# Patient Record
Sex: FEMALE | Race: BLACK OR AFRICAN AMERICAN | Employment: UNEMPLOYED | URBAN - METROPOLITAN AREA
[De-identification: names, ages, dates, MRNs, and addresses within clinical notes are randomized per-mention and may not be internally consistent; named-entity substitution may affect disease eponyms.]

---

## 2017-01-06 ENCOUNTER — OFFICE VISIT (OUTPATIENT)
Dept: INTERNAL MEDICINE | Facility: CLINIC | Age: 47
End: 2017-01-06
Payer: MEDICARE

## 2017-01-06 VITALS
HEART RATE: 82 BPM | SYSTOLIC BLOOD PRESSURE: 128 MMHG | WEIGHT: 293 LBS | BODY MASS INDEX: 50.02 KG/M2 | HEIGHT: 64 IN | OXYGEN SATURATION: 97 % | TEMPERATURE: 98.6 F | DIASTOLIC BLOOD PRESSURE: 88 MMHG

## 2017-01-06 DIAGNOSIS — M17.0 PRIMARY OSTEOARTHRITIS OF BOTH KNEES: ICD-10-CM

## 2017-01-06 DIAGNOSIS — Z01.818 PREOP GENERAL PHYSICAL EXAM: Primary | ICD-10-CM

## 2017-01-06 DIAGNOSIS — E78.5 HYPERLIPIDEMIA LDL GOAL <130: ICD-10-CM

## 2017-01-06 DIAGNOSIS — Z98.84 GASTRIC BYPASS STATUS FOR OBESITY: ICD-10-CM

## 2017-01-06 DIAGNOSIS — G47.33 OSA (OBSTRUCTIVE SLEEP APNEA): ICD-10-CM

## 2017-01-06 DIAGNOSIS — K59.00 CONSTIPATION, UNSPECIFIED CONSTIPATION TYPE: ICD-10-CM

## 2017-01-06 DIAGNOSIS — I10 ESSENTIAL HYPERTENSION, BENIGN: ICD-10-CM

## 2017-01-06 DIAGNOSIS — E88.810 DYSMETABOLIC SYNDROME X: ICD-10-CM

## 2017-01-06 DIAGNOSIS — E61.1 IRON DEFICIENCY: ICD-10-CM

## 2017-01-06 DIAGNOSIS — K59.03 DRUG-INDUCED CONSTIPATION: ICD-10-CM

## 2017-01-06 DIAGNOSIS — E53.8 VITAMIN B12 DEFICIENCY WITHOUT ANEMIA: ICD-10-CM

## 2017-01-06 DIAGNOSIS — E55.9 VITAMIN D DEFICIENCY: ICD-10-CM

## 2017-01-06 DIAGNOSIS — E54 ASCORBIC ACID DEFICIENCY: ICD-10-CM

## 2017-01-06 LAB
ALBUMIN SERPL-MCNC: 3.3 G/DL (ref 3.4–5)
ALP SERPL-CCNC: 79 U/L (ref 40–150)
ALT SERPL W P-5'-P-CCNC: 23 U/L (ref 0–50)
ANION GAP SERPL CALCULATED.3IONS-SCNC: 9 MMOL/L (ref 3–14)
AST SERPL W P-5'-P-CCNC: 12 U/L (ref 0–45)
BILIRUB SERPL-MCNC: 0.3 MG/DL (ref 0.2–1.3)
BUN SERPL-MCNC: 27 MG/DL (ref 7–30)
CALCIUM SERPL-MCNC: 9.4 MG/DL (ref 8.5–10.1)
CHLORIDE SERPL-SCNC: 101 MMOL/L (ref 94–109)
CO2 SERPL-SCNC: 27 MMOL/L (ref 20–32)
CREAT SERPL-MCNC: 0.98 MG/DL (ref 0.52–1.04)
ERYTHROCYTE [DISTWIDTH] IN BLOOD BY AUTOMATED COUNT: 12.9 % (ref 10–15)
GFR SERPL CREATININE-BSD FRML MDRD: 61 ML/MIN/1.7M2
GLUCOSE SERPL-MCNC: 102 MG/DL (ref 70–99)
HCT VFR BLD AUTO: 39.4 % (ref 35–47)
HGB BLD-MCNC: 13.3 G/DL (ref 11.7–15.7)
LDLC SERPL DIRECT ASSAY-MCNC: 168 MG/DL
MCH RBC QN AUTO: 35.8 PG (ref 26.5–33)
MCHC RBC AUTO-ENTMCNC: 33.8 G/DL (ref 31.5–36.5)
MCV RBC AUTO: 106 FL (ref 78–100)
PLATELET # BLD AUTO: 295 10E9/L (ref 150–450)
POTASSIUM SERPL-SCNC: 4.6 MMOL/L (ref 3.4–5.3)
PROT SERPL-MCNC: 7.6 G/DL (ref 6.8–8.8)
RBC # BLD AUTO: 3.72 10E12/L (ref 3.8–5.2)
SODIUM SERPL-SCNC: 137 MMOL/L (ref 133–144)
T4 FREE SERPL-MCNC: 1.04 NG/DL (ref 0.76–1.46)
TSH SERPL DL<=0.05 MIU/L-ACNC: 3.34 MU/L (ref 0.4–4)
VIT B12 SERPL-MCNC: 395 PG/ML (ref 193–986)
WBC # BLD AUTO: 11.8 10E9/L (ref 4–11)

## 2017-01-06 PROCEDURE — 82306 VITAMIN D 25 HYDROXY: CPT | Performed by: INTERNAL MEDICINE

## 2017-01-06 PROCEDURE — 82728 ASSAY OF FERRITIN: CPT | Performed by: INTERNAL MEDICINE

## 2017-01-06 PROCEDURE — 83540 ASSAY OF IRON: CPT | Performed by: INTERNAL MEDICINE

## 2017-01-06 PROCEDURE — 99000 SPECIMEN HANDLING OFFICE-LAB: CPT | Performed by: INTERNAL MEDICINE

## 2017-01-06 PROCEDURE — 84443 ASSAY THYROID STIM HORMONE: CPT | Performed by: INTERNAL MEDICINE

## 2017-01-06 PROCEDURE — 82607 VITAMIN B-12: CPT | Performed by: INTERNAL MEDICINE

## 2017-01-06 PROCEDURE — 85027 COMPLETE CBC AUTOMATED: CPT | Performed by: INTERNAL MEDICINE

## 2017-01-06 PROCEDURE — 93000 ELECTROCARDIOGRAM COMPLETE: CPT | Performed by: INTERNAL MEDICINE

## 2017-01-06 PROCEDURE — 36415 COLL VENOUS BLD VENIPUNCTURE: CPT | Performed by: INTERNAL MEDICINE

## 2017-01-06 PROCEDURE — 83721 ASSAY OF BLOOD LIPOPROTEIN: CPT | Performed by: INTERNAL MEDICINE

## 2017-01-06 PROCEDURE — 99215 OFFICE O/P EST HI 40 MIN: CPT | Performed by: INTERNAL MEDICINE

## 2017-01-06 PROCEDURE — 83550 IRON BINDING TEST: CPT | Performed by: INTERNAL MEDICINE

## 2017-01-06 PROCEDURE — 82180 ASSAY OF ASCORBIC ACID: CPT | Mod: 90 | Performed by: INTERNAL MEDICINE

## 2017-01-06 PROCEDURE — 84439 ASSAY OF FREE THYROXINE: CPT | Performed by: INTERNAL MEDICINE

## 2017-01-06 PROCEDURE — 80053 COMPREHEN METABOLIC PANEL: CPT | Performed by: INTERNAL MEDICINE

## 2017-01-06 RX ORDER — AMOXICILLIN 250 MG
1-2 CAPSULE ORAL 2 TIMES DAILY
Qty: 120 TABLET | Status: ON HOLD | OUTPATIENT
Start: 2017-01-06 | End: 2019-01-29

## 2017-01-06 RX ORDER — POLYETHYLENE GLYCOL 3350 17 G/17G
1 POWDER, FOR SOLUTION ORAL DAILY
Qty: 1 BOTTLE | Status: SHIPPED
Start: 2017-01-06 | End: 2019-12-12

## 2017-01-06 RX ORDER — ROSUVASTATIN CALCIUM 40 MG/1
40 TABLET, COATED ORAL DAILY
Qty: 90 TABLET | Status: SHIPPED
Start: 2017-01-06 | End: 2018-09-27

## 2017-01-06 RX ORDER — CYANOCOBALAMIN 1000 UG/ML
1 INJECTION, SOLUTION INTRAMUSCULAR; SUBCUTANEOUS
Qty: 30 ML | Refills: 5 | Status: SHIPPED | OUTPATIENT
Start: 2017-01-06 | End: 2017-02-07

## 2017-01-06 RX ORDER — HYDROCODONE BITARTRATE AND ACETAMINOPHEN 10; 325 MG/1; MG/1
1 TABLET ORAL 3 TIMES DAILY
COMMUNITY
End: 2018-02-12

## 2017-01-06 NOTE — PATIENT INSTRUCTIONS
** FOLLOW UP PLAN**:    PLEASE SCHEDULE OFFICE VISIT 1 MONTH FROM TODAY TO FOLLOW UP POST OPERATIVELY        YOU MAY CONTACT THE CLINIC IF ANY QUESTIONS OR CONCERNS -[phone] OR VIA [facility]           Before Your Surgery      Call your surgeon if there is any change in your health. This includes signs of a cold or flu (such as a sore throat, runny nose, cough, rash or fever).    Do not smoke, drink alcohol or take over the counter medicine (unless your surgeon or primary care doctor tells you to) for the 24 hours before and after surgery.    If you take prescribed drugs: Follow your doctor s orders about which medicines to take and which to stop until after surgery.    Eating and drinking prior to surgery: follow the instructions from your surgeon    Take a shower or bath the night before surgery. Use the soap your surgeon gave you to gently clean your skin. If you do not have soap from your surgeon, use your regular soap. Do not shave or scrub the surgery site.  Wear clean pajamas and have clean sheets on your bed.

## 2017-01-06 NOTE — NURSING NOTE
"Chief Complaint   Patient presents with     Pre-Op Exam     01/18/17 weight loss surgery - Dr Amaro at [facility] - needs Rx for a shower chair        Initial /88 mmHg  Pulse 82  Temp(Src) 98.6  F (37  C) (Oral)  Ht   Wt   SpO2 97%  LMP   Breastfeeding? No Estimated body mass index is 51.54 kg/(m^2) as calculated from the following:    Height as of 7/16/15: 5' 4\" (1.626 m).    Weight as of 5/24/16: 300 lb 6.4 oz (136.261 kg)..  BP completed using cuff size: regular - took BP on right wrist  RISA Vicente LPN    "

## 2017-01-06 NOTE — PROGRESS NOTES
[address] [address]  [phone]  Dept: [phone]    PRE-OP EVALUATION:  Today's date: 2017    Christina Fletcher (: 1970) presents for pre-operative evaluation assessment as requested by Dr. Amaro.  She requires evaluation and anesthesia risk assessment prior to undergoing surgery/procedure for treatment of obesity .  Proposed procedure: bariatric surgery    Date of Surgery/ Procedure: 17  Time of Surgery/ Procedure: to be determined  Hospital/Surgical Facility: [facility]  Fax number for surgical facility: [phone]  Primary Physician: Abdullahi Saini  Type of Anesthesia Anticipated: General    Patient has a Health Care Directive or Living Will:  NO    1. NO - Do you have a history of heart attack, stroke, stent, bypass or surgery on an artery in the head, neck, heart or legs?  2. YES - DO YOU EVER HAVE ANY PAIN OR DISCOMFORT IN YOUR CHEST? sometimes  3. NO - Do you have a history of  Heart Failure?  4. YES - ARE YOUR TROUBLED BY SHORTNESS OF BREATH WHEN WALKING ON THE LEVEL, UP A SLIGHT HILL OR AT NIGHT? Slight hill  5. NO - Do you currently have a cold, bronchitis or other respiratory infection?  6. NO - Do you have a cough, shortness of breath or wheezing?  7. NO - Do you sometimes get pains in the calves of your legs when you walk?  8. YES - DO YOU OR ANYONE IN YOUR FAMILY HAVE PREVIOUS HISTORY OF BLOOD CLOTS? PE - takes Coumadin  9. NO - Do you or does anyone in your family have a serious bleeding problem such as prolonged bleeding following surgeries or cuts?  10. YES - HAVE YOU EVER HAD PROBLEMS WITH ANEMIA OR BEEN TOLD TO TAKE IRON PILLS? Dr Saini  11. YES - HAVE YOU HAD ANY ABNORMAL BLOOD LOSS SUCH AS BLACK, TARRY OR BLOODY STOOLS, OR ABNORMAL VAGINAL BLEEDING? Vaginal bleeding - irregular periods  12. NO - Have you ever had a blood transfusion?  13. YES - HAVE YOU OR ANY OF YOUR RELATIVES EVER HAD PROBLEMS WITH  ANESTHESIA? Patient - feels that she woke up during a surgery once  14. YES - DO YOU HAVE SLEEP APNEA, EXCESSIVE SNORING OR DAYTIME DROWSINESS? Sleep apnea  15. NO - Do you have any prosthetic heart valves?  16. NO - Do you have prosthetic joints?  17. NO - Is there any chance that you may be pregnant?      HPI:                                                      Brief HPI related to upcoming procedure: Christina has struggled with weight issues over many years since 2009 after injuring her knees and being unable to exercise. She is scheduled for surgery on 01/18/17.      HYPERTENSION - Patient has longstanding history of mod-severe HTN , currently denies any symptoms referable to elevated blood pressure. Specifically denies chest pain, palpitations, dyspnea, orthopnea, PND or peripheral edema. Blood pressure readings have been in normal range. Current medication regimen is as listed below. Patient denies any side effects of medication.                                                                                                                                                                                          .  HYPERLIPIDEMIA - Patient has a long history of significant Hyperlipidemia requiring medication for treatment with recent poor control. Patient reports no problems or side effects with the medication.                                                                                                                                                       .    MEDICAL HISTORY:                                                      Patient Active Problem List    Diagnosis Date Noted     Eczema 05/24/2016     Priority: Medium     Hyponatremia 05/24/2016     Priority: Medium     Chronic kidney disease, stage III (moderate) 05/24/2016     Priority: Medium     Neuropathy (H) 05/24/2016     Priority: Medium     Morbid obesity due to excess calories (H) 05/24/2016     Priority: Medium     Vitamin C deficiency  05/24/2016     Priority: Medium     Osteoarthritis of both knees, unspecified osteoarthritis type 05/24/2016     Priority: Medium     Hypomagnesemia 05/24/2016     Priority: Medium     Essential hypertension with goal blood pressure less than 130/80 05/24/2016     Priority: Medium     Eczema, unspecified type 05/24/2016     Priority: Medium     Major depressive disorder, recurrent episode, moderate (H) 05/24/2016     Priority: Medium     Moderate major depression (H) 01/13/2015     Priority: Medium     Hyperlipidemia with target LDL less than 130 01/13/2015     Priority: Medium     Major depressive disorder, single episode, moderate (H) 11/20/2014     Priority: Medium     BMI 50.0-59.9, adult (H) 08/08/2014     Priority: Medium     KAROLINA on CPAP 05/02/2014     Priority: Medium     KAROLINA (obstructive sleep apnea) 03/25/2014     Priority: Medium     Hyperlipidemia LDL goal <130 03/25/2014     Priority: Medium     Knee pain 10/30/2013     Priority: Medium     OA (osteoarthritis) of knee 09/05/2013     Priority: Medium     Ascorbic acid deficiency 09/05/2013     Priority: Medium     Diagnosis updated by automated process. Provider to review and confirm.       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 Provider to review and confirm.       Iron deficiency 11/20/2012     Priority: Medium     Vitamin D deficiency 11/20/2012     Priority: Medium     Right facial swelling 11/20/2012     Priority: Medium     FILLING FELL OUT, FACIALPAIN , FEVER, AND CHILLS, SUSPECT DENTAL  ABSCESS       Elevated MCV 11/01/2012     Priority: Medium     Fibroids 11/01/2012     Priority: Medium     Fatigue 11/01/2012     Priority: Medium     Menorrhagia 11/01/2012     Priority: Medium     HTN (hypertension) 11/01/2012     Priority: Medium     Gastric bypass status for obesity 11/01/2012     Priority: Medium     CARDIOVASCULAR SCREENING; LDL GOAL LESS THAN 160 11/01/2012     Priority: Medium     Lower extremity edema 08/15/2011     Priority: Medium     Obesity 05/06/2010     Priority: Medium     Chronic constipation 05/06/2010     Priority: Medium     GERD (gastroesophageal reflux disease) 05/06/2010     Priority: Medium     Adjustment disorder with depressed mood 04/04/2008     Priority: Medium     Other disorder of menstruation and other abnormal bleeding from female genital tract 01/17/2008     Priority: Medium     Cramping, hx of fibroids, US___       Female genital symptoms 04/30/2007     Priority: Medium     Problem list name updated by automated process. Provider to review        Past Medical History   Diagnosis Date     Intramural leiomyoma of uterus      ABDOMINAL PAIN OTHER SPEC SITE 4/4/2008     Adjustment disorder with depressed mood 4/4/2008     Obesity 5/6/2010     Other disorder of menstruation and other abnormal bleeding from female genital tract 1/17/2008     Chronic constipation 5/6/2010     GERD (gastroesophageal reflux disease) 5/6/2010     Lower extremity edema 8/15/2011     Osteoarthritis, knee      ACL (anterior cruciate ligament) tear 2007     Past Surgical History   Procedure Laterality Date     C laparoscopic gastric restrictive px, w/gastric bypass/ irene-en-y, < 150cm  2001     Lap Banding      Debride assoc fx/dislo skin/mus/bone  1990's     Infected - Right Femur     Hysteroscopy,ablation endometrium  2008     Current Outpatient Prescriptions   Medication Sig Dispense Refill     HYDROcodone-acetaminophen (NORCO)  MG per tablet Take 1 tablet by mouth 3 times daily        Emollient (AVEENO ACTIVE JOEL SKIN RELIEF) CREA Externally apply 1 Application topically 2 times daily INDICATION: ECZEMA 1 Bottle PRN     Nebivolol HCl 20 MG TABS Take 20 mg by mouth daily INDICATION: TO LOWER BLOOD PRESSURE 90 tablet 2     simvastatin (ZOCOR) 20 MG tablet Take 1 tablet (20 mg) by mouth At Bedtime INDICATION: TO LOWER CHOLESTEROL AND TO HELP REDUCE RISK OF HEART ATTACK AND STROKE 90 tablet prn     chlorthalidone (HYGROTON) 50 MG tablet Take 1 tablet (50 mg) by mouth daily INDICATION: TO LOWER BLOOD PRESSURE 30 tablet 1     magnesium oxide (MAG-OX) 400 MG tablet INDICATION: TO TREAT LOW MAGNESIUM, TAKE 1 PILL TWICE DAILY 180 tablet 3     cyanocobalamin (VITAMIN B12) 1000 MCG/ML injection Inject 1 mL (1,000 mcg) into the muscle every 14 days INDICATION: FOR VITAMIN B12 SUPPLEMENTATION 30 mL 5     spironolactone (ALDACTONE) 100 MG tablet Take 1 tablet (100 mg) by mouth 2 times daily INDICATION: TO LOWER BLOOD PRESSURE 180 tablet 3     Calcium Carb-Cholecalciferol (CALCIUM 1000 + D) 1000-800 MG-UNIT TABS Take 1 tablet by mouth daily TAKE WITH FOOD, FOR BONE HEALTH AND FOR VITAMIN D SUPPLEMENTATION 100 tablet PRN     buPROPion (WELLBUTRIN XL) 300 MG 24 hr tablet Take 1 tablet (300 mg) by mouth every morning INDICATION: TO CONTROL SYMPTOMS OF DEPRESSION 90 tablet 3     senna-docusate (SENOKOT-S;PERICOLACE) 8.6-50 MG per tablet Take 1-2 tablets by mouth 2 times daily INDICATION: CONSTIPATION, TO ACHIEVE 1-2 SOFT BMs PER  tablet prn     tiZANidine (ZANAFLEX) 2 MG tablet   2     warfarin (COUMADIN) 5 MG tablet Take 1.5 tablets (7.5 mg) by mouth daily Take 1 to 1 1/2 tablets by mouth daily as directed by the anticoagulation clinicINDICATION: ANTICOAGULATION 180 tablet 0     cholecalciferol (VITAMIN D3) 52265 UNITS capsule TAKE ONE CAP 3 X A MONTH ON THE 1ST, 10TH AND 20TH OF EACH MONTH, FOR VITAMIN D DEFICIENCY 60 capsule 0     ORDER FOR DME Equipment being ordered: thermometer 1 Device 0     Ascorbic  "Acid Buffered (VITAMIN C EFFERVESCENT) PDEF Take 1 packet by mouth 2 times daily (before meals) EMERGEN- C, INDICATION: VITAMIN C SUPPLEMENTATION AND TO AID ABSORPTION OF IRON SUPPLEMENT 90 g PRN     ORDER FOR DME Equipment being ordered: Automatic blood pressure/pulse monitor 1 Units NA     triamcinolone (KENALOG) 0.1 % cream Apply sparingly once or twice per day as needed to affected area until the skin is better, then stop 30 g 0     diclofenac (VOLTAREN) 1 % GEL Apply topically 4 times daily       Syringe/Needle, Disp, (BD ECLIPSE SYRINGE) 25G X 5/8\" 3 ML MISC 1,000 mcg See Admin Instructions AS DIRECTED FOR B12 SHOTS 20 each 11     ORDER FOR DME Equipment being ordered: shower chair, knee brace 1 Device 0     B Complex Vitamins (B COMPLEX 50) TABS Take 1 tablet by mouth daily. INDICATION: VITAMIN SUPPLEMENT 100 tablet PRN     Multiple Vitamin (MULTI-VITAMIN DAILY PO) Take 2 tablets by mouth daily        celecoxib (CELEBREX) 200 MG capsule Take 1 capsule (200 mg) by mouth 2 times daily as needed for pain 60 capsule prn     HYDROcodone-acetaminophen (NORCO) 7.5-325 MG per tablet Take 1 tablet by mouth every 6 hours as needed for pain 42 tablet 0     [DISCONTINUED] hydrALAZINE (APRESOLINE) 100 MG TABS Take 1 tablet (100 mg) by mouth 2 times daily INDICATION: TO LOWER BLOOD PRESSURE 60 tablet prn     [DISCONTINUED] metoprolol (TOPROL-XL) 100 MG 24 hr tablet Take 1 tablet (100 mg) by mouth 2 times daily INDICATION:TO LOWER BLOOD PRESSURE AND TO PREVENT HEART DISEASE 60 tablet PRN     [DISCONTINUED] Spironolactone-HCTZ 50-50 MG TABS Take 1 tablet by mouth every morning INDICATION: TO LOWER BLOOD PRESSURE 30 tablet PRN     ORDER FOR DME Equipment being ordered: wheeled walker with seat 1 Device 0     OTC products: None, except as noted above and Aspirin    Allergies   Allergen Reactions     Hydralazine Shortness Of Breath     WITH ASSOCIATED NAUSEA AND VOMITING     Penicillin [Esters]       Latex Allergy: NO    Social " History   Substance Use Topics     Smoking status: Never Smoker      Smokeless tobacco: Never Used     Alcohol Use: 0.0 oz/week     0 Standard drinks or equivalent per week      Comment: occ      History   Drug Use No       REVIEW OF SYSTEMS:                                                    14 point ROS negative except for OA issues, states that she needs a shower chair.      EXAM:                                                    /88 mmHg  Pulse 82  Temp(Src) 98.6  F (37  C) (Oral)  Ht   Wt   SpO2 97%  LMP   Breastfeeding? No    GENERAL APPEARANCE: healthy, alert and no distress     EYES: EOMI,- PERRL     HENT: ear canals and TM's normal and nose and mouth without ulcers or lesions     NECK: no adenopathy, no asymmetry, masses, or scars and thyroid normal to palpation     RESP: lungs clear to auscultation - no rales, rhonchi or wheezes     CV: regular rates and rhythm, normal S1 S2, no S3 or S4 and no murmur, click or rub -     ABDOMEN: tender L flank, no rebound     MS: extremities normal- no gross deformities noted, no evidence of inflammation in joints, FROM in all extremities.     SKIN: no suspicious lesions or rashes     NEURO: Normal strength and tone, sensory exam grossly normal, mentation intact and speech normal     PSYCH: mentation appears normal. and affect normal/bright     LYMPHATICS: No axillary, cervical, inguinal, or supraclavicular nodes    DIAGNOSTICS:                                                      EKG: appears normal, NSR, normal axis, normal intervals, no acute ST/T changes c/w ischemia, no LVH by voltage criteria, unchanged from previous tracings  Labs Drawn and in Process:   Unresulted Labs Ordered in the Past 30 Days of this Admission     No orders found from 11/8/2016 to 1/7/2017.          Recent Labs   Lab Test  05/24/16   1209  03/25/16   1229  07/16/15   1345   05/18/15   1517 02/23/15  12/22/14   03/25/14   1713   HGB   --    --    --    --   13.6   --    --    --     --   13.8   PLT   --    --    --    --   266   --    --    --    --   307   INR   --    --    --    --    --   4.0*   --   3.5*   < >   --    NA  135   --   135   < >  132*   --    < >   --    < >  139   POTASSIUM  4.6   --   4.2   < >  4.4   --    < >   --    < >  3.8   CR  1.14*   --   1.00   < >  0.96   --    < >   --    < >  0.74   A1C   --   5.9   --    --    --    --    --    --    --    --     < > = values in this interval not displayed.        IMPRESSION:                                                    Reason for surgery/procedure: RADHA-EN-Y SURGERY  Diagnosis/reason for consult: OBTAIN PREOPERATIVE MEDICAL CLEARANCE AND TO ASSESS CARDIAC RISK    The proposed surgical procedure is considered INTERMEDIATE risk.    REVISED CARDIAC RISK INDEX  The patient has the following serious cardiovascular risks for perioperative complications such as (MI, PE, VFib and 3  AV Block):  No serious cardiac risks  INTERPRETATION: 1 risks: Class II (low risk - 0.9% complication rate)    The patient has the following additional risks for perioperative complications:  No identified additional risks    No diagnosis found.    RECOMMENDATIONS:                                                      --Consult hospital rounder / IM to assist post-op medical management    Obstructive Sleep Apnea (or suspected sleep apnea)  Patient is to bring their home CPAP with them on the day of surgery      --Patient is to take all scheduled medications on the day of surgery EXCEPT for modifications listed below.    APPROVAL GIVEN to proceed with proposed procedure, without further diagnostic evaluation       Signed Electronically by: Abdullahi Saini MD    Copy of this evaluation report is provided to requesting physician.    [facility] Preop Guidelines

## 2017-01-06 NOTE — MR AVS SNAPSHOT
After Visit Summary   1/6/2017    Christina Fletcher    MRN: [id number]           Patient Information     Date Of Birth          1970        Visit Information        Provider Department      1/6/2017 2:00 PM Abdullahi Saini MD [facility]        Today's Diagnoses     Preop general physical exam    -  1     Vitamin D deficiency         Gastric bypass status for obesity         Hyperlipidemia LDL goal <130         Dysmetabolic syndrome X         Vitamin B12 deficiency without anemia         Iron deficiency         Ascorbic acid deficiency         Essential hypertension, benign         Drug-induced constipation         Constipation, unspecified constipation type         KAROLINA (obstructive sleep apnea)           Care Instructions      ** FOLLOW UP PLAN**:    PLEASE SCHEDULE OFFICE VISIT 1 MONTH FROM TODAY TO FOLLOW UP POST OPERATIVELY        YOU MAY CONTACT THE CLINIC IF ANY QUESTIONS OR CONCERNS -[phone] OR VIA MoviePass           Before Your Surgery      Call your surgeon if there is any change in your health. This includes signs of a cold or flu (such as a sore throat, runny nose, cough, rash or fever).    Do not smoke, drink alcohol or take over the counter medicine (unless your surgeon or primary care doctor tells you to) for the 24 hours before and after surgery.    If you take prescribed drugs: Follow your doctor s orders about which medicines to take and which to stop until after surgery.    Eating and drinking prior to surgery: follow the instructions from your surgeon    Take a shower or bath the night before surgery. Use the soap your surgeon gave you to gently clean your skin. If you do not have soap from your surgeon, use your regular soap. Do not shave or scrub the surgery site.  Wear clean pajamas and have clean sheets on your bed.         Follow-ups after your visit        Who to contact     If you have questions or need follow up information about today's  "clinic visit or your schedule please contact [facility] directly at [phone].  Normal or non-critical lab and imaging results will be communicated to you by MyChart, letter or phone within 4 business days after the clinic has received the results. If you do not hear from us within 7 days, please contact the clinic through [employer] or phone. If you have a critical or abnormal lab result, we will notify you by phone as soon as possible.  Submit refill requests through [employer] or call your pharmacy and they will forward the refill request to us. Please allow 3 business days for your refill to be completed.          Additional Information About Your Visit        MyChart Information     Be[employer]ance lets you send messages to your doctor, view your test results, renew your prescriptions, schedule appointments and more. To sign up, go to [email or web address] . Click on \"Log in\" on the left side of the screen, which will take you to the Welcome page. Then click on \"Sign up Now\" on the right side of the page.     You will be asked to enter the access code listed below, as well as some personal information. Please follow the directions to create your username and password.     Your access code is: [email or web address]  Expires: 2017  3:47 PM     Your access code will  in 90 days. If you need help or a new code, please call your [facility] clinic or [phone].        Care EveryWhere ID     This is your Care EveryWhere ID. This could be used by other organizations to access your [facility] medical records  [id number]        Your Vitals Were     Pulse Temperature Pulse Oximetry Breastfeeding?          82 98.6  F (37  C) (Oral) 97% No         Blood Pressure from Last 3 Encounters:   17 128/88   16 124/88   16 128/60    Weight from Last 3 Encounters:   16 300 lb 6.4 oz (136.261 kg)   01/13/15 291 lb (131.997 kg)   14 295 lb (133.811 kg)              We Performed the " Following     CBC with platelets     Comprehensive metabolic panel     EKG 12-lead complete w/read - Clinics     Ferritin     Iron and iron binding capacity     LDL cholesterol direct     T4 FREE     TSH     Vitamin B12     Vitamin C     Vitamin D Deficiency          Today's Medication Changes          These changes are accurate as of: 1/6/17  3:47 PM.  If you have any questions, ask your nurse or doctor.               Start taking these medicines.        Dose/Directions    polyethylene glycol powder   Commonly known as:  MIRALAX   Used for:  Drug-induced constipation   Started by:  Abdullahi Saini MD        Dose:  1 capful   Take 17 g (1 capful) by mouth daily INDICATION: CONSTIPATION, TO ACHIEVE 1-2 SOFT BMs PER DAY   Quantity:  1 Bottle   Refills:  PRN       rosuvastatin 40 MG tablet   Commonly known as:  CRESTOR   Used for:  Hyperlipidemia LDL goal <130   Replaces:  simvastatin 20 MG tablet   Started by:  Abdullahi Saini MD        Dose:  40 mg   Take 1 tablet (40 mg) by mouth daily INDICATION: TO LOWER CHOLESTEROL AND TO HELP REDUCE RISK OF HEART ATTACK AND STROKE   Quantity:  90 tablet   Refills:  PRN         Stop taking these medicines if you haven't already. Please contact your care team if you have questions.     celecoxib 200 MG capsule   Commonly known as:  celeBREX   Stopped by:  Abdullahi Saini MD           simvastatin 20 MG tablet   Commonly known as:  ZOCOR   Replaced by:  rosuvastatin 40 MG tablet   Stopped by:  Abdullahi Saini MD                Where to get your medicines      These medications were sent to [facility] [address] [address]     Phone:  [phone]    - cholecalciferol 66930 UNITS capsule  - cyanocobalamin 1000 MCG/ML injection  - polyethylene glycol powder  - rosuvastatin 40 MG tablet  - senna-docusate 8.6-50 MG per tablet             Primary Care Provider Office Phone # Fax #    Abdullahi Saini  [phone]       [facility] [address] [facility] [address]        Thank you!     Thank you for choosing [facility]  for your care. Our goal is always to provide you with excellent care. Hearing back from our patients is one way we can continue to improve our services. Please take a few minutes to complete the written survey that you may receive in the mail after your visit with us. Thank you!             Your Updated Medication List - Protect others around you: Learn how to safely use, store and throw away your medicines at www.disposemymeds.org.          This list is accurate as of: 1/6/17  3:47 PM.  Always use your most recent med list.                   Brand Name Dispense Instructions for use    AVEENO ACTIVE JOEL SKIN RELIEF Crea     1 Bottle    Externally apply 1 Application topically 2 times daily INDICATION: ECZEMA       B COMPLEX 50 Tabs     100 tablet    Take 1 tablet by mouth daily. INDICATION: VITAMIN SUPPLEMENT       buPROPion 300 MG 24 hr tablet    WELLBUTRIN XL    90 tablet    Take 1 tablet (300 mg) by mouth every morning INDICATION: TO CONTROL SYMPTOMS OF DEPRESSION       Calcium Carb-Cholecalciferol 1000-800 MG-UNIT Tabs    CALCIUM 1000 + D    100 tablet    Take 1 tablet by mouth daily TAKE WITH FOOD, FOR BONE HEALTH AND FOR VITAMIN D SUPPLEMENTATION       chlorthalidone 50 MG tablet    HYGROTON    30 tablet    Take 1 tablet (50 mg) by mouth daily INDICATION: TO LOWER BLOOD PRESSURE       cholecalciferol 92394 UNITS capsule    VITAMIN D3    60 capsule    TAKE ONE CAP 3 X A MONTH ON THE 1ST, 10TH AND 20TH OF EACH MONTH, FOR VITAMIN D DEFICIENCY       cyanocobalamin 1000 MCG/ML injection    VITAMIN B12    30 mL    Inject 1 mL (1,000 mcg) into the muscle every 14 days INDICATION: FOR VITAMIN B12 SUPPLEMENTATION       * NORCO  MG per tablet   Generic drug:  HYDROcodone-acetaminophen      Take 1 tablet by mouth 3 times daily       *  "HYDROcodone-acetaminophen 7.5-325 MG per tablet    NORCO    42 tablet    Take 1 tablet by mouth every 6 hours as needed for pain       magnesium oxide 400 MG tablet    MAG-OX    180 tablet    INDICATION: TO TREAT LOW MAGNESIUM, TAKE 1 PILL TWICE DAILY       MULTI-VITAMIN DAILY PO      Take 2 tablets by mouth daily       Nebivolol HCl 20 MG Tabs     90 tablet    Take 20 mg by mouth daily INDICATION: TO LOWER BLOOD PRESSURE       * order for DME     1 Device    Equipment being ordered: shower chair, knee brace       * order for DME     1 Device    Equipment being ordered: wheeled walker with seat       * order for DME     1 Units    Equipment being ordered: Automatic blood pressure/pulse monitor       order for DME     1 Device    Equipment being ordered: thermometer       polyethylene glycol powder    MIRALAX    1 Bottle    Take 17 g (1 capful) by mouth daily INDICATION: CONSTIPATION, TO ACHIEVE 1-2 SOFT BMs PER DAY       rosuvastatin 40 MG tablet    CRESTOR    90 tablet    Take 1 tablet (40 mg) by mouth daily INDICATION: TO LOWER CHOLESTEROL AND TO HELP REDUCE RISK OF HEART ATTACK AND STROKE       senna-docusate 8.6-50 MG per tablet    SENOKOT-S;PERICOLACE    120 tablet    Take 1-2 tablets by mouth 2 times daily INDICATION: CONSTIPATION, TO ACHIEVE 1-2 SOFT BMs PER DAY       spironolactone 100 MG tablet    ALDACTONE    180 tablet    Take 1 tablet (100 mg) by mouth 2 times daily INDICATION: TO LOWER BLOOD PRESSURE       Syringe/Needle (Disp) 25G X 5/8\" 3 ML Misc    BD ECLIPSE SYRINGE    20 each    1,000 mcg See Admin Instructions AS DIRECTED FOR B12 SHOTS       tiZANidine 2 MG tablet    ZANAFLEX         triamcinolone 0.1 % cream    KENALOG    30 g    Apply sparingly once or twice per day as needed to affected area until the skin is better, then stop       VITAMIN C EFFERVESCENT Pdef     90 g    Take 1 packet by mouth 2 times daily (before meals) EMERGEN- C, INDICATION: VITAMIN C SUPPLEMENTATION AND TO AID ABSORPTION " OF IRON SUPPLEMENT       VOLTAREN 1 % Gel topical gel   Generic drug:  diclofenac      Apply topically 4 times daily       warfarin 5 MG tablet    COUMADIN    180 tablet    Take 1.5 tablets (7.5 mg) by mouth daily Take 1 to 1 1/2 tablets by mouth daily as directed by the anticoagulation clinicINDICATION: ANTICOAGULATION       * Notice:  This list has 5 medication(s) that are the same as other medications prescribed for you. Read the directions carefully, and ask your doctor or other care provider to review them with you.

## 2017-01-07 LAB
FERRITIN SERPL-MCNC: 170 NG/ML (ref 8–252)
IRON SATN MFR SERPL: 14 % (ref 15–46)
IRON SERPL-MCNC: 53 UG/DL (ref 35–180)
TIBC SERPL-MCNC: 381 UG/DL (ref 240–430)

## 2017-01-09 LAB — DEPRECATED CALCIDIOL+CALCIFEROL SERPL-MC: 42 UG/L (ref 20–75)

## 2017-01-10 ENCOUNTER — TELEPHONE (OUTPATIENT)
Dept: INTERNAL MEDICINE | Facility: CLINIC | Age: 47
End: 2017-01-10

## 2017-01-10 LAB — VIT C SERPL-MCNC: 29 MG/DL

## 2017-01-10 NOTE — PROGRESS NOTES
Quick Note:    Results reviewed follow up visit scheduled. Will discuss results at next office visit  ______

## 2017-01-10 NOTE — TELEPHONE ENCOUNTER
Reason for Call:  Other has questions about her lab  Pt going into hospital 1/18/17    Detailed comments has questions about her labs before going into hosptal    Phone Number Patient can be reached at [phone]    Best Time asap     Can we leave a detailed message on this number? YES    Call taken on 1/10/2017 at 11:36 AM by Vidhya Burton

## 2017-01-13 ENCOUNTER — TELEPHONE (OUTPATIENT)
Dept: INTERNAL MEDICINE | Facility: CLINIC | Age: 47
End: 2017-01-13

## 2017-01-13 DIAGNOSIS — Z79.01 LONG TERM CURRENT USE OF ANTICOAGULANT THERAPY: Primary | ICD-10-CM

## 2017-01-13 NOTE — TELEPHONE ENCOUNTER
Received a call form pt, asking to have INR done today.  Pt having surgery next Wed, was informed by PCP at preop, that [facility] will determine what pt should do about warfarin.    Pt stated that PCP told her INR would be done with preop labs- no INR was ordered.    Pt INR checked in [facility] nearly 2 yrs ago- Feb 2015- reading 4.0, advised return for INR 2 weeks, has never come back.    Asked pt who has been managing INR's, she said  She has not had INR checks done.  Asked who has been giving her rx's for warfarin. Noted last Rx ordered to pharm March 10, 2016 #180- 0 refills.    Pt stated, has not missed her meds, has been taking them all along????. When asked her who has been giving her the prescription, she asked if I do not believe her, she said I was accusing her of lying...  Informed pt, that due to [facility] has not managed her for nearly 2 yrs, that the information as to how many days to hold warfarin will need to come from PCP, and asked that she contact the staff in clinic, asked to have her call main number or attempted to inform her could transfer, but pt kept interrupting when [facility] nurse tried to give direction.   After 3 attempts asking to have pt listen, informed her that this conversation needed to end and she need to call back to main number of clinic to get advisement of anticoagulation..    This message is a FYI message, no response needed.

## 2017-01-14 ENCOUNTER — TELEPHONE (OUTPATIENT)
Dept: NURSING | Facility: CLINIC | Age: 47
End: 2017-01-14

## 2017-01-14 ENCOUNTER — TELEPHONE (OUTPATIENT)
Dept: INTERNAL MEDICINE | Facility: CLINIC | Age: 47
End: 2017-01-14

## 2017-01-14 NOTE — TELEPHONE ENCOUNTER
Patient that has a lot of issues re compliance. Was of the opinion that she was being seen at [facility] so that bridging anticoagulation could be coordinated through [facility]. INR not being ordered was an oversight but patient was supposed to have followed up a week ago. Patient is off coumadin as of today. Given the prescription fill history not even sure that patient is taking coumadin. So will have her come in on Monday to have INR checked and proceed from there.

## 2017-01-14 NOTE — TELEPHONE ENCOUNTER
Clinic Action Needed:  Yes, callback  FNA Triage Call  Presenting Problem:    Christina is having Surgery on Wednesday, Jan 18th.  Christina was prescribed Miralax for pre-surgery and Christina states that Miralax will not have her system cleared out by Wednesday.    Christina also has concerns about coumadin.  Please phone Christina on Monday, Jan 16th.      Routed to:  SHREYAS Eaton RN/FNA

## 2017-01-14 NOTE — TELEPHONE ENCOUNTER
Call Type: Triage Call    Presenting Problem: Christina is having Surgery on Wednesday, Jan 18th.  Christina was prescribed Miralax for pre-surgery and Christina states that  Miralax will not have her system cleared out by Wednesday.    Christina  also has concerns about coumadin.  Please phone Christina on Monday, Jan 16th.  Triage Note:  Guideline Title: No Guideline - Advice Per Reference (Adult)  Recommended Disposition: Call Provider Immediately  Original Inclination: Wanted to speak with a nurse  Override Disposition:  Intended Action: Call PCP/HCP  Physician Contacted: No  CALL PROVIDER IMMEDIATELY ?  YES  SEE ED IMMEDIATELY ? NO  ACTIVATE  ? NO  Physician Instructions:  Care Advice:

## 2017-01-16 DIAGNOSIS — Z79.01 LONG TERM CURRENT USE OF ANTICOAGULANT THERAPY: ICD-10-CM

## 2017-01-16 LAB — INR PPP: 1.04 (ref 0.86–1.14)

## 2017-01-16 PROCEDURE — 85610 PROTHROMBIN TIME: CPT | Performed by: INTERNAL MEDICINE

## 2017-01-16 PROCEDURE — 36415 COLL VENOUS BLD VENIPUNCTURE: CPT | Performed by: INTERNAL MEDICINE

## 2017-01-24 ENCOUNTER — ANTICOAGULATION THERAPY VISIT (OUTPATIENT)
Dept: ANTICOAGULATION | Facility: CLINIC | Age: 47
End: 2017-01-24
Payer: MEDICARE

## 2017-01-24 DIAGNOSIS — I26.99 PULMONARY EMBOLI (H): Primary | ICD-10-CM

## 2017-01-24 PROBLEM — Z79.01 LONG-TERM (CURRENT) USE OF ANTICOAGULANTS: Status: ACTIVE | Noted: 2017-01-24

## 2017-01-24 LAB — INR POINT OF CARE: 1.2 (ref 0.86–1.14)

## 2017-01-24 PROCEDURE — 85610 PROTHROMBIN TIME: CPT | Mod: QW

## 2017-01-24 PROCEDURE — 99207 ZZC NO CHARGE NURSE ONLY: CPT

## 2017-01-24 PROCEDURE — 36416 COLLJ CAPILLARY BLOOD SPEC: CPT

## 2017-01-24 RX ORDER — WARFARIN SODIUM 5 MG/1
5 TABLET ORAL DAILY
Qty: 35 TABLET | Refills: 0 | Status: SHIPPED | OUTPATIENT
Start: 2017-01-24 | End: 2017-03-30

## 2017-01-24 NOTE — PROGRESS NOTES
"  ANTICOAGULATION FOLLOW-UP CLINIC VISIT    Patient Name:  Christina Fletcher  Date:  1/24/2017  Contact Type:  Face to Face    SUBJECTIVE:     Patient Findings     Positives Hospitalization (pt had gastric bypass surgery on 1/18/17. When asked pt how surgery went ab [place], how she was feeling, appetite, pain level, pt stated, I am here to have [facility] get me started on coumadin.. Initiating pt's dose today, will start with \"new start protocal\".  )           OBJECTIVE    INR PROTIME   Date Value Ref Range Status   01/24/2017 1.2* 0.86 - 1.14 Final       ASSESSMENT / PLAN  INR assessment THER pt has not been on warfarin, should be at 1.2   Recheck INR In: 2 DAYS    INR Location Clinic      Anticoagulation Summary as of 1/24/2017     INR goal 2.0-3.0   Selected INR 1.2! (1/24/2017)   Maintenance plan No maintenance plan   Full instructions 1/24: 7.5 mg; 1/25: 7.5 mg; Otherwise No maintenance plan   Next INR check 1/26/2017   Target end date     Indications   Long-term (current) use of anticoagulants [Z79.01] [Z79.01]  PE (pulmonary embolism) [I26.99]         Anticoagulation Episode Summary     INR check [facility]    Preferred lab     Send INR reminders to  [facility]    Comments             See the Encounter Report to view Anticoagulation Flowsheet and Dosing Calendar (Go to Encounters tab in chart review, and find the Anticoagulation Therapy Visit)        Pinky Avendano RN                 "

## 2017-01-26 ENCOUNTER — ANTICOAGULATION THERAPY VISIT (OUTPATIENT)
Dept: ANTICOAGULATION | Facility: CLINIC | Age: 47
End: 2017-01-26
Payer: MEDICARE

## 2017-01-26 DIAGNOSIS — Z79.01 LONG-TERM (CURRENT) USE OF ANTICOAGULANTS: Primary | ICD-10-CM

## 2017-01-26 DIAGNOSIS — I26.99 PULMONARY EMBOLISM (H): ICD-10-CM

## 2017-01-26 LAB — INR POINT OF CARE: 2.2 (ref 0.86–1.14)

## 2017-01-26 PROCEDURE — 36416 COLLJ CAPILLARY BLOOD SPEC: CPT

## 2017-01-26 PROCEDURE — 85610 PROTHROMBIN TIME: CPT | Mod: QW

## 2017-01-26 PROCEDURE — 99207 ZZC NO CHARGE NURSE ONLY: CPT

## 2017-01-26 NOTE — PROGRESS NOTES
ANTICOAGULATION FOLLOW-UP CLINIC VISIT    Patient Name:  Christina Fletcher  Date:  1/26/2017  Contact Type:  Face to Face    SUBJECTIVE:     Patient Findings     Positives Initiation of therapy           OBJECTIVE    INR PROTIME   Date Value Ref Range Status   01/26/2017 2.2* 0.86 - 1.14 Final       ASSESSMENT / PLAN  INR assessment THER    Recheck INR In: 4 DAYS    INR Location Clinic      Anticoagulation Summary as of 1/26/2017     INR goal 2.0-3.0   Selected INR 2.2 (1/26/2017)   Maintenance plan No maintenance plan   Full instructions 1/26: 5 mg; 1/27: 5 mg; 1/28: 5 mg; 1/29: 5 mg; Otherwise No maintenance plan   Next INR check 1/30/2017   Target end date     Indications   Long-term (current) use of anticoagulants [Z79.01] [Z79.01]  PE (pulmonary embolism) [I26.99]         Anticoagulation Episode Summary     INR check [facility]    Preferred lab     Send INR reminders to  [facility]    Comments             See the Encounter Report to view Anticoagulation Flowsheet and Dosing Calendar (Go to Encounters tab in chart review, and find the Anticoagulation Therapy Visit)    Dosage adjustment made based on physician directed care plan.    Emily Mar RN

## 2017-02-02 ENCOUNTER — TELEPHONE (OUTPATIENT)
Dept: ANTICOAGULATION | Facility: CLINIC | Age: 47
End: 2017-02-02

## 2017-02-02 NOTE — TELEPHONE ENCOUNTER
Pt has missed last [facility] appointment.  Called placed to pt and left detailed message to follow up in the [facility].

## 2017-02-07 ENCOUNTER — OFFICE VISIT (OUTPATIENT)
Dept: INTERNAL MEDICINE | Facility: CLINIC | Age: 47
End: 2017-02-07
Payer: MEDICARE

## 2017-02-07 VITALS
DIASTOLIC BLOOD PRESSURE: 82 MMHG | BODY MASS INDEX: 49.55 KG/M2 | OXYGEN SATURATION: 96 % | WEIGHT: 288.8 LBS | TEMPERATURE: 98.9 F | SYSTOLIC BLOOD PRESSURE: 116 MMHG | HEART RATE: 85 BPM

## 2017-02-07 DIAGNOSIS — I10 ESSENTIAL HYPERTENSION WITH GOAL BLOOD PRESSURE LESS THAN 130/80: ICD-10-CM

## 2017-02-07 DIAGNOSIS — E54 ASCORBIC ACID DEFICIENCY: ICD-10-CM

## 2017-02-07 DIAGNOSIS — E53.8 VITAMIN B12 DEFICIENCY WITHOUT ANEMIA: ICD-10-CM

## 2017-02-07 DIAGNOSIS — E61.1 IRON DEFICIENCY: ICD-10-CM

## 2017-02-07 DIAGNOSIS — Z98.84 GASTRIC BYPASS STATUS FOR OBESITY: Primary | ICD-10-CM

## 2017-02-07 DIAGNOSIS — Z79.01 LONG TERM CURRENT USE OF ANTICOAGULANT THERAPY: ICD-10-CM

## 2017-02-07 DIAGNOSIS — E55.9 VITAMIN D DEFICIENCY: ICD-10-CM

## 2017-02-07 DIAGNOSIS — E83.42 HYPOMAGNESEMIA: ICD-10-CM

## 2017-02-07 DIAGNOSIS — E53.1 PYRIDOXINE DEFICIENCY: ICD-10-CM

## 2017-02-07 PROCEDURE — 99215 OFFICE O/P EST HI 40 MIN: CPT | Performed by: INTERNAL MEDICINE

## 2017-02-07 RX ORDER — MAGNESIUM OXIDE 400 MG/1
TABLET ORAL
Qty: 180 TABLET | Refills: 3 | Status: SHIPPED
Start: 2017-02-07 | End: 2018-09-27

## 2017-02-07 RX ORDER — SPIRONOLACTONE 50 MG/1
50 TABLET, FILM COATED ORAL 2 TIMES DAILY
Qty: 180 TABLET | Refills: 3 | Status: SHIPPED | OUTPATIENT
Start: 2017-02-07 | End: 2018-09-27

## 2017-02-07 RX ORDER — SPIRONOLACTONE 100 MG/1
50 TABLET, FILM COATED ORAL 2 TIMES DAILY
Qty: 180 TABLET | Refills: 3 | Status: SHIPPED | OUTPATIENT
Start: 2017-02-07 | End: 2017-02-07

## 2017-02-07 NOTE — NURSING NOTE
"Chief Complaint   Patient presents with     Hospital F/U       Initial /82 mmHg  Pulse 85  Temp(Src) 98.9  F (37.2  C) (Oral)  Wt 288 lb 12.8 oz (130.999 kg)  SpO2 96% Estimated body mass index is 49.55 kg/(m^2) as calculated from the following:    Height as of 1/6/17: 5' 4\" (1.626 m).    Weight as of this encounter: 288 lb 12.8 oz (130.999 kg).  Medication Reconciliation: complete    "

## 2017-02-07 NOTE — PROGRESS NOTES
SUBJECTIVE:                                                    Christina Fletcher is a 47 year old female who presents to clinic today for the following health issues:          Hospital Follow-up Visit:    Hospital/Nursing Home/IP Rehab Facility: [facility]  Date of Admission: 1-18-17  Date of Discharge: 1-20-17  Reason(s) for Admission: Bariatric Surgery             Problems taking medications regularly:  None       Medication changes since discharge: None       Problems adhering to non-medication therapy:  None    Summary of hospitalization:  Discharge summary unavailable  Diagnostic Tests/Treatments reviewed.  Follow up needed: none  Other Healthcare Providers Involved in Patient s Care:         None  Update since discharge: improved. She has lost 20 lbs since her last visit.    Post Discharge Medication Reconciliation: discharge medications reconciled and changed, per note/orders (see AVS).  Plan of care communicated with patient     Coding guidelines for this visit:  Type of Medical   Decision Making Face-to-Face Visit       within 7 Days of discharge Face-to-Face Visit        within 14 days of discharge   Moderate Complexity [id number] [id number]   High Complexity 94070 49110            Other significant issues as outlined and addressed in the plan section of this note.      Problem list and histories reviewed & adjusted, as indicated.  Additional history: as documented    Labs reviewed in EPIC    ROS:  14 point ROS negative except as above      OBJECTIVE:                                                    /82  Pulse 85  Temp 98.9  F (37.2  C) (Oral)  Wt 288 lb 12.8 oz (131 kg)  SpO2 96%  BMI 49.57 kg/m2  Body mass index is 49.57 kg/(m^2).  GENERAL: healthy, alert and no distress  NECK: no adenopathy, no asymmetry, masses, or scars and thyroid normal to palpation  RESP: lungs clear to auscultation - no rales, rhonchi or wheezes  CV: regular rate and rhythm, normal S1 S2, no S3 or S4, no murmur, click or  rub, no peripheral edema and peripheral pulses strong  ABDOMEN: tenderness - mild generalized, and bowel sounds normal  MS: no gross musculoskeletal defects noted, no edema    Diagnostic Test Results:  Results for orders placed or performed in visit on 01/26/17   INR point of care   Result Value Ref Range    INR Protime 2.2 (A) 0.86 - 1.14        ASSESSMENT/PLAN:                                                    Hypertension; controlled   Associated with the following complications:    None   Plan:  Medications: see orders, diuretic dose decreased      DIAGNOSIS/PLAN:     ICD-10-CM    1. Gastric bypass status for obesity Z98.84 Cyanocobalamin (B-12 SUPER STRENGTH) 5000 MCG/ML LIQD     calcium-vitamin D-vitamin K (VIACTIV) 500-500-40 MG-UNT-MCG CHEW     Vitamin B6     Vitamin B1 whole blood     Vitamin D Deficiency     CBC with platelets     Ferritin     Vitamin C     Comprehensive metabolic panel     Vitamin B12     Folate     Vitamin B1 whole blood     Magnesium     Phosphorus    LAP BAND AND GASTRIC SLEEVE   2. Vitamin D deficiency E55.9 calcium-vitamin D-vitamin K (VIACTIV) 500-500-40 MG-UNT-MCG CHEW     cholecalciferol (VITAMIN D3) 22642 UNITS capsule     Vitamin D Deficiency   3. Hypomagnesemia E83.42 magnesium oxide (MAG-OX) 400 MG tablet     Magnesium   4. Essential hypertension with goal blood pressure less than 130/80 I10 spironolactone (ALDACTONE) 50 MG tablet     DISCONTINUED: spironolactone (ALDACTONE) 100 MG tablet    INCIDENTALLY MENTIONS THAT SHE HAS HAD NON-ACTIVITY RELATED CP   5. Vitamin B12 deficiency without anemia E53.8 Vitamin B12   6. Iron deficiency E61.1 CBC with platelets     Ferritin   7. Ascorbic acid deficiency E54 Vitamin C   8. 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 Long term current use of anticoagulant therapy Z79.01 INR     CANCELED: INR       SIGNIFICANT ISSUES RE The primary encounter diagnosis was Gastric bypass status for obesity. Diagnoses of Vitamin D deficiency,  "Hypomagnesemia, Essential hypertension with goal blood pressure less than 130/80, Vitamin B12 deficiency without anemia, Iron deficiency, Ascorbic acid deficiency, Pyridoxine deficiency, and Long term current use of anticoagulant therapy were also pertinent to this visit. AS NOTED AND ADDRESSED ABOVE   MEDS AND AND LABS AS ORDERED TO ADDRESS PREVIOUS AND CURRENT ABNORMAL INDICES    SEE PATIENT INSTRUCTION SECTION FOR FOLLOW UP PLAN    Liane IS TO CONTINUE OTHER TREATMENT REGIMEN/PLANS EXCEPT AS INDICATED    COMPLIANCE WITH MEDICATIONS DIET AND EXERCISE PLANS ENCOURAGED    DISCONTINUED MEDS:  Medications Discontinued During This Encounter   Medication Reason     Syringe/Needle, Disp, (BD ECLIPSE SYRINGE) 25G X 5/8\" 3 ML MISC      chlorthalidone (HYGROTON) 50 MG tablet      cyanocobalamin (VITAMIN B12) 1000 MCG/ML injection Reorder     Calcium Carb-Cholecalciferol (CALCIUM 1000 + D) 1000-800 MG-UNIT TABS Reorder     cholecalciferol (VITAMIN D3) 46391 UNITS capsule Reorder     magnesium oxide (MAG-OX) 400 MG tablet Reorder     spironolactone (ALDACTONE) 100 MG tablet Reorder     spironolactone (ALDACTONE) 100 MG tablet Reorder       CURRENT MED LIST WITH CHANGES AS NOTED BELOW:  Current Outpatient Prescriptions   Medication Sig Dispense Refill     Cyanocobalamin (B-12 SUPER STRENGTH) 5000 MCG/ML LIQD Place 1 mL under the tongue every morning FOR VITAMIN B12 SUPPLEMENTATION, PLEASE PLACE UNDER THE TONGUE 2 Bottle PRN     calcium-vitamin D-vitamin K (VIACTIV) 500-500-40 MG-UNT-MCG CHEW Take 1 tablet by mouth 3 times daily (with meals) INDICATION: FOR BONE AND BREAST HEALTH 100 tablet PRN     cholecalciferol (VITAMIN D3) 73214 UNITS capsule Take 1 capsule (50,000 Units) by mouth once a week TAKE FOR VITAMIN D DEFICIENCY 60 capsule 0     magnesium oxide (MAG-OX) 400 MG tablet INDICATION: TO TREAT LOW MAGNESIUM, TAKE 1 PILL TWICE DAILY 180 tablet 3     spironolactone (ALDACTONE) 50 MG tablet Take 1 tablet (50 mg) by mouth 2 " times daily INDICATION: TO LOWER BLOOD PRESSURE 180 tablet 3     warfarin (COUMADIN) 5 MG tablet Take 1 tablet (5 mg) by mouth daily 35 tablet 0     HYDROcodone-acetaminophen (NORCO)  MG per tablet Take 1 tablet by mouth 3 times daily       rosuvastatin (CRESTOR) 40 MG tablet Take 1 tablet (40 mg) by mouth daily INDICATION: TO LOWER CHOLESTEROL AND TO HELP REDUCE RISK OF HEART ATTACK AND STROKE 90 tablet PRN     polyethylene glycol (MIRALAX) powder Take 17 g (1 capful) by mouth daily INDICATION: CONSTIPATION, TO ACHIEVE 1-2 SOFT BMs PER DAY 1 Bottle PRN     senna-docusate (SENOKOT-S;PERICOLACE) 8.6-50 MG per tablet Take 1-2 tablets by mouth 2 times daily INDICATION: CONSTIPATION, TO ACHIEVE 1-2 SOFT BMs PER  tablet prn     order for DME Equipment being ordered: SHOWER CHAIR 1 Device 0     Emollient (AVEENO ACTIVE JOEL SKIN RELIEF) CREA Externally apply 1 Application topically 2 times daily INDICATION: ECZEMA 1 Bottle PRN     Nebivolol HCl 20 MG TABS Take 20 mg by mouth daily INDICATION: TO LOWER BLOOD PRESSURE 90 tablet 2     buPROPion (WELLBUTRIN XL) 300 MG 24 hr tablet Take 1 tablet (300 mg) by mouth every morning INDICATION: TO CONTROL SYMPTOMS OF DEPRESSION 90 tablet 3     warfarin (COUMADIN) 5 MG tablet Take 1.5 tablets (7.5 mg) by mouth daily Take 1 to 1 1/2 tablets by mouth daily as directed by the anticoagulation clinicINDICATION: ANTICOAGULATION 180 tablet 0     HYDROcodone-acetaminophen (NORCO) 7.5-325 MG per tablet Take 1 tablet by mouth every 6 hours as needed for pain 42 tablet 0     ORDER FOR DME Equipment being ordered: thermometer 1 Device 0     Ascorbic Acid Buffered (VITAMIN C EFFERVESCENT) PDEF Take 1 packet by mouth 2 times daily (before meals) EMERGEN- C, INDICATION: VITAMIN C SUPPLEMENTATION AND TO AID ABSORPTION OF IRON SUPPLEMENT 90 g PRN     ORDER FOR DME Equipment being ordered: Automatic blood pressure/pulse monitor 1 Units NA     triamcinolone (KENALOG) 0.1 % cream Apply  sparingly once or twice per day as needed to affected area until the skin is better, then stop 30 g 0     diclofenac (VOLTAREN) 1 % GEL Apply topically 4 times daily       ORDER FOR DME Equipment being ordered: wheeled walker with seat 1 Device 0     ORDER FOR DME Equipment being ordered: shower chair, knee brace 1 Device 0     B Complex Vitamins (B COMPLEX 50) TABS Take 1 tablet by mouth daily. INDICATION: VITAMIN SUPPLEMENT 100 tablet PRN     Multiple Vitamin (MULTI-VITAMIN DAILY PO) Take 2 tablets by mouth daily        tiZANidine (ZANAFLEX) 2 MG tablet   2     [DISCONTINUED] hydrALAZINE (APRESOLINE) 100 MG TABS Take 1 tablet (100 mg) by mouth 2 times daily INDICATION: TO LOWER BLOOD PRESSURE 60 tablet prn     [DISCONTINUED] metoprolol (TOPROL-XL) 100 MG 24 hr tablet Take 1 tablet (100 mg) by mouth 2 times daily INDICATION:TO LOWER BLOOD PRESSURE AND TO PREVENT HEART DISEASE 60 tablet PRN     [DISCONTINUED] Spironolactone-HCTZ 50-50 MG TABS Take 1 tablet by mouth every morning INDICATION: TO LOWER BLOOD PRESSURE 30 tablet PRN         Office visit time > 40 mins, greater than 50% of which was spent obtaining history, reviewing medications, counseling re compliance with treatment plan, discussion of treatment, follow up plans, and coordination of care.       Patient Instructions   ** FOLLOW UP PLAN**:    PLEASE SCHEDULE LABS WITH OFFICE VISIT 2 MONTHS FROM TODAY TO FOLLOW UP ON  Gastric bypass status for obesity  Vitamin D deficiency  Hypomagnesemia  Essential hypertension with goal blood pressure less than 130/80  Vitamin Deficiencies  OTHER MEDICAL ISSUES, AND TO REVIEW TEST RESULTS      BE SURE TO SCHEDULE YOUR LAB DRAW APPOINTMENT FOR AT LEAST 1 WEEK BEFORE YOUR NEXT VISIT        YOU MAY CONTACT THE CLINIC IF ANY QUESTIONS OR CONCERNS -[phone] OR VIA invino                 Abdullahi Saini MD  [facility]

## 2017-02-07 NOTE — MR AVS SNAPSHOT
After Visit Summary   2/7/2017    Christina Fletcher    MRN: [id number]           Patient Information     Date Of Birth          1970        Visit Information        Provider Department      2/7/2017 2:00 PM Abdullahi Saini MD [facility]        Today's Diagnoses     Gastric bypass status for obesity    -  1     Vitamin D deficiency         Hypomagnesemia         Essential hypertension with goal blood pressure less than 130/80         Vitamin B12 deficiency without anemia         Iron deficiency         Ascorbic acid deficiency         Pyridoxine deficiency         Long term current use of anticoagulant therapy           Care Instructions    ** FOLLOW UP PLAN**:    PLEASE SCHEDULE LABS WITH OFFICE VISIT 2 MONTHS FROM TODAY TO FOLLOW UP ON  Gastric bypass status for obesity  Vitamin D deficiency  Hypomagnesemia  Essential hypertension with goal blood pressure less than 130/80  Vitamin Deficiencies  OTHER MEDICAL ISSUES, AND TO REVIEW TEST RESULTS      BE SURE TO SCHEDULE YOUR LAB DRAW APPOINTMENT FOR AT LEAST 1 WEEK BEFORE YOUR NEXT VISIT        YOU MAY CONTACT THE CLINIC IF ANY QUESTIONS OR CONCERNS -[phone] OR VIA [facility]                   Follow-ups after your visit        Your next 10 appointments already scheduled     Feb 07, 2017  5:15 PM   [facility]tico[facility] with [facility]   [facility]    [address] [address] [phone]              Future tests that were ordered for you today     Open Future Orders        Priority Expected Expires Ordered    Vitamin B6 Routine 2/7/2017 8/7/2017 2/7/2017    Vitamin B1 whole blood Routine 2/7/2017 8/7/2017 2/7/2017    Vitamin D Deficiency Routine 2/7/2017 8/7/2017 2/7/2017    CBC with platelets Routine 2/7/2017 8/7/2017 2/7/2017    Ferritin Routine 2/7/2017 8/7/2017 2/7/2017    Vitamin C Routine 2/7/2017 8/7/2017  "2017    Comprehensive metabolic panel Routine 2017    Vitamin B12 Routine 2017    Folate Routine 2017    Vitamin B1 whole blood Routine 2017    Magnesium Routine 2017    Phosphorus Routine 2017            Who to contact     If you have questions or need follow up information about today's clinic visit or your schedule please contact [facility] directly at [phone].  Normal or non-critical lab and imaging results will be communicated to you by Cell[employer], letter or phone within 4 business days after the clinic has received the results. If you do not hear from us within 7 days, please contact the clinic through [employer] or phone. If you have a critical or abnormal lab result, we will notify you by phone as soon as possible.  Submit refill requests through [employer] or call your pharmacy and they will forward the refill request to us. Please allow 3 business days for your refill to be completed.          Additional Information About Your Visit        [employer] Information     Z[employer]ikka lets you send messages to your doctor, view your test results, renew your prescriptions, schedule appointments and more. To sign up, go to [email or web address] . Click on \"Log in\" on the left side of the screen, which will take you to the Welcome page. Then click on \"Sign up Now\" on the right side of the page.     You will be asked to enter the access code listed below, as well as some personal information. Please follow the directions to create your username and password.     Your access code is: [email or web address]  Expires: 2017  3:47 PM     Your access code will  in 90 days. If you need help or a new code, please call your [facility] or [phone].        Care EveryWhere ID     This is your Care EveryWhere ID. This could be used by other organizations to access your " [place] medical records  [id number]        Your Vitals Were     Pulse Temperature Pulse Oximetry             85 98.9  F (37.2  C) (Oral) 96%          Blood Pressure from Last 3 Encounters:   02/07/17 116/82   01/06/17 128/88   05/24/16 124/88    Weight from Last 3 Encounters:   02/07/17 288 lb 12.8 oz (130.999 kg)   01/06/17 310 lb (140.615 kg)   05/24/16 300 lb 6.4 oz (136.261 kg)              We Performed the Following     INR          Today's Medication Changes          These changes are accurate as of: 2/7/17  3:33 PM.  If you have any questions, ask your nurse or doctor.               Start taking these medicines.        Dose/Directions    calcium-vitamin D-vitamin K 500-500-40 MG-UNT-MCG Chew   Commonly known as:  VIACTIV   Used for:  Vitamin D deficiency, Gastric bypass status for obesity   Replaces:  Calcium Carb-Cholecalciferol 1000-800 MG-UNIT Tabs   Started by:  Abdullahi Saini MD        Dose:  1 tablet   Take 1 tablet by mouth 3 times daily (with meals) INDICATION: FOR BONE AND BREAST HEALTH   Quantity:  100 tablet   Refills:  PRN       Cyanocobalamin 5000 MCG/ML Liqd   Commonly known as:  B-12 SUPER STRENGTH   Used for:  Gastric bypass status for obesity   Replaces:  cyanocobalamin 1000 MCG/ML injection   Started by:  Abdullahi Saini MD        Dose:  1 mL   Place 1 mL under the tongue every morning FOR VITAMIN B12 SUPPLEMENTATION, PLEASE PLACE UNDER THE TONGUE   Quantity:  2 Bottle   Refills:  PRN       spironolactone 50 MG tablet   Commonly known as:  ALDACTONE   Used for:  Essential hypertension with goal blood pressure less than 130/80   Started by:  Abdullahi Saini MD        Dose:  50 mg   Take 1 tablet (50 mg) by mouth 2 times daily INDICATION: TO LOWER BLOOD PRESSURE   Quantity:  180 tablet   Refills:  3         These medicines have changed or have updated prescriptions.        Dose/Directions    cholecalciferol 56856 UNITS capsule   Commonly known as:  VITAMIN D3   This may have  "changed:    - how much to take  - how to take this  - when to take this  - additional instructions   Used for:  Vitamin D deficiency   Changed by:  Abdullahi Saini MD        Dose:  1 capsule   Take 1 capsule (50,000 Units) by mouth once a week TAKE FOR VITAMIN D DEFICIENCY   Quantity:  60 capsule   Refills:  0         Stop taking these medicines if you haven't already. Please contact your care team if you have questions.     Calcium Carb-Cholecalciferol 1000-800 MG-UNIT Tabs   Commonly known as:  CALCIUM 1000 + D   Replaced by:  calcium-vitamin D-vitamin K 500-500-40 MG-UNT-MCG Chew   Stopped by:  Abdullahi Saini MD           chlorthalidone 50 MG tablet   Commonly known as:  HYGROTON   Stopped by:  Abdullahi Saini MD           cyanocobalamin 1000 MCG/ML injection   Commonly known as:  VITAMIN B12   Replaced by:  Cyanocobalamin 5000 MCG/ML Liqd   Stopped by:  Abdullahi Saini MD           Syringe/Needle (Disp) 25G X 5/8\" 3 ML Misc   Commonly known as:  BD ECLIPSE SYRINGE   Stopped by:  Abdullahi Saini MD                Where to get your medicines      These medications were sent to [id number] - [address] AT [facility] & [address] [place] [address]     Phone:  [phone]    - calcium-vitamin D-vitamin K 500-500-40 MG-UNT-MCG Chew  - Cyanocobalamin 5000 MCG/ML Liqd  - magnesium oxide 400 MG tablet  - spironolactone 50 MG tablet      Some of these will need a paper prescription and others can be bought over the counter.  Ask your nurse if you have questions.     You don't need a prescription for these medications    - cholecalciferol 03317 UNITS capsule             Primary Care Provider Office Phone # Fax #    Abdullahi Saini [phone]       [facility] [address]  [facility] [address]        Thank you!     Thank you for choosing [facility]  for your care. Our goal " is always to provide you with excellent care. Hearing back from our patients is one way we can continue to improve our services. Please take a few minutes to complete the written survey that you may receive in the mail after your visit with us. Thank you!             Your Updated Medication List - Protect others around you: Learn how to safely use, store and throw away your medicines at www.disposemymeds.org.          This list is accurate as of: 2/7/17  3:33 PM.  Always use your most recent med list.                   Brand Name Dispense Instructions for use    AVEENO ACTIVE JOEL SKIN RELIEF Crea     1 Bottle    Externally apply 1 Application topically 2 times daily INDICATION: ECZEMA       B COMPLEX 50 Tabs     100 tablet    Take 1 tablet by mouth daily. INDICATION: VITAMIN SUPPLEMENT       buPROPion 300 MG 24 hr tablet    WELLBUTRIN XL    90 tablet    Take 1 tablet (300 mg) by mouth every morning INDICATION: TO CONTROL SYMPTOMS OF DEPRESSION       calcium-vitamin D-vitamin K 500-500-40 MG-UNT-MCG Chew    VIACTIV    100 tablet    Take 1 tablet by mouth 3 times daily (with meals) INDICATION: FOR BONE AND BREAST HEALTH       cholecalciferol 27029 UNITS capsule    VITAMIN D3    60 capsule    Take 1 capsule (50,000 Units) by mouth once a week TAKE FOR VITAMIN D DEFICIENCY       Cyanocobalamin 5000 MCG/ML Liqd    B-12 SUPER STRENGTH    2 Bottle    Place 1 mL under the tongue every morning FOR VITAMIN B12 SUPPLEMENTATION, PLEASE PLACE UNDER THE TONGUE       * NORCO  MG per tablet   Generic drug:  HYDROcodone-acetaminophen      Take 1 tablet by mouth 3 times daily       * HYDROcodone-acetaminophen 7.5-325 MG per tablet    NORCO    42 tablet    Take 1 tablet by mouth every 6 hours as needed for pain       magnesium oxide 400 MG tablet    MAG-OX    180 tablet    INDICATION: TO TREAT LOW MAGNESIUM, TAKE 1 PILL TWICE DAILY       MULTI-VITAMIN DAILY PO      Take 2 tablets by mouth daily       Nebivolol HCl 20 MG Tabs      90 tablet    Take 20 mg by mouth daily INDICATION: TO LOWER BLOOD PRESSURE       * order for DME     1 Device    Equipment being ordered: shower chair, knee brace       * order for DME     1 Device    Equipment being ordered: wheeled walker with seat       * order for DME     1 Units    Equipment being ordered: Automatic blood pressure/pulse monitor       * order for DME     1 Device    Equipment being ordered: thermometer       * order for DME     1 Device    Equipment being ordered: SHOWER CHAIR       polyethylene glycol powder    MIRALAX    1 Bottle    Take 17 g (1 capful) by mouth daily INDICATION: CONSTIPATION, TO ACHIEVE 1-2 SOFT BMs PER DAY       rosuvastatin 40 MG tablet    CRESTOR    90 tablet    Take 1 tablet (40 mg) by mouth daily INDICATION: TO LOWER CHOLESTEROL AND TO HELP REDUCE RISK OF HEART ATTACK AND STROKE       senna-docusate 8.6-50 MG per tablet    SENOKOT-S;PERICOLACE    120 tablet    Take 1-2 tablets by mouth 2 times daily INDICATION: CONSTIPATION, TO ACHIEVE 1-2 SOFT BMs PER DAY       spironolactone 50 MG tablet    ALDACTONE    180 tablet    Take 1 tablet (50 mg) by mouth 2 times daily INDICATION: TO LOWER BLOOD PRESSURE       tiZANidine 2 MG tablet    ZANAFLEX         triamcinolone 0.1 % cream    KENALOG    30 g    Apply sparingly once or twice per day as needed to affected area until the skin is better, then stop       VITAMIN C EFFERVESCENT Pdef     90 g    Take 1 packet by mouth 2 times daily (before meals) EMERGEN- C, INDICATION: VITAMIN C SUPPLEMENTATION AND TO AID ABSORPTION OF IRON SUPPLEMENT       VOLTAREN 1 % Gel topical gel   Generic drug:  diclofenac      Apply topically 4 times daily       * warfarin 5 MG tablet    COUMADIN    180 tablet    Take 1.5 tablets (7.5 mg) by mouth daily Take 1 to 1 1/2 tablets by mouth daily as directed by the anticoagulation clinicINDICATION: ANTICOAGULATION       * warfarin 5 MG tablet    COUMADIN    35 tablet    Take 1 tablet (5 mg) by mouth daily        * Notice:  This list has 9 medication(s) that are the same as other medications prescribed for you. Read the directions carefully, and ask your doctor or other care provider to review them with you.

## 2017-02-07 NOTE — PATIENT INSTRUCTIONS
** FOLLOW UP PLAN**:    PLEASE SCHEDULE LABS WITH OFFICE VISIT 2 MONTHS FROM TODAY TO FOLLOW UP ON  Gastric bypass status for obesity  Vitamin D deficiency  Hypomagnesemia  Essential hypertension with goal blood pressure less than 130/80  Vitamin Deficiencies  OTHER MEDICAL ISSUES, AND TO REVIEW TEST RESULTS      BE SURE TO SCHEDULE YOUR LAB DRAW APPOINTMENT FOR AT LEAST 1 WEEK BEFORE YOUR NEXT VISIT        YOU MAY CONTACT THE CLINIC IF ANY QUESTIONS OR CONCERNS -[phone] OR VIA [employer]

## 2017-02-08 ENCOUNTER — TRANSFERRED RECORDS (OUTPATIENT)
Dept: HEALTH INFORMATION MANAGEMENT | Facility: CLINIC | Age: 47
End: 2017-02-08

## 2017-02-09 NOTE — TELEPHONE ENCOUNTER
Pt was to have [facility] appointment on 2/7/17 following appointment with PCP.  Pt told staff that could not wait to be seen and left before [facility] nurse could see her.  Pt rescheduled her appointment for 2/8 at 4pm and then no showed for the appointment.  Writer did attempt to all pt and left VM that needs to follow up in [facility] . Pt has not had a follow up INR since recent start up.   Pt has been noncompliant with follow up.     FYI to MD, advise if needed.

## 2017-02-10 NOTE — TELEPHONE ENCOUNTER
Please call patient and reiterate the importance of following up with the [facility] especially in view of her recent surgery, and please help facilitate her making an appointment. Appreciation to [facility] staff for update.

## 2017-03-13 ENCOUNTER — COMMUNICATION - HEALTHEAST (OUTPATIENT)
Dept: SURGERY | Facility: CLINIC | Age: 47
End: 2017-03-13

## 2017-03-13 DIAGNOSIS — K91.2 POSTOPERATIVE MALABSORPTION: ICD-10-CM

## 2017-03-30 DIAGNOSIS — I82.409 DEEP VEIN THROMBOSIS (DVT) OF LOWER EXTREMITY, UNSPECIFIED CHRONICITY, UNSPECIFIED LATERALITY, UNSPECIFIED VEIN (H): Primary | ICD-10-CM

## 2017-03-30 NOTE — TELEPHONE ENCOUNTER
warfarin 5mg peach    Last Written Prescription Date: 01/24/17  Last Fill Qty: 35, # refills: 0  Last Office Visit with G, P or [facility] prescribing provider: 02/07/17       Date and Result of Last PT/INR:   Lab Results   Component Value Date    INR 2.2 01/26/2017    INR 1.2 01/24/2017    INR 1.04 01/16/2017    INR 2.74 07/30/2014

## 2017-03-30 NOTE — TELEPHONE ENCOUNTER
Routing refill request to provider for review/approval because:  Patient has been non compliant with INR follow ups, have placed several reminder calls and pt has no showed for appointments on 2/1, 2/8, 2/15 and 2/17.      Warfarin last filled on 1/24/17 for #30 tabs only-----break in medication     Routing refill request to provider for review/approval because:  A break in medication---Warfarin last filled on 1/24/17 for #30 tabs only-----break in medication

## 2017-03-31 RX ORDER — WARFARIN SODIUM 5 MG/1
5 TABLET ORAL DAILY
Qty: 35 TABLET | Refills: 0 | Status: SHIPPED | OUTPATIENT
Start: 2017-03-31 | End: 2017-05-24

## 2017-05-24 DIAGNOSIS — I10 ESSENTIAL HYPERTENSION WITH GOAL BLOOD PRESSURE LESS THAN 130/80: ICD-10-CM

## 2017-05-24 DIAGNOSIS — I82.409 DEEP VEIN THROMBOSIS (DVT) OF LOWER EXTREMITY, UNSPECIFIED CHRONICITY, UNSPECIFIED LATERALITY, UNSPECIFIED VEIN (H): ICD-10-CM

## 2017-05-25 RX ORDER — NEBIVOLOL HYDROCHLORIDE 20 MG/1
TABLET ORAL
Qty: 90 TABLET | Refills: 2 | Status: SHIPPED | OUTPATIENT
Start: 2017-05-25 | End: 2018-03-22

## 2017-05-25 NOTE — TELEPHONE ENCOUNTER
Routing RX for Warfarin to INR Nurse.    RX for Nebivolol-refilled. Prescription approved per [facility] Refill Protocol.

## 2017-05-30 RX ORDER — WARFARIN SODIUM 5 MG/1
TABLET ORAL
Qty: 35 TABLET | Refills: 0 | Status: SHIPPED | OUTPATIENT
Start: 2017-05-30 | End: 2017-07-27

## 2017-07-06 ENCOUNTER — TELEPHONE (OUTPATIENT)
Dept: INTERNAL MEDICINE | Facility: CLINIC | Age: 47
End: 2017-07-06

## 2017-07-06 NOTE — TELEPHONE ENCOUNTER
7/6/2017      Patient is out of town and cannot schedule at the moment. She will call back when she return home.          Outreach ,  Sher Rose

## 2017-07-27 DIAGNOSIS — I82.409 DEEP VEIN THROMBOSIS (DVT) OF LOWER EXTREMITY, UNSPECIFIED CHRONICITY, UNSPECIFIED LATERALITY, UNSPECIFIED VEIN (H): ICD-10-CM

## 2017-07-28 RX ORDER — WARFARIN SODIUM 5 MG/1
TABLET ORAL
Qty: 35 TABLET | Refills: 0 | Status: SHIPPED | OUTPATIENT
Start: 2017-07-28 | End: 2018-02-12

## 2017-07-28 NOTE — TELEPHONE ENCOUNTER
Warfarin     Last Written Prescription Date: 5/30/17  Last Fill Qty: 30, # refills: 0  Last Office Visit with [facility], [facility] or [facility] prescribing provider: 2/7/17       Date and Result of Last PT/INR:   Lab Results   Component Value Date    INR 2.2 01/26/2017    INR 1.2 01/24/2017    INR 1.04 01/16/2017    INR 2.74 07/30/2014        Routing refill request to provider for review/approval because:  Evelyne given x1 and patient did not follow up, please advise  Pt has not had an INR done since February.  Pt has no showed for several appointments.

## 2017-08-15 ENCOUNTER — TELEPHONE (OUTPATIENT)
Dept: INTERNAL MEDICINE | Facility: CLINIC | Age: 47
End: 2017-08-15

## 2017-08-15 DIAGNOSIS — F33.1 MAJOR DEPRESSIVE DISORDER, RECURRENT EPISODE, MODERATE (H): ICD-10-CM

## 2017-08-15 RX ORDER — BUPROPION HYDROCHLORIDE 300 MG/1
300 TABLET ORAL EVERY MORNING
Qty: 90 TABLET | Refills: 0 | Status: CANCELLED | OUTPATIENT
Start: 2017-08-15

## 2017-08-15 RX ORDER — BUPROPION HYDROCHLORIDE 300 MG/1
TABLET ORAL
Qty: 90 TABLET | Refills: 3 | Status: SHIPPED | OUTPATIENT
Start: 2017-08-15 | End: 2018-09-27

## 2017-08-15 NOTE — TELEPHONE ENCOUNTER
Pt has a fill that was rx'd in [place] and with appt filled at [facility] .Larissa Luis RN

## 2017-08-15 NOTE — TELEPHONE ENCOUNTER
Wellbutrin 300mg        Last Written Prescription Date: 5/24/17  Last Fill Quantity: 90; # refills: 3  Last Office Visit with [facility], [facility] or  Health prescribing provider:  2/7/17   Next 5 appointments (look out 90 days)     Aug 31, 2017  2:00 PM CDT   [facility]i [facility]   [facility]    [address] [place] [address] [phone]                   Last PHQ-9 score on record=   PHQ-9 SCORE 5/24/2016   Total Score -   Total Score 16       Lab Results   Component Value Date    AST 12 01/06/2017     Lab Results   Component Value Date    ALT 23 01/06/2017       Labs showing if normal/abnormal  Lab Results   Component Value Date    AST 12 01/06/2017    ALT 23 01/06/2017     Routing refill request to provider for review/approval because:  Routed to PCP because elevated PHQ-9

## 2017-08-15 NOTE — TELEPHONE ENCOUNTER
Routing refill request to provider for review/approval because:  Pt is due for PHQ9, and 6 month f/u--appt has been scheduled for 8/31.      buPROPion (WELLBUTRIN XL) 300 MG 24 hr tablet  Last Written Prescription Date: 5/24/16  Last Fill Quantity: 90; # refills: 3  Last Office Visit with [facility], [facility] or [facility] prescribing provider:   2/7/17  Next 5 appointments (look out 90 days)     Aug 31, 2017  2:00 PM CDT   [facility]   [facility]    [address] [address] [phone]                   Last PHQ-9 score on record=   PHQ-9 SCORE 5/24/2016   Total Score -   Total Score 16       Lab Results   Component Value Date    AST 12 01/06/2017     Lab Results   Component Value Date    ALT 23 01/06/2017

## 2017-09-03 ENCOUNTER — HEALTH MAINTENANCE LETTER (OUTPATIENT)
Age: 47
End: 2017-09-03

## 2017-10-27 DIAGNOSIS — I82.409 DEEP VEIN THROMBOSIS (DVT) OF LOWER EXTREMITY, UNSPECIFIED CHRONICITY, UNSPECIFIED LATERALITY, UNSPECIFIED VEIN (H): ICD-10-CM

## 2017-10-30 ENCOUNTER — OFFICE VISIT (OUTPATIENT)
Dept: INTERNAL MEDICINE | Facility: CLINIC | Age: 47
End: 2017-10-30
Payer: MEDICARE

## 2017-10-30 ENCOUNTER — TELEPHONE (OUTPATIENT)
Dept: INTERNAL MEDICINE | Facility: CLINIC | Age: 47
End: 2017-10-30

## 2017-10-30 VITALS
SYSTOLIC BLOOD PRESSURE: 120 MMHG | HEART RATE: 77 BPM | DIASTOLIC BLOOD PRESSURE: 80 MMHG | OXYGEN SATURATION: 99 % | TEMPERATURE: 98 F

## 2017-10-30 DIAGNOSIS — G47.33 OSA ON CPAP: Primary | ICD-10-CM

## 2017-10-30 DIAGNOSIS — M17.0 OSTEOARTHRITIS OF BOTH KNEES, UNSPECIFIED OSTEOARTHRITIS TYPE: ICD-10-CM

## 2017-10-30 DIAGNOSIS — Z79.01 LONG-TERM (CURRENT) USE OF ANTICOAGULANTS: ICD-10-CM

## 2017-10-30 PROBLEM — Z86.711 HISTORY OF PULMONARY EMBOLISM: Status: ACTIVE | Noted: 2017-01-18

## 2017-10-30 PROCEDURE — 99214 OFFICE O/P EST MOD 30 MIN: CPT | Performed by: PHYSICIAN ASSISTANT

## 2017-10-30 NOTE — MR AVS SNAPSHOT
"              After Visit Summary   10/30/2017    Christina Fletcher    MRN: [id number]           Patient Information     Date Of Birth          1970        Visit Information        Provider Department      10/30/2017 1:40 PM Lisbet Pate PA-C [facility]        Today's Diagnoses     KAROLINA on CPAP    -  1    Osteoarthritis of both knees, unspecified osteoarthritis type        Long-term (current) use of anticoagulants [Z79.01]           Follow-ups after your visit        Who to contact     If you have questions or need follow up information about today's clinic visit or your schedule please contact [facility] directly at [phone].  Normal or non-critical lab and imaging results will be communicated to you by ipadiohart, letter or phone within 4 business days after the clinic has received the results. If you do not hear from us within 7 days, please contact the clinic through ipadiohart or phone. If you have a critical or abnormal lab result, we will notify you by phone as soon as possible.  Submit refill requests through [employer] or call your pharmacy and they will forward the refill request to us. Please allow 3 business days for your refill to be completed.          Additional Information About Your Visit        MyChart Information     [employer] lets you send messages to your doctor, view your test results, renew your prescriptions, schedule appointments and more. To sign up, go to [email or web address] . Click on \"Log in\" on the left side of the screen, which will take you to the Welcome page. Then click on \"Sign up Now\" on the right side of the page.     You will be asked to enter the access code listed below, as well as some personal information. Please follow the directions to create your username and password.     Your access code is: [email or web address]  Expires: 2018  5:41 PM     Your access code will  in 90 days. If you need help or a new code, " please call your [place] clinic or [phone].        Care EveryWhere ID     This is your Care EveryWhere ID. This could be used by other organizations to access your [place] medical records  [id number]        Your Vitals Were     Pulse Temperature Pulse Oximetry             77 98  F (36.7  C) (Oral) 99%          Blood Pressure from Last 3 Encounters:   10/30/17 120/80   02/07/17 116/82   01/06/17 128/88    Weight from Last 3 Encounters:   02/07/17 288 lb 12.8 oz (131 kg)   01/06/17 (!) 310 lb (140.6 kg)   05/24/16 (!) 300 lb 6.4 oz (136.3 kg)              Today, you had the following     No orders found for display       Primary Care Provider Office Phone # Fax #    Abdullahi Saini [phone]       [address] [facility] [address]        Equal Access to Services     ARNOLD CHAN : Hadii aad ku hadasho Soomaali, waaxda luqadaha, qaybta kaalmada adeegyada, johnny madsen . So [facility] [phone].    ATENCIÓN: Si jamarila español, tiene a hassan disposición servicios gratuitos de asistencia lingüística. Llame al [phone].    We comply with applicable federal civil rights laws and [place] laws. We do not discriminate on the basis of race, color, national origin, age, disability, sex, sexual orientation, or gender identity.            Thank you!     Thank you for choosing [facility]  for your care. Our goal is always to provide you with excellent care. Hearing back from our patients is one way we can continue to improve our services. Please take a few minutes to complete the written survey that you may receive in the mail after your visit with us. Thank you!             Your Updated Medication List - Protect others around you: Learn how to safely use, store and throw away your medicines at www.disposemymeds.org.          This list is accurate as of: 10/30/17  3:12 PM.  Always use your most recent med list.                   Brand Name Dispense  Instructions for use Diagnosis    AVEENO ACTIVE JOEL SKIN RELIEF Crea     1 Bottle    Externally apply 1 Application topically 2 times daily INDICATION: ECZEMA    Eczema, unspecified type       B COMPLEX 50 Tabs     100 tablet    Take 1 tablet by mouth daily. INDICATION: VITAMIN SUPPLEMENT    Gastric bypass status for obesity       buPROPion 300 MG 24 hr tablet    WELLBUTRIN XL    90 tablet    TAKE 1 TABLET(300 MG) BY MOUTH EVERY MORNING    Major depressive disorder, recurrent episode, moderate (H)       BYSTOLIC 20 MG Tabs   Generic drug:  Nebivolol HCl     90 tablet    TAKE 1 TABLET BY MOUTH DAILY    Essential hypertension with goal blood pressure less than 130/80       calcium-vitamin D-vitamin K 500-500-40 MG-UNT-MCG Chew    VIACTIV    100 tablet    Take 1 tablet by mouth 3 times daily (with meals) INDICATION: FOR BONE AND BREAST HEALTH    Vitamin D deficiency, Gastric bypass status for obesity       cholecalciferol 31056 UNITS capsule    VITAMIN D3    60 capsule    Take 1 capsule (50,000 Units) by mouth once a week TAKE FOR VITAMIN D DEFICIENCY    Vitamin D deficiency       Cyanocobalamin 5000 MCG/ML Liqd    B-12 SUPER STRENGTH    2 Bottle    Place 1 mL under the tongue every morning FOR VITAMIN B12 SUPPLEMENTATION, PLEASE PLACE UNDER THE TONGUE    Gastric bypass status for obesity       * NORCO  MG per tablet   Generic drug:  HYDROcodone-acetaminophen      Take 1 tablet by mouth 3 times daily        * HYDROcodone-acetaminophen 7.5-325 MG per tablet    NORCO    42 tablet    Take 1 tablet by mouth every 6 hours as needed for pain    Chronic pain       magnesium oxide 400 MG tablet    MAG-OX    180 tablet    INDICATION: TO TREAT LOW MAGNESIUM, TAKE 1 PILL TWICE DAILY    Hypomagnesemia       MULTI-VITAMIN DAILY PO      Take 2 tablets by mouth daily        * order for DME     1 Device    Equipment being ordered: shower chair, knee brace    Fatigue, PE (pulmonary embolism), Knee pain       * order for DME     1  Device    Equipment being ordered: wheeled walker with seat    Lower extremity edema, Fatigue, Obesity, OA (osteoarthritis) of knee       * order for DME     1 Units    Equipment being ordered: Automatic blood pressure/pulse monitor    HTN (hypertension)       * order for DME     1 Device    Equipment being ordered: thermometer    Avulsion of tooth       * order for DME     1 Device    Equipment being ordered: SHOWER CHAIR    BMI 50.0-59.9, adult (H), Primary osteoarthritis of both knees       polyethylene glycol powder    MIRALAX    1 Bottle    Take 17 g (1 capful) by mouth daily INDICATION: CONSTIPATION, TO ACHIEVE 1-2 SOFT BMs PER DAY    Drug-induced constipation       rosuvastatin 40 MG tablet    CRESTOR    90 tablet    Take 1 tablet (40 mg) by mouth daily INDICATION: TO LOWER CHOLESTEROL AND TO HELP REDUCE RISK OF HEART ATTACK AND STROKE    Hyperlipidemia LDL goal <130       senna-docusate 8.6-50 MG per tablet    SENOKOT-S;PERICOLACE    120 tablet    Take 1-2 tablets by mouth 2 times daily INDICATION: CONSTIPATION, TO ACHIEVE 1-2 SOFT BMs PER DAY    Constipation, unspecified constipation type       spironolactone 50 MG tablet    ALDACTONE    180 tablet    Take 1 tablet (50 mg) by mouth 2 times daily INDICATION: TO LOWER BLOOD PRESSURE    Essential hypertension with goal blood pressure less than 130/80       tiZANidine 2 MG tablet    ZANAFLEX          triamcinolone 0.1 % cream    KENALOG    30 g    Apply sparingly once or twice per day as needed to affected area until the skin is better, then stop    Eczema       VITAMIN C EFFERVESCENT Pdef     90 g    Take 1 packet by mouth 2 times daily (before meals) EMERGEN- C, INDICATION: VITAMIN C SUPPLEMENTATION AND TO AID ABSORPTION OF IRON SUPPLEMENT    Gastric bypass status for obesity, Deficient, vitamin, C       VOLTAREN 1 % Gel topical gel   Generic drug:  diclofenac      Apply topically 4 times daily        * warfarin 5 MG tablet    COUMADIN    180 tablet    Take  1.5 tablets (7.5 mg) by mouth daily Take 1 to 1 1/2 tablets by mouth daily as directed by the anticoagulation clinicINDICATION: ANTICOAGULATION    Other pulmonary embolism without acute cor pulmonale, unspecified chronicity (H)       * warfarin 5 MG tablet    COUMADIN    35 tablet    TAKE 1 TABLET(5 MG) BY MOUTH DAILY    Deep vein thrombosis (DVT) of lower extremity, unspecified chronicity, unspecified laterality, unspecified vein (H)       * Notice:  This list has 9 medication(s) that are the same as other medications prescribed for you. Read the directions carefully, and ask your doctor or other care provider to review them with you.

## 2017-10-30 NOTE — NURSING NOTE
"Chief Complaint   Patient presents with     Forms     [employer]        Initial /80 (BP Location: Left arm, Patient Position: Chair, Cuff Size: Adult Regular)  Pulse 77  Temp 98  F (36.7  C) (Oral)  SpO2 99% Estimated body mass index is 49.57 kg/(m^2) as calculated from the following:    Height as of 1/6/17: 5' 4\" (1.626 m).    Weight as of 2/7/17: 288 lb 12.8 oz (131 kg).  Medication Reconciliation: complete  \  "

## 2017-10-30 NOTE — TELEPHONE ENCOUNTER
Routing refill request to provider for review/approval because:  Evelyne given x1 and patient did not follow up, please advise  Patient last INR done 2/7/17 and has no showed for several appointments  Pt last refill done in July for #35 tablets only.

## 2017-10-30 NOTE — PROGRESS NOTES
SUBJECTIVE:   Christina Fletcher is a 47 year old female who presents to clinic today for the following health issues:      Pt needs forms filled out for [employer].     Sleep apnea       Duration: years     Description (location/character/radiation): patient on CPAP machine and needs form filled out so that her power does not get turned off and she is able to pay a reduced rate     Intensity: moderate    Accompanying signs and symptoms: does have hypoventilation if not using her CPAP     History (similar episodes/previous evaluation):     Precipitating or alleviating factors:     Therapies tried and outcome: CPAP      Vascular Disease Follow-up:  Hx of DVT      Chest pain or pressure, left side neck or arm pain: No    Shortness of breath/increased sweats/nausea with exertion: No    Pain in calves walking 1-2 blocks: No- chronic knee pain     Worsened or new symptoms since last visit: No    Daily warfarin.     Patient seeing [facility] and [facility] pain clinics for management of chronic pain.   She has been getting INR checked with labs with [facility] before having injections done in her knees.   We have not had any records of these labs.             Problem list and histories reviewed & adjusted, as indicated.  Additional history: as documented    Labs reviewed in EPIC    Reviewed and updated as needed this visit by clinical staffTobacco  Allergies       Reviewed and updated as needed this visit by Provider  Allergies  Meds         ROS:  Constitutional, HEENT, cardiovascular, pulmonary, gi and gu systems are negative, except as otherwise noted.      OBJECTIVE:   /80 (BP Location: Left arm, Patient Position: Chair, Cuff Size: Adult Regular)  Pulse 77  Temp 98  F (36.7  C) (Oral)  SpO2 99%  There is no height or weight on file to calculate BMI.  GENERAL: healthy, alert and no distress  RESP: lungs clear to auscultation - no rales, rhonchi or wheezes  CV: regular rate and rhythm, normal S1 S2, no S3 or S4, no  murmur, click or rub, no peripheral edema and peripheral pulses strong  MS: walks with cane, antalgic gait   SKIN: no suspicious lesions or rashes  PSYCH: mentation appears normal, affect normal/bright    Diagnostic Test Results:  none     ASSESSMENT/PLAN:             1. KAROLINA on CPAP  Form filled out for [place] [employer]- Copy in chart       2. Osteoarthritis of both knees, unspecified osteoarthritis type  Seeing pain clinics-   Release of records given to patient as Ellie has not gotten office notes in a long time     3. Long-term (current) use of anticoagulants [Z79.01]  Get copy of INR reports.     Reviewed need to establish care with new primary as Dr Saini is leaving this practice.     25 minutes spent with patient > 50% of time on counseling and plan of care.          Lisbet Pate PA-C  [facility]

## 2017-10-31 RX ORDER — WARFARIN SODIUM 5 MG/1
TABLET ORAL
Qty: 35 TABLET | Refills: 0 | OUTPATIENT
Start: 2017-10-31

## 2017-11-01 NOTE — TELEPHONE ENCOUNTER
It appears that patient has been noncompliant with anticoagulation given the part that her last refill was in July for 35 pills. Please contact and have her follow-up with her local INR team immediately.

## 2017-12-05 DIAGNOSIS — I82.409 DEEP VEIN THROMBOSIS (DVT) OF LOWER EXTREMITY, UNSPECIFIED CHRONICITY, UNSPECIFIED LATERALITY, UNSPECIFIED VEIN (H): ICD-10-CM

## 2017-12-05 RX ORDER — WARFARIN SODIUM 5 MG/1
TABLET ORAL
Qty: 35 TABLET | Refills: 0 | OUTPATIENT
Start: 2017-12-05

## 2017-12-06 NOTE — TELEPHONE ENCOUNTER
Routing refill request to provider for review/approval because:    Last INR Feb 1 2017.  Last Warfarin fill was 7/28/17 with #35  0 refills.    Pt has given sugar dose.  Needs to set up appt with new provider.

## 2017-12-15 NOTE — TELEPHONE ENCOUNTER
Left detailed message to call back to schedule appt per CTC. Also left detailed message for pharmacy to inform pt. Was going to keep chart open to make sure she sets up this appt.  RISA Vicente LPN

## 2018-02-12 ENCOUNTER — OFFICE VISIT (OUTPATIENT)
Dept: INTERNAL MEDICINE | Facility: CLINIC | Age: 48
End: 2018-02-12
Payer: MEDICARE

## 2018-02-12 ENCOUNTER — TELEPHONE (OUTPATIENT)
Dept: NURSING | Facility: CLINIC | Age: 48
End: 2018-02-12

## 2018-02-12 VITALS
DIASTOLIC BLOOD PRESSURE: 90 MMHG | OXYGEN SATURATION: 96 % | TEMPERATURE: 98 F | SYSTOLIC BLOOD PRESSURE: 120 MMHG | HEART RATE: 90 BPM | HEIGHT: 64 IN

## 2018-02-12 DIAGNOSIS — S80.11XA HEMATOMA OF LEG, RIGHT, INITIAL ENCOUNTER: ICD-10-CM

## 2018-02-12 DIAGNOSIS — S80.10XA CONTUSION OF LOWER LEG, UNSPECIFIED LATERALITY, INITIAL ENCOUNTER: Primary | ICD-10-CM

## 2018-02-12 PROCEDURE — 99213 OFFICE O/P EST LOW 20 MIN: CPT | Performed by: PHYSICIAN ASSISTANT

## 2018-02-12 RX ORDER — OXYCODONE AND ACETAMINOPHEN 10; 325 MG/1; MG/1
TABLET ORAL
Refills: 0 | COMMUNITY
Start: 2018-02-01 | End: 2019-03-12

## 2018-02-12 RX ORDER — DULOXETIN HYDROCHLORIDE 30 MG/1
CAPSULE, DELAYED RELEASE ORAL
Refills: 0 | COMMUNITY
Start: 2018-01-08 | End: 2018-09-27

## 2018-02-12 NOTE — PROGRESS NOTES
"  SUBJECTIVE:   Christina Fletcher is a 48 year old female who presents to clinic today for the following health issues:      Pt is here today because she has noticed bruising on her R upper thigh that has a hard ball in the center of the bruise. Also she has some bruising down by her ankles as well.     Patient was triaged today by RN, concern about lump in leg, - possible blood clot.  Patient wanted to see INR nurse only and was told no needed to be seen by a provider.  Initially patient defensive but when realized I had seen her in the past then was ok.  Denies any redness or swelling of the legs. Just worried about some bruising. No increase in leg pain.  Was playing with granddaughter yesterday.         -------------------------------------    Problem list and histories reviewed & adjusted, as indicated.  Additional history: as documented    Labs reviewed in EPIC    Reviewed and updated as needed this visit by clinical staff  Tobacco  Allergies  Meds       Reviewed and updated as needed this visit by Provider  Allergies  Meds         ROS:  Constitutional, HEENT, cardiovascular, pulmonary, gi and gu systems are negative, except as otherwise noted.    OBJECTIVE:     /90 (BP Location: Left arm, Patient Position: Chair, Cuff Size: Adult Large)  Pulse 90  Temp 98  F (36.7  C) (Oral)  Ht 5' 4\" (1.626 m)  SpO2 96%  There is no height or weight on file to calculate BMI.  GENERAL: healthy, alert and no distress  RESP: lungs clear to auscultation - no rales, rhonchi or wheezes  CV: regular rate and rhythm, normal S1 S2, no S3 or S4, no murmur, click or rub, no peripheral edema and peripheral pulses strong  MS: obese,  No swelling/induration noted.   No redness.  Few scatter bruising lower inner ankles.   Hematoma upper right thigh about 2 cm in diameter  No calf pain  SKIN: no suspicious lesions or rashes and xerosis - lower legs    Diagnostic Test Results:  none     ASSESSMENT/PLAN:             1. Contusion " of lower leg, unspecified laterality, initial encounter      2. Hematoma of leg, right, initial encounter        Appears simple bruising of lower legs- likely mild trauma.  Larger hematoma upper right thigh.  No sign of clot or superficial thrombophlebitis     Patient to see new PCP- review medications and management.  Questions about need for coumadin- patient has been off for several months on her own.         Lisbet Pate PA-C  [facility]

## 2018-02-12 NOTE — TELEPHONE ENCOUNTER
Christina Fletcher is a 48 year old female who calls with bilateral leg pain, bruising/redness and knot in upper right thigh.    NURSING ASSESSMENT:  Description:  Bruising/redness on bilateral legs from thighs to ankles, swelling, knot in upper right thigh  Onset/duration:  States bruising/swelling has been present for some time, discovered knot in upper right thigh today.  Precip. factors: (Not on coumadin as prescribed and has not had INR checked recently)  Associated symptoms:  NA  Improves/worsens symptoms:  Improves with rest, Worsens with movement      NURSING PLAN: Nursing advice to patient Advised patient be seen in ED.  Patient declined.  Informed patient of possible risks in delaying care.  Patient understood and requested appointment in clinic. Scheduled appointment.  Notified provider of situation.    RECOMMENDED DISPOSITION:  To ED, another person to drive - Declined  Will comply with recommendation: NO, declined.  If further questions/concerns or if symptoms do not improve, worsen or new symptoms develop, call your PCP or [facility] as soon as possible.      Guideline used:  Telephone Triage Protocols for Nurses, Fifth Edition, Estephania Correa RN

## 2018-02-12 NOTE — MR AVS SNAPSHOT
"              After Visit Summary   2/12/2018    Christina Fletcher    MRN: [id number]           Patient Information     Date Of Birth          1970        Visit Information        Provider Department      2/12/2018 2:20 PM Lisbet Pate PA-C [facility]        Today's Diagnoses     Contusion of lower leg, unspecified laterality, initial encounter    -  1    Hematoma of leg, right, initial encounter           Follow-ups after your visit        Your next 10 appointments already scheduled     Feb 13, 2018  2:45 PM CST   Anticoagulation Visit with [facility]   [facility] ([facility]    [address] [address] [phone]              Who to contact     If you have questions or need follow up information about today's clinic visit or your schedule please contact [facility] directly at [phone].  Normal or non-critical lab and imaging results will be communicated to you by MyChart, letter or phone within 4 business days after the clinic has received the results. If you do not hear from us within 7 days, please contact the clinic through [employer] or phone. If you have a critical or abnormal lab result, we will notify you by phone as soon as possible.  Submit refill requests through [employer] or call your pharmacy and they will forward the refill request to us. Please allow 3 business days for your refill to be completed.          Additional Information About Your Visit        [employer] Information     [employer] lets you send messages to your doctor, view your test results, renew your prescriptions, schedule appointments and more. To sign up, go to [email or web address] . Click on \"Log in\" on the left side of the screen, which will take you to the Welcome page. Then click on \"Sign up Now\" on the right side of the page.     You will be asked to enter the access code listed " "below, as well as some personal information. Please follow the directions to create your username and password.     Your access code is: [email or web address]  Expires: 2018  3:02 PM     Your access code will  in 90 days. If you need help or a new code, please call your [place] [facility] clinic or [phone].        Care EveryWhere ID     This is your Care EveryWhere ID. This could be used by other organizations to access your [facility] medical records  [id number]        Your Vitals Were     Pulse Temperature Height Pulse Oximetry          90 98  F (36.7  C) (Oral) 5' 4\" (1.626 m) 96%         Blood Pressure from Last 3 Encounters:   18 120/90   10/30/17 120/80   17 116/82    Weight from Last 3 Encounters:   17 288 lb 12.8 oz (131 kg)   17 (!) 310 lb (140.6 kg)   16 (!) 300 lb 6.4 oz (136.3 kg)              Today, you had the following     No orders found for display         Today's Medication Changes          These changes are accurate as of 18  3:02 PM.  If you have any questions, ask your nurse or doctor.               Stop taking these medicines if you haven't already. Please contact your care team if you have questions.     HYDROcodone-acetaminophen 7.5-325 MG per tablet   Commonly known as:  NORCO   Stopped by:  Lisbet Pate PA-C           NORCO  MG per tablet   Generic drug:  HYDROcodone-acetaminophen   Stopped by:  Lisbet Pate PA-C                    Primary Care Provider Office Phone # Fax #    Abdullahi Saini [phone]       XXX RESIGNED XXX  [facility] [address]        Equal Access to Services     ARNOLD CHAN : Lisa Maradiaga, olga ace, qaybta kaalmada johnny ybarra. So [facility] [phone].    ATENCIÓN: Si habla español, tiene a hassan disposición servicios gratuitos de asistencia lingüística. Llgeorgie al [phone].    We comply with applicable federal civil rights laws " and [place] laws. We do not discriminate on the basis of race, color, national origin, age, disability, sex, sexual orientation, or gender identity.            Thank you!     Thank you for choosing [facility]  for your care. Our goal is always to provide you with excellent care. Hearing back from our patients is one way we can continue to improve our services. Please take a few minutes to complete the written survey that you may receive in the mail after your visit with us. Thank you!             Your Updated Medication List - Protect others around you: Learn how to safely use, store and throw away your medicines at www.disposemymeds.org.          This list is accurate as of 2/12/18  3:02 PM.  Always use your most recent med list.                   Brand Name Dispense Instructions for use Diagnosis    AVEENO ACTIVE JOEL SKIN RELIEF Crea     1 Bottle    Externally apply 1 Application topically 2 times daily INDICATION: ECZEMA    Eczema, unspecified type       B COMPLEX 50 Tabs     100 tablet    Take 1 tablet by mouth daily. INDICATION: VITAMIN SUPPLEMENT    Gastric bypass status for obesity       buPROPion 300 MG 24 hr tablet    WELLBUTRIN XL    90 tablet    TAKE 1 TABLET(300 MG) BY MOUTH EVERY MORNING    Major depressive disorder, recurrent episode, moderate (H)       BYSTOLIC 20 MG Tabs   Generic drug:  Nebivolol HCl     90 tablet    TAKE 1 TABLET BY MOUTH DAILY    Essential hypertension with goal blood pressure less than 130/80       calcium-vitamin D-vitamin K 500-500-40 MG-UNT-MCG Chew    VIACTIV    100 tablet    Take 1 tablet by mouth 3 times daily (with meals) INDICATION: FOR BONE AND BREAST HEALTH    Vitamin D deficiency, Gastric bypass status for obesity       cholecalciferol 51094 UNITS capsule    VITAMIN D3    60 capsule    Take 1 capsule (50,000 Units) by mouth once a week TAKE FOR VITAMIN D DEFICIENCY    Vitamin D deficiency       Cyanocobalamin 5000 MCG/ML Liqd    B-12 SUPER  STRENGTH    2 Bottle    Place 1 mL under the tongue every morning FOR VITAMIN B12 SUPPLEMENTATION, PLEASE PLACE UNDER THE TONGUE    Gastric bypass status for obesity       DULoxetine 30 MG EC capsule    CYMBALTA     TK 1 C PO D        magnesium oxide 400 MG tablet    MAG-OX    180 tablet    INDICATION: TO TREAT LOW MAGNESIUM, TAKE 1 PILL TWICE DAILY    Hypomagnesemia       MULTI-VITAMIN DAILY PO      Take 2 tablets by mouth daily        * order for DME     1 Device    Equipment being ordered: shower chair, knee brace    Fatigue, PE (pulmonary embolism), Knee pain       * order for DME     1 Device    Equipment being ordered: wheeled walker with seat    Lower extremity edema, Fatigue, Obesity, OA (osteoarthritis) of knee       * order for DME     1 Units    Equipment being ordered: Automatic blood pressure/pulse monitor    HTN (hypertension)       * order for DME     1 Device    Equipment being ordered: thermometer    Avulsion of tooth       * order for DME     1 Device    Equipment being ordered: SHOWER CHAIR    BMI 50.0-59.9, adult (H), Primary osteoarthritis of both knees       oxyCODONE-acetaminophen  MG per tablet    PERCOCET     TK 1 AND 1/2 TS PO Q 4 TO 6 H PRF PAIN. MAX 4.5 TS PER DAY        polyethylene glycol powder    MIRALAX    1 Bottle    Take 17 g (1 capful) by mouth daily INDICATION: CONSTIPATION, TO ACHIEVE 1-2 SOFT BMs PER DAY    Drug-induced constipation       rosuvastatin 40 MG tablet    CRESTOR    90 tablet    Take 1 tablet (40 mg) by mouth daily INDICATION: TO LOWER CHOLESTEROL AND TO HELP REDUCE RISK OF HEART ATTACK AND STROKE    Hyperlipidemia LDL goal <130       senna-docusate 8.6-50 MG per tablet    SENOKOT-S;PERICOLACE    120 tablet    Take 1-2 tablets by mouth 2 times daily INDICATION: CONSTIPATION, TO ACHIEVE 1-2 SOFT BMs PER DAY    Constipation, unspecified constipation type       spironolactone 50 MG tablet    ALDACTONE    180 tablet    Take 1 tablet (50 mg) by mouth 2 times daily  INDICATION: TO LOWER BLOOD PRESSURE    Essential hypertension with goal blood pressure less than 130/80       tiZANidine 2 MG tablet    ZANAFLEX          triamcinolone 0.1 % cream    KENALOG    30 g    Apply sparingly once or twice per day as needed to affected area until the skin is better, then stop    Eczema       VITAMIN C EFFERVESCENT Pdef     90 g    Take 1 packet by mouth 2 times daily (before meals) EMERGEN- C, INDICATION: VITAMIN C SUPPLEMENTATION AND TO AID ABSORPTION OF IRON SUPPLEMENT    Gastric bypass status for obesity, Deficient, vitamin, C       VOLTAREN 1 % Gel topical gel   Generic drug:  diclofenac      Apply topically 4 times daily        * Notice:  This list has 5 medication(s) that are the same as other medications prescribed for you. Read the directions carefully, and ask your doctor or other care provider to review them with you.

## 2018-02-15 ENCOUNTER — ANTICOAGULATION THERAPY VISIT (OUTPATIENT)
Dept: ANTICOAGULATION | Facility: CLINIC | Age: 48
End: 2018-02-15

## 2018-02-15 NOTE — MR AVS SNAPSHOT
Christina Fletcher   2/15/2018   Anticoagulation Therapy Visit    Description:  48 year old female   Provider:  Abdullahi Saini MD   Department:  [facility] Anti Coagulation           INR as of 2/15/2018     Today's INR       Anticoagulation Summary as of 2/15/2018     INR goal 2.0-3.0   Today's INR    Full instructions No maintenance plan   Next INR check     Indications   Long-term (current) use of anticoagulants [Z79.01] [Z79.01]         Anticoagulation Episode Summary     Resolved date 2/15/2018    Resolved reason Noncompliance      Contact Numbers     [facility]  Please call  [phone] to cancel and/or reschedule your appointment   Please call  [phone] with any problems or questions regarding your therapy.

## 2018-02-15 NOTE — PROGRESS NOTES
Patient currently off warfarin and was advised to follow up with a new MD to discuss if still needs to continue on warfarin.

## 2018-03-01 ENCOUNTER — TRANSFERRED RECORDS (OUTPATIENT)
Dept: HEALTH INFORMATION MANAGEMENT | Facility: CLINIC | Age: 48
End: 2018-03-01

## 2018-03-22 DIAGNOSIS — I10 ESSENTIAL HYPERTENSION WITH GOAL BLOOD PRESSURE LESS THAN 130/80: ICD-10-CM

## 2018-03-22 RX ORDER — NEBIVOLOL 20 MG/1
1 TABLET ORAL DAILY
Qty: 90 TABLET | Refills: 2 | Status: SHIPPED | OUTPATIENT
Start: 2018-03-22 | End: 2018-09-27

## 2018-03-22 NOTE — TELEPHONE ENCOUNTER
Requested Prescriptions   Pending Prescriptions Disp Refills     Nebivolol HCl (BYSTOLIC) 20 MG TABS 90 tablet 2     Sig: Take 1 tablet by mouth daily    There is no refill protocol information for this order      Last Written Prescription Date:  05/25/17  Last Fill Quantity: 90,  # refills: 2   Last office visit: 2/12/2018 with prescribing provider:  02/12/18   Future Office Visit: 03/28/18  Next 5 appointments (look out 90 days)     Mar 28, 2018  1:00 PM CDT   Office Visit with Kathryn Arce PA-C   [facility]    [address] [address] [phone]

## 2018-03-22 NOTE — TELEPHONE ENCOUNTER
"Requested Prescriptions   Pending Prescriptions Disp Refills     Nebivolol HCl (BYSTOLIC) 20 MG TABS 90 tablet 2     Sig: Take 1 tablet by mouth daily    Beta-Blockers Protocol Failed    3/22/2018  3:01 PM       Failed - Blood pressure under 140/90 in past 12 months    BP Readings from Last 3 Encounters:   02/12/18 120/90   10/30/17 120/80   02/07/17 116/82                Passed - Patient is age 6 or older       Passed - Recent (12 mo) or future (30 days) visit within the authorizing provider's specialty    Patient had office visit in the last 12 months or has a visit in the next 30 days with authorizing provider or within the authorizing provider's specialty.  See \"Patient Info\" tab in inbasket, or \"Choose Columns\" in Meds & Orders section of the refill encounter.            Routing refill request to provider for review/approval because:  out of range:  Blood pressure        "

## 2018-03-28 ENCOUNTER — APPOINTMENT (OUTPATIENT)
Dept: GENERAL RADIOLOGY | Facility: CLINIC | Age: 48
End: 2018-03-28
Attending: EMERGENCY MEDICINE
Payer: MEDICARE

## 2018-03-28 ENCOUNTER — APPOINTMENT (OUTPATIENT)
Dept: CT IMAGING | Facility: CLINIC | Age: 48
End: 2018-03-28
Attending: EMERGENCY MEDICINE
Payer: MEDICARE

## 2018-03-28 ENCOUNTER — HOSPITAL ENCOUNTER (EMERGENCY)
Facility: CLINIC | Age: 48
Discharge: HOME | End: 2018-03-29
Attending: EMERGENCY MEDICINE | Admitting: EMERGENCY MEDICINE
Payer: MEDICARE

## 2018-03-28 VITALS
RESPIRATION RATE: 31 BRPM | TEMPERATURE: 97.4 F | SYSTOLIC BLOOD PRESSURE: 119 MMHG | WEIGHT: 270 LBS | DIASTOLIC BLOOD PRESSURE: 85 MMHG | BODY MASS INDEX: 46.1 KG/M2 | HEIGHT: 64 IN | HEART RATE: 101 BPM | OXYGEN SATURATION: 95 %

## 2018-03-28 DIAGNOSIS — E86.0 DEHYDRATION: ICD-10-CM

## 2018-03-28 DIAGNOSIS — R79.89 ELEVATED SERUM CREATININE: ICD-10-CM

## 2018-03-28 DIAGNOSIS — J20.9 ACUTE BRONCHITIS, UNSPECIFIED ORGANISM: ICD-10-CM

## 2018-03-28 LAB
ALBUMIN SERPL-MCNC: 2.5 G/DL (ref 3.4–5)
ALBUMIN UR-MCNC: 30 MG/DL
ALP SERPL-CCNC: 88 U/L (ref 40–150)
ALT SERPL W P-5'-P-CCNC: 15 U/L (ref 0–50)
ANION GAP SERPL CALCULATED.3IONS-SCNC: 10 MMOL/L (ref 3–14)
APPEARANCE UR: CLEAR
AST SERPL W P-5'-P-CCNC: 20 U/L (ref 0–45)
BASOPHILS # BLD AUTO: 0 10E9/L (ref 0–0.2)
BASOPHILS NFR BLD AUTO: 0.3 %
BILIRUB SERPL-MCNC: 0.2 MG/DL (ref 0.2–1.3)
BILIRUB UR QL STRIP: NEGATIVE
BUN SERPL-MCNC: 18 MG/DL (ref 7–30)
CALCIUM SERPL-MCNC: 9.4 MG/DL (ref 8.5–10.1)
CHLORIDE SERPL-SCNC: 100 MMOL/L (ref 94–109)
CO2 SERPL-SCNC: 24 MMOL/L (ref 20–32)
COLOR UR AUTO: YELLOW
CREAT SERPL-MCNC: 1.45 MG/DL (ref 0.52–1.04)
D DIMER PPP FEU-MCNC: 3.2 UG/ML FEU (ref 0–0.5)
D DIMER PPP FEU-MCNC: NORMAL UG/ML FEU (ref 0–0.5)
DIFFERENTIAL METHOD BLD: ABNORMAL
EOSINOPHIL # BLD AUTO: 0.1 10E9/L (ref 0–0.7)
EOSINOPHIL NFR BLD AUTO: 0.8 %
ERYTHROCYTE [DISTWIDTH] IN BLOOD BY AUTOMATED COUNT: 11.5 % (ref 10–15)
FLUAV+FLUBV AG SPEC QL: NEGATIVE
FLUAV+FLUBV AG SPEC QL: NEGATIVE
GFR SERPL CREATININE-BSD FRML MDRD: 39 ML/MIN/1.7M2
GLUCOSE SERPL-MCNC: 111 MG/DL (ref 70–99)
GLUCOSE UR STRIP-MCNC: NEGATIVE MG/DL
HCT VFR BLD AUTO: 38.4 % (ref 35–47)
HGB BLD-MCNC: 12.7 G/DL (ref 11.7–15.7)
HGB UR QL STRIP: NEGATIVE
HYALINE CASTS #/AREA URNS LPF: 5 /LPF (ref 0–2)
IMM GRANULOCYTES # BLD: 0.1 10E9/L (ref 0–0.4)
IMM GRANULOCYTES NFR BLD: 0.8 %
INTERPRETATION ECG - MUSE: NORMAL
KETONES UR STRIP-MCNC: NEGATIVE MG/DL
LEUKOCYTE ESTERASE UR QL STRIP: ABNORMAL
LIPASE SERPL-CCNC: 82 U/L (ref 73–393)
LYMPHOCYTES # BLD AUTO: 1.9 10E9/L (ref 0.8–5.3)
LYMPHOCYTES NFR BLD AUTO: 12 %
MCH RBC QN AUTO: 35.4 PG (ref 26.5–33)
MCHC RBC AUTO-ENTMCNC: 33.1 G/DL (ref 31.5–36.5)
MCV RBC AUTO: 107 FL (ref 78–100)
MONOCYTES # BLD AUTO: 1.3 10E9/L (ref 0–1.3)
MONOCYTES NFR BLD AUTO: 8.4 %
NEUTROPHILS # BLD AUTO: 12.3 10E9/L (ref 1.6–8.3)
NEUTROPHILS NFR BLD AUTO: 77.7 %
NITRATE UR QL: NEGATIVE
PH UR STRIP: 6 PH (ref 5–7)
PLATELET # BLD AUTO: 378 10E9/L (ref 150–450)
PLATELET # BLD EST: ABNORMAL 10*3/UL
POTASSIUM SERPL-SCNC: 4.1 MMOL/L (ref 3.4–5.3)
PROT SERPL-MCNC: 8.5 G/DL (ref 6.8–8.8)
RBC # BLD AUTO: 3.59 10E12/L (ref 3.8–5.2)
RBC #/AREA URNS AUTO: 1 /HPF (ref 0–2)
RBC MORPH BLD: NORMAL
SODIUM SERPL-SCNC: 134 MMOL/L (ref 133–144)
SOURCE: ABNORMAL
SP GR UR STRIP: 1.04 (ref 1–1.03)
SPECIMEN SOURCE: NORMAL
SQUAMOUS #/AREA URNS AUTO: 3 /HPF (ref 0–1)
TROPONIN I SERPL-MCNC: <0.015 UG/L (ref 0–0.04)
UROBILINOGEN UR STRIP-MCNC: NORMAL MG/DL (ref 0–2)
WBC # BLD AUTO: 15.8 10E9/L (ref 4–11)
WBC #/AREA URNS AUTO: 1 /HPF (ref 0–5)

## 2018-03-28 PROCEDURE — 25000125 ZZHC RX 250: Performed by: EMERGENCY MEDICINE

## 2018-03-28 PROCEDURE — 96360 HYDRATION IV INFUSION INIT: CPT | Mod: 59

## 2018-03-28 PROCEDURE — 36415 COLL VENOUS BLD VENIPUNCTURE: CPT | Performed by: EMERGENCY MEDICINE

## 2018-03-28 PROCEDURE — 83690 ASSAY OF LIPASE: CPT | Performed by: EMERGENCY MEDICINE

## 2018-03-28 PROCEDURE — 71260 CT THORAX DX C+: CPT

## 2018-03-28 PROCEDURE — 71046 X-RAY EXAM CHEST 2 VIEWS: CPT

## 2018-03-28 PROCEDURE — A9270 NON-COVERED ITEM OR SERVICE: HCPCS | Mod: GY | Performed by: EMERGENCY MEDICINE

## 2018-03-28 PROCEDURE — 85025 COMPLETE CBC W/AUTO DIFF WBC: CPT | Performed by: EMERGENCY MEDICINE

## 2018-03-28 PROCEDURE — 25000128 H RX IP 250 OP 636: Performed by: EMERGENCY MEDICINE

## 2018-03-28 PROCEDURE — 80053 COMPREHEN METABOLIC PANEL: CPT | Performed by: EMERGENCY MEDICINE

## 2018-03-28 PROCEDURE — 93005 ELECTROCARDIOGRAM TRACING: CPT

## 2018-03-28 PROCEDURE — 85379 FIBRIN DEGRADATION QUANT: CPT | Performed by: EMERGENCY MEDICINE

## 2018-03-28 PROCEDURE — 99285 EMERGENCY DEPT VISIT HI MDM: CPT | Mod: 25

## 2018-03-28 PROCEDURE — 81001 URINALYSIS AUTO W/SCOPE: CPT | Performed by: EMERGENCY MEDICINE

## 2018-03-28 PROCEDURE — 87804 INFLUENZA ASSAY W/OPTIC: CPT | Performed by: EMERGENCY MEDICINE

## 2018-03-28 PROCEDURE — 25000132 ZZH RX MED GY IP 250 OP 250 PS 637: Mod: GY | Performed by: EMERGENCY MEDICINE

## 2018-03-28 PROCEDURE — 96361 HYDRATE IV INFUSION ADD-ON: CPT

## 2018-03-28 PROCEDURE — 84484 ASSAY OF TROPONIN QUANT: CPT | Performed by: EMERGENCY MEDICINE

## 2018-03-28 RX ORDER — ACETAMINOPHEN 500 MG
1000 TABLET ORAL ONCE
Status: COMPLETED | OUTPATIENT
Start: 2018-03-28 | End: 2018-03-28

## 2018-03-28 RX ORDER — ALBUTEROL SULFATE 0.83 MG/ML
2.5 SOLUTION RESPIRATORY (INHALATION) ONCE
Status: COMPLETED | OUTPATIENT
Start: 2018-03-28 | End: 2018-03-28

## 2018-03-28 RX ORDER — IOPAMIDOL 755 MG/ML
82 INJECTION, SOLUTION INTRAVASCULAR ONCE
Status: COMPLETED | OUTPATIENT
Start: 2018-03-28 | End: 2018-03-28

## 2018-03-28 RX ORDER — AZITHROMYCIN 250 MG/1
TABLET, FILM COATED ORAL
Qty: 6 TABLET | Refills: 0 | Status: SHIPPED | OUTPATIENT
Start: 2018-03-28 | End: 2018-09-27

## 2018-03-28 RX ORDER — ALBUTEROL SULFATE 90 UG/1
2-4 AEROSOL, METERED RESPIRATORY (INHALATION)
Qty: 1 INHALER | Refills: 1 | Status: SHIPPED | OUTPATIENT
Start: 2018-03-28 | End: 2018-09-27

## 2018-03-28 RX ADMIN — IOPAMIDOL 82 ML: 755 INJECTION, SOLUTION INTRAVENOUS at 21:21

## 2018-03-28 RX ADMIN — SODIUM CHLORIDE 103 ML: 9 INJECTION, SOLUTION INTRAVENOUS at 21:21

## 2018-03-28 RX ADMIN — SODIUM CHLORIDE, POTASSIUM CHLORIDE, SODIUM LACTATE AND CALCIUM CHLORIDE 1000 ML: 600; 310; 30; 20 INJECTION, SOLUTION INTRAVENOUS at 18:46

## 2018-03-28 RX ADMIN — SODIUM CHLORIDE, POTASSIUM CHLORIDE, SODIUM LACTATE AND CALCIUM CHLORIDE 1000 ML: 600; 310; 30; 20 INJECTION, SOLUTION INTRAVENOUS at 20:25

## 2018-03-28 RX ADMIN — ALBUTEROL SULFATE 2.5 MG: 2.5 SOLUTION RESPIRATORY (INHALATION) at 22:53

## 2018-03-28 RX ADMIN — ACETAMINOPHEN 1000 MG: 500 TABLET, FILM COATED ORAL at 23:07

## 2018-03-28 ASSESSMENT — ENCOUNTER SYMPTOMS
DIARRHEA: 1
VOMITING: 1
FREQUENCY: 1
NAUSEA: 1
RHINORRHEA: 0
DIAPHORESIS: 1
SHORTNESS OF BREATH: 1
SORE THROAT: 0
FEVER: 1
MYALGIAS: 1
APPETITE CHANGE: 1

## 2018-03-28 NOTE — ED AVS SNAPSHOT
Emergency Department    [address] [place] [address]    Phone:  [phone]    Fax:  [phone]                                       Christina Fletcher   MRN: [id number]    Department:   Emergency Department   Date of Visit:  3/28/2018           After Visit Summary Signature Page     I have received my discharge instructions, and my questions have been answered. I have discussed any challenges I see with this plan with the nurse or doctor.    ..........................................................................................................................................  Patient/Patient Representative Signature      ..........................................................................................................................................  Patient Representative Print Name and Relationship to Patient    ..................................................               ................................................  Date                                            Time    ..........................................................................................................................................  Reviewed by Signature/Title    ...................................................              ..............................................  Date                                                            Time

## 2018-03-28 NOTE — ED PROVIDER NOTES
"  History     Chief Complaint:  Chest Pain    HPI   Christina Fletcher is a 48 year old female with arthritis a history of pulmonary embolism in 2013 who presents to the emergency department with her son for evaluation of intermittent chest pain and SOB. The patient was put on warfarin after her pulmonary embolism, but states that she stopped taking it about two months ago because \"so much is going on\" in her life. She reports that she has been feeling ill since last week with subjective fevers, lack of appetite, nausea and dry heaves, some diarrhea, body aches, diaphoresis, shortness of breath, and chest pain.     The patient's chest pain is intermittent and comes on every few hours at home and is located in the left chest and shoulder. The pain and shortness of breath is worse with exertion, with moving her left arm, and with taking a deep breath. Overall her episodes of chest pain occur about every four hours. The patient has been taking tylenol for her pain with her last dose just before presenting here. She also notes some increased urinary frequency and urgency, to the extent that she occasionally does not make it to the bathroom in time. She denies any associated leg swelling, congestion, ear pain, sore throat, or rhinorrhea. Of note, the patient's son has been ill with flu-like symptoms recently.    Allergies:  Hydralazine  Nsaids  Penicillin [Esters]    Medications:    Bystolic  Cymbalta  Percocet  Wellbutrin  Magnesium  Aldactone  Crestor  Miralax  Senna-docusate  Zanaflex  Apresoline  Toprol-XL  Kenalog  Voltaren    Past Medical History:    Anterior cruciate ligament tear  Adjustment disorder with depressed mood  Chronic constipation  GERD  Intramural leiomyoma of uterus  Lower extremity edema  Major depressive disorder  Obesity  Knee osteoarthritis  Abnormal menstruation  Pulmonary embolism     Past Surgical History:    Debride right femur  Laparoscopic adjustable gastric band  Laparoscopic gastric " "sleeve  Gastric bypass (Conversion of gastric sleeve to gastric bypass with lysis of adhesions)  Hysteroscopy, ablation of endometrium    Family History:    Hypertension  Mother  Thyroid Disease Son  Kidney failure  Daughter  Breast Cancer  Maternal Grandmother  Bone Cancer  Maternal Grandmother    Social History:  The patient was accompanied to the emergency department by her son.  Smoking Status: Never Smoker  Smokeless Tobacco: Never Used  Alcohol Use: Yes (Occasional)  Marital Status: Single     Review of Systems   Constitutional: Positive for appetite change (anorexia), diaphoresis and fever.   HENT: Negative for congestion, ear pain, rhinorrhea and sore throat.    Respiratory: Positive for shortness of breath.    Cardiovascular: Positive for chest pain. Negative for leg swelling.   Gastrointestinal: Positive for diarrhea, nausea and vomiting (dry heaves).   Genitourinary: Positive for frequency and urgency.   Musculoskeletal: Positive for myalgias.   All other systems reviewed and are negative.    Physical Exam     Patient Vitals for the past 24 hrs:   BP Temp Temp src Pulse Heart Rate Resp SpO2 Height Weight   03/28/18 2336 - - - - 115 (!) 31 95 % - -   03/28/18 2257 119/85 - - 101 104 25 95 % - -   03/28/18 2255 119/85 - - - 104 16 96 % - -   03/28/18 2155 - - - - 98 29 100 % - -   03/28/18 2138 117/74 - - - 99 28 97 % - -   03/28/18 2005 - - - - 94 27 96 % - -   03/28/18 2004 - - - - 95 29 94 % - -   03/28/18 2003 116/57 - - - - - - - -   03/28/18 1958 - - - - 96 26 97 % - -   03/28/18 1924 - - - - 90 27 96 % - -   03/28/18 1815 - - - - 97 14 95 % 1.626 m (5' 4\") 122.5 kg (270 lb)   03/28/18 1814 - - - - - - 96 % - -   03/28/18 1813 (!) 107/36 97.4  F (36.3  C) Temporal - - - - - -       Physical Exam  General: Well appearing, nontoxic. Resting comfortably  Head:  Scalp, face, and head appear normal  Eyes:  Pupils are equal, round, and reactive to light, EOMI    Conjunctivae non-injected and sclerae " white  ENT:    The external nose is normal    Pinnae are normal. Bilateral TMs clear without erythema, bulging, or effusion. Auditory canals normal.     The oropharynx is normal, mucous membranes moist    Posterior pharynx clear without swelling, exudates or erythema     Uvula is in the midline  Neck:  Normal range of motion.    There is no rigidity noted    Trachea is in the midline  CV:  Regular rate and rhythm     Normal S1/S2, no S3/S4    No murmur or rub  Resp:  Lungs are clear and equal bilaterally    There is no tachypnea    No increased work of breathing    No rales, wheezing, or rhonchi  GI:  Abdomen is soft, obese no rigidity or guarding    No distension, or mass    No tenderness or rebound tenderness   MS:  Normal muscular tone.    Symmetric motor strength    No lower extremity edema. DP pulses 2+ and symmetric. No calf swelling or tenderness.  Skin:  No rash or acute lesions.  Neuro: A&Ox3, GCS 15    CN II - XII intact    Speech clear, fluent, and normal    Strength 5/5 and symmetric in bilateral upper and lower extremities..    No meningismus   Psych:  Normal affect.  Appropriate interactions.    Emergency Department Course     ECG:  ECG taken at 1808, ECG read at 1814  Normal sinus rhythm  Moderate voltage criteria for LVH, may be normal variant  Borderline ECG   No significant change compared to ECG dated 01/06/17.  Rate 100 bpm. ND interval 130 ms. QRS duration 90 ms. QT/QTc 340/438 ms. P-R-T axes 49 -13 20.    Imaging:  Radiology findings were communicated with the patient who voiced understanding of the findings.    CT Chest Pulmonary Embolism w Contrast  1. No CT evidence of pulmonary embolism.  2. Poor lung volumes and bilateral atelectasis.  Reading per radiology.     XR Chest 2 Views  Atelectasis right base with elevated right hemidiaphragm.  Reading per radiology.     Laboratory:  Laboratory findings were communicated with the patient who voiced understanding of the findings.    UA: Specific  Gravity Urine 1.043 (H), Protein Albumin 30 (A), Leukocyte Esterase Trace (A), Squamous Epithelial/HPF 3 (H), Hyaline Casts 5 (H), o/w Negative   D dimer quantitative: 3.2 (H)  Influenza A/B antigen: Influenza A Negative, Influenza B Negative   CBC: WBC 15.8 (H), HGB 12.7,   CMP: Glucose 111 (H), Creatinine 1.45 (H), GFR Estimate 47 (L), Albumin 2.5 (L) o/w WNL   Lipase: 82  Troponin (Collected 1835): <0.015     Interventions:  1846 NS Bolus IV   2025 Lactated ringers Bolus IV   2253 Albuterol nebulizer 2.5 mg   2307 Tylenol 1,000 mg PO     Emergency Department Course:    Nursing notes and vitals reviewed.    I performed an exam of the patient as documented above.     IV was inserted and blood was drawn for laboratory testing, results above.     A nasopharyngeal sample was obtained from the patient and sent for influenza testing.    The patient was sent for a CT scan and x-ray while in the emergency department, results above.    2330 I checked on and updated the patient.     0002 I called the patient's prescriptions into the pharmacy as she requested.    I personally reviewed the laboratory, imaging, and EKG results with the patient and answered all related questions prior to discharge.    Impression & Plan      Medical Decision Making:  Christina Fletcher is a 48 year old female with a history of pulmonary embolism who presents with shortness of breath, cough, intermittent pleuritic chest pain as noted above. Patient is well appearing, afebrile; however she is intermittently tachycardic. She is not hypoxic. Given her reported history of fever, cough, and shortness of breath, I feel the most likely etiology is infectious in nature including influenza, pneumonia, bronchitis, UTI among other possible etiologies. Given her history of pulmonary embolism there is also concern for pulmonary embolism and other rare causes of chest pain and shortness of breath are also considered, such as pneumothorax or pleural  effusion. Patient's history and physical exam are not consistent with an acute coronary syndrome. I also considered musculoskeletal pain as the cause of her chest pain, given its intermittent nature. Workup in the emergency department was notable for elevated white blood cell count at 15.8, elevated creatinine at 1.45, and elevated D dimer. CTA of the chest was negative for any acute findings, including pneumonia or other air space disease. Urinalysis was negative for infection. Influenza testing was negative. Patient was treated with IV fluids, nebulizer treatment, and pain medications and felt significantly improved. I discussed with her that her creatinine is mildly elevated and that this should be followed up closely by her primary care physician. I feel that this is 2/2 dehydration. We will treat her with antibiotics given her elevated white blood cell count and inhaler was prescribed for home as well as tylenol for pain. Patient was provided with close return precautions and instructed to follow up closely with her PCP in two days. She was discharged in improved condition. Return precautions were discussed with patient. The patient's questions were answered and the patient was agreeable with discharge.    Diagnosis:    ICD-10-CM    1. Acute bronchitis, unspecified organism J20.9 UA reflex to Microscopic and Culture   2. Elevated serum creatinine R79.89    3. Dehydration E86.0      Disposition:   The patient was discharged home.     Discharge Medications:  Discharge Medication List as of 3/28/2018 11:43 PM      START taking these medications    Details   albuterol (PROAIR HFA/PROVENTIL HFA/VENTOLIN HFA) 108 (90 BASE) MCG/ACT Inhaler Inhale 2-4 puffs into the lungs every 2 hours as needed for shortness of breath / dyspnea, Disp-1 Inhaler, R-1, Local PrintWITH SPACER      acetaminophen 500 MG CAPS Take 2 capsules by mouth every 8 hours as needed For aches, pain, fever, Disp-60 capsule, R-0, Local Print       azithromycin (ZITHROMAX Z-LAINE) 250 MG tablet Take 2 tablets now, then 1 tablet daily for 4 days., Disp-6 tablet, R-0, Local Print             Scribe Disclosure:  I, Theresa Ramirez, am serving as a scribe at 6:20 PM on 3/28/2018 to document services personally performed by Ross Troy MD based on my observations and the provider's statements to me.     EMERGENCY DEPARTMENT     Ross Troy MD  03/29/18 1902

## 2018-03-28 NOTE — ED AVS SNAPSHOT
Emergency Department    [address] [facility] [address]    Phone:  [phone]    Fax:  [phone]                                       Christina Fletcher   MRN: [id number]    Department:   Emergency Department   Date of Visit:  3/28/2018           Patient Information     Date Of Birth          1970        Your diagnoses for this visit were:     Acute bronchitis, unspecified organism     Elevated serum creatinine     Dehydration        You were seen by Ross Troy MD.      Follow-up Information     Follow up with [facility]. Schedule an appointment as soon as possible for a visit in 2 days.    Specialties:  Internal Medicine, Pediatrics, Obstetrics & Gynecology    Why:  For close follow up    Contact information:    [address] [address] [phone]        Discharge Instructions         Your kidney function which is measured by the chemical creatinine in the blood was mildly low today.  This is most likely caused by dehydration.  Your primary care doctor should recheck this in 1-2 weeks to make sure it has returned to normal.  Taking too much ibuprofen can also cause kidney problems and this should be avoided. Follow up closely with your primary care physician.    Bronchitis, Antibiotic Treatment (Adult)    Bronchitis is an infection of the air passages (bronchial tubes) in your lungs. It often occurs when you have a cold. This illness is contagious during the first few days and is spread through the air by coughing and sneezing, or by direct contact (touching the sick person and then touching your own eyes, nose, or mouth).  Symptoms of bronchitis include cough with mucus (phlegm) and low-grade fever. Bronchitis usually lasts 7 to 14 days. Mild cases can be treated with simple home remedies. More severe infection is treated with an antibiotic.  Home care  Follow these guidelines when caring for yourself at home:    If your  symptoms are severe, rest at home for the first 2 to 3 days. When you go back to your usual activities, don't let yourself get too tired.    Do not smoke. Also avoid being exposed to secondhand smoke.    You may use over-the-counter medicines to control fever or pain, unless another medicine was prescribed. (Note: If you have chronic liver or kidney disease or have ever had a stomach ulcer or gastrointestinal bleeding, talk with your healthcare provider before using these medicines. Also talk to your provider if you are taking medicine to prevent blood clots.) Aspirin should never be given to anyone younger than 18 years of age who is ill with a viral infection or fever. It may cause severe liver or brain damage.    Your appetite may be poor, so a light diet is fine. Avoid dehydration by drinking 6 to 8 glasses of fluids per day (such as water, soft drinks, sports drinks, juices, tea, or soup). Extra fluids will help loosen secretions in the nose and lungs.    Over-the-counter cough, cold, and sore-throat medicines will not shorten the length of the illness, but they may be helpful to reduce symptoms. (Note: Do not use decongestants if you have high blood pressure.)    Finish all antibiotic medicine. Do this even if you are feeling better after only a few days.  Follow-up care  Follow up with your healthcare provider, or as advised. If you had an X-ray or ECG (electrocardiogram), a specialist will review it. You will be notified of any new findings that may affect your care.  Note: If you are age 65 or older, or if you have a chronic lung disease or condition that affects your immune system, or you smoke, talk to your healthcare provider about having pneumococcal vaccinations and a yearly influenza vaccination (flu shot).  When to seek medical advice  Call your healthcare provider right away if any of these occur:    Fever of 100.4 F (38 C) or higher    Coughing up increased amounts of colored sputum    Weakness,  drowsiness, headache, facial pain, ear pain, or a stiff neck  Call 911, or get immediate medical care  Contact emergency services right away if any of these occur.    Coughing up blood    Worsening weakness, drowsiness, headache, or stiff neck    Trouble breathing, wheezing, or pain with breathing  Date Last Reviewed: 9/13/2015 2000-2017 [employer]. [address] [place] [address]. All rights reserved. This information is not intended as a substitute for professional medical care. Always follow your healthcare professional's instructions.          Dehydration (Adult)  Dehydration occurs when your body loses too much fluid. This may be the result of prolonged vomiting or diarrhea, excessive sweating, or a high fever. It may also happen if you don t drink enough fluid when you re sick or out in the heat. Misuse of diuretics (water pills) can also be a cause.  Symptoms include thirst and decreased urine output. You may also feel dizzy, weak, fatigued, or very drowsy. The diet described below is usually enough to treat dehydration. In some cases, you may need medicine.  Home care    Drink at least 12 8-ounce glasses of fluid every day to resolve the dehydration. Fluid may include water; orange juice; lemonade; apple, grape, or cranberry juice; clear fruit drinks; electrolyte replacement and sports drinks; and teas and coffee without caffeine. Don't drink alcohol. If you have been diagnosed with a kidney disease, ask your doctor how much and what types of fluids you should drink to prevent dehydration. If you have kidney disease, fluid can build up in the body. This can be dangerous to your health.    If you have a fever, muscle aches, or a headache as a result of a cold or flu, you may take acetaminophen or ibuprofen, unless another medicine was prescribed. If you have chronic liver or kidney disease, or have ever had a stomach ulcer or gastrointestinal bleeding, talk with your healthcare  provider before using these medicines. Don't take aspirin if you are younger than 18 and have a fever. Aspirin raises the chance for severe liver injury.  Follow-up care  Follow up with your healthcare provider, or as advised.  When to seek medical advice  Call your healthcare provider right away if any of these occur:    Continued vomiting    Frequent diarrhea (more than 5 times a day); blood (red or black color) or mucus in diarrhea    Blood in vomit or stool    Swollen abdomen or increasing abdominal pain    Weakness, dizziness, or fainting    Unusual drowsiness or confusion    Reduced urine output or extreme thirst    Fever of 100.4 F (38 C) or higher  Date Last Reviewed: 5/1/2017 2000-2017 [employer]. [address] [place] [address], [place] [id number]. All rights reserved. This information is not intended as a substitute for professional medical care. Always follow your healthcare professional's instructions.          24 Hour Appointment Hotline       To make an appointment at any [facility], call [phone] ([phone]). If you don't have a family doctor or clinic, we will help you find one. [facility] are conveniently located to serve the needs of you and your family.             Review of your medicines      START taking        Dose / Directions Last dose taken    acetaminophen 500 MG Caps   Dose:  2 capsule   Quantity:  60 capsule        Take 2 capsules by mouth every 8 hours as needed For aches, pain, fever   Refills:  0        albuterol 108 (90 BASE) MCG/ACT Inhaler   Commonly known as:  PROAIR HFA/PROVENTIL HFA/VENTOLIN HFA   Dose:  2-4 puff   Quantity:  1 Inhaler        Inhale 2-4 puffs into the lungs every 2 hours as needed for shortness of breath / dyspnea   Refills:  1        azithromycin 250 MG tablet   Commonly known as:  ZITHROMAX Z-LAINE   Quantity:  6 tablet        Take 2 tablets now, then 1 tablet daily for 4 days.   Refills:  0          Our records show that you are  taking the medicines listed below. If these are incorrect, please call your family doctor or clinic.        Dose / Directions Last dose taken    AVEENO ACTIVE JOEL SKIN RELIEF Crea   Dose:  1 Application   Quantity:  1 Bottle        Externally apply 1 Application topically 2 times daily INDICATION: ECZEMA   Refills:  PRN        B COMPLEX 50 Tabs   Dose:  1 tablet   Quantity:  100 tablet        Take 1 tablet by mouth daily. INDICATION: VITAMIN SUPPLEMENT   Refills:  PRN        buPROPion 300 MG 24 hr tablet   Commonly known as:  WELLBUTRIN XL   Quantity:  90 tablet        TAKE 1 TABLET(300 MG) BY MOUTH EVERY MORNING   Refills:  3        calcium-vitamin D-vitamin K 500-500-40 MG-UNT-MCG Chew   Commonly known as:  VIACTIV   Dose:  1 tablet   Quantity:  100 tablet        Take 1 tablet by mouth 3 times daily (with meals) INDICATION: FOR BONE AND BREAST HEALTH   Refills:  PRN        cholecalciferol 95426 UNITS capsule   Commonly known as:  VITAMIN D3   Dose:  1 capsule   Quantity:  60 capsule        Take 1 capsule (50,000 Units) by mouth once a week TAKE FOR VITAMIN D DEFICIENCY   Refills:  0        Cyanocobalamin 5000 MCG/ML Liqd   Commonly known as:  B-12 SUPER STRENGTH   Dose:  1 mL   Quantity:  2 Bottle        Place 1 mL under the tongue every morning FOR VITAMIN B12 SUPPLEMENTATION, PLEASE PLACE UNDER THE TONGUE   Refills:  PRN        DULoxetine 30 MG EC capsule   Commonly known as:  CYMBALTA        TK 1 C PO D   Refills:  0        magnesium oxide 400 MG tablet   Commonly known as:  MAG-OX   Quantity:  180 tablet        INDICATION: TO TREAT LOW MAGNESIUM, TAKE 1 PILL TWICE DAILY   Refills:  3        MULTI-VITAMIN DAILY PO   Dose:  2 tablet        Take 2 tablets by mouth daily   Refills:  0        Nebivolol HCl 20 MG Tabs   Commonly known as:  BYSTOLIC   Dose:  1 tablet   Quantity:  90 tablet        Take 1 tablet by mouth daily   Refills:  2        * order for DME   Quantity:  1 Device        Equipment being ordered:  shower chair, knee brace   Refills:  0        * order for DME   Quantity:  1 Device        Equipment being ordered: wheeled walker with seat   Refills:  0        * order for DME   Quantity:  1 Units        Equipment being ordered: Automatic blood pressure/pulse monitor   Refills:  NA        order for DME   Quantity:  1 Device        Equipment being ordered: thermometer   Refills:  0        order for DME   Quantity:  1 Device        Equipment being ordered: SHOWER CHAIR   Refills:  0        oxyCODONE-acetaminophen  MG per tablet   Commonly known as:  PERCOCET        TK 1 AND 1/2 TS PO Q 4 TO 6 H PRF PAIN. MAX 4.5 TS PER DAY   Refills:  0        polyethylene glycol powder   Commonly known as:  MIRALAX   Dose:  1 capful   Quantity:  1 Bottle        Take 17 g (1 capful) by mouth daily INDICATION: CONSTIPATION, TO ACHIEVE 1-2 SOFT BMs PER DAY   Refills:  PRN        rosuvastatin 40 MG tablet   Commonly known as:  CRESTOR   Dose:  40 mg   Quantity:  90 tablet        Take 1 tablet (40 mg) by mouth daily INDICATION: TO LOWER CHOLESTEROL AND TO HELP REDUCE RISK OF HEART ATTACK AND STROKE   Refills:  PRN        senna-docusate 8.6-50 MG per tablet   Commonly known as:  SENOKOT-S;PERICOLACE   Dose:  1-2 tablet   Quantity:  120 tablet        Take 1-2 tablets by mouth 2 times daily INDICATION: CONSTIPATION, TO ACHIEVE 1-2 SOFT BMs PER DAY   Refills:  prn        spironolactone 50 MG tablet   Commonly known as:  ALDACTONE   Dose:  50 mg   Quantity:  180 tablet        Take 1 tablet (50 mg) by mouth 2 times daily INDICATION: TO LOWER BLOOD PRESSURE   Refills:  3        tiZANidine 2 MG tablet   Commonly known as:  ZANAFLEX        Refills:  2        triamcinolone 0.1 % cream   Commonly known as:  KENALOG   Quantity:  30 g        Apply sparingly once or twice per day as needed to affected area until the skin is better, then stop   Refills:  0        VITAMIN C EFFERVESCENT Pdef   Dose:  1 packet   Quantity:  90 g        Take 1  packet by mouth 2 times daily (before meals) EMERGEN- C, INDICATION: VITAMIN C SUPPLEMENTATION AND TO AID ABSORPTION OF IRON SUPPLEMENT   Refills:  PRN        VOLTAREN 1 % Gel topical gel   Generic drug:  diclofenac        Apply topically 4 times daily   Refills:  0        * Notice:  This list has 3 medication(s) that are the same as other medications prescribed for you. Read the directions carefully, and ask your doctor or other care provider to review them with you.            Prescriptions were sent or printed at these locations (3 Prescriptions)                   Other Prescriptions                Printed at Department/Unit printer (3 of 3)         albuterol (PROAIR HFA/PROVENTIL HFA/VENTOLIN HFA) 108 (90 BASE) MCG/ACT Inhaler               acetaminophen 500 MG CAPS               azithromycin (ZITHROMAX Z-LAINE) 250 MG tablet                Procedures and tests performed during your visit     Procedure/Test Number of Times Performed    CBC with platelets differential 1    CT Chest Pulmonary Embolism w Contrast 1    Comprehensive metabolic panel 1    D dimer quantitative 2    EKG 12 lead 1    Influenza A/B antigen 1    Lipase 1    Peripheral IV catheter 1    Troponin I 1    UA reflex to Microscopic and Culture 1    XR Chest 2 Views 1      Orders Needing Specimen Collection     None      Pending Results     No orders found from 3/26/2018 to 3/29/2018.            Pending Culture Results     No orders found from 3/26/2018 to 3/29/2018.            Pending Results Instructions     If you had any lab results that were not finalized at the time of your Discharge, you can call the ED Lab Result RN at [phone]. You will be contacted by this team for any positive Lab results or changes in treatment. The nurses are available 7 days a week from 10A to 6:30P.  You can leave a message 24 hours per day and they will return your call.        Test Results From Your Hospital Stay        3/28/2018  7:50 PM      Component Results      Component Value Ref Range & Units Status    WBC 15.8 (H) 4.0 - 11.0 10e9/L Final    RBC Count 3.59 (L) 3.8 - 5.2 10e12/L Final    Hemoglobin 12.7 11.7 - 15.7 g/dL Final    Hematocrit 38.4 35.0 - 47.0 % Final     (H) 78 - 100 fl Final    MCH 35.4 (H) 26.5 - 33.0 pg Final    MCHC 33.1 31.5 - 36.5 g/dL Final    RDW 11.5 10.0 - 15.0 % Final    Platelet Count 378 150 - 450 10e9/L Final    Diff Method Automated Method  Final    % Neutrophils 77.7 % Final    % Lymphocytes 12.0 % Final    % Monocytes 8.4 % Final    % Eosinophils 0.8 % Final    % Basophils 0.3 % Final    % Immature Granulocytes 0.8 % Final    Absolute Neutrophil 12.3 (H) 1.6 - 8.3 10e9/L Final    Absolute Lymphocytes 1.9 0.8 - 5.3 10e9/L Final    Absolute Monocytes 1.3 0.0 - 1.3 10e9/L Final    Absolute Eosinophils 0.1 0.0 - 0.7 10e9/L Final    Absolute Basophils 0.0 0.0 - 0.2 10e9/L Final    Abs Immature Granulocytes 0.1 0 - 0.4 10e9/L Final    RBC Morphology Normal  Final    Platelet Estimate   Final    Automated count confirmed.  Platelet morphology is normal.         3/28/2018  7:21 PM      Component Results     Component Value Ref Range & Units Status    Sodium 134 133 - 144 mmol/L Final    Potassium 4.1 3.4 - 5.3 mmol/L Final    Specimen slightly hemolyzed, potassium may be falsely elevated    Chloride 100 94 - 109 mmol/L Final    Carbon Dioxide 24 20 - 32 mmol/L Final    Anion Gap 10 3 - 14 mmol/L Final    Glucose 111 (H) 70 - 99 mg/dL Final    Urea Nitrogen 18 7 - 30 mg/dL Final    Creatinine 1.45 (H) 0.52 - 1.04 mg/dL Final    GFR Estimate 39 (L) >60 mL/min/1.7m2 Final    Non  GFR Calc    GFR Estimate If Black 47 (L) >60 mL/min/1.7m2 Final    African American GFR Calc    Calcium 9.4 8.5 - 10.1 mg/dL Final    Bilirubin Total 0.2 0.2 - 1.3 mg/dL Final    Albumin 2.5 (L) 3.4 - 5.0 g/dL Final    Protein Total 8.5 6.8 - 8.8 g/dL Final    Alkaline Phosphatase 88 40 - 150 U/L Final    ALT 15 0 - 50 U/L Final    AST 20 0 - 45 U/L  Final    Specimen is hemolyzed which can falsely elevate AST. Analysis of a   non-hemolyzed specimen may result in a lower value.           3/28/2018  7:21 PM      Component Results     Component Value Ref Range & Units Status    Lipase 82 73 - 393 U/L Final         3/28/2018  7:21 PM      Component Results     Component Value Ref Range & Units Status    Troponin I ES <0.015 0.000 - 0.045 ug/L Final    The 99th percentile for upper reference range is 0.045 ug/L.  Troponin values   in the range of 0.045 - 0.120 ug/L may be associated with risks of adverse   clinical events.           3/28/2018  7:05 PM      Component Results     Component Value Ref Range & Units Status    D Dimer Canceled, Test credited 0.0 - 0.50 ug/ml FEU Final    Unsatisfactory specimen - hemolyzed  NOTED ON ED TRACKBOARD           3/28/2018 11:02 PM      Component Results     Component Value Ref Range & Units Status    Color Urine Yellow  Final    Appearance Urine Clear  Final    Glucose Urine Negative NEG^Negative mg/dL Final    Bilirubin Urine Negative NEG^Negative Final    Ketones Urine Negative NEG^Negative mg/dL Final    Specific Gravity Urine 1.043 (H) 1.003 - 1.035 Final    Blood Urine Negative NEG^Negative Final    pH Urine 6.0 5.0 - 7.0 pH Final    Protein Albumin Urine 30 (A) NEG^Negative mg/dL Final    Urobilinogen mg/dL Normal 0.0 - 2.0 mg/dL Final    Nitrite Urine Negative NEG^Negative Final    Leukocyte Esterase Urine Trace (A) NEG^Negative Final    Source Midstream Urine  Final    RBC Urine 1 0 - 2 /HPF Final    WBC Urine 1 0 - 5 /HPF Final    Squamous Epithelial /HPF Urine 3 (H) 0 - 1 /HPF Final    Hyaline Casts 5 (H) 0 - 2 /LPF Final         3/28/2018  7:08 PM      Component Results     Component Value Ref Range & Units Status    Influenza A/B Agn Specimen Nasopharyngeal  Final    Influenza A Negative NEG^Negative Final    Influenza B Negative NEG^Negative Final    Test results must be correlated with clinical data. If necessary,  results   should be confirmed by a molecular assay or viral culture.           3/28/2018  8:30 PM      Component Results     Component Value Ref Range & Units Status    D Dimer 3.2 (H) 0.0 - 0.50 ug/ml FEU Final    This D-dimer assay is intended for use in conjunction with a clinical pretest   probability assessment model to exclude pulmonary embolism (PE) and deep   venous thrombosis (DVT) in outpatients suspected of PE or DVT. The cut-off   value is 0.5 ug/mL FEU.           3/28/2018 10:48 PM      Narrative     CHEST TWO VIEWS 3/28/2018 7:41 PM     HISTORY: Fever, cough.     COMPARISON: 4/23/2009.    FINDINGS: Elevated right hemidiaphragm. Linear atelectasis right base.  These changes are new since 4/23/2009.        Impression     IMPRESSION: Atelectasis right base with elevated right hemidiaphragm.    RAFAEL FARRIS MD         3/28/2018 10:59 PM      Narrative     CT CHEST PULMONARY EMBOLISM WITH CONTRAST 3/28/2018 9:29 PM     HISTORY: Dyspnea.     CONTRAST DOSE:  82 mL [id number]    Radiation dose for this scan was reduced using automated exposure  control, adjustment of the mA and/or kV according to patient size, or  iterative reconstruction technique.    COMPARISON: 5/15/2013    FINDINGS:  There is a good contrast bolus within the pulmonary  arteries. No pulmonary arterial filling defects are demonstrated to  indicate pulmonary embolism. There is no evidence of aortic  dissection. The mediastinum and earnest appear within normal limits.  There are diminished lung volumes with marked elevation of the right  hemidiaphragm. This is associated with atelectasis at the right lung  base along the right hemidiaphragm. Atelectasis is also suspected  along the left hemidiaphragm. The lungs are otherwise clear. No  pleural effusion or pneumothorax. Within the upper abdomen, the left  kidney is not identified. Gastric sutures are noted.        Impression     IMPRESSION:  1. No CT evidence of pulmonary embolism.  2. Poor  lung volumes and bilateral atelectasis.    OSKAR NOVOA MD                Clinical Quality Measure: Blood Pressure Screening     Your blood pressure was checked while you were in the emergency department today. The last reading we obtained was  BP: 119/85 . Please read the guidelines below about what these numbers mean and what you should do about them.  If your systolic blood pressure (the top number) is less than 120 and your diastolic blood pressure (the bottom number) is less than 80, then your blood pressure is normal. There is nothing more that you need to do about it.  If your systolic blood pressure (the top number) is 120-139 or your diastolic blood pressure (the bottom number) is 80-89, your blood pressure may be higher than it should be. You should have your blood pressure rechecked within a year by a primary care provider.  If your systolic blood pressure (the top number) is 140 or greater or your diastolic blood pressure (the bottom number) is 90 or greater, you may have high blood pressure. High blood pressure is treatable, but if left untreated over time it can put you at risk for heart attack, stroke, or kidney failure. You should have your blood pressure rechecked by a primary care provider within the next 4 weeks.  If your provider in the emergency department today gave you specific instructions to follow-up with your doctor or provider even sooner than that, you should follow that instruction and not wait for up to 4 weeks for your follow-up visit.        Thank you for choosing [facility]       Thank you for choosing [facility] for your care. Our goal is always to provide you with excellent care. Hearing back from our patients is one way we can continue to improve our services. Please take a few minutes to complete the written survey that you may receive in the mail after you visit with us. Thank you!        [employer] Information     [employer] lets you send messages to your doctor, view your test  "results, renew your prescriptions, schedule appointments and more. To sign up, go to [email or web address] . Click on \"Log in\" on the left side of the screen, which will take you to the Welcome page. Then click on \"Sign up Now\" on the right side of the page.     You will be asked to enter the access code listed below, as well as some personal information. Please follow the directions to create your username and password.     Your access code is: [email or web address]  Expires: 2018  4:02 PM     Your access code will  in 90 days. If you need help or a new code, please call your [place] clinic or [phone].        Care EveryWhere ID     This is your Care EveryWhere ID. This could be used by other organizations to access your [place] medical records  [id number]        Equal Access to Services     ARNOLD CHAN : Lias Maradiaga, olga ace, melissa ybarra, johnny madsen . So [facility] [phone].    ATENCIÓN: Si habla español, tiene a hassan disposición servicios gratuitos de asistencia lingüística. Llame al [phone].    We comply with applicable federal civil rights laws and [place] laws. We do not discriminate on the basis of race, color, national origin, age, disability, sex, sexual orientation, or gender identity.            After Visit Summary       This is your record. Keep this with you and show to your community pharmacist(s) and doctor(s) at your next visit.                  "

## 2018-03-29 NOTE — ED NOTES
Patient encouraged to provide urine sample, pt still reporting she is unable to provide urine at this time.

## 2018-03-29 NOTE — ED NOTES
Patient reports she is still unable to provide urine sample and refusing catheterization to collect specimen. Dr. Troy notified.

## 2018-03-29 NOTE — DISCHARGE INSTRUCTIONS
Your kidney function which is measured by the chemical creatinine in the blood was mildly low today.  This is most likely caused by dehydration.  Your primary care doctor should recheck this in 1-2 weeks to make sure it has returned to normal.  Taking too much ibuprofen can also cause kidney problems and this should be avoided. Follow up closely with your primary care physician.    Bronchitis, Antibiotic Treatment (Adult)    Bronchitis is an infection of the air passages (bronchial tubes) in your lungs. It often occurs when you have a cold. This illness is contagious during the first few days and is spread through the air by coughing and sneezing, or by direct contact (touching the sick person and then touching your own eyes, nose, or mouth).  Symptoms of bronchitis include cough with mucus (phlegm) and low-grade fever. Bronchitis usually lasts 7 to 14 days. Mild cases can be treated with simple home remedies. More severe infection is treated with an antibiotic.  Home care  Follow these guidelines when caring for yourself at home:    If your symptoms are severe, rest at home for the first 2 to 3 days. When you go back to your usual activities, don't let yourself get too tired.    Do not smoke. Also avoid being exposed to secondhand smoke.    You may use over-the-counter medicines to control fever or pain, unless another medicine was prescribed. (Note: If you have chronic liver or kidney disease or have ever had a stomach ulcer or gastrointestinal bleeding, talk with your healthcare provider before using these medicines. Also talk to your provider if you are taking medicine to prevent blood clots.) Aspirin should never be given to anyone younger than 18 years of age who is ill with a viral infection or fever. It may cause severe liver or brain damage.    Your appetite may be poor, so a light diet is fine. Avoid dehydration by drinking 6 to 8 glasses of fluids per day (such as water, soft drinks, sports drinks,  juices, tea, or soup). Extra fluids will help loosen secretions in the nose and lungs.    Over-the-counter cough, cold, and sore-throat medicines will not shorten the length of the illness, but they may be helpful to reduce symptoms. (Note: Do not use decongestants if you have high blood pressure.)    Finish all antibiotic medicine. Do this even if you are feeling better after only a few days.  Follow-up care  Follow up with your healthcare provider, or as advised. If you had an X-ray or ECG (electrocardiogram), a specialist will review it. You will be notified of any new findings that may affect your care.  Note: If you are age 65 or older, or if you have a chronic lung disease or condition that affects your immune system, or you smoke, talk to your healthcare provider about having pneumococcal vaccinations and a yearly influenza vaccination (flu shot).  When to seek medical advice  Call your healthcare provider right away if any of these occur:    Fever of 100.4 F (38 C) or higher    Coughing up increased amounts of colored sputum    Weakness, drowsiness, headache, facial pain, ear pain, or a stiff neck  Call 911, or get immediate medical care  Contact emergency services right away if any of these occur.    Coughing up blood    Worsening weakness, drowsiness, headache, or stiff neck    Trouble breathing, wheezing, or pain with breathing  Date Last Reviewed: 9/13/2015 2000-2017 [employer]. [address] [place] [address]. All rights reserved. This information is not intended as a substitute for professional medical care. Always follow your healthcare professional's instructions.          Dehydration (Adult)  Dehydration occurs when your body loses too much fluid. This may be the result of prolonged vomiting or diarrhea, excessive sweating, or a high fever. It may also happen if you don t drink enough fluid when you re sick or out in the heat. Misuse of diuretics (water pills) can also be  a cause.  Symptoms include thirst and decreased urine output. You may also feel dizzy, weak, fatigued, or very drowsy. The diet described below is usually enough to treat dehydration. In some cases, you may need medicine.  Home care    Drink at least 12 8-ounce glasses of fluid every day to resolve the dehydration. Fluid may include water; orange juice; lemonade; apple, grape, or cranberry juice; clear fruit drinks; electrolyte replacement and sports drinks; and teas and coffee without caffeine. Don't drink alcohol. If you have been diagnosed with a kidney disease, ask your doctor how much and what types of fluids you should drink to prevent dehydration. If you have kidney disease, fluid can build up in the body. This can be dangerous to your health.    If you have a fever, muscle aches, or a headache as a result of a cold or flu, you may take acetaminophen or ibuprofen, unless another medicine was prescribed. If you have chronic liver or kidney disease, or have ever had a stomach ulcer or gastrointestinal bleeding, talk with your healthcare provider before using these medicines. Don't take aspirin if you are younger than 18 and have a fever. Aspirin raises the chance for severe liver injury.  Follow-up care  Follow up with your healthcare provider, or as advised.  When to seek medical advice  Call your healthcare provider right away if any of these occur:    Continued vomiting    Frequent diarrhea (more than 5 times a day); blood (red or black color) or mucus in diarrhea    Blood in vomit or stool    Swollen abdomen or increasing abdominal pain    Weakness, dizziness, or fainting    Unusual drowsiness or confusion    Reduced urine output or extreme thirst    Fever of 100.4 F (38 C) or higher  Date Last Reviewed: 5/1/2017 2000-2017 [employer]. [address] [place] [address]. All rights reserved. This information is not intended as a substitute for professional medical care. Always follow  your healthcare professional's instructions.

## 2018-03-29 NOTE — ED NOTES
Transport tech informed this writer that patient reported she is in a pain management program. Dr. Troy notified.

## 2018-04-20 ENCOUNTER — TRANSFERRED RECORDS (OUTPATIENT)
Dept: HEALTH INFORMATION MANAGEMENT | Facility: CLINIC | Age: 48
End: 2018-04-20

## 2018-06-07 ENCOUNTER — TELEPHONE (OUTPATIENT)
Dept: INTERNAL MEDICINE | Facility: CLINIC | Age: 48
End: 2018-06-07

## 2018-06-07 NOTE — TELEPHONE ENCOUNTER
Per outreach, patient is aware of overdue mammogram screening and will call on their own time for scheduling.     Thanks

## 2018-09-27 ENCOUNTER — OFFICE VISIT (OUTPATIENT)
Dept: FAMILY MEDICINE | Facility: CLINIC | Age: 48
End: 2018-09-27
Payer: MEDICARE

## 2018-09-27 VITALS
BODY MASS INDEX: 46.35 KG/M2 | HEART RATE: 113 BPM | SYSTOLIC BLOOD PRESSURE: 136 MMHG | DIASTOLIC BLOOD PRESSURE: 100 MMHG | TEMPERATURE: 98.3 F | RESPIRATION RATE: 16 BRPM | HEIGHT: 64 IN

## 2018-09-27 DIAGNOSIS — Z86.711 HISTORY OF PULMONARY EMBOLISM: ICD-10-CM

## 2018-09-27 DIAGNOSIS — I10 ESSENTIAL HYPERTENSION WITH GOAL BLOOD PRESSURE LESS THAN 130/80: ICD-10-CM

## 2018-09-27 DIAGNOSIS — J40 BRONCHITIS: ICD-10-CM

## 2018-09-27 DIAGNOSIS — E83.42 HYPOMAGNESEMIA: ICD-10-CM

## 2018-09-27 DIAGNOSIS — F33.1 MAJOR DEPRESSIVE DISORDER, RECURRENT EPISODE, MODERATE (H): Primary | ICD-10-CM

## 2018-09-27 DIAGNOSIS — E78.5 HYPERLIPIDEMIA LDL GOAL <130: ICD-10-CM

## 2018-09-27 DIAGNOSIS — Z98.84 GASTRIC BYPASS STATUS FOR OBESITY: ICD-10-CM

## 2018-09-27 DIAGNOSIS — E55.9 VITAMIN D DEFICIENCY: ICD-10-CM

## 2018-09-27 DIAGNOSIS — M17.0 PRIMARY OSTEOARTHRITIS OF BOTH KNEES: ICD-10-CM

## 2018-09-27 DIAGNOSIS — E66.01 MORBID OBESITY DUE TO EXCESS CALORIES (H): ICD-10-CM

## 2018-09-27 PROCEDURE — 85610 PROTHROMBIN TIME: CPT | Mod: QW | Performed by: PHYSICIAN ASSISTANT

## 2018-09-27 PROCEDURE — 99215 OFFICE O/P EST HI 40 MIN: CPT | Performed by: PHYSICIAN ASSISTANT

## 2018-09-27 PROCEDURE — 36416 COLLJ CAPILLARY BLOOD SPEC: CPT | Performed by: PHYSICIAN ASSISTANT

## 2018-09-27 RX ORDER — WARFARIN SODIUM 7.5 MG/1
7.5 TABLET ORAL DAILY
Qty: 30 TABLET | Refills: 0 | Status: SHIPPED | OUTPATIENT
Start: 2018-09-27 | End: 2019-01-25

## 2018-09-27 RX ORDER — ALBUTEROL SULFATE 90 UG/1
2-4 AEROSOL, METERED RESPIRATORY (INHALATION)
Qty: 1 INHALER | Refills: 1 | Status: SHIPPED | OUTPATIENT
Start: 2018-09-27 | End: 2019-01-10

## 2018-09-27 RX ORDER — MAGNESIUM OXIDE 400 MG/1
TABLET ORAL
Qty: 180 TABLET | Refills: 3 | Status: SHIPPED | OUTPATIENT
Start: 2018-09-27 | End: 2019-01-25

## 2018-09-27 RX ORDER — SPIRONOLACTONE 50 MG/1
50 TABLET, FILM COATED ORAL 2 TIMES DAILY
Qty: 180 TABLET | Refills: 3 | Status: ON HOLD | OUTPATIENT
Start: 2018-09-27 | End: 2019-01-29

## 2018-09-27 RX ORDER — DULOXETIN HYDROCHLORIDE 30 MG/1
CAPSULE, DELAYED RELEASE ORAL
Qty: 90 CAPSULE | Refills: 3 | Status: SHIPPED | OUTPATIENT
Start: 2018-09-27 | End: 2019-01-25

## 2018-09-27 RX ORDER — ROSUVASTATIN CALCIUM 40 MG/1
40 TABLET, COATED ORAL DAILY
Qty: 90 TABLET | Status: ON HOLD | OUTPATIENT
Start: 2018-09-27 | End: 2019-01-29

## 2018-09-27 RX ORDER — NEBIVOLOL 20 MG/1
1 TABLET ORAL DAILY
Qty: 90 TABLET | Refills: 2 | Status: ON HOLD | OUTPATIENT
Start: 2018-09-27 | End: 2019-01-29

## 2018-09-27 RX ORDER — BUPROPION HYDROCHLORIDE 300 MG/1
TABLET ORAL
Qty: 90 TABLET | Refills: 3 | Status: ON HOLD | OUTPATIENT
Start: 2018-09-27 | End: 2019-01-29

## 2018-09-27 ASSESSMENT — ANXIETY QUESTIONNAIRES
3. WORRYING TOO MUCH ABOUT DIFFERENT THINGS: NEARLY EVERY DAY
2. NOT BEING ABLE TO STOP OR CONTROL WORRYING: NEARLY EVERY DAY
GAD7 TOTAL SCORE: 18
5. BEING SO RESTLESS THAT IT IS HARD TO SIT STILL: MORE THAN HALF THE DAYS
IF YOU CHECKED OFF ANY PROBLEMS ON THIS QUESTIONNAIRE, HOW DIFFICULT HAVE THESE PROBLEMS MADE IT FOR YOU TO DO YOUR WORK, TAKE CARE OF THINGS AT HOME, OR GET ALONG WITH OTHER PEOPLE: SOMEWHAT DIFFICULT
7. FEELING AFRAID AS IF SOMETHING AWFUL MIGHT HAPPEN: MORE THAN HALF THE DAYS
1. FEELING NERVOUS, ANXIOUS, OR ON EDGE: NEARLY EVERY DAY
6. BECOMING EASILY ANNOYED OR IRRITABLE: NEARLY EVERY DAY

## 2018-09-27 ASSESSMENT — ENCOUNTER SYMPTOMS
RESPIRATORY NEGATIVE: 1
CARDIOVASCULAR NEGATIVE: 1
ARTHRALGIAS: 1
GASTROINTESTINAL NEGATIVE: 1
EYES NEGATIVE: 1
NEUROLOGICAL NEGATIVE: 1
CONSTITUTIONAL NEGATIVE: 1
ENDOCRINE NEGATIVE: 1

## 2018-09-27 ASSESSMENT — PATIENT HEALTH QUESTIONNAIRE - PHQ9: 5. POOR APPETITE OR OVEREATING: MORE THAN HALF THE DAYS

## 2018-09-27 NOTE — MR AVS SNAPSHOT
After Visit Summary   9/27/2018    Christina Fletcher    MRN: [id number]           Patient Information     Date Of Birth          1970        Visit Information        Provider Department      9/27/2018 11:30 AM Mary Carmen Chaudhry PA-C [facility]        Today's Diagnoses     Major depressive disorder, recurrent episode, moderate (H)    -  1    Primary osteoarthritis of both knees        Vitamin D deficiency        Gastric bypass status for obesity        Hypomagnesemia        Essential hypertension with goal blood pressure less than 130/80        Hyperlipidemia LDL goal <130        History of pulmonary embolism        Bronchitis        Morbid obesity due to excess calories (H)          Care Instructions    Shobha Varghese -[phone] ([place])  Orthopedic Surgeon - Joint Replacement   [place]   I enjoy caring for patients who have a wide variety of bone, joint and muscle problems and have a special interest in patients who have hip and knee arthritis. My goal is to improve my patients' quality of life by relieving pain and returning them to the highest level of function possible. I focus on total and complex hip and knee replacements, partial knee replacements and minimally invasive surgery. I have completed additional subspeciality training in Joint Replacement Surgery and had the opportunity to train at the #1 ranked hospital for orthopedics in the country. Not only do I strive to be an excellent technical surgeon, but I also want to provide my patients with a comfortable environment where they feel they are truly listened to and treated with care, compassion and respect.              Follow-ups after your visit        Additional Services     BARIATRIC ADULT REFERRAL       Your provider has referred you to: [facility]: [facility] [place] [phone]   [email or web address]    Please be aware that coverage of these services is subject to the terms and limitations of your health insurance plan.  Call member services at your health plan with any benefit or coverage questions.      Please bring the following with you to your appointment:      (1) List of current medications   (2) This referral request   (3) Any documents/labs given to you for this referral            MENTAL HEALTH REFERRAL  - Adult; Outpatient Treatment; Individual/Couples/Family/Group Therapy/Health Psychology; Other: Not Listed - Enter Referral Details in Scheduling Comments Below       [facility]    Phone: [phone]            ORTHOPEDICS ADULT REFERRAL       Your provider has referred you to: [facility] [phone]    Please be aware that coverage of these services is subject to the terms and limitations of your health insurance plan.  Call member services at your health plan with any benefit or coverage questions.      Please bring the following to your appointment:    >>   Any x-rays, CTs or MRIs which have been performed.  Contact the facility where they were done to arrange for  prior to your scheduled appointment.    >>   List of current medications   >>   This referral request   >>   Any documents/labs given to you for this referral            PHYSICAL THERAPY REFERRAL       Phone: [phone]    Please be aware that coverage of these services is subject to the terms and limitations of your health insurance plan.  Call member services at your health plan with any benefit or coverage questions.                  Follow-up notes from your care team     Return in about 2 weeks (around 10/11/2018) for Depression Recheck.      Future tests that were ordered for you today     Open Future Orders        Priority Expected Expires Ordered    PHYSICAL THERAPY REFERRAL Routine  9/27/2019 9/27/2018           "  Who to contact     If you have questions or need follow up information about today's clinic visit or your schedule please contact [facility] directly at [phone].  Normal or non-critical lab and imaging results will be communicated to you by MyChart, letter or phone within 4 business days after the clinic has received the results. If you do not hear from us within 7 days, please contact the clinic through MyChart or phone. If you have a critical or abnormal lab result, we will notify you by phone as soon as possible.  Submit refill requests through [employer] or call your pharmacy and they will forward the refill request to us. Please allow 3 business days for your refill to be completed.          Additional Information About Your Visit        [facility] Information     [employer] lets you send messages to your doctor, view your test results, renew your prescriptions, schedule appointments and more. To sign up, go to [email or web address] . Click on \"Log in\" on the left side of the screen, which will take you to the Welcome page. Then click on \"Sign up Now\" on the right side of the page.     You will be asked to enter the access code listed below, as well as some personal information. Please follow the directions to create your username and password.     Your access code is: [email or web address]  Expires: 2018 11:36 AM     Your access code will  in 90 days. If you need help or a new code, please call your [facility] clinic or [phone].        Care EveryWhere ID     This is your Care EveryWhere ID. This could be used by other organizations to access your [facility] medical records  [id number]        Your Vitals Were     Pulse Temperature Respirations Height BMI (Body Mass Index)       113 98.3  F (36.8  C) (Tympanic) 16 5' 4\" (1.626 m) 46.35 kg/m2        Blood Pressure from Last 3 Encounters:   18 (!) 136/100   18 119/85   18 120/90    Weight from Last 3 " Encounters:   03/28/18 270 lb (122.5 kg)   02/07/17 288 lb 12.8 oz (131 kg)   01/06/17 (!) 310 lb (140.6 kg)              We Performed the Following     BARIATRIC ADULT REFERRAL     DEPRESSION ACTION PLAN (DAP)     MENTAL HEALTH REFERRAL  - Adult; Outpatient Treatment; Individual/Couples/Family/Group Therapy/Health Psychology; Other: Not Listed - Enter Referral Details in Scheduling Comments Below     ORTHOPEDICS ADULT REFERRAL          Today's Medication Changes          These changes are accurate as of 9/27/18 12:39 PM.  If you have any questions, ask your nurse or doctor.               Start taking these medicines.        Dose/Directions    warfarin 7.5 MG tablet   Commonly known as:  COUMADIN   Used for:  History of pulmonary embolism   Started by:  Mary Carmen Chaudhry PA-C        Dose:  7.5 mg   Take 1 tablet (7.5 mg) by mouth daily   Quantity:  30 tablet   Refills:  0            Where to get your medicines      These medications were sent to [facility] [address] [place] [address] AT [facility] & [address] [place] [id number]     Phone:  [phone]     acetaminophen 500 MG Caps    albuterol 108 (90 Base) MCG/ACT inhaler    buPROPion 300 MG 24 hr tablet    calcium-vitamin D-vitamin K 500-500-40 MG-UNT-MCG Chew    Cyanocobalamin 5000 MCG/ML Liqd    DULoxetine 30 MG EC capsule    magnesium oxide 400 MG tablet    Nebivolol HCl 20 MG Tabs    rosuvastatin 40 MG tablet    spironolactone 50 MG tablet    warfarin 7.5 MG tablet                Primary Care Provider Fax #    Physician No Ref-Primary [phone]       No address on file        Equal Access to Services     [facility] : Hadii rocio ku hadasho Sorohitali, waaxda luqadaha, qaybta kaalmada adeegkareem, johnny meier. So [facility] [phone].    ATENCIÓN: Si habla español, tiene a hassan disposición servicios gratuitos de asistencia lingüística. Llame al [phone].    We comply  with applicable federal civil rights laws and [place] laws. We do not discriminate on the basis of race, color, national origin, age, disability, sex, sexual orientation, or gender identity.            Thank you!     Thank you for choosing [facility]  for your care. Our goal is always to provide you with excellent care. Hearing back from our patients is one way we can continue to improve our services. Please take a few minutes to complete the written survey that you may receive in the mail after your visit with us. Thank you!             Your Updated Medication List - Protect others around you: Learn how to safely use, store and throw away your medicines at www.disposemymeds.org.          This list is accurate as of 9/27/18 12:39 PM.  Always use your most recent med list.                   Brand Name Dispense Instructions for use Diagnosis    acetaminophen 500 MG Caps     60 capsule    Take 2 capsules by mouth every 8 hours as needed For aches, pain, fever    Primary osteoarthritis of both knees       albuterol 108 (90 Base) MCG/ACT inhaler    PROAIR HFA/PROVENTIL HFA/VENTOLIN HFA    1 Inhaler    Inhale 2-4 puffs into the lungs every 2 hours as needed for shortness of breath / dyspnea    Bronchitis       AVEENO ACTIVE JOEL SKIN RELIEF Crea     1 Bottle    Externally apply 1 Application topically 2 times daily INDICATION: ECZEMA    Eczema, unspecified type       B COMPLEX 50 Tabs     100 tablet    Take 1 tablet by mouth daily. INDICATION: VITAMIN SUPPLEMENT    Gastric bypass status for obesity       buPROPion 300 MG 24 hr tablet    WELLBUTRIN XL    90 tablet    TAKE 1 TABLET(300 MG) BY MOUTH EVERY MORNING    Major depressive disorder, recurrent episode, moderate (H)       calcium-vitamin D-vitamin K 500-500-40 MG-UNT-MCG Chew    VIACTIV    100 tablet    Take 1 tablet by mouth 3 times daily (with meals) INDICATION: FOR BONE AND BREAST HEALTH    Vitamin D deficiency, Gastric bypass status  for obesity       cholecalciferol 55001 units capsule    VITAMIN D3    60 capsule    Take 1 capsule (50,000 Units) by mouth once a week TAKE FOR VITAMIN D DEFICIENCY    Vitamin D deficiency       Cyanocobalamin 5000 MCG/ML Liqd    B-12 SUPER STRENGTH    2 Bottle    Place 1 mL under the tongue every morning FOR VITAMIN B12 SUPPLEMENTATION, PLEASE PLACE UNDER THE TONGUE    Gastric bypass status for obesity       DULoxetine 30 MG EC capsule    CYMBALTA    90 capsule    TK 1 C PO D    Major depressive disorder, recurrent episode, moderate (H)       magnesium oxide 400 MG tablet    MAG-OX    180 tablet    INDICATION: TO TREAT LOW MAGNESIUM, TAKE 1 PILL TWICE DAILY    Hypomagnesemia       MULTI-VITAMIN DAILY PO      Take 2 tablets by mouth daily        Nebivolol HCl 20 MG Tabs    BYSTOLIC    90 tablet    Take 1 tablet by mouth daily    Essential hypertension with goal blood pressure less than 130/80       * order for DME     1 Device    Equipment being ordered: shower chair, knee brace    Fatigue, PE (pulmonary embolism), Knee pain       * order for DME     1 Device    Equipment being ordered: wheeled walker with seat    Lower extremity edema, Fatigue, Obesity, OA (osteoarthritis) of knee       * order for DME     1 Units    Equipment being ordered: Automatic blood pressure/pulse monitor    HTN (hypertension)       order for DME     1 Device    Equipment being ordered: thermometer    Avulsion of tooth       order for DME     1 Device    Equipment being ordered: SHOWER CHAIR    BMI 50.0-59.9, adult (H), Primary osteoarthritis of both knees       oxyCODONE-acetaminophen  MG per tablet    PERCOCET     TK 1 AND 1/2 TS PO Q 4 TO 6 H PRF PAIN. MAX 4.5 TS PER DAY        polyethylene glycol powder    MIRALAX    1 Bottle    Take 17 g (1 capful) by mouth daily INDICATION: CONSTIPATION, TO ACHIEVE 1-2 SOFT BMs PER DAY    Drug-induced constipation       rosuvastatin 40 MG tablet    CRESTOR    90 tablet    Take 1 tablet (40 mg)  by mouth daily INDICATION: TO LOWER CHOLESTEROL AND TO HELP REDUCE RISK OF HEART ATTACK AND STROKE    Hyperlipidemia LDL goal <130       senna-docusate 8.6-50 MG per tablet    SENOKOT-S;PERICOLACE    120 tablet    Take 1-2 tablets by mouth 2 times daily INDICATION: CONSTIPATION, TO ACHIEVE 1-2 SOFT BMs PER DAY    Constipation, unspecified constipation type       spironolactone 50 MG tablet    ALDACTONE    180 tablet    Take 1 tablet (50 mg) by mouth 2 times daily INDICATION: TO LOWER BLOOD PRESSURE    Essential hypertension with goal blood pressure less than 130/80       tiZANidine 2 MG tablet    ZANAFLEX          triamcinolone 0.1 % cream    KENALOG    30 g    Apply sparingly once or twice per day as needed to affected area until the skin is better, then stop    Eczema       VITAMIN C EFFERVESCENT Pdef     90 g    Take 1 packet by mouth 2 times daily (before meals) EMERGEN- C, INDICATION: VITAMIN C SUPPLEMENTATION AND TO AID ABSORPTION OF IRON SUPPLEMENT    Gastric bypass status for obesity, Deficient, vitamin, C       VOLTAREN 1 % Gel topical gel   Generic drug:  diclofenac      Apply topically 4 times daily        warfarin 7.5 MG tablet    COUMADIN    30 tablet    Take 1 tablet (7.5 mg) by mouth daily    History of pulmonary embolism       * Notice:  This list has 3 medication(s) that are the same as other medications prescribed for you. Read the directions carefully, and ask your doctor or other care provider to review them with you.

## 2018-09-27 NOTE — PROGRESS NOTES
SUBJECTIVE:   Christina Fletcher is a 48 year old female who presents to clinic today for the following health issues:    Depression and Anxiety Follow-Up    Status since last visit: Worsened     Other associated symptoms: anger outburst    Complicating factors:     Significant life event: yes     Current substance abuse: None    PHQ-9 5/24/2016 2/13/2017   Total Score 16 -   Q9: Suicide Ideation Not at all Not at all       Amount of exercise or physical activity: None    Problems taking medications regularly: No    Medication side effects: none    Diet: regular (no restrictions)    Hypertension Follow-up      Outpatient blood pressures are not being checked.    Low Salt Diet: not monitoring salt    She has been out of her medications since June - including her warfarin.  She has a history of DVT and PE.  She has osteoarthritis of both knees complicated by morbid obesity.  She has a history of gastric band revised to a gastric sleeve - but still does not lose weight - she refused obtaining a weight today  She also has hypertension - which is not being treated or checked.   She has a hard time getting out of bed due to her depression - which is complicated by her knee pain.   She found benefit for her depression and knee pain by going to [facility] for physical and behavioral therapy in the past, and would like to restart this if possible.   She also would like a second opinion on her knee pain because her surgeon recommend both weight loss and waiting until age 51 before he would replace her knees - which she feels is not a feasible plan.     Problem list and histories reviewed & adjusted, as indicated.  Additional history: as documented    Patient Active Problem List   Diagnosis     Female genital symptoms     Other disorder of menstruation and other abnormal bleeding from female genital tract     Adjustment disorder with depressed mood     Obesity     Chronic constipation     GERD (gastroesophageal reflux disease)      Lower extremity edema     Elevated MCV     Fibroids     Fatigue     Menorrhagia     HTN (hypertension)     Gastric bypass status for obesity     CARDIOVASCULAR SCREENING; LDL GOAL LESS THAN 160     Bariatric surgery status     Vitamin B12 deficiency without anemia     Iron deficiency     Vitamin D deficiency     Weight gain     Dysmetabolic syndrome     Galactorrhea     OA (osteoarthritis) of knee     Ascorbic acid deficiency     Pyridoxine deficiency     Knee pain     KAROLINA (obstructive sleep apnea)     Hyperlipidemia LDL goal <130     KAROLINA on CPAP     BMI 50.0-59.9, adult (H)     Hyperlipidemia with target LDL less than 130     Eczema     Hyponatremia     Chronic kidney disease, stage III (moderate)     Neuropathy     Morbid obesity due to excess calories (H)     Vitamin C deficiency     Osteoarthritis of both knees, unspecified osteoarthritis type     Hypomagnesemia     Essential hypertension with goal blood pressure less than 130/80     Eczema, unspecified type     Major depressive disorder, recurrent episode, moderate (H)     Long-term (current) use of anticoagulants [Z79.01]     History of pulmonary embolism     Past Surgical History:   Procedure Laterality Date     DEBRIDE ASSOC FX/DISLO SKIN/MUS/BONE  1990's    Infected - Right Femur     GASTRIC BYPASS  01/18/2017    conversion of gastric sleeve to gastric bypass with lysis of adhesions-      HYSTEROSCOPY,ABLATION ENDOMETRIUM  2008     LAP ADJUSTABLE GASTRIC BAND  2001    Lap Banding      LAPAROSCOPIC GASTRIC SLEEVE  2010    Dr Harris       Social History   Substance Use Topics     Smoking status: Never Smoker     Smokeless tobacco: Never Used     Alcohol use 0.0 oz/week     0 Standard drinks or equivalent per week      Comment: occ      Family History   Problem Relation Age of Onset     Hypertension Mother      Thyroid Disease Son      Genitourinary Problems Daughter      Kidney failure     Breast Cancer Maternal Grandmother      Cancer Maternal Grandmother       Bone Cancer         Current Outpatient Prescriptions   Medication Sig Dispense Refill     acetaminophen 500 MG CAPS Take 2 capsules by mouth every 8 hours as needed For aches, pain, fever 60 capsule 0     albuterol (PROAIR HFA/PROVENTIL HFA/VENTOLIN HFA) 108 (90 Base) MCG/ACT inhaler Inhale 2-4 puffs into the lungs every 2 hours as needed for shortness of breath / dyspnea 1 Inhaler 1     Ascorbic Acid Buffered (VITAMIN C EFFERVESCENT) PDEF Take 1 packet by mouth 2 times daily (before meals) EMERGEN- C, INDICATION: VITAMIN C SUPPLEMENTATION AND TO AID ABSORPTION OF IRON SUPPLEMENT 90 g PRN     B Complex Vitamins (B COMPLEX 50) TABS Take 1 tablet by mouth daily. INDICATION: VITAMIN SUPPLEMENT 100 tablet PRN     buPROPion (WELLBUTRIN XL) 300 MG 24 hr tablet TAKE 1 TABLET(300 MG) BY MOUTH EVERY MORNING 90 tablet 3     calcium-vitamin D-vitamin K (VIACTIV) 500-500-40 MG-UNT-MCG CHEW Take 1 tablet by mouth 3 times daily (with meals) INDICATION: FOR BONE AND BREAST HEALTH 100 tablet PRN     cholecalciferol (VITAMIN D3) 50576 UNITS capsule Take 1 capsule (50,000 Units) by mouth once a week TAKE FOR VITAMIN D DEFICIENCY 60 capsule 0     Cyanocobalamin (B-12 SUPER STRENGTH) 5000 MCG/ML LIQD Place 1 mL under the tongue every morning FOR VITAMIN B12 SUPPLEMENTATION, PLEASE PLACE UNDER THE TONGUE 2 Bottle PRN     diclofenac (VOLTAREN) 1 % GEL Apply topically 4 times daily       DULoxetine (CYMBALTA) 30 MG EC capsule TK 1 C PO D 90 capsule 3     Emollient (AVEENO ACTIVE JOEL SKIN RELIEF) CREA Externally apply 1 Application topically 2 times daily INDICATION: ECZEMA 1 Bottle PRN     magnesium oxide (MAG-OX) 400 MG tablet INDICATION: TO TREAT LOW MAGNESIUM, TAKE 1 PILL TWICE DAILY 180 tablet 3     Multiple Vitamin (MULTI-VITAMIN DAILY PO) Take 2 tablets by mouth daily        Nebivolol HCl (BYSTOLIC) 20 MG TABS Take 1 tablet by mouth daily 90 tablet 2     order for DME Equipment being ordered: SHOWER CHAIR 1 Device 0     ORDER  FOR DME Equipment being ordered: thermometer 1 Device 0     ORDER FOR DME Equipment being ordered: Automatic blood pressure/pulse monitor 1 Units NA     ORDER FOR DME Equipment being ordered: wheeled walker with seat 1 Device 0     ORDER FOR DME Equipment being ordered: shower chair, knee brace 1 Device 0     oxyCODONE-acetaminophen (PERCOCET)  MG per tablet TK 1 AND 1/2 TS PO Q 4 TO 6 H PRF PAIN. MAX 4.5 TS PER DAY  0     polyethylene glycol (MIRALAX) powder Take 17 g (1 capful) by mouth daily INDICATION: CONSTIPATION, TO ACHIEVE 1-2 SOFT BMs PER DAY 1 Bottle PRN     rosuvastatin (CRESTOR) 40 MG tablet Take 1 tablet (40 mg) by mouth daily INDICATION: TO LOWER CHOLESTEROL AND TO HELP REDUCE RISK OF HEART ATTACK AND STROKE 90 tablet PRN     senna-docusate (SENOKOT-S;PERICOLACE) 8.6-50 MG per tablet Take 1-2 tablets by mouth 2 times daily INDICATION: CONSTIPATION, TO ACHIEVE 1-2 SOFT BMs PER  tablet prn     spironolactone (ALDACTONE) 50 MG tablet Take 1 tablet (50 mg) by mouth 2 times daily INDICATION: TO LOWER BLOOD PRESSURE 180 tablet 3     tiZANidine (ZANAFLEX) 2 MG tablet   2     triamcinolone (KENALOG) 0.1 % cream Apply sparingly once or twice per day as needed to affected area until the skin is better, then stop 30 g 0     warfarin (COUMADIN) 7.5 MG tablet Take 1 tablet (7.5 mg) by mouth daily 30 tablet 0     [DISCONTINUED] albuterol (PROAIR HFA/PROVENTIL HFA/VENTOLIN HFA) 108 (90 BASE) MCG/ACT Inhaler Inhale 2-4 puffs into the lungs every 2 hours as needed for shortness of breath / dyspnea 1 Inhaler 1     [DISCONTINUED] buPROPion (WELLBUTRIN XL) 300 MG 24 hr tablet TAKE 1 TABLET(300 MG) BY MOUTH EVERY MORNING 90 tablet 3     [DISCONTINUED] DULoxetine (CYMBALTA) 30 MG EC capsule TK 1 C PO D  0     [DISCONTINUED] hydrALAZINE (APRESOLINE) 100 MG TABS Take 1 tablet (100 mg) by mouth 2 times daily INDICATION: TO LOWER BLOOD PRESSURE 60 tablet prn     [DISCONTINUED] metoprolol (TOPROL-XL) 100 MG 24 hr  "tablet Take 1 tablet (100 mg) by mouth 2 times daily INDICATION:TO LOWER BLOOD PRESSURE AND TO PREVENT HEART DISEASE 60 tablet PRN     [DISCONTINUED] Nebivolol HCl (BYSTOLIC) 20 MG TABS Take 1 tablet by mouth daily 90 tablet 2     [DISCONTINUED] rosuvastatin (CRESTOR) 40 MG tablet Take 1 tablet (40 mg) by mouth daily INDICATION: TO LOWER CHOLESTEROL AND TO HELP REDUCE RISK OF HEART ATTACK AND STROKE 90 tablet PRN     [DISCONTINUED] spironolactone (ALDACTONE) 50 MG tablet Take 1 tablet (50 mg) by mouth 2 times daily INDICATION: TO LOWER BLOOD PRESSURE 180 tablet 3     [DISCONTINUED] Spironolactone-HCTZ 50-50 MG TABS Take 1 tablet by mouth every morning INDICATION: TO LOWER BLOOD PRESSURE 30 tablet PRN     Allergies   Allergen Reactions     Hydralazine Shortness Of Breath     WITH ASSOCIATED NAUSEA AND VOMITING     Nsaids      Other reaction(s): Other - Describe In Comment Field  Patient had gastric bypass surgery.  Should not take oral NSAID's ever.     Penicillin [Penicillins]        Reviewed and updated as needed this visit by clinical staff  Tobacco  Allergies  Meds  Med Hx  Surg Hx  Fam Hx  Soc Hx      Reviewed and updated as needed this visit by Provider         Review of Systems   Constitutional: Negative.    HENT: Negative.    Eyes: Negative.    Respiratory: Negative.    Cardiovascular: Negative.    Gastrointestinal: Negative.    Endocrine: Negative.    Genitourinary: Negative.    Musculoskeletal: Positive for arthralgias.   Skin: Negative.    Neurological: Negative.    Psychiatric/Behavioral:        As in HPI       OBJECTIVE:     BP (!) 136/100 (Cuff Size: Adult Regular)  Pulse 113  Temp 98.3  F (36.8  C) (Tympanic)  Resp 16  Ht 5' 4\" (1.626 m)  BMI 46.35 kg/m2  Body mass index is 46.35 kg/(m^2).    Physical Exam   Constitutional: She is oriented to person, place, and time. She appears well-developed and well-nourished.   HENT:   Head: Normocephalic.   Right Ear: External ear normal.   Left Ear: " External ear normal.   Nose: Nose normal.   Eyes: Conjunctivae and EOM are normal.   Neck: Normal range of motion.   Cardiovascular: Tachycardia present.    Pulmonary/Chest: Effort normal.   Neurological: She is alert and oriented to person, place, and time.   Skin: Skin is warm and dry. She is not diaphoretic.   Psychiatric: Her speech is normal and behavior is normal. Judgment and thought content normal. Cognition and memory are normal. She exhibits a depressed mood.       No results found for this or any previous visit (from the past 24 hour(s)).    ASSESSMENT/PLAN:       ICD-10-CM    1. Major depressive disorder, recurrent episode, moderate (H) F33.1 DULoxetine (CYMBALTA) 30 MG EC capsule     buPROPion (WELLBUTRIN XL) 300 MG 24 hr tablet     MENTAL HEALTH REFERRAL  - Adult; Outpatient Treatment; Individual/Couples/Family/Group Therapy/Health Psychology; Other: Not Listed - Enter Referral Details in Scheduling Comments Below   2. Primary osteoarthritis of both knees M17.0 ORTHOPEDICS ADULT REFERRAL     acetaminophen 500 MG CAPS     PHYSICAL THERAPY REFERRAL   3. Vitamin D deficiency E55.9 calcium-vitamin D-vitamin K (VIACTIV) 500-500-40 MG-UNT-MCG CHEW   4. Gastric bypass status for obesity Z98.84 calcium-vitamin D-vitamin K (VIACTIV) 500-500-40 MG-UNT-MCG CHEW     Cyanocobalamin (B-12 SUPER STRENGTH) 5000 MCG/ML LIQD     BARIATRIC ADULT REFERRAL    LAP BAND AND GASTRIC SLEEVE   5. Hypomagnesemia E83.42 magnesium oxide (MAG-OX) 400 MG tablet   6. Essential hypertension with goal blood pressure less than 130/80 I10 Nebivolol HCl (BYSTOLIC) 20 MG TABS     spironolactone (ALDACTONE) 50 MG tablet    INCIDENTALLY MENTIONS THAT SHE HAS HAD NON-ACTIVITY RELATED CP   7. Hyperlipidemia LDL goal <130 E78.5 rosuvastatin (CRESTOR) 40 MG tablet   8. History of pulmonary embolism Z86.711 warfarin (COUMADIN) 7.5 MG tablet     INR point of care     INR CLINIC REFERRAL   9. Bronchitis J40 albuterol (PROAIR HFA/PROVENTIL  HFA/VENTOLIN HFA) 108 (90 Base) MCG/ACT inhaler   10. Morbid obesity due to excess calories (H) E66.01 BARIATRIC ADULT REFERRAL     PHYSICAL THERAPY REFERRAL      Christina has many concerns that we were not able to completely address during today's visit.   I would like her to return in one week for an INR recheck  She should also return in 2 weeks for a depression recheck with me.   All her medications were refilled today, we will check in on these again in 2 weeks.     50 minutes were spent with the patient, greater than 50% of our time was spent in counseling.      Patient Instructions   Shobha Varghese -[phone] (Tria)  Orthopedic Surgeon - Joint Replacement   [place]   I enjoy caring for patients who have a wide variety of bone, joint and muscle problems and have a special interest in patients who have hip and knee arthritis. My goal is to improve my patients' quality of life by relieving pain and returning them to the highest level of function possible. I focus on total and complex hip and knee replacements, partial knee replacements and minimally invasive surgery. I have completed additional subspeciality training in Joint Replacement Surgery and had the opportunity to train at the #1 ranked hospital for orthopedics in the country. Not only do I strive to be an excellent technical surgeon, but I also want to provide my patients with a comfortable environment where they feel they are truly listened to and treated with care, compassion and respect.          Davis Chaudhry PA-C  [facility]

## 2018-09-27 NOTE — PATIENT INSTRUCTIONS
Shobha Varghese -[phone] (Tria)  Orthopedic Surgeon - Joint Replacement   [place]   I enjoy caring for patients who have a wide variety of bone, joint and muscle problems and have a special interest in patients who have hip and knee arthritis. My goal is to improve my patients' quality of life by relieving pain and returning them to the highest level of function possible. I focus on total and complex hip and knee replacements, partial knee replacements and minimally invasive surgery. I have completed additional subspeciality training in Joint Replacement Surgery and had the opportunity to train at the #1 ranked hospital for orthopedics in the country. Not only do I strive to be an excellent technical surgeon, but I also want to provide my patients with a comfortable environment where they feel they are truly listened to and treated with care, compassion and respect.

## 2018-09-28 ENCOUNTER — TELEPHONE (OUTPATIENT)
Dept: FAMILY MEDICINE | Facility: CLINIC | Age: 48
End: 2018-09-28

## 2018-09-28 DIAGNOSIS — Z98.84 GASTRIC BYPASS STATUS FOR OBESITY: ICD-10-CM

## 2018-09-28 RX ORDER — CYANOCOBALAMIN (VITAMIN B-12) 2500 MCG
2500 TABLET, SUBLINGUAL SUBLINGUAL DAILY
Qty: 30 TABLET | Refills: 1 | Status: ON HOLD | OUTPATIENT
Start: 2018-09-28 | End: 2019-01-29

## 2018-09-28 ASSESSMENT — PATIENT HEALTH QUESTIONNAIRE - PHQ9: SUM OF ALL RESPONSES TO PHQ QUESTIONS 1-9: 18

## 2018-09-28 ASSESSMENT — ANXIETY QUESTIONNAIRES: GAD7 TOTAL SCORE: 18

## 2018-09-28 NOTE — TELEPHONE ENCOUNTER
Reason for Call:  Medication or medication refill:    Do you use a [facility]?  Name of the pharmacy and phone number for the current request:     [facility] [address] [place] [address] [address] AT [facility] & [facility]    Name of the medication requested: Cyanocobalamin (B-12 SUPER STRENGTH) 5000 MCG/ML LIQD     Other request: Patients insurance does not cover the liquid but will cover the sublingual tablets. Please make the switch for the patient.     Can we leave a detailed message on this number? YES    Phone number patient can be reached at: Other phone number:  [phone]    Best Time: anytime.     Call taken on 9/28/2018 at 1:0 PM by Naomi Arce

## 2018-09-28 NOTE — TELEPHONE ENCOUNTER
Pt should schedule appt for labs and discussion about cholesterol. He has not had cholesterol panel or LDL checked since Jan 2017.  This is the highest dose of Crestor, Crestor is more potent than Atorvastatin.

## 2018-09-28 NOTE — TELEPHONE ENCOUNTER
rosuvastatin (CRESTOR) 40 MG tablet  Is not covered the preferred alternative is atorvastatin tab mg

## 2018-09-28 NOTE — TELEPHONE ENCOUNTER
Spoke with pharmacy saying that a PA is needed for the Crestor.Is sending over form to be filled out.    Left message with patient that she needs labs and an office visit related to her cholesterol.

## 2018-09-29 ENCOUNTER — TELEPHONE (OUTPATIENT)
Dept: FAMILY MEDICINE | Facility: CLINIC | Age: 48
End: 2018-09-29

## 2018-09-29 NOTE — TELEPHONE ENCOUNTER
Prior Authorization Retail Medication Request    Medication/Dose: rosuvastatin (CRESTOR) 40 MG tablet  ICD code (if different than what is on RX):    Previously Tried and Failed:    Rationale:      Insurance Name:   Insurance ID:  [id number]      Pharmacy Information (if different than what is on RX)  Name:    Phone:      MESSAGE: PLAN DOES NOT COVER THIS MEDICATION. PLEASE CALL PLAN [phone] TO INITIATE PRIOR AUTHORIZATION OF CALL/ FAX PHARMACY TO CHANGE MEDICATION.

## 2018-10-01 ENCOUNTER — TELEPHONE (OUTPATIENT)
Dept: FAMILY MEDICINE | Facility: CLINIC | Age: 48
End: 2018-10-01

## 2018-10-01 NOTE — TELEPHONE ENCOUNTER
Patient Contact    Attempt # 1    Was call answered?  No.  Left message on voicemail with information to call me back.    Want to assess for signs of bleeding, if no signs, okay to wait until Friday for INR. If signs of bleeding, try to get in on Wednesday's schedule.

## 2018-10-01 NOTE — TELEPHONE ENCOUNTER
Central Prior Authorization Team   Phone: [phone]    PA Initiation    Medication: rosuvastatin (CRESTOR) 40 MG tablet  Insurance Company: [employer] - Phone [phone] Fax [phone]  Pharmacy Filling the Rx: [facility] [address] [place] [address] [address] AT [facility]  Filling Pharmacy Phone: [phone]  Filling Pharmacy Fax:    Start Date: 10/1/2018

## 2018-10-01 NOTE — TELEPHONE ENCOUNTER
Prior Authorization Approval    Authorization Effective Date: 10/1/2018  Authorization Expiration Date: 12/31/2019  Medication: rosuvastatin (CRESTOR) 40 MG tablet  Approved Dose/Quantity:    Reference #:     Insurance Company: [employer] - Phone [phone] Fax [phone]  Expected CoPay:       CoPay Card Available:      Foundation Assistance Needed:    Which Pharmacy is filling the prescription (Not needed for infusion/clinic administered): [facility] [address] [place] [address] [address]  Pharmacy Notified: Yes  Patient Notified: Yes

## 2018-10-05 NOTE — TELEPHONE ENCOUNTER
Call made to the patient, as she did not show up to her appointment.     She is in [place], her uncle passed away. She states she is going to call the clinic and come in when she is back in [place], which should be Wednesday at the latest.     She promises to go to the ER if she has any signs of bleeding or clotting.

## 2018-10-05 NOTE — TELEPHONE ENCOUNTER
patient aware if signs of clotting (pain, tenderness, swelling, color change in leg or arm, SOB) and bleeding occur (blood in stool, urine, large bruising, bleeding gums, nosebleeds) to have INR check sooner. If sx severe report to ER or concerned for stroke call 911. If general questions or concerns arise, call clinic.

## 2019-01-25 ENCOUNTER — HOSPITAL ENCOUNTER (INPATIENT)
Facility: CLINIC | Age: 49
LOS: 4 days | Discharge: HOME | DRG: 287 | End: 2019-01-29
Attending: EMERGENCY MEDICINE | Admitting: HOSPITALIST
Payer: MEDICARE

## 2019-01-25 ENCOUNTER — APPOINTMENT (OUTPATIENT)
Dept: GENERAL RADIOLOGY | Facility: CLINIC | Age: 49
DRG: 287 | End: 2019-01-25
Payer: MEDICARE

## 2019-01-25 DIAGNOSIS — R07.89 CHEST PRESSURE: ICD-10-CM

## 2019-01-25 DIAGNOSIS — I42.9 CARDIOMYOPATHY, UNSPECIFIED TYPE (H): ICD-10-CM

## 2019-01-25 DIAGNOSIS — E87.70 HYPERVOLEMIA, UNSPECIFIED HYPERVOLEMIA TYPE: ICD-10-CM

## 2019-01-25 DIAGNOSIS — F41.1 GENERALIZED ANXIETY DISORDER: ICD-10-CM

## 2019-01-25 DIAGNOSIS — F33.1 MAJOR DEPRESSIVE DISORDER, RECURRENT EPISODE, MODERATE (H): ICD-10-CM

## 2019-01-25 DIAGNOSIS — K59.00 CONSTIPATION, UNSPECIFIED CONSTIPATION TYPE: ICD-10-CM

## 2019-01-25 DIAGNOSIS — R06.09 DYSPNEA ON EXERTION: Primary | ICD-10-CM

## 2019-01-25 DIAGNOSIS — I50.9 ACUTE CONGESTIVE HEART FAILURE, UNSPECIFIED HEART FAILURE TYPE (H): ICD-10-CM

## 2019-01-25 DIAGNOSIS — E78.5 HYPERLIPIDEMIA LDL GOAL <130: ICD-10-CM

## 2019-01-25 DIAGNOSIS — R06.02 SHORTNESS OF BREATH: ICD-10-CM

## 2019-01-25 DIAGNOSIS — Z86.711 HISTORY OF PULMONARY EMBOLISM: ICD-10-CM

## 2019-01-25 LAB
ALBUMIN SERPL-MCNC: 3.2 G/DL (ref 3.4–5)
ALP SERPL-CCNC: 85 U/L (ref 40–150)
ALT SERPL W P-5'-P-CCNC: 85 U/L (ref 0–50)
ANION GAP SERPL CALCULATED.3IONS-SCNC: 7 MMOL/L (ref 3–14)
AST SERPL W P-5'-P-CCNC: 32 U/L (ref 0–45)
BASOPHILS # BLD AUTO: 0 10E9/L (ref 0–0.2)
BASOPHILS NFR BLD AUTO: 0 %
BILIRUB SERPL-MCNC: 0.4 MG/DL (ref 0.2–1.3)
BUN SERPL-MCNC: 29 MG/DL (ref 7–30)
CALCIUM SERPL-MCNC: 8.8 MG/DL (ref 8.5–10.1)
CHLORIDE SERPL-SCNC: 108 MMOL/L (ref 94–109)
CO2 SERPL-SCNC: 28 MMOL/L (ref 20–32)
CREAT SERPL-MCNC: 0.9 MG/DL (ref 0.52–1.04)
CREAT SERPL-MCNC: 0.91 MG/DL (ref 0.52–1.04)
D DIMER PPP FEU-MCNC: <0.3 UG/ML FEU (ref 0–0.5)
DIFFERENTIAL METHOD BLD: ABNORMAL
EOSINOPHIL # BLD AUTO: 0 10E9/L (ref 0–0.7)
EOSINOPHIL NFR BLD AUTO: 0.1 %
ERYTHROCYTE [DISTWIDTH] IN BLOOD BY AUTOMATED COUNT: 12.1 % (ref 10–15)
GFR SERPL CREATININE-BSD FRML MDRD: 74 ML/MIN/{1.73_M2}
GFR SERPL CREATININE-BSD FRML MDRD: 75 ML/MIN/{1.73_M2}
GLUCOSE SERPL-MCNC: 81 MG/DL (ref 70–99)
HBA1C MFR BLD: 5 % (ref 0–5.6)
HCT VFR BLD AUTO: 38.9 % (ref 35–47)
HGB BLD-MCNC: 12.9 G/DL (ref 11.7–15.7)
IMM GRANULOCYTES # BLD: 0.1 10E9/L (ref 0–0.4)
IMM GRANULOCYTES NFR BLD: 0.6 %
INR PPP: 1 (ref 0.86–1.14)
INTERPRETATION ECG - MUSE: NORMAL
LYMPHOCYTES # BLD AUTO: 1.5 10E9/L (ref 0.8–5.3)
LYMPHOCYTES NFR BLD AUTO: 13.1 %
MCH RBC QN AUTO: 39.6 PG (ref 26.5–33)
MCHC RBC AUTO-ENTMCNC: 33.2 G/DL (ref 31.5–36.5)
MCV RBC AUTO: 119 FL (ref 78–100)
MONOCYTES # BLD AUTO: 1 10E9/L (ref 0–1.3)
MONOCYTES NFR BLD AUTO: 9.2 %
NEUTROPHILS # BLD AUTO: 8.7 10E9/L (ref 1.6–8.3)
NEUTROPHILS NFR BLD AUTO: 77 %
NRBC # BLD AUTO: 0 10*3/UL
NRBC BLD AUTO-RTO: 0 /100
NT-PROBNP SERPL-MCNC: 4575 PG/ML (ref 0–450)
PLATELET # BLD AUTO: 220 10E9/L (ref 150–450)
PLATELET # BLD AUTO: 238 10E9/L (ref 150–450)
POTASSIUM SERPL-SCNC: 3.9 MMOL/L (ref 3.4–5.3)
PROT SERPL-MCNC: 7.2 G/DL (ref 6.8–8.8)
RBC # BLD AUTO: 3.26 10E12/L (ref 3.8–5.2)
SODIUM SERPL-SCNC: 143 MMOL/L (ref 133–144)
TROPONIN I SERPL-MCNC: 0.04 UG/L (ref 0–0.04)
TSH SERPL DL<=0.005 MIU/L-ACNC: 1.6 MU/L (ref 0.4–4)
WBC # BLD AUTO: 11.3 10E9/L (ref 4–11)

## 2019-01-25 PROCEDURE — 85610 PROTHROMBIN TIME: CPT | Performed by: EMERGENCY MEDICINE

## 2019-01-25 PROCEDURE — 83036 HEMOGLOBIN GLYCOSYLATED A1C: CPT | Performed by: HOSPITALIST

## 2019-01-25 PROCEDURE — 25000128 H RX IP 250 OP 636: Performed by: EMERGENCY MEDICINE

## 2019-01-25 PROCEDURE — 83880 ASSAY OF NATRIURETIC PEPTIDE: CPT | Performed by: EMERGENCY MEDICINE

## 2019-01-25 PROCEDURE — 93005 ELECTROCARDIOGRAM TRACING: CPT

## 2019-01-25 PROCEDURE — 99285 EMERGENCY DEPT VISIT HI MDM: CPT | Mod: 25

## 2019-01-25 PROCEDURE — A9270 NON-COVERED ITEM OR SERVICE: HCPCS | Mod: GY | Performed by: HOSPITALIST

## 2019-01-25 PROCEDURE — 85049 AUTOMATED PLATELET COUNT: CPT | Performed by: HOSPITALIST

## 2019-01-25 PROCEDURE — 96374 THER/PROPH/DIAG INJ IV PUSH: CPT

## 2019-01-25 PROCEDURE — 84484 ASSAY OF TROPONIN QUANT: CPT | Performed by: HOSPITALIST

## 2019-01-25 PROCEDURE — 99207 ZZC APP CREDIT; MD BILLING SHARED VISIT: CPT | Performed by: NURSE PRACTITIONER

## 2019-01-25 PROCEDURE — 93010 ELECTROCARDIOGRAM REPORT: CPT | Performed by: INTERNAL MEDICINE

## 2019-01-25 PROCEDURE — 71046 X-RAY EXAM CHEST 2 VIEWS: CPT

## 2019-01-25 PROCEDURE — 85025 COMPLETE CBC W/AUTO DIFF WBC: CPT | Performed by: EMERGENCY MEDICINE

## 2019-01-25 PROCEDURE — 99223 1ST HOSP IP/OBS HIGH 75: CPT | Mod: AI | Performed by: HOSPITALIST

## 2019-01-25 PROCEDURE — 80053 COMPREHEN METABOLIC PANEL: CPT | Performed by: EMERGENCY MEDICINE

## 2019-01-25 PROCEDURE — 84484 ASSAY OF TROPONIN QUANT: CPT | Performed by: EMERGENCY MEDICINE

## 2019-01-25 PROCEDURE — 25000132 ZZH RX MED GY IP 250 OP 250 PS 637: Mod: GY | Performed by: EMERGENCY MEDICINE

## 2019-01-25 PROCEDURE — 12000000 ZZH R&B MED SURG/OB

## 2019-01-25 PROCEDURE — 25000128 H RX IP 250 OP 636: Performed by: HOSPITALIST

## 2019-01-25 PROCEDURE — 84443 ASSAY THYROID STIM HORMONE: CPT | Performed by: EMERGENCY MEDICINE

## 2019-01-25 PROCEDURE — 82565 ASSAY OF CREATININE: CPT | Performed by: HOSPITALIST

## 2019-01-25 PROCEDURE — 85379 FIBRIN DEGRADATION QUANT: CPT | Performed by: EMERGENCY MEDICINE

## 2019-01-25 PROCEDURE — 25000132 ZZH RX MED GY IP 250 OP 250 PS 637: Mod: GY | Performed by: HOSPITALIST

## 2019-01-25 PROCEDURE — A9270 NON-COVERED ITEM OR SERVICE: HCPCS | Mod: GY | Performed by: EMERGENCY MEDICINE

## 2019-01-25 PROCEDURE — 36415 COLL VENOUS BLD VENIPUNCTURE: CPT | Performed by: HOSPITALIST

## 2019-01-25 RX ORDER — LIDOCAINE 40 MG/G
CREAM TOPICAL
Status: DISCONTINUED | OUTPATIENT
Start: 2019-01-25 | End: 2019-01-29 | Stop reason: HOSPADM

## 2019-01-25 RX ORDER — CARVEDILOL 6.25 MG/1
6.25 TABLET ORAL 2 TIMES DAILY
Status: DISCONTINUED | OUTPATIENT
Start: 2019-01-25 | End: 2019-01-29

## 2019-01-25 RX ORDER — SPIRONOLACTONE 25 MG/1
50 TABLET ORAL 2 TIMES DAILY
Status: DISCONTINUED | OUTPATIENT
Start: 2019-01-25 | End: 2019-01-26

## 2019-01-25 RX ORDER — PROCHLORPERAZINE MALEATE 5 MG
10 TABLET ORAL EVERY 6 HOURS PRN
Status: DISCONTINUED | OUTPATIENT
Start: 2019-01-25 | End: 2019-01-29 | Stop reason: HOSPADM

## 2019-01-25 RX ORDER — AMOXICILLIN 250 MG
1 CAPSULE ORAL 2 TIMES DAILY
Status: DISCONTINUED | OUTPATIENT
Start: 2019-01-25 | End: 2019-01-29 | Stop reason: HOSPADM

## 2019-01-25 RX ORDER — ACETAMINOPHEN 325 MG/1
650 TABLET ORAL EVERY 4 HOURS PRN
Status: DISCONTINUED | OUTPATIENT
Start: 2019-01-25 | End: 2019-01-28

## 2019-01-25 RX ORDER — WARFARIN SODIUM 7.5 MG/1
7.5 TABLET ORAL
Status: COMPLETED | OUTPATIENT
Start: 2019-01-25 | End: 2019-01-25

## 2019-01-25 RX ORDER — PROCHLORPERAZINE 25 MG
25 SUPPOSITORY, RECTAL RECTAL EVERY 12 HOURS PRN
Status: DISCONTINUED | OUTPATIENT
Start: 2019-01-25 | End: 2019-01-29 | Stop reason: HOSPADM

## 2019-01-25 RX ORDER — FUROSEMIDE 10 MG/ML
20 INJECTION INTRAMUSCULAR; INTRAVENOUS
Status: DISCONTINUED | OUTPATIENT
Start: 2019-01-26 | End: 2019-01-26

## 2019-01-25 RX ORDER — LABETALOL HYDROCHLORIDE 5 MG/ML
10 INJECTION, SOLUTION INTRAVENOUS
Status: DISCONTINUED | OUTPATIENT
Start: 2019-01-25 | End: 2019-01-29 | Stop reason: HOSPADM

## 2019-01-25 RX ORDER — TIZANIDINE 2 MG/1
2 TABLET ORAL EVERY 6 HOURS PRN
Status: DISCONTINUED | OUTPATIENT
Start: 2019-01-25 | End: 2019-01-29 | Stop reason: HOSPADM

## 2019-01-25 RX ORDER — POLYETHYLENE GLYCOL 3350 17 G/17G
17 POWDER, FOR SOLUTION ORAL DAILY
Status: DISCONTINUED | OUTPATIENT
Start: 2019-01-26 | End: 2019-01-25

## 2019-01-25 RX ORDER — BUPROPION HYDROCHLORIDE 300 MG/1
300 TABLET ORAL DAILY
Status: DISCONTINUED | OUTPATIENT
Start: 2019-01-26 | End: 2019-01-27

## 2019-01-25 RX ORDER — OXYCODONE AND ACETAMINOPHEN 10; 325 MG/1; MG/1
1-2 TABLET ORAL EVERY 6 HOURS PRN
Status: DISCONTINUED | OUTPATIENT
Start: 2019-01-25 | End: 2019-01-29 | Stop reason: HOSPADM

## 2019-01-25 RX ORDER — ROSUVASTATIN CALCIUM 20 MG/1
40 TABLET, COATED ORAL DAILY
Status: DISCONTINUED | OUTPATIENT
Start: 2019-01-26 | End: 2019-01-29 | Stop reason: HOSPADM

## 2019-01-25 RX ORDER — NALOXONE HYDROCHLORIDE 0.4 MG/ML
.1-.4 INJECTION, SOLUTION INTRAMUSCULAR; INTRAVENOUS; SUBCUTANEOUS
Status: DISCONTINUED | OUTPATIENT
Start: 2019-01-25 | End: 2019-01-29 | Stop reason: HOSPADM

## 2019-01-25 RX ORDER — ACETAMINOPHEN 650 MG/1
650 SUPPOSITORY RECTAL EVERY 4 HOURS PRN
Status: DISCONTINUED | OUTPATIENT
Start: 2019-01-25 | End: 2019-01-29 | Stop reason: HOSPADM

## 2019-01-25 RX ORDER — FUROSEMIDE 10 MG/ML
40 INJECTION INTRAMUSCULAR; INTRAVENOUS ONCE
Status: COMPLETED | OUTPATIENT
Start: 2019-01-25 | End: 2019-01-25

## 2019-01-25 RX ORDER — NITROGLYCERIN 0.4 MG/1
0.4 TABLET SUBLINGUAL EVERY 5 MIN PRN
Status: DISCONTINUED | OUTPATIENT
Start: 2019-01-25 | End: 2019-01-25

## 2019-01-25 RX ORDER — ONDANSETRON 4 MG/1
4 TABLET, ORALLY DISINTEGRATING ORAL EVERY 6 HOURS PRN
Status: DISCONTINUED | OUTPATIENT
Start: 2019-01-25 | End: 2019-01-29 | Stop reason: HOSPADM

## 2019-01-25 RX ORDER — AMOXICILLIN 250 MG
2 CAPSULE ORAL 2 TIMES DAILY PRN
Status: DISCONTINUED | OUTPATIENT
Start: 2019-01-25 | End: 2019-01-29 | Stop reason: HOSPADM

## 2019-01-25 RX ORDER — POLYETHYLENE GLYCOL 3350 17 G/17G
17 POWDER, FOR SOLUTION ORAL DAILY PRN
Status: DISCONTINUED | OUTPATIENT
Start: 2019-01-25 | End: 2019-01-29 | Stop reason: HOSPADM

## 2019-01-25 RX ORDER — NITROGLYCERIN 0.4 MG/1
0.4 TABLET SUBLINGUAL EVERY 5 MIN PRN
Status: DISCONTINUED | OUTPATIENT
Start: 2019-01-25 | End: 2019-01-29 | Stop reason: HOSPADM

## 2019-01-25 RX ORDER — ONDANSETRON 2 MG/ML
4 INJECTION INTRAMUSCULAR; INTRAVENOUS EVERY 6 HOURS PRN
Status: DISCONTINUED | OUTPATIENT
Start: 2019-01-25 | End: 2019-01-29 | Stop reason: HOSPADM

## 2019-01-25 RX ORDER — BISACODYL 10 MG
10 SUPPOSITORY, RECTAL RECTAL DAILY PRN
Status: DISCONTINUED | OUTPATIENT
Start: 2019-01-25 | End: 2019-01-29 | Stop reason: HOSPADM

## 2019-01-25 RX ORDER — ASPIRIN 325 MG
325 TABLET ORAL ONCE
Status: DISCONTINUED | OUTPATIENT
Start: 2019-01-25 | End: 2019-01-25

## 2019-01-25 RX ORDER — AMOXICILLIN 250 MG
1 CAPSULE ORAL 2 TIMES DAILY PRN
Status: DISCONTINUED | OUTPATIENT
Start: 2019-01-25 | End: 2019-01-29 | Stop reason: HOSPADM

## 2019-01-25 RX ADMIN — CARVEDILOL 6.25 MG: 6.25 TABLET, FILM COATED ORAL at 23:40

## 2019-01-25 RX ADMIN — DICLOFENAC 2 G: 10 GEL TOPICAL at 21:04

## 2019-01-25 RX ADMIN — SENNOSIDES AND DOCUSATE SODIUM 1 TABLET: 8.6; 5 TABLET ORAL at 21:01

## 2019-01-25 RX ADMIN — WARFARIN SODIUM 7.5 MG: 7.5 TABLET ORAL at 21:01

## 2019-01-25 RX ADMIN — FUROSEMIDE 40 MG: 10 INJECTION, SOLUTION INTRAVENOUS at 16:28

## 2019-01-25 RX ADMIN — NITROGLYCERIN 0.4 MG: 0.4 TABLET SUBLINGUAL at 13:51

## 2019-01-25 RX ADMIN — SPIRONOLACTONE 50 MG: 25 TABLET ORAL at 21:01

## 2019-01-25 RX ADMIN — TIZANIDINE 2 MG: 2 TABLET ORAL at 23:40

## 2019-01-25 RX ADMIN — ENOXAPARIN SODIUM 135 MG: 150 INJECTION SUBCUTANEOUS at 21:04

## 2019-01-25 ASSESSMENT — ACTIVITIES OF DAILY LIVING (ADL)
BATHING: 1-->ASSISTIVE EQUIPMENT
NUMBER_OF_TIMES_PATIENT_HAS_FALLEN_WITHIN_LAST_SIX_MONTHS: 1
COGNITION: 0 - NO COGNITION ISSUES REPORTED
TRANSFERRING: 1-->ASSISTIVE EQUIPMENT
AMBULATION: 1-->ASSISTIVE EQUIPMENT
SWALLOWING: 0-->SWALLOWS FOODS/LIQUIDS WITHOUT DIFFICULTY
DRESS: 0-->INDEPENDENT
TOILETING: 1-->ASSISTIVE EQUIPMENT
RETIRED_EATING: 0-->INDEPENDENT
RETIRED_COMMUNICATION: 0-->UNDERSTANDS/COMMUNICATES WITHOUT DIFFICULTY
FALL_HISTORY_WITHIN_LAST_SIX_MONTHS: YES
ADLS_ACUITY_SCORE: 14

## 2019-01-25 ASSESSMENT — ENCOUNTER SYMPTOMS
ABDOMINAL PAIN: 0
COUGH: 1
FEVER: 0
SHORTNESS OF BREATH: 1

## 2019-01-25 ASSESSMENT — MIFFLIN-ST. JEOR: SCORE: 1903.22

## 2019-01-25 NOTE — PROGRESS NOTES
RECEIVING UNIT ED HANDOFF REVIEW    ED Nurse Handoff Report was reviewed by: Keo Hood on January 25, 2019 at 5:43 PM

## 2019-01-25 NOTE — H&P
[facility]    History and Physical - Hospitalist Service       Date of Admission:  1/25/2019    Assessment & Plan   Christina Fletcher is a 49 year old female admitted on 1/25/2019.  Past history of HTN, KAROLINA, CKD, hyperlipidemia, GERD, depression and PE who presents with progressive edema and dyspnea on exertion.    Probable acute on chronic CHF  Presents with progressive edema and WILKINSON, CHF possibly due to CPAP non-compliance.  Admission BNP elevated at 3545, CXR without edema, but did note right upper lobe patchy opacity.  EKG showing sinus tachycardia without ischemic changes, troponin negative.  TSH, D-dimer wnl.  Will continue lasix 20 mg IV bid, follow intake/output, daily weights and BMP.  Obtain TTE, trend troponin given report of chest discomfort upon arrival, check A1C and lipid panel with AM labs.  Will likely require ischemic work-up once adequately diuresed.  Would follow up CXR outpatient once euvolemic to ensure opacity resolved.    HTN  Hold PTA nebivolol in favor of starting Coreg.  Prn labetalol.    KAROLINA  CPAP per home settings.    CKD stage III  Baseline Cr 0.9-1.0, currently stable.  Monitor with diuresis.    Hyperlipidemia  Continue PTA statin.    Depression  Tearful during interview and reports recent loss of daughter with minimal support and difficulty coping.  Denies suicidal ideation.  Would like to speak with Psychiatry.  Will continue PTA Wellbutrin in the meantime.    Hx of PE  Stopped coumadin on her own but would like to resume this.  Will start lovenox bridge and consult PharmD for coumadin dosing.     Diet: 2g na  DVT Prophylaxis: Enoxaparin (Lovenox) SQ  Hernández Catheter: not present  Code Status: Full code    Disposition Plan   Expected discharge: 2 - 3 days, recommended to prior living arrangement once adequately diuresed, work-up complete.  Entered: Kulwinder Romo MD 01/25/2019, 4:36 PM     The patient's care was discussed with the Patient.    Kulwinder Romo MD  [place]  [facility]    ______________________________________________________________________    Chief Complaint   Dyspnea on exertion and lower extremity edema    History is obtained from the patient, chart review, discussion with emergency room provider    History of Present Illness   Christina Fletcher is a 49 year old female who presents with 1 week of progressive dyspnea on exertion and lower extremity edema.  She is a somewhat vague historian, stating symptoms started some unspecified period of time ago, but became significantly worse in the past week.  She reports lower extremity edema, but later states that she also has noted swelling of the upper extremities and some mild facial swelling.  She endorses dyspnea on exertion.  She has not noted any orthopnea.  She reports a nonproductive cough and denies any fevers or chills.  She reports occasional sharp chest pains over the past 6 months.  She also reports substernal chest pressure over the past several days, including today.  She reports very limited activity secondary to her bilateral knee osteoarthritis and therefore cannot really comment on whether chest pains are exertionally related.  Discomfort was nonradiating today.  Her remainder of review of systems is otherwise negative.  She stopped some of her medications an unspecified period of time ago, she did not want to go into details with me as she reported that she gave this information to the pharmacist.      She was more open later in interview.  She reports she is largely noncompliant with her CPAP.  Also stopped several medications of her own accord, including warfarin.  Reports recent loss of her daughter and has been under severe stress recently, reports minimal social support.       Review of Systems    The 10 point Review of Systems is negative other than noted in the HPI or here.     Past Medical History    History of pulmonary embolus  Depression  GERD  Hypertension  Obstructive sleep  apnea  History of hyperlipidemia  Chronic kidney disease stage III  Bilateral knee osteoarthritis    Past Surgical History   She has had prior gastric procedure, it is unclear which of the 3 listed in her chart she actually had  Hysterectomy    Social History   She denies any tobacco or illicit drug use.  She reports having approximately 1 alcoholic beverage weekly.    Family History   She reports her mother has heart disease, though does not know any details because she is not in contact with her.  Has home health services.    Prior to Admission Medications   Prior to Admission Medications   Prescriptions Last Dose Informant Patient Reported? Taking?   Ascorbic Acid Buffered (VITAMIN C EFFERVESCENT) PDEF   No No   Sig: Take 1 packet by mouth 2 times daily (before meals) EMERGEN- C, INDICATION: VITAMIN C SUPPLEMENTATION AND TO AID ABSORPTION OF IRON SUPPLEMENT   B Complex Vitamins (B COMPLEX 50) TABS   No No   Sig: Take 1 tablet by mouth daily. INDICATION: VITAMIN SUPPLEMENT   DULoxetine (CYMBALTA) 30 MG EC capsule   No No   Sig: TK 1 C PO D   Emollient (AVEENO ACTIVE JOEL SKIN RELIEF) CREA   No No   Sig: Externally apply 1 Application topically 2 times daily INDICATION: ECZEMA   Multiple Vitamin (MULTI-VITAMIN DAILY PO)   Yes No   Sig: Take 2 tablets by mouth daily    Nebivolol HCl (BYSTOLIC) 20 MG TABS   No No   Sig: Take 1 tablet by mouth daily   ORDER FOR DME   No No   Sig: Equipment being ordered: shower chair, knee brace   ORDER FOR DME   No No   Sig: Equipment being ordered: wheeled walker with seat   ORDER FOR DME   No No   Sig: Equipment being ordered: Automatic blood pressure/pulse monitor   ORDER FOR DME   No No   Sig: Equipment being ordered: thermometer   acetaminophen 500 MG CAPS   No No   Sig: Take 2 capsules by mouth every 8 hours as needed For aches, pain, fever   albuterol (PROAIR HFA/PROVENTIL HFA/VENTOLIN HFA) 108 (90 Base) MCG/ACT inhaler   No No   Sig: Inhale 2-4 puffs into the lungs every 2  hours as needed for shortness of breath / dyspnea   buPROPion (WELLBUTRIN XL) 300 MG 24 hr tablet   No No   Sig: TAKE 1 TABLET(300 MG) BY MOUTH EVERY MORNING   calcium-vitamin D-vitamin K (VIACTIV) 500-500-40 MG-UNT-MCG CHEW   No No   Sig: Take 1 tablet by mouth 3 times daily (with meals) INDICATION: FOR BONE AND BREAST HEALTH   cholecalciferol (VITAMIN D3) 55036 UNITS capsule   No No   Sig: Take 1 capsule (50,000 Units) by mouth once a week TAKE FOR VITAMIN D DEFICIENCY   cyanocobalamin (VITAMIN B-12) 2500 MCG sublingual tablet   No No   Sig: Place 2,500 mcg under the tongue daily   diclofenac (VOLTAREN) 1 % GEL   Yes No   Sig: Apply topically 4 times daily   magnesium oxide (MAG-OX) 400 MG tablet   No No   Sig: INDICATION: TO TREAT LOW MAGNESIUM, TAKE 1 PILL TWICE DAILY   order for DME   No No   Sig: Equipment being ordered: SHOWER CHAIR   oxyCODONE-acetaminophen (PERCOCET)  MG per tablet   Yes No   Sig: TK 1 AND 1/2 TS PO Q 4 TO 6 H PRF PAIN. MAX 4.5 TS PER DAY   polyethylene glycol (MIRALAX) powder   No No   Sig: Take 17 g (1 capful) by mouth daily INDICATION: CONSTIPATION, TO ACHIEVE 1-2 SOFT BMs PER DAY   rosuvastatin (CRESTOR) 40 MG tablet   No No   Sig: Take 1 tablet (40 mg) by mouth daily INDICATION: TO LOWER CHOLESTEROL AND TO HELP REDUCE RISK OF HEART ATTACK AND STROKE   senna-docusate (SENOKOT-S;PERICOLACE) 8.6-50 MG per tablet   No No   Sig: Take 1-2 tablets by mouth 2 times daily INDICATION: CONSTIPATION, TO ACHIEVE 1-2 SOFT BMs PER DAY   spironolactone (ALDACTONE) 50 MG tablet   No No   Sig: Take 1 tablet (50 mg) by mouth 2 times daily INDICATION: TO LOWER BLOOD PRESSURE   tiZANidine (ZANAFLEX) 2 MG tablet   Yes No   triamcinolone (KENALOG) 0.1 % cream   No No   Sig: Apply sparingly once or twice per day as needed to affected area until the skin is better, then stop   warfarin (COUMADIN) 7.5 MG tablet   No No   Sig: Take 1 tablet (7.5 mg) by mouth daily      Facility-Administered Medications:  None     Allergies   Allergies   Allergen Reactions     Hydralazine Shortness Of Breath     WITH ASSOCIATED NAUSEA AND VOMITING     Nsaids      Other reaction(s): Other - Describe In Comment Field  Patient had gastric bypass surgery.  Should not take oral NSAID's ever.     Penicillin [Penicillins]        Physical Exam   Vital Signs: Temp: 98.6  F (37  C)   BP: (!) 145/100 Pulse: 116 Heart Rate: 115 Resp: 15 SpO2: 98 % O2 Device: None (Room air)    Weight: 0 lbs 0 oz    General Appearance: Well-developed, obese female in no acute distress  Eyes: Pupils equal, round and reactive to light, sclera anicteric  HEENT: Refused oral exam, no neck lymphadenopathy  Respiratory: Lungs clear bilaterally without wheezes or crackles, no tachypnea  Cardiovascular: Regular rate and rhythm, normal S1/S2, no murmurs, rubs or gallops  GI: Abdomen soft, nontender, nondistended, normal bowel sounds  Lymph/Hematologic: 2+ pitting edema bilateral lower extremities  Musculoskeletal: Extreme is warm and well-perfused  Neurologic: Alert and appropriate, cranial nerves grossly intact   Psychiatric: normal affect    Data   Data reviewed today: I reviewed all medications, new labs and imaging results over the last 24 hours. I personally reviewed the EKG tracing showing sinus tachycardia without ischemic changes and the chest x-ray image(s) showing patchy right upper lobe opacities.    Recent Labs   Lab 01/25/19  1310   WBC 11.3*   HGB 12.9   *      INR 1.00      POTASSIUM 3.9   CHLORIDE 108   CO2 28   BUN 29   CR 0.91   ANIONGAP 7   ELIZABETH 8.8   GLC 81   ALBUMIN 3.2*   PROTTOTAL 7.2   BILITOTAL 0.4   ALKPHOS 85   ALT 85*   AST 32   TROPI 0.039     Recent Results (from the past 24 hour(s))   XR Chest 2 Views    Narrative    CHEST TWO VIEWS    1/25/2019 4:02 PM     HISTORY: Likely congestive heart failure.    COMPARISON: 3/28/2018.      Impression    IMPRESSION: New patchy opacity medial right upper lung. This could  represent  developing airspace disease. Stable cardiomegaly.    PETE LEBRON MD

## 2019-01-25 NOTE — ED PROVIDER NOTES
"  History     Chief Complaint:  Shortness of breath, edema, chest pressure       HPI   Christina Fletcher is a 49 year old female with a history of HTN, hyperlipidemia and PE who presents with shortness of breath, leg swelling, and chest pressure. The patient states that for the last week she has noticed swelling in her legs, face, and abdomen as well as shortness of breath and chest pressure. She first noticed the swelling in her ankles. The patient's shortness of breath is present at rest as well as exertion, but it gets worse on exertion. She also has had a dry cough recently. The patient denies any fevers or abdominal pain. She has not been taking any medications for \"a while\" due to personal issues that have arisen.       CARDIAC RISK FACTORS:  Sex:    Female  Tobacco:   negative   Hypertension:   positive  Hyperlipidemia:  positive  Diabetes:   negative  Family History:  Positive. Mother has CHF.    PE/DVT RISK FACTORS:  Sex:    Female  Hormones:   negative   Tobacco:   negative   Cancer:   negative   Travel:   negative   Surgery:   negative   Other immobilization: negative   Personal history:  Positive  Family history:  negative           Allergies:  Hydralazine  Nsaids  Penicillin [Esters]     Medications:  Currently not taking any of the following prescribed medications.   Bystolic  Cymbalta  Wellbutrin  Magnesium  Aldactone  Crestor  Miralax  Senna-docusate  Zanaflex  Apresoline  Toprol-XL  Kenalog  Voltaren  Coumadin     Past Medical History:    HTN  Anterior cruciate ligament tear  Adjustment disorder with depressed mood  Chronic constipation  GERD  Intramural leiomyoma of uterus  Lower extremity edema  Major depressive disorder  Obesity  Knee osteoarthritis  Abnormal menstruation  Pulmonary embolism      Past Surgical History:    Debride right femur  Laparoscopic adjustable gastric band  Laparoscopic gastric sleeve  Gastric bypass (Conversion of gastric sleeve to gastric bypass with lysis of " "adhesions)  Hysteroscopy, ablation of endometrium     Family History:    Hypertension  Thyroid Disease   Kidney failure   Breast Cancer    Bone Cancer      Social History:  Patient presents with son  Negative for tobacco use.  Occasional alcohol use  Marital Status:  Single      Review of Systems   Constitutional: Negative for fever.   Respiratory: Positive for cough and shortness of breath.    Cardiovascular: Positive for chest pain and leg swelling.   Gastrointestinal: Negative for abdominal pain.   All other systems reviewed and are negative.      Physical Exam     Patient Vitals for the past 24 hrs:   BP Temp Pulse Heart Rate Resp SpO2 Height   01/25/19 1615 (!) 145/100 -- 116 115 15 98 % --   01/25/19 1545 (!) 147/107 -- 105 115 22 -- --   01/25/19 1530 (!) 143/95 -- 115 114 30 -- --   01/25/19 1515 (!) 137/98 -- 109 112 25 98 % --   01/25/19 1500 (!) 138/106 -- 103 105 20 99 % --   01/25/19 1445 (!) 133/100 -- 105 112 (!) 41 96 % --   01/25/19 1430 (!) 133/101 -- 112 108 23 96 % --   01/25/19 1415 (!) 137/105 -- 110 110 (!) 31 97 % --   01/25/19 1400 (!) 140/100 -- 114 -- -- 94 % --   01/25/19 1345 (!) 135/120 -- -- -- -- -- --   01/25/19 1233 136/77 98.6  F (37  C) 118 -- 18 97 % 1.626 m (5' 4\")         Physical Exam   General: Resting on the bed. sitting rather upright.  Appears somewhat winded.    Head: No obvious trauma to head.  Ears, Nose, Throat:  External ears normal.  Nose normal.    Eyes:  Conjunctivae clear.  Pupils are equal, round, and reactive.   Neck: Normal range of motion.  Neck supple.   CV: Tachycardic rate and regular rhythm.  No murmurs.      Respiratory: Effort normal and breath sounds normal.  No wheezing or crackles.   Gastrointestinal: Soft.  No distension. There is no tenderness.    Musculoskeletal: Bilateral pitting edema to the lower extremities.  Nontender.  Skin: Skin is warm and dry.  No rash noted.       Emergency Department Course   ECG:  Time: 1303  Vent. Rate 113 bpm. AL " interval 136. QRS duration 88. QT/QTc 342/469. P-R-T axis 77 3 78. Sinus tachycardia. Otherwise normal ECG. No significant changes compared to EKG dated 3/28/18 Read time: 1303      Imaging:  Radiographic findings were communicated with the patient who voiced understanding of the findings.  X-ray Chest, 2 views:  New patchy opacity medial right upper lung. This could  represent developing airspace disease. Stable cardiomegaly.  Result per radiology.      Laboratory:  CBC: WBC: 11.3 (H), HGB: 12.9 , PLT: 220  CMP: Albumin: 3.2 (L), ALT: 85 (H), o/w WNL (Creatinine: 0.91)  INR: 1.00  D dimer: <0.3  BNP: 4575 (H)  Troponin: 0.039  TSH: 1.6     Interventions:  1351 - Nitrostat 0.4 mg SL  1628 - Lasix 40 mg IV    Emergency Department Course:  Nursing notes and vitals reviewed.  1251: I performed an exam of the patient as documented above. IV inserted and blood drawn. Labs ordered.     1615: Findings and plan explained to the Patient who consents to admission.     1622: Discussed the patient with Dr. Romo, who will admit the patient to a medical bed for further monitoring, evaluation, and treatment.     Impression & Plan    Medical Decision Makin-year-old female with history of prior PE off Coumadin, hypertension presents with shortness of breath, fluid retention, chest pressure.  Vital signs mildly tachycardic and mildly hypertensive.  No hypoxia.  Broad differential was pursued including but not limited to acute coronary syndrome, PE, CHF, pneumonia, pneumothorax, effusion, fluid overload, etc.  Considered PE but overall recent history seems less consistent with PE and  D-dimer is negative.  Overall very low suspicion for PE at this time.  Clinically her diagnosis seems to be more related to her fluid retention.  CBC shows mild leukocytosis and no anemia.  BMP shows no acute electrolyte metabolic or renal dysfunction.  INR is normal as patient was no longer on coumadin.  EKG was reviewed showing sinus  tachycardia no acute ST-T wave changes.  No signs of ischemia or arrhythmia.  Troponin is detectable but within normal range at 0.039.  Patient is unable to tolerate aspirin given her gastric sleeve operation.  Thyroid function is within normal range.  BNP is markedly elevated 4575.  This in the setting of her fluid retention, shortness of breath, chest pressure seems most consistent with likely CHF exacerbation and diagnosis.  Chest x-ray shows a possible new medial right upper lobe lung opacity but clinically there is no signs of infection per history or on exam.  No appreciable effusions or pneumothorax of note.  Patient does have known cardiomegaly.  At this point plan to admit patient for CHF exacerbation workup.  Patient was given 40 mg of Lasix.  She was also given nitro with some improvement in her symptoms.  Patient was discussed with hospitalist who admitted to tele.      Diagnosis:    ICD-10-CM    1. Acute congestive heart failure, unspecified heart failure type (H) I50.9    2. Shortness of breath R06.02    3. Chest pressure R07.89    4. Hypervolemia, unspecified hypervolemia type E87.70        IRoss, am serving as a scribe on 1/25/2019 at 1:20 PM to personally document services performed by Mary Fang MD based on my observations and the provider's statements to me.     Ross Sheets  1/25/2019    EMERGENCY DEPARTMENT       Mary Fang MD  01/25/19 4048

## 2019-01-25 NOTE — ED NOTES
"[facility]  ED Nurse Handoff Report    ED Chief complaint: generalized swelling x 2 days      ED Diagnosis:   Final diagnoses:   Acute congestive heart failure, unspecified heart failure type (H)   Shortness of breath   Chest pressure   Hypervolemia, unspecified hypervolemia type       Code Status: Full Code    Allergies:   Allergies   Allergen Reactions     Hydralazine Shortness Of Breath     WITH ASSOCIATED NAUSEA AND VOMITING     Nsaids      Other reaction(s): Other - Describe In Comment Field  Patient had gastric bypass surgery.  Should not take oral NSAID's ever.     Penicillin [Penicillins]        Activity level - Baseline/Home:  Independent    Activity Level - Current:   Stand with Assist     Needed?: No    Isolation: No  Infection: Not Applicable  Bariatric?: No    Vital Signs:   Vitals:    01/25/19 1515 01/25/19 1530 01/25/19 1545 01/25/19 1615   BP: (!) 137/98 (!) 143/95 (!) 147/107 (!) 145/100   Pulse: 109 115 105 116   Resp: 25 30 22 15   Temp:       SpO2: 98%   98%   Height:           Cardiac Rhythm: ,        Pain level: 0-10 Pain Scale: 7    Is this patient confused?: No   Does this patient have a guardian?  No         If yes, is there guardianship documents in the Epic \"Code/ACP\" activity?  N/A         Guardian Notified?  N/A  [place] - Suicide Severity Rating Scale Completed?  Yes  If yes, what color did the patient score?  White    Patient Report: Initial Complaint: swelling to legs, sob, fatigue  Focused Assessment: pt for the past week has noticed a large increase to swelling of bilat le's. Increased sob on exertion and fatigue. Pt with hx of HTN and blood clots and PE, states she hasnt been taking her meds for a few weeks. Pt alert and oriented. Independent getting to BR, some help needed getting into bed. Pt lives with son who is here.   Tests Performed: labs, Xray  Abnormal Results:   Results for orders placed or performed during the hospital encounter of 01/25/19 "   XR Chest 2 Views    Narrative    CHEST TWO VIEWS    1/25/2019 4:02 PM     HISTORY: Likely congestive heart failure.    COMPARISON: 3/28/2018.      Impression    IMPRESSION: New patchy opacity medial right upper lung. This could  represent developing airspace disease. Stable cardiomegaly.    PETE LEBRON MD   CBC with platelets differential   Result Value Ref Range    WBC 11.3 (H) 4.0 - 11.0 10e9/L    RBC Count 3.26 (L) 3.8 - 5.2 10e12/L    Hemoglobin 12.9 11.7 - 15.7 g/dL    Hematocrit 38.9 35.0 - 47.0 %     (H) 78 - 100 fl    MCH 39.6 (H) 26.5 - 33.0 pg    MCHC 33.2 31.5 - 36.5 g/dL    RDW 12.1 10.0 - 15.0 %    Platelet Count 220 150 - 450 10e9/L    Diff Method Automated Method     % Neutrophils 77.0 %    % Lymphocytes 13.1 %    % Monocytes 9.2 %    % Eosinophils 0.1 %    % Basophils 0.0 %    % Immature Granulocytes 0.6 %    Nucleated RBCs 0 0 /100    Absolute Neutrophil 8.7 (H) 1.6 - 8.3 10e9/L    Absolute Lymphocytes 1.5 0.8 - 5.3 10e9/L    Absolute Monocytes 1.0 0.0 - 1.3 10e9/L    Absolute Eosinophils 0.0 0.0 - 0.7 10e9/L    Absolute Basophils 0.0 0.0 - 0.2 10e9/L    Abs Immature Granulocytes 0.1 0 - 0.4 10e9/L    Absolute Nucleated RBC 0.0    Comprehensive metabolic panel   Result Value Ref Range    Sodium 143 133 - 144 mmol/L    Potassium 3.9 3.4 - 5.3 mmol/L    Chloride 108 94 - 109 mmol/L    Carbon Dioxide 28 20 - 32 mmol/L    Anion Gap 7 3 - 14 mmol/L    Glucose 81 70 - 99 mg/dL    Urea Nitrogen 29 7 - 30 mg/dL    Creatinine 0.91 0.52 - 1.04 mg/dL    GFR Estimate 74 >60 mL/min/[1.73_m2]    GFR Estimate If Black 86 >60 mL/min/[1.73_m2]    Calcium 8.8 8.5 - 10.1 mg/dL    Bilirubin Total 0.4 0.2 - 1.3 mg/dL    Albumin 3.2 (L) 3.4 - 5.0 g/dL    Protein Total 7.2 6.8 - 8.8 g/dL    Alkaline Phosphatase 85 40 - 150 U/L    ALT 85 (H) 0 - 50 U/L    AST 32 0 - 45 U/L   INR   Result Value Ref Range    INR 1.00 0.86 - 1.14   D dimer quantitative   Result Value Ref Range    D Dimer <0.3 0.0 - 0.50 ug/ml FEU    Nt probnp inpatient   Result Value Ref Range    N-Terminal Pro BNP Inpatient 4,575 (H) 0 - 450 pg/mL   Troponin I   Result Value Ref Range    Troponin I ES 0.039 0.000 - 0.045 ug/L   TSH   Result Value Ref Range    TSH 1.60 0.40 - 4.00 mU/L   EKG 12 lead   Result Value Ref Range    Interpretation ECG Click View Image link to view waveform and result      Treatments provided: meds    Family Comments: son here    OBS brochure/video discussed/provided to patient/family: N/A              Name of person given brochure if not patient: na              Relationship to patient: na    ED Medications:   Medications   nitroGLYcerin (NITROSTAT) sublingual tablet 0.4 mg (0.4 mg Sublingual Given 1/25/19 1351)   furosemide (LASIX) injection 40 mg (40 mg Intravenous Given 1/25/19 1629)       Drips infusing?:  No    For the majority of the shift this patient was Green.   Interventions performed were none.    Severe Sepsis OR Septic Shock Diagnosis Present: No    To be done/followed up on inpatient unit:  none    ED NURSE PHONE NUMBER: [phone]

## 2019-01-26 ENCOUNTER — APPOINTMENT (OUTPATIENT)
Dept: CARDIOLOGY | Facility: CLINIC | Age: 49
DRG: 287 | End: 2019-01-26
Payer: MEDICARE

## 2019-01-26 LAB
ANION GAP SERPL CALCULATED.3IONS-SCNC: 7 MMOL/L (ref 3–14)
BUN SERPL-MCNC: 26 MG/DL (ref 7–30)
CALCIUM SERPL-MCNC: 8.7 MG/DL (ref 8.5–10.1)
CHLORIDE SERPL-SCNC: 105 MMOL/L (ref 94–109)
CHOLEST SERPL-MCNC: 182 MG/DL
CO2 SERPL-SCNC: 28 MMOL/L (ref 20–32)
CREAT SERPL-MCNC: 0.79 MG/DL (ref 0.52–1.04)
ERYTHROCYTE [DISTWIDTH] IN BLOOD BY AUTOMATED COUNT: 12.2 % (ref 10–15)
FERRITIN SERPL-MCNC: 68 NG/ML (ref 8–252)
GFR SERPL CREATININE-BSD FRML MDRD: 88 ML/MIN/{1.73_M2}
GLUCOSE SERPL-MCNC: 140 MG/DL (ref 70–99)
HCT VFR BLD AUTO: 36.5 % (ref 35–47)
HDLC SERPL-MCNC: 61 MG/DL
HGB BLD-MCNC: 12.3 G/DL (ref 11.7–15.7)
INR PPP: 1.03 (ref 0.86–1.14)
LDLC SERPL CALC-MCNC: 100 MG/DL
MCH RBC QN AUTO: 39.8 PG (ref 26.5–33)
MCHC RBC AUTO-ENTMCNC: 33.7 G/DL (ref 31.5–36.5)
MCV RBC AUTO: 118 FL (ref 78–100)
NONHDLC SERPL-MCNC: 121 MG/DL
PLATELET # BLD AUTO: 208 10E9/L (ref 150–450)
POTASSIUM SERPL-SCNC: 3.8 MMOL/L (ref 3.4–5.3)
RBC # BLD AUTO: 3.09 10E12/L (ref 3.8–5.2)
SODIUM SERPL-SCNC: 140 MMOL/L (ref 133–144)
TRIGL SERPL-MCNC: 104 MG/DL
TROPONIN I SERPL-MCNC: 0.02 UG/L (ref 0–0.04)
TROPONIN I SERPL-MCNC: 0.02 UG/L (ref 0–0.04)
TROPONIN I SERPL-MCNC: 0.05 UG/L (ref 0–0.04)
WBC # BLD AUTO: 8.7 10E9/L (ref 4–11)

## 2019-01-26 PROCEDURE — 21000001 ZZH R&B HEART CARE

## 2019-01-26 PROCEDURE — 36415 COLL VENOUS BLD VENIPUNCTURE: CPT | Performed by: HOSPITALIST

## 2019-01-26 PROCEDURE — 93306 TTE W/DOPPLER COMPLETE: CPT | Mod: 26 | Performed by: INTERNAL MEDICINE

## 2019-01-26 PROCEDURE — 36415 COLL VENOUS BLD VENIPUNCTURE: CPT | Performed by: INTERNAL MEDICINE

## 2019-01-26 PROCEDURE — 25000128 H RX IP 250 OP 636: Performed by: INTERNAL MEDICINE

## 2019-01-26 PROCEDURE — 99222 1ST HOSP IP/OBS MODERATE 55: CPT | Mod: 25 | Performed by: INTERNAL MEDICINE

## 2019-01-26 PROCEDURE — 80048 BASIC METABOLIC PNL TOTAL CA: CPT | Performed by: HOSPITALIST

## 2019-01-26 PROCEDURE — 84484 ASSAY OF TROPONIN QUANT: CPT | Performed by: INTERNAL MEDICINE

## 2019-01-26 PROCEDURE — 93010 ELECTROCARDIOGRAM REPORT: CPT | Performed by: INTERNAL MEDICINE

## 2019-01-26 PROCEDURE — 82728 ASSAY OF FERRITIN: CPT | Performed by: INTERNAL MEDICINE

## 2019-01-26 PROCEDURE — 80061 LIPID PANEL: CPT | Performed by: HOSPITALIST

## 2019-01-26 PROCEDURE — 25500064 ZZH RX 255 OP 636: Performed by: HOSPITALIST

## 2019-01-26 PROCEDURE — 84484 ASSAY OF TROPONIN QUANT: CPT | Performed by: HOSPITALIST

## 2019-01-26 PROCEDURE — A9270 NON-COVERED ITEM OR SERVICE: HCPCS | Mod: GY | Performed by: INTERNAL MEDICINE

## 2019-01-26 PROCEDURE — 25000132 ZZH RX MED GY IP 250 OP 250 PS 637: Mod: GY | Performed by: INTERNAL MEDICINE

## 2019-01-26 PROCEDURE — 25000128 H RX IP 250 OP 636: Performed by: HOSPITALIST

## 2019-01-26 PROCEDURE — 93005 ELECTROCARDIOGRAM TRACING: CPT

## 2019-01-26 PROCEDURE — A9270 NON-COVERED ITEM OR SERVICE: HCPCS | Mod: GY | Performed by: HOSPITALIST

## 2019-01-26 PROCEDURE — 99233 SBSQ HOSP IP/OBS HIGH 50: CPT | Performed by: INTERNAL MEDICINE

## 2019-01-26 PROCEDURE — 40000264 ECHOCARDIOGRAM COMPLETE

## 2019-01-26 PROCEDURE — 25000132 ZZH RX MED GY IP 250 OP 250 PS 637: Mod: GY | Performed by: HOSPITALIST

## 2019-01-26 PROCEDURE — 85027 COMPLETE CBC AUTOMATED: CPT | Performed by: HOSPITALIST

## 2019-01-26 PROCEDURE — 85610 PROTHROMBIN TIME: CPT | Performed by: HOSPITALIST

## 2019-01-26 RX ORDER — LORAZEPAM 2 MG/ML
0.5 INJECTION INTRAMUSCULAR ONCE
Status: COMPLETED | OUTPATIENT
Start: 2019-01-26 | End: 2019-01-26

## 2019-01-26 RX ORDER — LISINOPRIL 5 MG/1
5 TABLET ORAL DAILY
Status: DISCONTINUED | OUTPATIENT
Start: 2019-01-26 | End: 2019-01-27

## 2019-01-26 RX ORDER — SPIRONOLACTONE 25 MG/1
50 TABLET ORAL DAILY
Status: DISCONTINUED | OUTPATIENT
Start: 2019-01-27 | End: 2019-01-27

## 2019-01-26 RX ORDER — FUROSEMIDE 10 MG/ML
40 INJECTION INTRAMUSCULAR; INTRAVENOUS
Status: DISCONTINUED | OUTPATIENT
Start: 2019-01-26 | End: 2019-01-27

## 2019-01-26 RX ADMIN — ENOXAPARIN SODIUM 135 MG: 150 INJECTION SUBCUTANEOUS at 07:36

## 2019-01-26 RX ADMIN — ENOXAPARIN SODIUM 135 MG: 150 INJECTION SUBCUTANEOUS at 20:25

## 2019-01-26 RX ADMIN — FUROSEMIDE 20 MG: 10 INJECTION, SOLUTION INTRAVENOUS at 07:57

## 2019-01-26 RX ADMIN — FUROSEMIDE 40 MG: 10 INJECTION, SOLUTION INTRAVENOUS at 16:47

## 2019-01-26 RX ADMIN — CARVEDILOL 6.25 MG: 6.25 TABLET, FILM COATED ORAL at 20:26

## 2019-01-26 RX ADMIN — DICLOFENAC 2 G: 10 GEL TOPICAL at 13:19

## 2019-01-26 RX ADMIN — SPIRONOLACTONE 50 MG: 25 TABLET ORAL at 08:01

## 2019-01-26 RX ADMIN — LORAZEPAM 0.5 MG: 2 INJECTION, SOLUTION INTRAMUSCULAR; INTRAVENOUS at 16:43

## 2019-01-26 RX ADMIN — DICLOFENAC 2 G: 10 GEL TOPICAL at 08:11

## 2019-01-26 RX ADMIN — OXYCODONE HYDROCHLORIDE AND ACETAMINOPHEN 1 TABLET: 10; 325 TABLET ORAL at 17:55

## 2019-01-26 RX ADMIN — OXYCODONE HYDROCHLORIDE AND ACETAMINOPHEN 1 TABLET: 10; 325 TABLET ORAL at 08:09

## 2019-01-26 RX ADMIN — SENNOSIDES AND DOCUSATE SODIUM 1 TABLET: 8.6; 5 TABLET ORAL at 20:26

## 2019-01-26 RX ADMIN — SENNOSIDES AND DOCUSATE SODIUM 1 TABLET: 8.6; 5 TABLET ORAL at 08:11

## 2019-01-26 RX ADMIN — DICLOFENAC 2 G: 10 GEL TOPICAL at 21:42

## 2019-01-26 RX ADMIN — ROSUVASTATIN CALCIUM 40 MG: 20 TABLET, FILM COATED ORAL at 08:01

## 2019-01-26 RX ADMIN — SENNOSIDES AND DOCUSATE SODIUM 1 TABLET: 8.6; 5 TABLET ORAL at 08:01

## 2019-01-26 RX ADMIN — BUPROPION HYDROCHLORIDE 300 MG: 300 TABLET, FILM COATED, EXTENDED RELEASE ORAL at 08:01

## 2019-01-26 RX ADMIN — DICLOFENAC 2 G: 10 GEL TOPICAL at 17:56

## 2019-01-26 RX ADMIN — CARVEDILOL 6.25 MG: 6.25 TABLET, FILM COATED ORAL at 08:01

## 2019-01-26 RX ADMIN — HUMAN ALBUMIN MICROSPHERES AND PERFLUTREN 9 ML: 10; .22 INJECTION, SOLUTION INTRAVENOUS at 11:45

## 2019-01-26 RX ADMIN — LISINOPRIL 5 MG: 5 TABLET ORAL at 17:55

## 2019-01-26 RX ADMIN — POLYETHYLENE GLYCOL 3350 17 G: 17 POWDER, FOR SOLUTION ORAL at 21:41

## 2019-01-26 RX ADMIN — TIZANIDINE 2 MG: 2 TABLET ORAL at 20:26

## 2019-01-26 ASSESSMENT — MIFFLIN-ST. JEOR: SCORE: 1901

## 2019-01-26 ASSESSMENT — ACTIVITIES OF DAILY LIVING (ADL)
ADLS_ACUITY_SCORE: 16
ADLS_ACUITY_SCORE: 17
ADLS_ACUITY_SCORE: 17

## 2019-01-26 NOTE — CONSULTS
[facility]    Cardiology Consultation     Date of Admission:  1/25/2019    Assessment & Plan   Christina Fletcher is a 49 year old female who was admitted on 1/25/2019.    1.  Acute systolic congestive heart failure  Patient presents with increasing shortness of breath, leg edema and abdominal distention, with elevated N-terminal proBNP, pulmonary congestion on chest x-ray and clinical evidence of congestive heart failure.  Echo reveals ejection fraction of less than 20% which is decreased significantly since 2013 when it was 45-50%.  At this time, I agree with intravenous Lasix.  Coreg can be continued.  I will add lisinopril 5 mg daily and decrease prolactin from 50 mg twice daily to once daily.  We will continue to monitor BMP.  When she is adequately diuresed, I would recommend coronary angiography to rule out CAD as well as assess the suspicion of anomalous circumflex coronary artery on echo.  This cardiomyopathy could be familial given the family history in her mother.  I would also check ferritin to rule out uncommon causes of cardiomyopathy.  Her TSH is normal.  Eventually, she would benefit from outpatient cardiac MRI although she is quite anxious and will need Valium and is likely to have some claustrophobia.    Given her need for angiogram, I would hold off on warfarin and continue Lovenox for bridging.    2.  Obstructive sleep apnea, continue CPAP.    3.  Morbid obesity, history of gastric bypass, has regained weight    Given the classic symptoms of congestive heart failure at a young age, possible familiar etiology, she will benefit from follow-up in the heart failure clinic with core MD and Inocente after discharge.    Luis Melendez MD    Primary Care Physician   [facility]    Reason for Consult   Reason for consult: I was asked by hospitalist to evaluate this patient for congestive heart failure.    History of Present Illness   Christina Fletcher is a 49 year old female who  presents with 1 week of progressive dyspnea on exertion and lower extremity edema.     She is quite anxious and tearful.  When I was taking history, she started getting some palpitations and shortness of breath suggestive of anxiety.  He tells me that over the last few days, she has had increased swelling on the legs as well as face and neck.  She has had abdominal distention and shortness of breath.  Typically comes, she cannot walk much because of degenerative joint disease.  But lately she has not been able to walk more than a few feet because of shortness of breath.  She always uses 1-2 pillows to sleep because of her sleep apnea and uses CPAP.  Lately she has to be more upright.    She denies any significant chest pain suggestive of angina.  There are occasional sharp pains over the last few     She denies any recent fever or chills.  No recent travel.  She does have a family history of congestive heart failure in her mother who also had cardiomyopathy.  I talked to the mother and the patient called her in my presence and she confirmed that her ejection fraction was low and now has improved some.  She also confirmed that her some of her uncles also have had some congestive heart failure.    She has history of pulmonary embolus in 2013 after having an abdominal procedure for uterine bleeding.  She is on warfarin since.  Her INR on admission was subtherapeutic.    She has minimal social support here and apparently there was some loss of her child recently.      Evaluation here has revealed an ejection fraction of less than 20% echocardiogram with LV dilatation.  Chest x-ray showed some pulmonary congestion on my review.  N-terminal proBNP was 4500.  She was started on IV Lasix and she has diuresed well.  She is still on IV Lasix.  Coreg has been added.  She is on Aldactone at home.    The echocardiogram also revealed possible coronary vessels along the mitral annulus which could suggest an anomalous vessel.  This  will need to be further evaluated.    Her first 2 troponins are negative and second troponin is slightly borderline elevated.    EKG reveals sinus rhythm without ischemic changes.        Patient Active Problem List   Diagnosis     Female genital symptoms     Other disorder of menstruation and other abnormal bleeding from female genital tract     Adjustment disorder with depressed mood     Obesity     Chronic constipation     GERD (gastroesophageal reflux disease)     Lower extremity edema     Elevated MCV     Fibroids     Fatigue     Menorrhagia     HTN (hypertension)     Gastric bypass status for obesity     CARDIOVASCULAR SCREENING; LDL GOAL LESS THAN 160     Bariatric surgery status     Vitamin B12 deficiency without anemia     Iron deficiency     Vitamin D deficiency     Weight gain     Dysmetabolic syndrome     Galactorrhea     OA (osteoarthritis) of knee     Ascorbic acid deficiency     Pyridoxine deficiency     Knee pain     KAROLINA (obstructive sleep apnea)     Hyperlipidemia LDL goal <130     KAROLINA on CPAP     BMI 50.0-59.9, adult (H)     Hyperlipidemia with target LDL less than 130     Eczema     Hyponatremia     Chronic kidney disease, stage III (moderate) (H)     Neuropathy     Morbid obesity due to excess calories (H)     Vitamin C deficiency     Osteoarthritis of both knees, unspecified osteoarthritis type     Hypomagnesemia     Essential hypertension with goal blood pressure less than 130/80     Eczema, unspecified type     Major depressive disorder, recurrent episode, moderate (H)     Long-term (current) use of anticoagulants [Z79.01]     History of pulmonary embolism     Dyspnea on exertion       Past Medical History   I have reviewed this patient's medical history and updated it with pertinent information if needed.   Past Medical History:   Diagnosis Date     ABDOMINAL PAIN OTHER SPEC SITE 4/4/2008     ACL (anterior cruciate ligament) tear 2007     Adjustment disorder with depressed mood 4/4/2008      Chronic constipation 5/6/2010     GERD (gastroesophageal reflux disease) 5/6/2010     Intramural leiomyoma of uterus      Lower extremity edema 8/15/2011     Major depressive disorder, single episode, moderate (H) 11/20/2014     Obesity 5/6/2010     Osteoarthritis, knee      Other disorder of menstruation and other abnormal bleeding from female genital tract 1/17/2008     PE (pulmonary embolism) 5/15/2013     Pulmonary emboli (H) 6/18/2013       Past Surgical History   I have reviewed this patient's surgical history and updated it with pertinent information if needed.  Past Surgical History:   Procedure Laterality Date     DEBRIDE ASSOC FX/DISLO SKIN/MUS/BONE  1990's    Infected - Right Femur     GASTRIC BYPASS  01/18/2017    conversion of gastric sleeve to gastric bypass with lysis of adhesions-      HYSTEROSCOPY,ABLATION ENDOMETRIUM  2008     LAP ADJUSTABLE GASTRIC BAND  2001    Lap Banding      LAPAROSCOPIC GASTRIC SLEEVE  2010    Dr Harris       Prior to Admission Medications   Prior to Admission Medications   Prescriptions Last Dose Informant Patient Reported? Taking?   Ascorbic Acid Buffered (VITAMIN C EFFERVESCENT) PDEF Past Week at Unknown time  No Yes   Sig: Take 1 packet by mouth 2 times daily (before meals) EMERGEN- C, INDICATION: VITAMIN C SUPPLEMENTATION AND TO AID ABSORPTION OF IRON SUPPLEMENT   Multiple Vitamin (MULTI-VITAMIN DAILY PO) Past Week at Unknown time  Yes Yes   Sig: Take 1 tablet by mouth daily    Nebivolol HCl (BYSTOLIC) 20 MG TABS Past Week at Unknown time  No Yes   Sig: Take 1 tablet by mouth daily   acetaminophen 500 MG CAPS Past Month at Unknown time  No Yes   Sig: Take 2 capsules by mouth every 8 hours as needed For aches, pain, fever   albuterol (PROAIR HFA/PROVENTIL HFA/VENTOLIN HFA) 108 (90 Base) MCG/ACT inhaler Past Week at Unknown time  No Yes   Sig: Inhale 2-4 puffs into the lungs every 2 hours as needed for shortness of breath / dyspnea   buPROPion (WELLBUTRIN XL) 300 MG 24  hr tablet Past Week at Unknown time  No Yes   Sig: TAKE 1 TABLET(300 MG) BY MOUTH EVERY MORNING   cholecalciferol (VITAMIN D3) 57767 UNITS capsule Past Month at Unknown time  No Yes   Sig: Take 1 capsule (50,000 Units) by mouth once a week TAKE FOR VITAMIN D DEFICIENCY   cyanocobalamin (VITAMIN B-12) 2500 MCG sublingual tablet Past Week at Unknown time  No Yes   Sig: Place 2,500 mcg under the tongue daily   diclofenac (VOLTAREN) 1 % GEL 1/25/2019 at 1200  Yes Yes   Sig: Apply topically 4 times daily   oxyCODONE-acetaminophen (PERCOCET)  MG per tablet 1/25/2019 at Unknown time  Yes Yes   Sig: TK 1 AND 1/2 TS PO Q 4 TO 6 H PRF PAIN. MAX 4.5 TS PER DAY   polyethylene glycol (MIRALAX) powder Past Week at Unknown time  No Yes   Sig: Take 17 g (1 capful) by mouth daily INDICATION: CONSTIPATION, TO ACHIEVE 1-2 SOFT BMs PER DAY   rosuvastatin (CRESTOR) 40 MG tablet More than a month at Unknown time  No No   Sig: Take 1 tablet (40 mg) by mouth daily INDICATION: TO LOWER CHOLESTEROL AND TO HELP REDUCE RISK OF HEART ATTACK AND STROKE   senna-docusate (SENOKOT-S;PERICOLACE) 8.6-50 MG per tablet Past Week at Unknown time  No Yes   Sig: Take 1-2 tablets by mouth 2 times daily INDICATION: CONSTIPATION, TO ACHIEVE 1-2 SOFT BMs PER DAY   spironolactone (ALDACTONE) 50 MG tablet Past Week at Unknown time  No Yes   Sig: Take 1 tablet (50 mg) by mouth 2 times daily INDICATION: TO LOWER BLOOD PRESSURE      Facility-Administered Medications: None     Current Facility-Administered Medications   Medication Dose Route Frequency     buPROPion  300 mg Oral Daily     carvedilol  6.25 mg Oral BID     diclofenac  2 g Topical 4x Daily     enoxaparin  1 mg/kg Subcutaneous Q12H     furosemide  40 mg Intravenous BID     rosuvastatin  40 mg Oral Daily     senna-docusate  1 tablet Oral BID     sodium chloride (PF)  10 mL Intravenous Once     sodium chloride (PF)  3 mL Intracatheter Q8H     spironolactone  50 mg Oral BID     warfarin  12 mg Oral  "ONCE at 18:00     Current Facility-Administered Medications   Medication Last Rate     ACE/ARB/ARNI NOT PRESCRIBED       Warfarin Therapy Reminder       Allergies   Allergies   Allergen Reactions     Hydralazine Shortness Of Breath     WITH ASSOCIATED NAUSEA AND VOMITING     Nsaids Other (See Comments)     Other reaction(s): Other - Describe In Comment Field  Patient had gastric bypass surgery.  Should not take oral NSAID's ever.     Penicillin [Penicillins] Rash       Social History    reports that  has never smoked. she has never used smokeless tobacco. She reports that she drinks alcohol. She reports that she does not use drugs.    Family History   Family History   Problem Relation Age of Onset     Hypertension Mother      Breast Cancer Maternal Grandmother      Cancer Maternal Grandmother         Bone Cancer     Thyroid Disease Son      Genitourinary Problems Daughter         Kidney failure       Review of Systems   The comprehensive 10 point Review of Systems is negative other than noted in the HPI or here.     Physical Exam   Vital Signs with Ranges  Temp:  [97.7  F (36.5  C)-98.2  F (36.8  C)] 98.2  F (36.8  C)  Pulse:  [113-116] 113  Heart Rate:  [] 95  Resp:  [12-27] 20  BP: (101-152)/() 112/76  SpO2:  [96 %-99 %] 98 %  Wt Readings from Last 4 Encounters:   01/26/19 128.6 kg (283 lb 8.2 oz)   03/28/18 122.5 kg (270 lb)   02/07/17 131 kg (288 lb 12.8 oz)   01/06/17 (!) 140.6 kg (310 lb)     I/O last 3 completed shifts:  In: 240 [P.O.:240]  Out: 1650 [Urine:1650]      Vitals: /76 (BP Location: Left arm)   Pulse 113   Temp 98.2  F (36.8  C) (Oral)   Resp 20   Ht 1.626 m (5' 4\")   Wt 128.6 kg (283 lb 8.2 oz)   SpO2 98%   BMI 48.66 kg/m      Constitutional:   morbidly obese   Eyes:   extra-ocular muscles intact   ENT:   atramatic   Neck:   supple, symmetrical, trachea midline, jvp COULD NOT BE EVALUATED   Hematologic / Lymphatic:   no cervical lymphadenopathy   Back:   symmetric "   Lungs:   crackles right base and left base   Cardiovascular:   normal S1 and S2 and no murmur noted, 1+ ANKLE EDEMA   Abdomen:   distended and non-tender     Neurologic:   Motor Exam:  moves all extremities well and symmetrically   Neuropsychiatric:   Orientation: oriented to self, place, time and situation   Skin:   no rashes       Recent Labs   Lab 01/26/19  0215 01/25/19  2245 01/25/19  1845 01/25/19  1310   TROPI 0.047* 0.044 0.037 0.039       Recent Labs   Lab 01/26/19  0810 01/26/19  0215 01/25/19 2245 01/25/19  1845 01/25/19  1310   WBC 8.7  --   --   --  11.3*   HGB 12.3  --   --   --  12.9   *  --   --   --  119*     --   --  238 220   INR 1.03  --   --   --  1.00     --   --   --  143   POTASSIUM 3.8  --   --   --  3.9   CHLORIDE 105  --   --   --  108   CO2 28  --   --   --  28   BUN 26  --   --   --  29   CR 0.79  --   --  0.90 0.91   GFRESTIMATED 88  --   --  75 74   GFRESTBLACK >90  --   --  87 86   ANIONGAP 7  --   --   --  7   ELIZABETH 8.7  --   --   --  8.8   *  --   --   --  81   ALBUMIN  --   --   --   --  3.2*   PROTTOTAL  --   --   --   --  7.2   BILITOTAL  --   --   --   --  0.4   ALKPHOS  --   --   --   --  85   ALT  --   --   --   --  85*   AST  --   --   --   --  32   TROPI  --  0.047* 0.044 0.037 0.039     Recent Labs   Lab Test 01/26/19  0810 01/06/17  1555 05/24/16  1209  07/30/14  1400 04/28/14  1706   CHOL 182  --  215*  --  157 241*   HDL 61  --  55  --  55 51   * 168* 139*   < > 79 154*   TRIG 104  --  104  --  113 179*   CHOLHDLRATIO  --   --   --   --  2.9 4.7    < > = values in this interval not displayed.     Recent Labs   Lab 01/26/19  0810 01/25/19  1845 01/25/19  1310   WBC 8.7  --  11.3*   HGB 12.3  --  12.9   HCT 36.5  --  38.9   *  --  119*    238 220     No results for input(s): PH, PHV, PO2, PO2V, SAT, PCO2, PCO2V, HCO3, HCO3V in the last 168 hours.  Recent Labs   Lab 01/25/19  1310   NTBNPI 4,575*     Recent Labs   Lab  01/25/19  1310   DD <0.3     No results for input(s): SED, CRP in the last 168 hours.  Recent Labs   Lab 01/26/19  0810 01/25/19  1845 01/25/19  1310    238 220     Recent Labs   Lab 01/25/19  1310   TSH 1.60     No results for input(s): COLOR, APPEARANCE, URINEGLC, URINEBILI, URINEKETONE, SG, UBLD, URINEPH, PROTEIN, UROBILINOGEN, NITRITE, LEUKEST, RBCU, WBCU in the last 168 hours.    Imaging:  Recent Results (from the past 48 hour(s))   XR Chest 2 Views    Narrative    CHEST TWO VIEWS    1/25/2019 4:02 PM     HISTORY: Likely congestive heart failure.    COMPARISON: 3/28/2018.      Impression    IMPRESSION: New patchy opacity medial right upper lung. This could  represent developing airspace disease. Stable cardiomegaly.    PETE LEBRON MD       Echo:  No results found for this or any previous visit (from the past 4320 hour(s)).

## 2019-01-26 NOTE — PLAN OF CARE
VSS.  Up with SBA/walker to bathroom.  Is WILKINSON.  Lungs clear.  Tele NSR.  Cardiac echo completed.  Fair intake, poor appetite.  Denies chest pain; reports chronic knee pain, Percocet and scheduled Voltaren gel decreases pain but she feels the gel is not as effective as it used to be.  Not yet rounded by hospitalist.

## 2019-01-26 NOTE — PLAN OF CARE
A&O x4, SBA.  VSS on room air; 2g Na+/no caffeine diet.  Tele NSR.  WILKINSON. Denies chest pain. Bilateral knee pain decreased with positioning.  BLE edema 2+, legs elevated on pillows.  Continue to monitor.

## 2019-01-26 NOTE — PROGRESS NOTES
[facility]    Hospitalist Progress Note    Interval History   - RRT overnight for chest pain, differential included anxiety  - Patient this afternoon reports chest tightness again, tearful over CHF diagnosis. She reports BLE swelling and continued shortness of breath. Will repeat EKG and troponin but try Ativan prior to SL nitroglycerin.  - Cards consulted; tx to [facility] for further management    Assessment & Plan   Summary: Christina Fletcher is a 48 yo F with PMH of HTN, KAROLINA, CKD, hyperlipidemia, GERD, depression and PE in 2013 admitted on 1/25/2019 with a CHF exacerbation. Echo 1/26/2019 with acute systolic heart failure, EF < 20%.    Acute systolic congestive heart failure: Chart review shows echo 2013 obtained during workup of PE with EF 45-50% which was not followed up upon. She presented here with progressive edema and WILKINSON, elevated pro BNP. CXR without edema. EKG showing sinus tachycardia without ischemic changes, troponin negative. TTE 1/26/2019 with EF < 20%, global decreased LV function. Troponins are minimally positive at 0.037, 0.047. Patient appears to have improve slightly with diuresis, however she has had at least two episodes of chest pain since admission. EKG and troponins have not suggested ACS.  - Increased to Lasix 40mg IV BID  - Coreg BID  - Cardiology consulted  - Given the patient's severe distress due to the recent death of her daughter, I would consider stress cardiomyopathy as a possible etiology here  - Repeat CXR as outpatient to follow up lung opacity     Depression: Tearful during interview and reports recent loss of daughter with minimal support and difficulty coping.  Denies suicidal ideation.  - Psych consulted, to see tomorrow     Hx of PE: Stopped coumadin on her own but would like to resume this.  - Lovenox bridge + warfarin    HTN: Coreg started this admission  KAROLINA: CPAP per home settings.  CKD stage III: Baseline Cr 0.9-1.0, currently stable.  Monitor with  diuresis.  Hyperlipidemia: Continue PTA statin.    DVT Prophylaxis: Therapeutic lovenox  Code Status: Full Code  PT/OT: Not needed yet    Disposition: Expected discharge in 2-3 days pending cards workup    Sanford Valle MD  Text Page  (7am to 6pm)  -Data reviewed today: I reviewed all new labs and imaging results over the last 24 hours.     Physical Exam   Temp: 97.5  F (36.4  C) Temp src: Oral BP: (!) 125/93 Pulse: 113 Heart Rate: 105 Resp: 20 SpO2: 99 % O2 Device: None (Room air)    Vitals:    01/25/19 1821 01/26/19 0420   Weight: 128.8 kg (284 lb) 128.6 kg (283 lb 8.2 oz)     Vital Signs with Ranges  Temp:  [97.5  F (36.4  C)-98.2  F (36.8  C)] 97.5  F (36.4  C)  Pulse:  [113-116] 113  Heart Rate:  [] 105  Resp:  [12-20] 20  BP: (101-147)/() 125/93  SpO2:  [96 %-99 %] 99 %  I/O last 3 completed shifts:  In: 240 [P.O.:240]  Out: 1650 [Urine:1650]  O2 requirements: none    Constitutional: Obese female, distressed and tearful  Cardiovascular: RRR, normal S1/2, no m/r/g  Respiratory: CTAB, no wheezing or crackles  Vascular: Trace to 1+ BLE pitting edema  GI: Normoactive bowel sounds, nontender  Skin/Integumen: No rashes  Neuro/Psych: Tearful. A&Ox3, moves all extremities    Medications     ACE/ARB/ARNI NOT PRESCRIBED       Warfarin Therapy Reminder         buPROPion  300 mg Oral Daily     carvedilol  6.25 mg Oral BID     diclofenac  2 g Topical 4x Daily     enoxaparin  1 mg/kg Subcutaneous Q12H     furosemide  40 mg Intravenous BID     lisinopril  5 mg Oral Daily     LORazepam  0.5 mg Intravenous Once     rosuvastatin  40 mg Oral Daily     senna-docusate  1 tablet Oral BID     sodium chloride (PF)  10 mL Intravenous Once     sodium chloride (PF)  3 mL Intracatheter Q8H     [START ON 1/27/2019] spironolactone  50 mg Oral Daily       Data   Recent Labs   Lab 01/26/19  0810 01/26/19  0215 01/25/19  2245 01/25/19  1845 01/25/19  1310   WBC 8.7  --   --   --  11.3*   HGB 12.3  --   --   --  12.9   MCV  118*  --   --   --  119*     --   --  238 220   INR 1.03  --   --   --  1.00     --   --   --  143   POTASSIUM 3.8  --   --   --  3.9   CHLORIDE 105  --   --   --  108   CO2 28  --   --   --  28   BUN 26  --   --   --  29   CR 0.79  --   --  0.90 0.91   ANIONGAP 7  --   --   --  7   ELIZABETH 8.7  --   --   --  8.8   *  --   --   --  81   ALBUMIN  --   --   --   --  3.2*   PROTTOTAL  --   --   --   --  7.2   BILITOTAL  --   --   --   --  0.4   ALKPHOS  --   --   --   --  85   ALT  --   --   --   --  85*   AST  --   --   --   --  32   TROPI  --  0.047* 0.044 0.037 0.039       Imaging:   No results found for this or any previous visit (from the past 24 hour(s)).

## 2019-01-26 NOTE — PHARMACY-ANTICOAGULATION SERVICE
Clinical Pharmacy - Warfarin Dosing Consult     Pharmacy has been consulted to manage this patient s warfarin therapy.  Indication: DVT/ PE Treatment  Therapy Goal: INR 2-3  Warfarin Prior to Admission: No  Significant drug interactions: Acetaminophen and Rosuvastatin   Recent documented change in oral intake/nutrition: Unknown    INR   Date Value Ref Range Status   01/25/2019 1.00 0.86 - 1.14 Final     INR Protime   Date Value Ref Range Status   01/26/2017 2.2 (A) 0.86 - 1.14 Final       Recommend warfarin 7.5 mg today.  Pharmacy will monitor Christina Fletcher daily and order warfarin doses to achieve specified goal.      Please contact pharmacy as soon as possible if the warfarin needs to be held for a procedure or if the warfarin goals change.

## 2019-01-26 NOTE — PLAN OF CARE
Admission    Patient arrives to room 614-1 via cart from ED  Care plan note: Pt is A&Ox4, up with SBA, VSS on RA ex tachy, declined schedule coreg since it's a new medication, tried to educate pt on that it's replacing her PTA bblocker. WILKINSON noted with ambulating to/from bathroom. But pt would like to discuss with MD in AM about it. LS diminished, BS present, flatus present, +2 generalized edema. Educated patient on diet and general CHF education. Tele ST. Trpn 0.037, checking q4h. Plan for echo tomorrow, CORE and Psych consults placed. Discharge pending progress.     At 2230 RRT was called for 7/10 chest pain, described it as pressure. EKG done, Trpn lab done 0.044. Pain Relieved after 15 minutes with no interventions. Pt educated again on Coreg, and agreed to take it.     Inpatient nursing criteria listed below were met:    PCD's Documented: Yes  Skin issues/needs documented :Yes  Isolation education started/completed NA  Patient allergies verified with patient: Yes  Verified completion of Traverse Risk Assessment Tool:  Yes  Verified completion of Guardianship screening tool: No  Fall Prevention: Care plan updated, Education given and documented Yes  Care Plan initiated: Yes  Home medications documented in belongings flowsheet: NA  Patient belongings documented in belongings flowsheet: Yes  Reminder note (belongings/ medications) placed in discharge instructions:NA  Admission profile/ required documentation complete: Yes

## 2019-01-26 NOTE — PLAN OF CARE
OT/CR: Orders received. Attempted evaluation, pt requesting to finish her lunch at this time.     Attempted (second attempt today from therapist), pt sobbing and requested therapist to return tomorrow. Pt declined to discuss sobbing with therapist. RN aware.

## 2019-01-26 NOTE — CONSULTS
NUTRITION EDUCATION      REASON FOR NUTRITION CONSULT:  Provider Order  -  Nutrition Education - CHF:  Dietitian to instruct patient on 2 gram sodium diet       ASSESSMENT:  Unable to complete nutrition assessment and instructions at this time        FOLLOW UP:   Will instruct patient prior to discharge    Estela Dill RD, LD, CNSC   Clinical Dietitian - [facility]

## 2019-01-27 LAB
ANION GAP SERPL CALCULATED.3IONS-SCNC: 7 MMOL/L (ref 3–14)
BUN SERPL-MCNC: 32 MG/DL (ref 7–30)
CALCIUM SERPL-MCNC: 8.5 MG/DL (ref 8.5–10.1)
CHLORIDE SERPL-SCNC: 103 MMOL/L (ref 94–109)
CO2 SERPL-SCNC: 29 MMOL/L (ref 20–32)
CREAT SERPL-MCNC: 1.04 MG/DL (ref 0.52–1.04)
GFR SERPL CREATININE-BSD FRML MDRD: 63 ML/MIN/{1.73_M2}
GLUCOSE SERPL-MCNC: 101 MG/DL (ref 70–99)
INR PPP: 0.99 (ref 0.86–1.14)
POTASSIUM SERPL-SCNC: 4.1 MMOL/L (ref 3.4–5.3)
SODIUM SERPL-SCNC: 139 MMOL/L (ref 133–144)
TROPONIN I SERPL-MCNC: 0.02 UG/L (ref 0–0.04)

## 2019-01-27 PROCEDURE — 93010 ELECTROCARDIOGRAM REPORT: CPT | Performed by: INTERNAL MEDICINE

## 2019-01-27 PROCEDURE — 25000128 H RX IP 250 OP 636: Performed by: HOSPITALIST

## 2019-01-27 PROCEDURE — 25000132 ZZH RX MED GY IP 250 OP 250 PS 637: Mod: GY | Performed by: HOSPITALIST

## 2019-01-27 PROCEDURE — A9270 NON-COVERED ITEM OR SERVICE: HCPCS | Mod: GY | Performed by: INTERNAL MEDICINE

## 2019-01-27 PROCEDURE — 85610 PROTHROMBIN TIME: CPT | Performed by: HOSPITALIST

## 2019-01-27 PROCEDURE — 99232 SBSQ HOSP IP/OBS MODERATE 35: CPT | Mod: 25 | Performed by: INTERNAL MEDICINE

## 2019-01-27 PROCEDURE — 21000001 ZZH R&B HEART CARE

## 2019-01-27 PROCEDURE — 25000132 ZZH RX MED GY IP 250 OP 250 PS 637: Mod: GY | Performed by: INTERNAL MEDICINE

## 2019-01-27 PROCEDURE — 99222 1ST HOSP IP/OBS MODERATE 55: CPT | Performed by: PSYCHIATRY & NEUROLOGY

## 2019-01-27 PROCEDURE — 25000128 H RX IP 250 OP 636: Performed by: INTERNAL MEDICINE

## 2019-01-27 PROCEDURE — 80048 BASIC METABOLIC PNL TOTAL CA: CPT | Performed by: INTERNAL MEDICINE

## 2019-01-27 PROCEDURE — A9270 NON-COVERED ITEM OR SERVICE: HCPCS | Mod: GY | Performed by: HOSPITALIST

## 2019-01-27 PROCEDURE — 84484 ASSAY OF TROPONIN QUANT: CPT | Performed by: INTERNAL MEDICINE

## 2019-01-27 PROCEDURE — 36415 COLL VENOUS BLD VENIPUNCTURE: CPT | Performed by: HOSPITALIST

## 2019-01-27 PROCEDURE — 93005 ELECTROCARDIOGRAM TRACING: CPT

## 2019-01-27 PROCEDURE — 99232 SBSQ HOSP IP/OBS MODERATE 35: CPT | Performed by: INTERNAL MEDICINE

## 2019-01-27 RX ORDER — LISINOPRIL 2.5 MG/1
2.5 TABLET ORAL DAILY
Status: DISCONTINUED | OUTPATIENT
Start: 2019-01-28 | End: 2019-01-29 | Stop reason: HOSPADM

## 2019-01-27 RX ORDER — BUPROPION HYDROCHLORIDE 150 MG/1
150 TABLET ORAL DAILY
Status: DISCONTINUED | OUTPATIENT
Start: 2019-01-28 | End: 2019-01-29 | Stop reason: HOSPADM

## 2019-01-27 RX ORDER — SPIRONOLACTONE 25 MG/1
25 TABLET ORAL DAILY
Status: DISCONTINUED | OUTPATIENT
Start: 2019-01-28 | End: 2019-01-27

## 2019-01-27 RX ORDER — LORAZEPAM 0.5 MG/1
0.5 TABLET ORAL 2 TIMES DAILY PRN
Status: DISCONTINUED | OUTPATIENT
Start: 2019-01-27 | End: 2019-01-29 | Stop reason: HOSPADM

## 2019-01-27 RX ADMIN — LORAZEPAM 0.5 MG: 0.5 TABLET ORAL at 14:07

## 2019-01-27 RX ADMIN — ENOXAPARIN SODIUM 135 MG: 150 INJECTION SUBCUTANEOUS at 21:08

## 2019-01-27 RX ADMIN — SENNOSIDES AND DOCUSATE SODIUM 1 TABLET: 8.6; 5 TABLET ORAL at 08:23

## 2019-01-27 RX ADMIN — SPIRONOLACTONE 50 MG: 25 TABLET, FILM COATED ORAL at 08:26

## 2019-01-27 RX ADMIN — FUROSEMIDE 40 MG: 10 INJECTION, SOLUTION INTRAVENOUS at 08:25

## 2019-01-27 RX ADMIN — CARVEDILOL 6.25 MG: 6.25 TABLET, FILM COATED ORAL at 21:05

## 2019-01-27 RX ADMIN — BUPROPION HYDROCHLORIDE 300 MG: 300 TABLET, FILM COATED, EXTENDED RELEASE ORAL at 08:23

## 2019-01-27 RX ADMIN — SENNOSIDES AND DOCUSATE SODIUM 1 TABLET: 8.6; 5 TABLET ORAL at 21:05

## 2019-01-27 RX ADMIN — ENOXAPARIN SODIUM 135 MG: 150 INJECTION SUBCUTANEOUS at 08:24

## 2019-01-27 RX ADMIN — DICLOFENAC 2 G: 10 GEL TOPICAL at 21:07

## 2019-01-27 RX ADMIN — CARVEDILOL 6.25 MG: 6.25 TABLET, FILM COATED ORAL at 08:25

## 2019-01-27 RX ADMIN — ROSUVASTATIN CALCIUM 40 MG: 20 TABLET, FILM COATED ORAL at 08:23

## 2019-01-27 RX ADMIN — TIZANIDINE 2 MG: 2 TABLET ORAL at 21:05

## 2019-01-27 RX ADMIN — OXYCODONE HYDROCHLORIDE AND ACETAMINOPHEN 1 TABLET: 10; 325 TABLET ORAL at 08:39

## 2019-01-27 RX ADMIN — OXYCODONE HYDROCHLORIDE AND ACETAMINOPHEN 1 TABLET: 10; 325 TABLET ORAL at 21:05

## 2019-01-27 ASSESSMENT — ACTIVITIES OF DAILY LIVING (ADL)
ADLS_ACUITY_SCORE: 16

## 2019-01-27 ASSESSMENT — MIFFLIN-ST. JEOR: SCORE: 1860.12

## 2019-01-27 NOTE — PLAN OF CARE
Heart Failure Care Pathway  GOALS TO BE MET BEFORE DISCHARGE:    1. Decrease congestion and/or edema with diuretic therapy to achieve near      optimal volume status.            Dyspnea improved:  Yes            Edema improved:     Yes        Net I/O and Weights since admission:          12/28 2300 - 01/27 2259  In: 660 [P.O.:660]  Out: 2350 [Urine:2350]  Net: -1690            Vitals:    01/25/19 1821 01/26/19 0420 01/27/19 0839   Weight: 128.8 kg (284 lb) 128.6 kg (283 lb 8.2 oz) 124.5 kg (274 lb 8 oz)       2.  O2 sats > 92% on RA or at prior home O2 therapy level.          Current oxygenation status:       SpO2: 95 %         O2 Device: None (Room air),            Able to wean O2 this shift to keep sats > 92%:  NA       Does patient use Home O2? No    3.  Tolerates ambulation and mobility near baseline: No, please explain:only able to walk to the bathroom.         How many times did the patient ambulate with nursing staff this shift? 4 in her room     C/o chest tightness at 1340, EKG showed no changes, pt also reported that she just heard some bad news from her family.  Unable to give nitroglycerin due to low Bp 84/63. Denied any dizziness or lightheadedness.  Cardiology was informed and recommended to call Hospitalist for non cardiac chest pain. Hospitalist informed about chest pain and Low BP. Received orders ok to give prn ativan. Pt's chest discomfort was resolved. Plan for angio tomorrow. Consent signed.     Please review the Heart Failure Care Pathway for additional HF goal outcomes.    Neptali Lewis RN  1/27/2019

## 2019-01-27 NOTE — PROGRESS NOTES
Stopped spironolactone 50mg daily this afternoon due to asymptomatic low blood pressures. Restart as pressures allow.

## 2019-01-27 NOTE — DISCHARGE INSTRUCTIONS
Coronary Angiography     The catheter can be placed into the groin, arm, or wrist.   Angiography is a special type of X-ray that lets your doctor view your coronary arteries to see if the blood vessels to your heart are narrowed or blocked.   Before the procedure    Tell your doctor what medicines you take and any allergies you may have.    Tell your doctor if you've had a reaction to contrast dye or have had any kidney problems.    Don t eat or drink anything for at least 6 to 8 hours before the procedure. You will likely be told not to have anything after midnight, the night before the procedure.    A nurse will place an IV catheter in your vein to give fluids, and medicine to relieve pain and help you feel less anxious.    He or she will clean your skin and, if necessary, shave the area where the catheter will be inserted.  During the procedure    Your doctor will place a long, thin tube called a catheter inside an artery in your groin or arm and guide it into your heart.    He or she will inject a contrast dye through the catheter into your blood vessels or heart chambers.    X-rays are taken to show images of the inside of your heart and coronary arteries.  After the procedure      Your doctor or nurse will tell you how long to lie down and keep the insertion site still.    If the insertion site was in your groin, you may need to lie down with your leg still for several hours. If bleeding occurs, a nurse will apply pressure to the area to control it.    A nurse will check your blood pressure and the insertion site frequently to make sure you remain stable after the procedure.    You may be asked to drink fluid to help flush the contrast liquid out of your system.    Have someone drive you home from the hospital.    If your doctor uses angioplasty to treat a blocked artery, you will stay the night in the hospital.    It s normal to find a small bruise or lump at the insertion site. The lump may be the collagen  plug or stitch that you feel, or a small bruise. These common side effects should disappear within a few weeks.  When to call your healthcare provider  Contact your healthcare provider right away if you have any of these:    Chest pain    Pain, swelling, redness, bleeding, or drainage at the insertion site    Severe pain, coldness, or a bluish color in the leg or arm that held the catheter    Fever over 100.4 F (38 C)   Date Last Reviewed: 6/1/2016 2000-2018 [employer]. [address] [place] [address]. All rights reserved. This information is not intended as a substitute for professional medical care. Always follow your healthcare professional's instructions.

## 2019-01-27 NOTE — PROGRESS NOTES
[facility]    Hospitalist Progress Note    Interval History   - Chest pain/anxiety improved with Ativan yesterday. Ativan PO PRN added today  - Slept poorly, otherwise no complaints this morning.  - Creatinine creeping up to 1.04 today. Will hold further diuresis to preserve renal function for angiogram tomorrow.    Assessment & Plan   Summary: Christina Fletcher is a 48 yo F with PMH of HTN, KAROLINA, CKD, hyperlipidemia, GERD, depression and PE in 2013 admitted on 1/25/2019 with a CHF exacerbation. Echo 1/26/2019 with acute systolic heart failure, EF < 20%. Angiogram planned 1/28.    Acute systolic congestive heart failure  Cardiomyopathy of unclear etiology  Chart review shows echo 2013 obtained during workup of PE with EF 45-50%. She presented here with progressive edema and WILKINSON, elevated pro BNP. CXR without edema. EKG showing sinus tachycardia without ischemic changes, troponin negative. TTE 1/26/2019 with EF < 20%, global decreased LV function. Troponins initially minimally positive at 0.037, 0.047, then negative at 0.02. Patient improving with diuresis, weight 284lb-->274lb. Differential includes stress cardiomyopathy given recent death of daughter.  - Cardiology consulted   - Angiogram tomorrow  - Creatinine creeping up to 1.04 today. Will hold further diuresis to preserve renal function for angiogram tomorrow  - 2L fluid restriction  - Coreg BID    CXR right lung opacity:  - Repeat CXR as outpatient to follow up lung opacity seen on admission     Depression and/or anxiety: Tearful during interview and reports recent loss of daughter with minimal support and difficulty coping.  Denies suicidal ideation.  - Ativan 0.5mg BID PRN added to address chest pain related to anxiety/panic attacks here  - Psych consulted, pending     Hx of PE 2013: Stopped coumadin on her own but would like to resume this.  - Lovenox for now  - Warfarin held pending angiogram    HTN: Coreg started this admission  KAROLINA: CPAP  per home settings.  CKD stage III: Baseline Cr 0.9, currently stable.  - Monitor with diuresis  Hyperlipidemia: Continue PTA statin.    DVT Prophylaxis: Therapeutic lovenox  Code Status: Full Code  PT/OT: Not needed yet    Disposition: Expected discharge in 2-3 days pending cards workup    Sanford Valle MD  Text Page  (7am to 6pm)  -Data reviewed today: I reviewed all new labs and imaging results over the last 24 hours.     Physical Exam   Temp: 98.3  F (36.8  C) Temp src: Oral BP: 99/55   Heart Rate: 92 Resp: 18 SpO2: 97 % O2 Device: None (Room air)    Vitals:    01/25/19 1821 01/26/19 0420 01/27/19 0839   Weight: 128.8 kg (284 lb) 128.6 kg (283 lb 8.2 oz) 124.5 kg (274 lb 8 oz)     Vital Signs with Ranges  Temp:  [97.5  F (36.4  C)-98.5  F (36.9  C)] 98.3  F (36.8  C)  Heart Rate:  [] 92  Resp:  [18-20] 18  BP: ()/(49-96) 99/55  SpO2:  [96 %-99 %] 97 %  I/O last 3 completed shifts:  In: 480 [P.O.:480]  Out: 850 [Urine:850]  O2 requirements: none    Constitutional: Obese female in NAD  Cardiovascular: RRR, normal S1/2, no m/r/g  Respiratory: CTAB, no wheezing or crackles  Vascular: Trace to 1+ BLE pitting edema  GI: Normoactive bowel sounds, nontender  Skin/Integumen: No rashes  Neuro/Psych: Appropriate mood and affect. A&Ox3, moves all extremities    Medications     ACE/ARB/ARNI NOT PRESCRIBED       Warfarin Therapy Reminder         buPROPion  300 mg Oral Daily     carvedilol  6.25 mg Oral BID     diclofenac  2 g Topical 4x Daily     enoxaparin  1 mg/kg Subcutaneous Q12H     lisinopril  5 mg Oral Daily     rosuvastatin  40 mg Oral Daily     senna-docusate  1 tablet Oral BID     sodium chloride (PF)  10 mL Intravenous Once     sodium chloride (PF)  3 mL Intracatheter Q8H     spironolactone  50 mg Oral Daily       Data   Recent Labs   Lab 01/27/19  0630 01/26/19  2105 01/26/19  1718 01/26/19  0810  01/25/19  1845 01/25/19  1310   WBC  --   --   --  8.7  --   --  11.3*   HGB  --   --   --  12.3  --    --  12.9   MCV  --   --   --  118*  --   --  119*   PLT  --   --   --  208  --  238 220   INR 0.99  --   --  1.03  --   --  1.00     --   --  140  --   --  143   POTASSIUM 4.1  --   --  3.8  --   --  3.9   CHLORIDE 103  --   --  105  --   --  108   CO2 29  --   --  28  --   --  28   BUN 32*  --   --  26  --   --  29   CR 1.04  --   --  0.79  --  0.90 0.91   ANIONGAP 7  --   --  7  --   --  7   ELIZABETH 8.5  --   --  8.7  --   --  8.8   *  --   --  140*  --   --  81   ALBUMIN  --   --   --   --   --   --  3.2*   PROTTOTAL  --   --   --   --   --   --  7.2   BILITOTAL  --   --   --   --   --   --  0.4   ALKPHOS  --   --   --   --   --   --  85   ALT  --   --   --   --   --   --  85*   AST  --   --   --   --   --   --  32   TROPI 0.016 0.023 0.024  --    < > 0.037 0.039    < > = values in this interval not displayed.       Imaging:   No results found for this or any previous visit (from the past 24 hour(s)).

## 2019-01-27 NOTE — PLAN OF CARE
Pt to Transfer to [facility] room 253.    Pt is A&Ox4, up with SBA, VSS on RA, slightly hypertensive and tachy. Scheduled coreg given and Cards started lisinopril this shift. Cardiology seen for significant decreased in EF found during echo today. Cards want hold off on warfarin today for possible angiogram on Monday. Pt has been tearful about diagnosis. Mild anxiety when discussing with MD/Nurse and c/o chest pain/tightness. One time dose IV ativan given and helpful. Psych to see tomorrow. LS clear, WILKINSON when ambulating to/from bathroom. Pt to transfer to [facility] for continued monitoring.

## 2019-01-27 NOTE — PROGRESS NOTES
At 1600 the writer tried to assess the pt and check VS but son asked to let pt sleep and promised to call when pt will wake up.

## 2019-01-27 NOTE — PLAN OF CARE
Heart Failure Care Pathway  GOALS TO BE MET BEFORE DISCHARGE:    1. Decrease congestion and/or edema with diuretic therapy to achieve near      optimal volume status.            Dyspnea improved:  No, please explain: WILKINSON             Edema improved:     No, please explain: 2+ bilat LE edema         Net I/O and Weights since admission:          12/28 0700 - 01/27 0659  In: 480 [P.O.:480]  Out: 1750 [Urine:1750]  Net: -1270            Vitals:    01/25/19 1821 01/26/19 0420   Weight: 128.8 kg (284 lb) 128.6 kg (283 lb 8.2 oz)       2.  O2 sats > 92% on RA or at prior home O2 therapy level.          Current oxygenation status:       SpO2: 96 %         O2 Device: None (Room air),            Able to wean O2 this shift to keep sats > 92%:  Room air       Does patient use Home O2? No    3.  Tolerates ambulation and mobility near baseline: Yes        How many times did the patient ambulate with nursing staff this shift? 1    Please review the Heart Failure Care Pathway for additional HF goal outcomes.    Pt refused vitals, assessments and troponin draw throughout night. Plan to continue to diurese, possible angio Monday.    Pt also refused weight this AM.   Mary Carmen Molina RN  1/27/2019

## 2019-01-27 NOTE — PROGRESS NOTES
Cardiology Progress Note  Luis Melendez         Assessment and Plan:      1.  Acute systolic congestive heart failure  Patient improved with IV diuresis.  Bicarb and creatinine slightly up.  Weight loss of at least 10 pounds since admission.  Agree with stopping IV diuresis after the a.m. dose today.  Will reassess tomorrow.  Her home Aldactone dose has been decreased from 50 mg twice daily to 50 mg daily.  Because of low blood pressures, lisinopril this morning was not given.  I will reduce the dose by 2.5 mg and decrease spironolactone to 25 mg daily.  She is on Coreg.  Her EF is less than 20%.  I recommended a left heart catheterization and coronary angiography along with right heart catheterization for further evaluation.  Risks and benefits of left heart catheterization and coronary angiogram were discussed with the patient in detail. 0.1-0.3% (for diagnostic angio) and 1-2% (for PCI)  risk of stroke, MI, death, emergent bypass for diagnostic angio, risk of contrast induced allergic reaction, renal dysfunction, vascular complications were discussed. Pros and cons of bare metal Vs drug eluting stents was discussed. Patient understands and wishes to proceed with it.  The risks and benefits of right heart catheterization with or without vasodilator study including risks of (0.1% -0.3%) bleeding, infection, death, arrhythmias, pulmonary artery rupture, etc were discussed with patient and he agrees to proceed with it.  CHF education done with the importance of salt restriction discussed.  Etiology of cardiomyopathy could be familial as mother has cardiomyopathy.  TSH and ferritin normal.  Troponins essentially negative except for one isolated reading there is slightly abnormal.    2.  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  Likely, clinically not significant.    3.  History of pulmonary embolus  On Lovenox.  Will discontinue Lovenox after tonight's dose.  End date for today  "added to the order so that no further Lovenox is given after evening dose.  Resume warfarin after the coronary angiography    4.  Morbid obesity    5.  Anxiety                   Interval History:   denies shortness of breath          Review of Systems:   The 5 point Review of Systems is negative other than noted in the HPI          Physical Exam:        Blood pressure 99/55, pulse 113, temperature 98.3  F (36.8  C), temperature source Oral, resp. rate 18, height 1.626 m (5' 4\"), weight 124.5 kg (274 lb 8 oz), SpO2 97 %, not currently breastfeeding.  Vitals:    01/25/19 1821 01/26/19 0420 01/27/19 0839   Weight: 128.8 kg (284 lb) 128.6 kg (283 lb 8.2 oz) 124.5 kg (274 lb 8 oz)     Weights since admission  Vitals:    01/25/19 1821 01/26/19 0420 01/27/19 0839   Weight: 128.8 kg (284 lb) 128.6 kg (283 lb 8.2 oz) 124.5 kg (274 lb 8 oz)     Vital Signs with Ranges  Temp:  [97.5  F (36.4  C)-98.5  F (36.9  C)] 98.3  F (36.8  C)  Heart Rate:  [] 92  Resp:  [18-20] 18  BP: ()/(49-96) 99/55  SpO2:  [96 %-99 %] 97 %  I/O's Last 24 hours  I/O last 3 completed shifts:  In: 480 [P.O.:480]  Out: 850 [Urine:850]  Net I/O since admission  01/22 0700 - 01/27 0659  In: 480 [P.O.:480]  Out: 1750 [Urine:1750]  Net: -1270    EXAM:    Constitutional:   in no apparent distress     Neck:   Cannot assess JVP because of thick neck     Lungs:    symmetric, clear to auscultation     Cardiovascular:   normal S1 and S2 and no murmur noted     Hematologic/Lymphatic:   no cervical lymphadenopathy     Extremities and Back:   Edema 1+     Neurological:   No gross or focal neurologic abnormalities            Medications:          [START ON 1/28/2019] buPROPion  150 mg Oral Daily     carvedilol  6.25 mg Oral BID     diclofenac  2 g Topical 4x Daily     enoxaparin  1 mg/kg Subcutaneous Q12H     [START ON 1/28/2019] lisinopril  2.5 mg Oral Daily     rosuvastatin  40 mg Oral Daily     senna-docusate  1 tablet Oral BID     sodium chloride (PF)  " 10 mL Intravenous Once     sodium chloride (PF)  3 mL Intracatheter Q8H     spironolactone  50 mg Oral Daily            Data:      All new lab and imaging data was reviewed.   Recent Labs   Lab Test 01/27/19  0630 01/26/19  0810 01/25/19  1845 01/25/19  1310 03/28/18  1835   WBC  --  8.7  --  11.3* 15.8*   HGB  --  12.3  --  12.9 12.7   MCV  --  118*  --  119* 107*   PLT  --  208 238 220 378   INR 0.99 1.03  --  1.00  --       Recent Labs   Lab Test 01/27/19  0630 01/26/19  0810 01/25/19  1845 01/25/19  1310    140  --  143   POTASSIUM 4.1 3.8  --  3.9   CHLORIDE 103 105  --  108   CO2 29 28  --  28   BUN 32* 26  --  29   CR 1.04 0.79 0.90 0.91   ANIONGAP 7 7  --  7   ELIZABETH 8.5 8.7  --  8.8   * 140*  --  81     Recent Labs   Lab Test 01/27/19  0630 01/26/19  2105 01/26/19  1718   TROPI 0.016 0.023 0.024              Imaging:   No results found for this or any previous visit (from the past 24 hour(s)).

## 2019-01-27 NOTE — PLAN OF CARE
OT/CR: Orders received. Discussed with RN. Per chart and RN, plan for coronary angiography on 1/28/19. Will hold evaluation until after. Per chart pt is up with SBA, pt to ambulate with nursing in the halls.

## 2019-01-27 NOTE — CONSULTS
"Consult Date:  01/27/2019      REASON FOR CONSULTATION:  Depression.      REQUESTING PHYSICIAN:  Kulwinder Romo MD      CHIEF COMPLAINT:  Query depression.      HISTORY OF PRESENT ILLNESS:  Christina Fletcher is a 49-year-old female with a psychiatric history of depression and anxiety that was admitted to the hospital 01/25/2019 for management of CHF exacerbation.  Echocardiogram obtained yesterday demonstrated acute systolic heart failure with an ejection fraction below 20% and she has a working diagnosis of cardiomyopathy of unclear etiology with acute systolic CHF.  She has an angiogram planned on 01/28/2019.      Our service was consulted on the patient for concerns of depression and anxiety.  It is reported that the patient lost her 25-year-old daughter in 2011 to cardiac arrest.  The patient reports that while she does have a son, she does not have much in the way of support.  She expresses a lot of concern about her living situation, wherein she lives in an apartment that requires stairs and she does not find herself fully able to safely navigate this.  She reports somewhat inconsistent adherence to medications, though has been on Wellbutrin in the past.  She finds benefit at a low dose, but states that she has not been on this medication in some unquantified period of time.  She was reinitiated on 300 mg of Wellbutrin in the hospital and feels that it is conferring increased anxiety.  She tells me the higher dose \"keeps me woke.\"  She does not describe baseline anxiety, though does concede to some episodic periods of panic that have reportedly been responsive to lorazepam (has received a single dose of 0.5 mg IV yesterday with benefit).  The patient tells me that sleep has been good since being in the hospital.  Appetite is good as well.  She denies any suicidality.  She is expressing interest in establishing with a psychiatric provider upon discharge from the hospital.      PAST PSYCHIATRIC HISTORY:  History of " depression and anxiety.  Was managed by her primary care doctor.  No history of suicidality.  No history of psychiatric hospitalizations.  No history of self-injurious behavior.  Has been on Wellbutrin in the past with benefit at low dose (150 mg).      PAST MEDICAL HISTORY:  Pulmonary embolus, GERD, hypertension, KAROLINA, hyperlipidemia, chronic kidney disease stage III, bilateral knee osteoarthritis, CHF.      FAMILY HISTORY:  Noncontributory.      SOCIAL HISTORY:  Reviewed in EMR and updated as appropriate.      REVIEW OF SYSTEMS:  Ten-point review of systems completed and negative other than noted in HPI.      ALLERGIES:  HYDRALAZINE, NSAIDS, PENICILLIN.       PRIOR TO ADMISSION MEDICATIONS:  Acetaminophen, albuterol, vitamin C, bupropion (was not on this medication prior to admission), vitamin D3, vitamin B12, diclofenac, multivitamin, Bystolic, Percocet, MiraLax, Crestor, Senna docusate, spironolactone.      MENTAL STATUS EXAMINATION:  Age appearing female with situationally appropriate grooming and hygiene, accompanied by son at bedside.  Calm and cooperative with good eye contact.  Awake, alert and globally oriented.  Cooperative, forthcoming and a reliable historian.  Mood was described as depressed.  Affect was stable, appropriately reactive.  Speech was fluent, spontaneous clear, and nonpressured.  Thoughts were linear, logical and goal directed.  No observation of delusional content or response to internal stimuli.  No fluctuation in cognition appreciated.  Intelligence estimate is average by way of vocabulary and conversational understanding.  Memory intact to recent and distant events.  Attention and concentration are well maintained.  Coordination, station and gait were not assessed.  Muscle strength and tone were not assessed.  Insight is good.  Judgment is good.  Denies suicidal or homicidal ideation, intent or plan.      VITAL SIGNS:  Temperature 98.3, heart rate 92, respirations 18, blood pressure  99/55, oxygen saturation 97%.      DIAGNOSES:   1.  Major depressive disorder, recurrent, mild to moderate.   2.  Panic attacks.   3.  Multiple medical comorbidities.      IMPRESSION:  Christina Fletcher is a 49-year-old female admitted to the hospital for cardiomyopathy of unclear etiology and acute systolic congestive heart failure.  We were asked to see the patient regarding concerns of depression and anxiety in the context of the passing of her daughter in 2011.  While the patient continues to work through this longstanding loss, her more acute complaints include not a great deal of support in the outpatient setting and concerns about physical safety in her current home environment, given the large amount of stairs she requires to get home.  She has found benefit on Wellbutrin in the past, though feels the current dose is leading to some negative increases in anxiety.  She describes the anxiety as panic attacks that have thus far been responsive to a single dose of lorazepam.  We will try reducing the Wellbutrin to 150 mg.  I recommended continuing the lorazepam as currently ordered for episodes of panic, as it is effective.  Would also recommend social work provide the patient with outpatient psychiatric resources that coincide with her location and Medicare funding.  Would also recommend physical therapy evaluation for potential services or alternative recommendations to her home living environment, given the difficulty she has ambulating the stairs.      PLAN:   1.  Reduce Wellbutrin to 150 mg.   2.  Continue lorazepam as currently ordered p.r.n. panic.   3.  Would ask social work to assist in providing outpatient psychiatric and therapy resources.   4.  Would recommend PT consult to clarify needs with respect to ambulating her home environment.         BOB MOODY DO             D: 01/27/2019   T: 01/27/2019   MT: ANKITA      Name:     CHRISTINA FLETCHER   MRN:      [id number]        Account:       [id number]    :      1970           Consult Date:  2019      Document: [id number]

## 2019-01-27 NOTE — PLAN OF CARE
Patient A&O x4,anxious, Denies chest pain/tightness/pressure and ST on tele  and VSS,  Lungs are diminished at bases and on RA, up with SBA , on cardiac no caffeine diet, skin is WNL , voiding well after Lasix IV 40 mg and last BM 1/25. IV is in R arm and SL, pt took shower upon arrival to the unit. Plan for Angio possible on Monday after diuresing.

## 2019-01-28 ENCOUNTER — SURGERY (OUTPATIENT)
Age: 49
End: 2019-01-28
Payer: MEDICARE

## 2019-01-28 ENCOUNTER — TELEPHONE (OUTPATIENT)
Dept: FAMILY MEDICINE | Facility: CLINIC | Age: 49
End: 2019-01-28

## 2019-01-28 LAB
ANION GAP SERPL CALCULATED.3IONS-SCNC: 6 MMOL/L (ref 3–14)
BUN SERPL-MCNC: 29 MG/DL (ref 7–30)
CALCIUM SERPL-MCNC: 8.7 MG/DL (ref 8.5–10.1)
CHLORIDE SERPL-SCNC: 103 MMOL/L (ref 94–109)
CO2 BLDCOV-SCNC: 31 MMOL/L (ref 21–28)
CO2 SERPL-SCNC: 31 MMOL/L (ref 20–32)
CREAT SERPL-MCNC: 0.88 MG/DL (ref 0.52–1.04)
ERYTHROCYTE [DISTWIDTH] IN BLOOD BY AUTOMATED COUNT: 12.2 % (ref 10–15)
GFR SERPL CREATININE-BSD FRML MDRD: 77 ML/MIN/{1.73_M2}
GLUCOSE SERPL-MCNC: 99 MG/DL (ref 70–99)
HCT VFR BLD AUTO: 36.5 % (ref 35–47)
HGB BLD-MCNC: 11.9 G/DL (ref 11.7–15.7)
INR PPP: 1 (ref 0.86–1.14)
KCT BLD-ACNC: 180 SEC (ref 75–150)
MCH RBC QN AUTO: 38.8 PG (ref 26.5–33)
MCHC RBC AUTO-ENTMCNC: 32.6 G/DL (ref 31.5–36.5)
MCV RBC AUTO: 119 FL (ref 78–100)
PCO2 BLDV: 52 MM HG (ref 40–50)
PH BLDV: 7.38 PH (ref 7.32–7.43)
PLATELET # BLD AUTO: 186 10E9/L (ref 150–450)
PO2 BLDV: 32 MM HG (ref 25–47)
POTASSIUM SERPL-SCNC: 4.2 MMOL/L (ref 3.4–5.3)
RBC # BLD AUTO: 3.07 10E12/L (ref 3.8–5.2)
SAO2 % BLDV FROM PO2: 59 %
SODIUM SERPL-SCNC: 140 MMOL/L (ref 133–144)
WBC # BLD AUTO: 9.9 10E9/L (ref 4–11)

## 2019-01-28 PROCEDURE — 80048 BASIC METABOLIC PNL TOTAL CA: CPT | Performed by: INTERNAL MEDICINE

## 2019-01-28 PROCEDURE — A9270 NON-COVERED ITEM OR SERVICE: HCPCS | Mod: GY

## 2019-01-28 PROCEDURE — 99152 MOD SED SAME PHYS/QHP 5/>YRS: CPT | Performed by: INTERNAL MEDICINE

## 2019-01-28 PROCEDURE — A9270 NON-COVERED ITEM OR SERVICE: HCPCS | Mod: GY | Performed by: HOSPITALIST

## 2019-01-28 PROCEDURE — 25000125 ZZHC RX 250: Performed by: INTERNAL MEDICINE

## 2019-01-28 PROCEDURE — A9270 NON-COVERED ITEM OR SERVICE: HCPCS | Mod: GY | Performed by: INTERNAL MEDICINE

## 2019-01-28 PROCEDURE — 25000128 H RX IP 250 OP 636: Performed by: INTERNAL MEDICINE

## 2019-01-28 PROCEDURE — B2111ZZ FLUOROSCOPY OF MULTIPLE CORONARY ARTERIES USING LOW OSMOLAR CONTRAST: ICD-10-PCS | Performed by: INTERNAL MEDICINE

## 2019-01-28 PROCEDURE — C1894 INTRO/SHEATH, NON-LASER: HCPCS | Performed by: INTERNAL MEDICINE

## 2019-01-28 PROCEDURE — 82803 BLOOD GASES ANY COMBINATION: CPT

## 2019-01-28 PROCEDURE — 99232 SBSQ HOSP IP/OBS MODERATE 35: CPT | Performed by: PHYSICIAN ASSISTANT

## 2019-01-28 PROCEDURE — 85027 COMPLETE CBC AUTOMATED: CPT | Performed by: INTERNAL MEDICINE

## 2019-01-28 PROCEDURE — 99153 MOD SED SAME PHYS/QHP EA: CPT | Performed by: INTERNAL MEDICINE

## 2019-01-28 PROCEDURE — 25000132 ZZH RX MED GY IP 250 OP 250 PS 637: Mod: GY | Performed by: PSYCHIATRY & NEUROLOGY

## 2019-01-28 PROCEDURE — 25000132 ZZH RX MED GY IP 250 OP 250 PS 637: Mod: GY | Performed by: INTERNAL MEDICINE

## 2019-01-28 PROCEDURE — 85347 COAGULATION TIME ACTIVATED: CPT

## 2019-01-28 PROCEDURE — 93460 R&L HRT ART/VENTRICLE ANGIO: CPT | Mod: 26 | Performed by: INTERNAL MEDICINE

## 2019-01-28 PROCEDURE — 25000132 ZZH RX MED GY IP 250 OP 250 PS 637: Mod: GY

## 2019-01-28 PROCEDURE — 99232 SBSQ HOSP IP/OBS MODERATE 35: CPT | Mod: 25 | Performed by: INTERNAL MEDICINE

## 2019-01-28 PROCEDURE — 21000001 ZZH R&B HEART CARE

## 2019-01-28 PROCEDURE — C1769 GUIDE WIRE: HCPCS | Performed by: INTERNAL MEDICINE

## 2019-01-28 PROCEDURE — 4A023N8 MEASUREMENT OF CARDIAC SAMPLING AND PRESSURE, BILATERAL, PERCUTANEOUS APPROACH: ICD-10-PCS | Performed by: INTERNAL MEDICINE

## 2019-01-28 PROCEDURE — 27210794 ZZH OR GENERAL SUPPLY STERILE: Performed by: INTERNAL MEDICINE

## 2019-01-28 PROCEDURE — 85610 PROTHROMBIN TIME: CPT | Performed by: INTERNAL MEDICINE

## 2019-01-28 PROCEDURE — 93460 R&L HRT ART/VENTRICLE ANGIO: CPT | Performed by: INTERNAL MEDICINE

## 2019-01-28 PROCEDURE — 25000132 ZZH RX MED GY IP 250 OP 250 PS 637: Mod: GY | Performed by: HOSPITALIST

## 2019-01-28 PROCEDURE — A9270 NON-COVERED ITEM OR SERVICE: HCPCS | Mod: GY | Performed by: PSYCHIATRY & NEUROLOGY

## 2019-01-28 PROCEDURE — 36415 COLL VENOUS BLD VENIPUNCTURE: CPT | Performed by: INTERNAL MEDICINE

## 2019-01-28 RX ORDER — ATROPINE SULFATE 0.1 MG/ML
.5-1 INJECTION INTRAVENOUS
Status: DISCONTINUED | OUTPATIENT
Start: 2019-01-28 | End: 2019-01-28 | Stop reason: HOSPADM

## 2019-01-28 RX ORDER — ACETAMINOPHEN 325 MG/1
325-650 TABLET ORAL EVERY 4 HOURS PRN
Status: DISCONTINUED | OUTPATIENT
Start: 2019-01-28 | End: 2019-01-29 | Stop reason: HOSPADM

## 2019-01-28 RX ORDER — FENTANYL CITRATE 50 UG/ML
25-50 INJECTION, SOLUTION INTRAMUSCULAR; INTRAVENOUS
Status: ACTIVE | OUTPATIENT
Start: 2019-01-28 | End: 2019-01-29

## 2019-01-28 RX ORDER — PROPOFOL 10 MG/ML
5-75 INJECTION, EMULSION INTRAVENOUS CONTINUOUS
Status: DISCONTINUED | OUTPATIENT
Start: 2019-01-28 | End: 2019-01-28 | Stop reason: HOSPADM

## 2019-01-28 RX ORDER — NALOXONE HYDROCHLORIDE 0.4 MG/ML
.2-.4 INJECTION, SOLUTION INTRAMUSCULAR; INTRAVENOUS; SUBCUTANEOUS
Status: DISCONTINUED | OUTPATIENT
Start: 2019-01-28 | End: 2019-01-28

## 2019-01-28 RX ORDER — METOPROLOL TARTRATE 1 MG/ML
5 INJECTION, SOLUTION INTRAVENOUS EVERY 5 MIN PRN
Status: DISCONTINUED | OUTPATIENT
Start: 2019-01-28 | End: 2019-01-28 | Stop reason: HOSPADM

## 2019-01-28 RX ORDER — SODIUM NITROPRUSSIDE 25 MG/ML
100-200 INJECTION INTRAVENOUS
Status: DISCONTINUED | OUTPATIENT
Start: 2019-01-28 | End: 2019-01-28 | Stop reason: HOSPADM

## 2019-01-28 RX ORDER — ENALAPRILAT 1.25 MG/ML
1.25-2.5 INJECTION INTRAVENOUS
Status: DISCONTINUED | OUTPATIENT
Start: 2019-01-28 | End: 2019-01-28 | Stop reason: HOSPADM

## 2019-01-28 RX ORDER — FUROSEMIDE 10 MG/ML
20-100 INJECTION INTRAMUSCULAR; INTRAVENOUS
Status: DISCONTINUED | OUTPATIENT
Start: 2019-01-28 | End: 2019-01-28 | Stop reason: HOSPADM

## 2019-01-28 RX ORDER — PROTAMINE SULFATE 10 MG/ML
25-100 INJECTION, SOLUTION INTRAVENOUS EVERY 5 MIN PRN
Status: DISCONTINUED | OUTPATIENT
Start: 2019-01-28 | End: 2019-01-28 | Stop reason: HOSPADM

## 2019-01-28 RX ORDER — CLOPIDOGREL 300 MG/1
300-600 TABLET, FILM COATED ORAL
Status: DISCONTINUED | OUTPATIENT
Start: 2019-01-28 | End: 2019-01-28 | Stop reason: HOSPADM

## 2019-01-28 RX ORDER — LORAZEPAM 0.5 MG/1
0.5 TABLET ORAL
Status: DISCONTINUED | OUTPATIENT
Start: 2019-01-28 | End: 2019-01-28 | Stop reason: HOSPADM

## 2019-01-28 RX ORDER — LIDOCAINE HYDROCHLORIDE 10 MG/ML
1-10 INJECTION, SOLUTION EPIDURAL; INFILTRATION; INTRACAUDAL; PERINEURAL
Status: COMPLETED | OUTPATIENT
Start: 2019-01-28 | End: 2019-01-28

## 2019-01-28 RX ORDER — NALOXONE HYDROCHLORIDE 0.4 MG/ML
.1-.4 INJECTION, SOLUTION INTRAMUSCULAR; INTRAVENOUS; SUBCUTANEOUS
Status: DISCONTINUED | OUTPATIENT
Start: 2019-01-28 | End: 2019-01-28

## 2019-01-28 RX ORDER — SODIUM CHLORIDE 9 MG/ML
INJECTION, SOLUTION INTRAVENOUS CONTINUOUS
Status: DISCONTINUED | OUTPATIENT
Start: 2019-01-28 | End: 2019-01-29 | Stop reason: HOSPADM

## 2019-01-28 RX ORDER — ATROPINE SULFATE 0.1 MG/ML
0.5 INJECTION INTRAVENOUS EVERY 5 MIN PRN
Status: ACTIVE | OUTPATIENT
Start: 2019-01-28 | End: 2019-01-29

## 2019-01-28 RX ORDER — PROPOFOL 10 MG/ML
10-20 INJECTION, EMULSION INTRAVENOUS EVERY 30 MIN PRN
Status: DISCONTINUED | OUTPATIENT
Start: 2019-01-28 | End: 2019-01-28 | Stop reason: HOSPADM

## 2019-01-28 RX ORDER — DIPHENHYDRAMINE HYDROCHLORIDE 50 MG/ML
25-50 INJECTION INTRAMUSCULAR; INTRAVENOUS
Status: DISCONTINUED | OUTPATIENT
Start: 2019-01-28 | End: 2019-01-28 | Stop reason: HOSPADM

## 2019-01-28 RX ORDER — LIDOCAINE 40 MG/G
CREAM TOPICAL
Status: DISCONTINUED | OUTPATIENT
Start: 2019-01-28 | End: 2019-01-28

## 2019-01-28 RX ORDER — BUPIVACAINE HYDROCHLORIDE 2.5 MG/ML
1-10 INJECTION, SOLUTION EPIDURAL; INFILTRATION; INTRACAUDAL
Status: DISCONTINUED | OUTPATIENT
Start: 2019-01-28 | End: 2019-01-28 | Stop reason: HOSPADM

## 2019-01-28 RX ORDER — LORAZEPAM 2 MG/ML
0.5 INJECTION INTRAMUSCULAR
Status: DISCONTINUED | OUTPATIENT
Start: 2019-01-28 | End: 2019-01-28 | Stop reason: HOSPADM

## 2019-01-28 RX ORDER — WARFARIN SODIUM 7.5 MG/1
7.5 TABLET ORAL ONCE
Status: COMPLETED | OUTPATIENT
Start: 2019-01-28 | End: 2019-01-28

## 2019-01-28 RX ORDER — VERAPAMIL HYDROCHLORIDE 2.5 MG/ML
1-2.5 INJECTION, SOLUTION INTRAVENOUS
Status: COMPLETED | OUTPATIENT
Start: 2019-01-28 | End: 2019-01-28

## 2019-01-28 RX ORDER — HEPARIN SODIUM 1000 [USP'U]/ML
1000-10000 INJECTION, SOLUTION INTRAVENOUS; SUBCUTANEOUS EVERY 5 MIN PRN
Status: DISCONTINUED | OUTPATIENT
Start: 2019-01-28 | End: 2019-01-28 | Stop reason: HOSPADM

## 2019-01-28 RX ORDER — FENTANYL CITRATE 50 UG/ML
25-50 INJECTION, SOLUTION INTRAMUSCULAR; INTRAVENOUS
Status: DISCONTINUED | OUTPATIENT
Start: 2019-01-28 | End: 2019-01-28 | Stop reason: HOSPADM

## 2019-01-28 RX ORDER — SODIUM CHLORIDE 9 MG/ML
INJECTION, SOLUTION INTRAVENOUS CONTINUOUS
Status: DISCONTINUED | OUTPATIENT
Start: 2019-01-28 | End: 2019-01-28 | Stop reason: HOSPADM

## 2019-01-28 RX ORDER — PROTAMINE SULFATE 10 MG/ML
1-5 INJECTION, SOLUTION INTRAVENOUS
Status: DISCONTINUED | OUTPATIENT
Start: 2019-01-28 | End: 2019-01-28 | Stop reason: HOSPADM

## 2019-01-28 RX ORDER — EPINEPHRINE 1 MG/ML
0.3 INJECTION, SOLUTION, CONCENTRATE INTRAVENOUS
Status: DISCONTINUED | OUTPATIENT
Start: 2019-01-28 | End: 2019-01-28 | Stop reason: HOSPADM

## 2019-01-28 RX ORDER — ADENOSINE 3 MG/ML
12-12000 INJECTION, SOLUTION INTRAVENOUS
Status: DISCONTINUED | OUTPATIENT
Start: 2019-01-28 | End: 2019-01-28 | Stop reason: HOSPADM

## 2019-01-28 RX ORDER — FLUMAZENIL 0.1 MG/ML
0.2 INJECTION, SOLUTION INTRAVENOUS
Status: DISCONTINUED | OUTPATIENT
Start: 2019-01-28 | End: 2019-01-28 | Stop reason: HOSPADM

## 2019-01-28 RX ORDER — ASPIRIN 81 MG/1
81-324 TABLET, CHEWABLE ORAL
Status: DISCONTINUED | OUTPATIENT
Start: 2019-01-28 | End: 2019-01-28 | Stop reason: HOSPADM

## 2019-01-28 RX ORDER — POTASSIUM CHLORIDE 1500 MG/1
20 TABLET, EXTENDED RELEASE ORAL
Status: DISCONTINUED | OUTPATIENT
Start: 2019-01-28 | End: 2019-01-28 | Stop reason: HOSPADM

## 2019-01-28 RX ORDER — NITROGLYCERIN 20 MG/100ML
.07-1.61 INJECTION INTRAVENOUS CONTINUOUS PRN
Status: DISCONTINUED | OUTPATIENT
Start: 2019-01-28 | End: 2019-01-28 | Stop reason: HOSPADM

## 2019-01-28 RX ORDER — ONDANSETRON 2 MG/ML
4 INJECTION INTRAMUSCULAR; INTRAVENOUS EVERY 4 HOURS PRN
Status: DISCONTINUED | OUTPATIENT
Start: 2019-01-28 | End: 2019-01-28 | Stop reason: HOSPADM

## 2019-01-28 RX ORDER — DOPAMINE HYDROCHLORIDE 160 MG/100ML
2-20 INJECTION, SOLUTION INTRAVENOUS CONTINUOUS PRN
Status: DISCONTINUED | OUTPATIENT
Start: 2019-01-28 | End: 2019-01-28 | Stop reason: HOSPADM

## 2019-01-28 RX ORDER — NALOXONE HYDROCHLORIDE 0.4 MG/ML
0.4 INJECTION, SOLUTION INTRAMUSCULAR; INTRAVENOUS; SUBCUTANEOUS EVERY 5 MIN PRN
Status: DISCONTINUED | OUTPATIENT
Start: 2019-01-28 | End: 2019-01-28 | Stop reason: HOSPADM

## 2019-01-28 RX ORDER — FENTANYL CITRATE 50 UG/ML
INJECTION, SOLUTION INTRAMUSCULAR; INTRAVENOUS
Status: DISCONTINUED | OUTPATIENT
Start: 2019-01-28 | End: 2019-01-28 | Stop reason: HOSPADM

## 2019-01-28 RX ORDER — CLOPIDOGREL BISULFATE 75 MG/1
75 TABLET ORAL
Status: DISCONTINUED | OUTPATIENT
Start: 2019-01-28 | End: 2019-01-28 | Stop reason: HOSPADM

## 2019-01-28 RX ORDER — DOBUTAMINE HYDROCHLORIDE 200 MG/100ML
2-20 INJECTION INTRAVENOUS CONTINUOUS PRN
Status: DISCONTINUED | OUTPATIENT
Start: 2019-01-28 | End: 2019-01-28 | Stop reason: HOSPADM

## 2019-01-28 RX ORDER — LIDOCAINE HYDROCHLORIDE 10 MG/ML
30 INJECTION, SOLUTION EPIDURAL; INFILTRATION; INTRACAUDAL; PERINEURAL
Status: COMPLETED | OUTPATIENT
Start: 2019-01-28 | End: 2019-01-28

## 2019-01-28 RX ORDER — DEXTROSE MONOHYDRATE 25 G/50ML
12.5-5 INJECTION, SOLUTION INTRAVENOUS EVERY 30 MIN PRN
Status: DISCONTINUED | OUTPATIENT
Start: 2019-01-28 | End: 2019-01-28 | Stop reason: HOSPADM

## 2019-01-28 RX ORDER — ASPIRIN 81 MG/1
81 TABLET ORAL DAILY
Status: DISCONTINUED | OUTPATIENT
Start: 2019-01-29 | End: 2019-01-29 | Stop reason: HOSPADM

## 2019-01-28 RX ORDER — FLUMAZENIL 0.1 MG/ML
0.2 INJECTION, SOLUTION INTRAVENOUS
Status: ACTIVE | OUTPATIENT
Start: 2019-01-28 | End: 2019-01-29

## 2019-01-28 RX ORDER — HYDROCODONE BITARTRATE AND ACETAMINOPHEN 5; 325 MG/1; MG/1
1-2 TABLET ORAL EVERY 4 HOURS PRN
Status: DISCONTINUED | OUTPATIENT
Start: 2019-01-28 | End: 2019-01-29 | Stop reason: HOSPADM

## 2019-01-28 RX ORDER — NITROGLYCERIN 5 MG/ML
100-500 VIAL (ML) INTRAVENOUS
Status: COMPLETED | OUTPATIENT
Start: 2019-01-28 | End: 2019-01-28

## 2019-01-28 RX ORDER — METHYLPREDNISOLONE SODIUM SUCCINATE 125 MG/2ML
125 INJECTION, POWDER, LYOPHILIZED, FOR SOLUTION INTRAMUSCULAR; INTRAVENOUS
Status: DISCONTINUED | OUTPATIENT
Start: 2019-01-28 | End: 2019-01-28 | Stop reason: HOSPADM

## 2019-01-28 RX ORDER — NITROGLYCERIN 5 MG/ML
100-200 VIAL (ML) INTRAVENOUS
Status: DISCONTINUED | OUTPATIENT
Start: 2019-01-28 | End: 2019-01-28 | Stop reason: HOSPADM

## 2019-01-28 RX ORDER — ASPIRIN 325 MG
325 TABLET ORAL
Status: DISCONTINUED | OUTPATIENT
Start: 2019-01-28 | End: 2019-01-28 | Stop reason: HOSPADM

## 2019-01-28 RX ORDER — NITROGLYCERIN 0.4 MG/1
0.4 TABLET SUBLINGUAL EVERY 5 MIN PRN
Status: DISCONTINUED | OUTPATIENT
Start: 2019-01-28 | End: 2019-01-28 | Stop reason: HOSPADM

## 2019-01-28 RX ORDER — NICARDIPINE HYDROCHLORIDE 2.5 MG/ML
100 INJECTION INTRAVENOUS
Status: DISCONTINUED | OUTPATIENT
Start: 2019-01-28 | End: 2019-01-28 | Stop reason: HOSPADM

## 2019-01-28 RX ORDER — POTASSIUM CHLORIDE 29.8 MG/ML
20 INJECTION INTRAVENOUS
Status: DISCONTINUED | OUTPATIENT
Start: 2019-01-28 | End: 2019-01-28 | Stop reason: HOSPADM

## 2019-01-28 RX ORDER — POTASSIUM CHLORIDE 7.45 MG/ML
10 INJECTION INTRAVENOUS
Status: DISCONTINUED | OUTPATIENT
Start: 2019-01-28 | End: 2019-01-28 | Stop reason: HOSPADM

## 2019-01-28 RX ORDER — PHENYLEPHRINE HCL IN 0.9% NACL 1 MG/10 ML
20-100 SYRINGE (ML) INTRAVENOUS
Status: DISCONTINUED | OUTPATIENT
Start: 2019-01-28 | End: 2019-01-28 | Stop reason: HOSPADM

## 2019-01-28 RX ORDER — MORPHINE SULFATE 2 MG/ML
1-2 INJECTION, SOLUTION INTRAMUSCULAR; INTRAVENOUS EVERY 5 MIN PRN
Status: DISCONTINUED | OUTPATIENT
Start: 2019-01-28 | End: 2019-01-28 | Stop reason: HOSPADM

## 2019-01-28 RX ORDER — LORAZEPAM 2 MG/ML
.5-2 INJECTION INTRAMUSCULAR EVERY 4 HOURS PRN
Status: DISCONTINUED | OUTPATIENT
Start: 2019-01-28 | End: 2019-01-28 | Stop reason: HOSPADM

## 2019-01-28 RX ORDER — PRASUGREL 10 MG/1
10-60 TABLET, FILM COATED ORAL
Status: DISCONTINUED | OUTPATIENT
Start: 2019-01-28 | End: 2019-01-28 | Stop reason: HOSPADM

## 2019-01-28 RX ORDER — NIFEDIPINE 10 MG/1
10 CAPSULE ORAL
Status: DISCONTINUED | OUTPATIENT
Start: 2019-01-28 | End: 2019-01-28 | Stop reason: HOSPADM

## 2019-01-28 RX ADMIN — SENNOSIDES AND DOCUSATE SODIUM 1 TABLET: 8.6; 5 TABLET ORAL at 21:14

## 2019-01-28 RX ADMIN — SENNOSIDES AND DOCUSATE SODIUM 1 TABLET: 8.6; 5 TABLET ORAL at 08:59

## 2019-01-28 RX ADMIN — TIZANIDINE 2 MG: 2 TABLET ORAL at 22:19

## 2019-01-28 RX ADMIN — WARFARIN SODIUM 7.5 MG: 7.5 TABLET ORAL at 21:14

## 2019-01-28 RX ADMIN — NITROGLYCERIN 200 MCG: 5 INJECTION, SOLUTION INTRAVENOUS at 15:35

## 2019-01-28 RX ADMIN — LIDOCAINE HYDROCHLORIDE 1 ML: 10 INJECTION, SOLUTION EPIDURAL; INFILTRATION; INTRACAUDAL; PERINEURAL at 15:28

## 2019-01-28 RX ADMIN — ASPIRIN 325 MG: 325 TABLET, DELAYED RELEASE ORAL at 09:00

## 2019-01-28 RX ADMIN — MIDAZOLAM 0.5 MG: 1 INJECTION INTRAMUSCULAR; INTRAVENOUS at 15:17

## 2019-01-28 RX ADMIN — BUPROPION HYDROCHLORIDE 150 MG: 150 TABLET, FILM COATED, EXTENDED RELEASE ORAL at 08:59

## 2019-01-28 RX ADMIN — DICLOFENAC 2 G: 10 GEL TOPICAL at 22:19

## 2019-01-28 RX ADMIN — VERAPAMIL HYDROCHLORIDE 2.5 MG: 2.5 INJECTION, SOLUTION INTRAVENOUS at 15:35

## 2019-01-28 RX ADMIN — OXYCODONE HYDROCHLORIDE AND ACETAMINOPHEN 1 TABLET: 10; 325 TABLET ORAL at 16:21

## 2019-01-28 RX ADMIN — MIDAZOLAM 0.5 MG: 1 INJECTION INTRAMUSCULAR; INTRAVENOUS at 15:21

## 2019-01-28 RX ADMIN — FENTANYL CITRATE 25 MCG: 50 INJECTION, SOLUTION INTRAMUSCULAR; INTRAVENOUS at 15:38

## 2019-01-28 RX ADMIN — CARVEDILOL 6.25 MG: 6.25 TABLET, FILM COATED ORAL at 09:00

## 2019-01-28 RX ADMIN — MIDAZOLAM 0.5 MG: 1 INJECTION INTRAMUSCULAR; INTRAVENOUS at 15:38

## 2019-01-28 RX ADMIN — DICLOFENAC 2 G: 10 GEL TOPICAL at 13:16

## 2019-01-28 RX ADMIN — ROSUVASTATIN CALCIUM 40 MG: 20 TABLET, FILM COATED ORAL at 09:00

## 2019-01-28 RX ADMIN — LISINOPRIL 2.5 MG: 2.5 TABLET ORAL at 08:59

## 2019-01-28 RX ADMIN — LIDOCAINE HYDROCHLORIDE 1 ML: 10 INJECTION, SOLUTION EPIDURAL; INFILTRATION; INTRACAUDAL; PERINEURAL at 15:24

## 2019-01-28 RX ADMIN — OXYCODONE HYDROCHLORIDE AND ACETAMINOPHEN 1 TABLET: 10; 325 TABLET ORAL at 09:04

## 2019-01-28 RX ADMIN — MIDAZOLAM 0.5 MG: 1 INJECTION INTRAMUSCULAR; INTRAVENOUS at 15:24

## 2019-01-28 RX ADMIN — SODIUM CHLORIDE: 9 INJECTION, SOLUTION INTRAVENOUS at 14:48

## 2019-01-28 RX ADMIN — FENTANYL CITRATE 25 MCG: 50 INJECTION, SOLUTION INTRAMUSCULAR; INTRAVENOUS at 15:16

## 2019-01-28 RX ADMIN — HEPARIN SODIUM 5000 UNITS: 1000 INJECTION, SOLUTION INTRAVENOUS; SUBCUTANEOUS at 15:35

## 2019-01-28 ASSESSMENT — ACTIVITIES OF DAILY LIVING (ADL)
ADLS_ACUITY_SCORE: 16

## 2019-01-28 ASSESSMENT — MIFFLIN-ST. JEOR
SCORE: 1867.83
SCORE: 1854.68

## 2019-01-28 NOTE — PROGRESS NOTES
[facility]  Cardiology Progress Note  Date of Service: 01/28/2019  Primary Cardiologist: Dr. Morales    Assessment & Plan    Christina Fletcher is a 49 year old female with past medical history significant for HTN, KAROLINA, CKD, hyperlipidemia, depression and history of PE admitted on 1/25/2019 with acute new onset systolic heart failure (progressive edema, exertional dyspnea).     Echocardiogram completed 1/26/19 showed EF <20%, right systolic function mildly reduced, and suspician for anomalous circumflex coronary artery. BNP elevated on admission 4,575, pulmonary congestion on CXR. TSH normal. Troponin mildly elevated (peak 0.047).     Assessment:  1.  Acute systolic congestive heart failure - EF <20%, prior EF in 2013 40-45%,  etiology unknown - undergoing cardiac catheterization today to r/o ischemia, ? Stress induced with recent death of daughter (not characteristic Takotsubo on echo), familial? (mother history of cardiomyopathy). Patient appears compensated, hard to assess volume status given body habitus. Weight on admission 284# today weight 273#, down from 276# yesterday. Kidney function stable (creatinine 0.88 today).    - Home spironolactone 50mg BID on hold r/t low blood pressures, was initially decreased to daily dosing, then to 25mg and yesterday put on hold r/t hypotension.    - Lisinopril 2.5mg daily (decreased r/t hypotension)   - Carvedilol 6.25mg BID (on nebivolol at home)   - Was receiving diuresis with IV lasix. Currently off diuresis, IV diuresis stopped yesterday given bump in creatinine and plan for cardiac catheterization today. Will likely need to restart diuresis after cardiac catheterization with oral lasix after reviewing RHC results.     - [facility] clinic enrollment   - Right and left heart catheterization today   - Risks and benefits of coronary angiogram discussed today including, bleeding, bruising, infection, allergic reaction, kidney damage (including need for dialysis),  stroke, heart attack, vascular damage, emergency open heart surgery, up to and including death.  Patient indicates understanding and is agreeable to proceed.     - Will benefit from cardiac MRI in the future. Anxious about being claustrophobic.    - Extensively reviewed cardiomyopathy, answered all questions.    - Will benefit from cardiac rehab at discharge  2. 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 Coronary angiogram today.   3. History of PE    - Resume warfarin post cardiac catheterization  4. Hypertension - controlled  5. Hyperlipidemia - stable, continue crestor  6.Obesity - history of gastric bypass  7. Anxiety/depression - severe distress with recent death of her daughter. Psych following.   8. KAROLINA - Compliant with CPAP    Plan:   1. Cardiac catheterization today  2. Continue lisinopril, carvedilol  3. Restart warfarin after cardiac catheterization  4. [facility] clinic enrollment.   5. Will need to determine diuretic needs. Currently diuresis on hold. Hard to assess volume standpoint r/t body habitus.     HALEIGH Ordoñez CNP  Text Page  (M-F, 7:30 - 4:00 pm)    Interval History   Patient resting in bed. Son at bedside. Reports significant improvement in dyspnea. Notes she is able to get to bathroom with no exertional dyspnea. Denies orthopnea or PND. Lower extremity edema improved. Denies chest pain or chest tightness. Denies dizziness, lightheadedness or other presyncopal symptoms. Denies palpitations or tachycardia.     Physical Exam   Temp: 98.2  F (36.8  C) Temp src: Oral BP: 120/89 Pulse: 106 Heart Rate: 99 Resp: 16 SpO2: 97 % O2 Device: None (Room air)    Vitals:    01/27/19 0839 01/28/19 0550 01/28/19 0904   Weight: 124.5 kg (274 lb 8 oz) 125.3 kg (276 lb 3.2 oz) 124 kg (273 lb 4.8 oz)       Constitutional  alert and oriented, in no acute distress.  Skin  warm and dry to touch  ENT  no pallor or cyanosis  Neck  JVD hard to assess r/t body habitus  Lungs clear throughout  Cardiac regular  rate/rhythm, no murmur appreciated  Abdomen  abdomen soft, obese, bowel sounds normoactive, no hepatosplenomegaly  Extremities and Back   no clubbing, cyanosis. Trace edema. Bilateral pedal pulse palpable.   Neurological no gross motor deficits noted, affect appropriate, oriented to time, person and place.    Medications     - MEDICATION INSTRUCTIONS -       ACE/ARB/ARNI NOT PRESCRIBED       sodium chloride       Warfarin Therapy Reminder         aspirin  325 mg Oral Daily     buPROPion  150 mg Oral Daily     carvedilol  6.25 mg Oral BID     diclofenac  2 g Topical 4x Daily     lisinopril  2.5 mg Oral Daily     rosuvastatin  40 mg Oral Daily     senna-docusate  1 tablet Oral BID     sodium chloride (PF)  3 mL Intracatheter Q8H       Data   Most Recent 3 CBC's:  Recent Labs   Lab Test 01/28/19  0718 01/26/19  0810 01/25/19  1845 01/25/19  1310   WBC 9.9 8.7  --  11.3*   HGB 11.9 12.3  --  12.9   * 118*  --  119*    208 238 220     Most Recent 3 BMP's:  Recent Labs   Lab Test 01/28/19  0718 01/27/19  0630 01/26/19  0810    139 140   POTASSIUM 4.2 4.1 3.8   CHLORIDE 103 103 105   CO2 31 29 28   BUN 29 32* 26   CR 0.88 1.04 0.79   ANIONGAP 6 7 7   ELIZABETH 8.7 8.5 8.7   GLC 99 101* 140*     Most Recent 3 Troponin's:  Recent Labs   Lab Test 01/27/19  0630 01/26/19  2105 01/26/19  1718   TROPI 0.016 0.023 0.024     Most Recent 3 BNP's:  Recent Labs   Lab Test 01/25/19  1310 05/15/13  1608   NTBNPI 4,575* 456*     Last 24 hours labs reviewed   EKG: (reviewed personally) 1/27/19 sinus rhythm    Imaging: CXR 1/25/19  New patchy opacity medial right upper lung. This could  represent developing airspace disease. Stable cardiomegaly.    Tele: sinus rhythm    Echo: 1/26/19  Left ventricular systolic function is severely reduced.  The visual ejection fraction is estimated at <20%.  The right ventricular systolic function is mildly reduced.  There is mild to moderate (1-2+) mitral regurgitation.  There appears to be  coronary artery with coursealong the MV annulus. 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  Compared to 2013, EF has decreased significantly. The study was technically  difficult.    Last ischemic eval: none, cardiac catheterization today

## 2019-01-28 NOTE — PLAN OF CARE
Heart Failure Care Pathway  GOALS TO BE MET BEFORE DISCHARGE:    1. Decrease congestion and/or edema with diuretic therapy to achieve near      optimal volume status.            Dyspnea improved:  Yes            Edema improved:     Yes        Net I/O and Weights since admission:          12/28 2300 - 01/27 2259  In: 660 [P.O.:660]  Out: 2350 [Urine:2350]  Net: -1690            Vitals:    01/25/19 1821 01/26/19 0420 01/27/19 0839   Weight: 128.8 kg (284 lb) 128.6 kg (283 lb 8.2 oz) 124.5 kg (274 lb 8 oz)       2.  O2 sats > 92% on RA or at prior home O2 therapy level.          Current oxygenation status:       SpO2: 99 %         O2 Device: None (Room air),            Able to wean O2 this shift to keep sats > 92%:  NA       Does patient use Home O2? No    3.  Tolerates ambulation and mobility near baseline: Yes        How many times did the patient ambulate with nursing staff this shift? 1 ( to the bathroom)    Please review the Heart Failure Care Pathway for additional HF goal outcomes.    Ruth Loera RN  1/27/2019

## 2019-01-28 NOTE — PROGRESS NOTES
A&Ox4. Tachycardic, 90s-110s. Tele ST. Lungs are diminished, on RA. Up SBA. Shower done on evening shift. Cardiac diet. NPO at midnight for angio today. Voiding, passing gas. Percocet given x1 for knee pain. Refusing assessments or vitals during night shift.

## 2019-01-28 NOTE — PLAN OF CARE
OT/CR: Patient planning an angio today. Looks like not scheduled til afternoon. Rescheduling CR eval for tomorrow.

## 2019-01-28 NOTE — PLAN OF CARE
Patient A&O x4, Denies chest pain/tightness/pressure or SOB  on tele SR and VSS,  Lungs are diminished and on RA, up with SBA , on cardiac diet, skin is WNL , voiding well and last BM 1/27/2019 after Miralax last night. IV is in R arm and SL. Plan for Angio in AM - keep NPO after midnight

## 2019-01-28 NOTE — PROGRESS NOTES
[facility]    Hospitalist Progress Note    Date of Service (when I saw the patient): 01/28/2019    Assessment & Plan   Christina Fletcher is a 48 yo F with PMH of HTN, KAROLINA, CKD, hyperlipidemia, GERD, depression and PE in 2013 admitted on 1/25/2019 with a CHF exacerbation. Echo 1/26/2019 with acute systolic heart failure, EF < 20%. Angiogram planned 1/28.    Acute systolic congestive heart failure  Cardiomyopathy of unclear etiology  Chart review shows echo 2013 obtained during workup of PE with EF 45-50%. She presented here with progressive edema and WILKINSON, elevated pro BNP. CXR without edema. EKG showing sinus tachycardia without ischemic changes, troponin negative. TTE 1/26/2019 with EF < 20%, global decreased LV function. Troponins initially minimally positive at 0.037, 0.047, then negative at 0.02. Patient improving with diuresis, weight 284lb-->273lbs. Differential includes stress cardiomyopathy given multiple psychosocial stressors.  - Cardiology consulted  - Angiogram planned for today.  N.p.o.  - Creatinine improved from 1.04 to 0.88 today. Will hold further diuresis to preserve renal function for angiogram.  - Lisinopril reduced to 2.5 mg daily  - Coreg BID  - 2L fluid restriction  - Cardiac rehab     CXR right lung opacity:  - Repeat CXR as outpatient to follow up lung opacity seen on admission     Depression and/or anxiety: Tearful during interview and reports recent loss of daughter with minimal support and difficulty coping.  Denies suicidal ideation.  - Ativan 0.5mg BID PRN added to address chest pain related to anxiety/panic attacks here  - Psych consulted, recommendations appreciated  - Reduce Wellbutrin to 150 mg  - Ask social work to assist in providing outpatient psychiatric and therapy resources.   - PT consulted to clarify needs with respect to ambulating her home environment.     Hx of PE 2013: Stopped coumadin on her own but would like to resume this.  - Lovenox for now  - Warfarin  held pending angiogram, plan to resume afterward.     HTN: Coreg started this admission.  Lisinopril reduced to 2.5 mg daily.  KAROLINA: CPAP per home settings.  CKD stage III: Baseline Cr 0.9, currently stable.  - Monitor with diuresis  Hyperlipidemia: Total cholesterol 182, , HDL 61, triglycerides 104  - Continue PTA statin.     DVT Prophylaxis: Therapeutic lovenox  Code Status: Full Code  PT/OT: Not needed yet     Disposition: Expected discharge in 1-2 days pending cards workup and recommendations.    Shayne Lorenzo Rodriguez    Interval History   Patient resting comfortably in bed on my arrival.  Denies any chest pain, shortness of breath, dizziness, abdominal pain, nausea.  She is waiting for angiogram today.    -Data reviewed today: I reviewed all new labs and imaging results over the last 24 hours.    Physical Exam   Temp: 98.7  F (37.1  C) Temp src: Oral BP: 113/80 Pulse: 106 Heart Rate: 103 Resp: 18 SpO2: 99 % O2 Device: None (Room air)    Vitals:    01/26/19 0420 01/27/19 0839 01/28/19 0550   Weight: 128.6 kg (283 lb 8.2 oz) 124.5 kg (274 lb 8 oz) 125.3 kg (276 lb 3.2 oz)     Vital Signs with Ranges  Temp:  [97.4  F (36.3  C)-98.7  F (37.1  C)] 98.7  F (37.1  C)  Pulse:  [106] 106  Heart Rate:  [] 103  Resp:  [18] 18  BP: ()/(55-95) 113/80  SpO2:  [95 %-99 %] 99 %  I/O last 3 completed shifts:  In: 180 [P.O.:180]  Out: 600 [Urine:600]    Constitutional: Alert, oriented to person, place, situation.  Cooperative, lying in bed in NAD.   Respiratory:  Lungs CTAB.  No crackles, wheezes, or rhonchi, no labored breathing.  Cardiovascular:  Heart RRR, no MRG, trace to 1+ bilateral lower extremity pitting edema  GI:  Abdomen soft, NT/ND and with normoactive BS  Skin/Integumen:  Warm, dry, non-diaphoretic.  MSK: CMS x4 intact.    Medications     - MEDICATION INSTRUCTIONS -       ACE/ARB/ARNI NOT PRESCRIBED       sodium chloride       Warfarin Therapy Reminder         aspirin  325 mg Oral Daily      buPROPion  150 mg Oral Daily     carvedilol  6.25 mg Oral BID     diclofenac  2 g Topical 4x Daily     lisinopril  2.5 mg Oral Daily     rosuvastatin  40 mg Oral Daily     senna-docusate  1 tablet Oral BID     sodium chloride (PF)  3 mL Intracatheter Q8H       Data   Recent Labs   Lab 01/28/19  0718 01/27/19  0630 01/26/19  2105 01/26/19  1718 01/26/19  0810  01/25/19  1845 01/25/19  1310   WBC 9.9  --   --   --  8.7  --   --  11.3*   HGB 11.9  --   --   --  12.3  --   --  12.9   *  --   --   --  118*  --   --  119*     --   --   --  208  --  238 220   INR 1.00 0.99  --   --  1.03  --   --  1.00    139  --   --  140  --   --  143   POTASSIUM 4.2 4.1  --   --  3.8  --   --  3.9   CHLORIDE 103 103  --   --  105  --   --  108   CO2 31 29  --   --  28  --   --  28   BUN 29 32*  --   --  26  --   --  29   CR 0.88 1.04  --   --  0.79  --  0.90 0.91   ANIONGAP 6 7  --   --  7  --   --  7   ELIZABETH 8.7 8.5  --   --  8.7  --   --  8.8   GLC 99 101*  --   --  140*  --   --  81   ALBUMIN  --   --   --   --   --   --   --  3.2*   PROTTOTAL  --   --   --   --   --   --   --  7.2   BILITOTAL  --   --   --   --   --   --   --  0.4   ALKPHOS  --   --   --   --   --   --   --  85   ALT  --   --   --   --   --   --   --  85*   AST  --   --   --   --   --   --   --  32   TROPI  --  0.016 0.023 0.024  --    < > 0.037 0.039    < > = values in this interval not displayed.       No results found for this or any previous visit (from the past 24 hour(s)).

## 2019-01-28 NOTE — TELEPHONE ENCOUNTER
Panel Management Review      Patient has the following on her problem list:     Depression / Dysthymia review    Measure:  Needs PHQ-9 score of 4 or less during index window.  Administer PHQ-9 and if score is 5 or more, send encounter to provider for next steps.    5 - 7 month window range:     PHQ-9 SCORE 7/16/2015 5/24/2016 9/27/2018   PHQ-9 Total Score 7 - -   PHQ-9 Total Score - 16 18       If PHQ-9 recheck is 5 or more, route to provider for next steps.    Patient is due for:  PHQ9      Composite cancer screening  Chart review shows that this patient is due/due soon for the following  pap  Summary:    Patient is due/failing the following:   PHQ9    Action needed:   Patient needs to do PHQ9.    Type of outreach:    Phone, spoke to patient.  pt is in hospital right now about to have heart surgery. She will schedule a hospital f/u soon and will do PHQ-9 then.    Questions for provider review:    None                                                                                                                                    Desiree Snyder CMA       Chart routed to Provider

## 2019-01-29 ENCOUNTER — APPOINTMENT (OUTPATIENT)
Dept: OCCUPATIONAL THERAPY | Facility: CLINIC | Age: 49
DRG: 287 | End: 2019-01-29
Payer: MEDICARE

## 2019-01-29 VITALS
HEIGHT: 64 IN | HEART RATE: 106 BPM | OXYGEN SATURATION: 97 % | SYSTOLIC BLOOD PRESSURE: 118 MMHG | TEMPERATURE: 97.5 F | BODY MASS INDEX: 46.67 KG/M2 | RESPIRATION RATE: 16 BRPM | DIASTOLIC BLOOD PRESSURE: 75 MMHG | WEIGHT: 273.4 LBS

## 2019-01-29 DIAGNOSIS — I50.23 ACUTE ON CHRONIC SYSTOLIC HEART FAILURE (H): Primary | ICD-10-CM

## 2019-01-29 LAB
ANION GAP SERPL CALCULATED.3IONS-SCNC: 4 MMOL/L (ref 3–14)
BUN SERPL-MCNC: 23 MG/DL (ref 7–30)
CALCIUM SERPL-MCNC: 8.3 MG/DL (ref 8.5–10.1)
CHLORIDE SERPL-SCNC: 102 MMOL/L (ref 94–109)
CO2 SERPL-SCNC: 30 MMOL/L (ref 20–32)
CREAT SERPL-MCNC: 0.76 MG/DL (ref 0.52–1.04)
GFR SERPL CREATININE-BSD FRML MDRD: >90 ML/MIN/{1.73_M2}
GLUCOSE SERPL-MCNC: 97 MG/DL (ref 70–99)
INR PPP: 0.95 (ref 0.86–1.14)
INR PPP: NORMAL (ref 0.86–1.14)
POTASSIUM SERPL-SCNC: 4.8 MMOL/L (ref 3.4–5.3)
SODIUM SERPL-SCNC: 136 MMOL/L (ref 133–144)

## 2019-01-29 PROCEDURE — 80048 BASIC METABOLIC PNL TOTAL CA: CPT | Performed by: HOSPITALIST

## 2019-01-29 PROCEDURE — A9270 NON-COVERED ITEM OR SERVICE: HCPCS | Mod: GY | Performed by: HOSPITALIST

## 2019-01-29 PROCEDURE — 99239 HOSP IP/OBS DSCHRG MGMT >30: CPT | Performed by: PHYSICIAN ASSISTANT

## 2019-01-29 PROCEDURE — 25000132 ZZH RX MED GY IP 250 OP 250 PS 637: Mod: GY | Performed by: NURSE PRACTITIONER

## 2019-01-29 PROCEDURE — 97535 SELF CARE MNGMENT TRAINING: CPT | Mod: GO | Performed by: OCCUPATIONAL THERAPIST

## 2019-01-29 PROCEDURE — A9270 NON-COVERED ITEM OR SERVICE: HCPCS | Mod: GY | Performed by: INTERNAL MEDICINE

## 2019-01-29 PROCEDURE — 25000132 ZZH RX MED GY IP 250 OP 250 PS 637: Mod: GY | Performed by: INTERNAL MEDICINE

## 2019-01-29 PROCEDURE — 97165 OT EVAL LOW COMPLEX 30 MIN: CPT | Mod: GO | Performed by: OCCUPATIONAL THERAPIST

## 2019-01-29 PROCEDURE — 99232 SBSQ HOSP IP/OBS MODERATE 35: CPT | Performed by: INTERNAL MEDICINE

## 2019-01-29 PROCEDURE — A9270 NON-COVERED ITEM OR SERVICE: HCPCS | Mod: GY | Performed by: PSYCHIATRY & NEUROLOGY

## 2019-01-29 PROCEDURE — 36415 COLL VENOUS BLD VENIPUNCTURE: CPT | Performed by: HOSPITALIST

## 2019-01-29 PROCEDURE — 40000133 ZZH STATISTIC OT WARD VISIT: Performed by: OCCUPATIONAL THERAPIST

## 2019-01-29 PROCEDURE — 85610 PROTHROMBIN TIME: CPT | Performed by: HOSPITALIST

## 2019-01-29 PROCEDURE — 25000132 ZZH RX MED GY IP 250 OP 250 PS 637: Mod: GY | Performed by: HOSPITALIST

## 2019-01-29 PROCEDURE — 25000132 ZZH RX MED GY IP 250 OP 250 PS 637: Mod: GY | Performed by: PSYCHIATRY & NEUROLOGY

## 2019-01-29 PROCEDURE — A9270 NON-COVERED ITEM OR SERVICE: HCPCS | Mod: GY | Performed by: NURSE PRACTITIONER

## 2019-01-29 RX ORDER — FUROSEMIDE 20 MG
20 TABLET ORAL DAILY
Status: DISCONTINUED | OUTPATIENT
Start: 2019-01-29 | End: 2019-01-29 | Stop reason: HOSPADM

## 2019-01-29 RX ORDER — LISINOPRIL 2.5 MG/1
2.5 TABLET ORAL DAILY
Qty: 30 TABLET | Refills: 0 | Status: SHIPPED | OUTPATIENT
Start: 2019-01-30 | End: 2019-02-04

## 2019-01-29 RX ORDER — AMOXICILLIN 250 MG
1-2 CAPSULE ORAL 2 TIMES DAILY
Qty: 60 TABLET | Refills: 0 | Status: SHIPPED | OUTPATIENT
Start: 2019-01-29 | End: 2019-06-14

## 2019-01-29 RX ORDER — BUPROPION HYDROCHLORIDE 150 MG/1
150 TABLET ORAL DAILY
Qty: 30 TABLET | Refills: 0 | Status: SHIPPED | OUTPATIENT
Start: 2019-01-30 | End: 2019-03-01

## 2019-01-29 RX ORDER — METOPROLOL SUCCINATE 25 MG/1
25 TABLET, EXTENDED RELEASE ORAL DAILY
Qty: 30 TABLET | Refills: 0 | Status: SHIPPED | OUTPATIENT
Start: 2019-01-30 | End: 2019-02-04

## 2019-01-29 RX ORDER — ROSUVASTATIN CALCIUM 40 MG/1
40 TABLET, COATED ORAL DAILY
Qty: 30 TABLET | Refills: 0 | Status: SHIPPED | OUTPATIENT
Start: 2019-01-29 | End: 2019-02-04

## 2019-01-29 RX ORDER — WARFARIN SODIUM 5 MG/1
7.5 TABLET ORAL DAILY
Qty: 45 TABLET | Refills: 0 | Status: SHIPPED | OUTPATIENT
Start: 2019-01-29 | End: 2019-02-22

## 2019-01-29 RX ORDER — LORAZEPAM 0.5 MG/1
0.5 TABLET ORAL 2 TIMES DAILY PRN
Qty: 20 TABLET | Refills: 0 | Status: SHIPPED | OUTPATIENT
Start: 2019-01-29 | End: 2019-02-14

## 2019-01-29 RX ORDER — FUROSEMIDE 20 MG
20 TABLET ORAL DAILY
Qty: 30 TABLET | Refills: 0 | Status: SHIPPED | OUTPATIENT
Start: 2019-01-30 | End: 2019-02-04

## 2019-01-29 RX ORDER — METOPROLOL SUCCINATE 25 MG/1
25 TABLET, EXTENDED RELEASE ORAL DAILY
Status: DISCONTINUED | OUTPATIENT
Start: 2019-01-30 | End: 2019-01-29 | Stop reason: HOSPADM

## 2019-01-29 RX ADMIN — LISINOPRIL 2.5 MG: 2.5 TABLET ORAL at 08:08

## 2019-01-29 RX ADMIN — OXYCODONE HYDROCHLORIDE AND ACETAMINOPHEN 1 TABLET: 10; 325 TABLET ORAL at 08:08

## 2019-01-29 RX ADMIN — OXYCODONE HYDROCHLORIDE AND ACETAMINOPHEN 1 TABLET: 10; 325 TABLET ORAL at 00:04

## 2019-01-29 RX ADMIN — ROSUVASTATIN CALCIUM 40 MG: 20 TABLET, FILM COATED ORAL at 08:07

## 2019-01-29 RX ADMIN — CARVEDILOL 6.25 MG: 6.25 TABLET, FILM COATED ORAL at 08:08

## 2019-01-29 RX ADMIN — SENNOSIDES AND DOCUSATE SODIUM 1 TABLET: 8.6; 5 TABLET ORAL at 08:08

## 2019-01-29 RX ADMIN — DICLOFENAC 2 G: 10 GEL TOPICAL at 08:10

## 2019-01-29 RX ADMIN — ASPIRIN 81 MG: 81 TABLET, COATED ORAL at 08:08

## 2019-01-29 RX ADMIN — FUROSEMIDE 20 MG: 20 TABLET ORAL at 11:30

## 2019-01-29 RX ADMIN — BUPROPION HYDROCHLORIDE 150 MG: 150 TABLET, FILM COATED, EXTENDED RELEASE ORAL at 08:07

## 2019-01-29 ASSESSMENT — ACTIVITIES OF DAILY LIVING (ADL)
ADLS_ACUITY_SCORE: 16
PREVIOUS_RESPONSIBILITIES: DRIVING
ADLS_ACUITY_SCORE: 19
ADLS_ACUITY_SCORE: 16
ADLS_ACUITY_SCORE: 19
ADLS_ACUITY_SCORE: 16

## 2019-01-29 ASSESSMENT — MIFFLIN-ST. JEOR: SCORE: 1855.13

## 2019-01-29 NOTE — CONSULTS
"Care Transition Initial Assessment - RN        Met with: Patient.  DATA   Active Problems:    Dyspnea on exertion       Cognitive Status: alert and oriented.        Contact information and PCP information verified: Yes  Lives With: child(erinn), dependent   Living Arrangements: apartment                 Insurance concerns: No Insurance issues identified  ASSESSMENT  Patient currently receives the following services:  PCA services at home, patient stated, \"I have a RN coming out tomorrow already.\"        Identified issues/concerns regarding health management: CHF, Blood pressure checks.      PLAN  Financial costs for the patient include unsure, patient is on Disability .  Patient given options and choices for discharge yes, resources for Depression to follow up .  Patient/family is agreeable to the plan?  Yes:   Patient anticipates discharging to home .        Patient anticipates needs for home equipment: No  Plan/Disposition: Home   Appointments: [facility], PCP per patient.     Care  (CTS) will continue to follow as needed.            "

## 2019-01-29 NOTE — PROGRESS NOTES
[facility]  Cardiology Progress Note  Date of Service: 01/29/2019  Primary Cardiologist: Dr. Morales    Assessment & Plan    Susanajorden Fletcher is a 49 year old female with past medical history significant for HTN, KAROLINA, CKD, hyperlipidemia, depression and history of PE admitted on 1/25/2019 with acute new onset systolic heart failure (progressive edema, exertional dyspnea).     Echocardiogram completed 1/26/19 showed EF <20%, right systolic function mildly reduced, and suspician for anomalous circumflex coronary artery. BNP elevated on admission 4,575, pulmonary congestion on CXR. TSH normal. Troponin mildly elevated (peak 0.047). Cardiac catheterization completed yesterday which showed normal coronary arteries, right heart catheterization showed elevated right sided filling pressures (mean RA 16mmHg) and elevated left sided filing pressures (PWCP 19, LVEDP 22).    Assessment:  1.  Acute systolic congestive heart failure, nonischemic cardiomyopathy - EF <20%, prior EF in 2013 40-45%,  etiology unknown - Familial (likely) vs ? Stress induced with recent death of daughter (unlikely). Patient appears compensated, LHC with normal coronary arteries, RHC yesterday showed elevated right sided filling pressures (mean RA 16mmHg) and elevated left sided filing pressures (PWCP 19, LVEDP 22). Weight on admission 284#, today weight 273# (stable). Kidney function stable (creatinine 0.74 today). Will start small dose of oral diuretics (Furosemide 20mg daily).    - Home spironolactone 50mg BID on hold r/t low blood pressures, was initially decreased to daily dosing, then to 25mg and then put on hold r/t hypotension. Will trial resuming in outpatient [facility] clinic.    - Lisinopril 2.5mg daily (decreased r/t hypotension)   - Start Metoprolol XL 25mg daily to allow more room in blood pressure for optimization of HF medications. STOP Carvedilol 6.25mg BID.   - Start furosemide 20mg daily   - [facility] clinic enrollment   - Right  Discharged home per ambulation , tolerated Xolair injection without difficulty and left heart catheterization today   - Cardiac MRI outpatient   - Cardiac rehab   - Genetic testing outpatient (Tammie Pavon, cardiac genetic counselor)   - Will benefit from cardiac rehab at discharge   - Blood pressure cuff from [facility].   2. 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 Normal coronary arteries on angiogram.   3. History of PE    - Resume warfarin post cardiac catheterization  4. Hypertension - controlled  5. Hyperlipidemia - stable, continue crestor, will consider decreasing dose in clinic given normal coronary arteries.   6.Obesity - history of gastric bypass  7. Anxiety/depression - severe distress with recent death of her daughter. Psych following.   8. KAROLINA - Compliant with CPAP    Plan:   1. Stable for discharge, cardiology signing off, [facility] clinic enrollment within 1 week.   2. Continue lisinopril  3. Stop Carvedilol to allow more room in BP for further titration of guideline HF therapy  4. Start metoprolol XL 25mg daily  5. Continue warfarin  6. [facility] clinic enrollment at discharge  7. Start furosemide 20mg daily  8. Cardiac rehab at discharge  9. Patient to monitor weight/BP daily at home at discharge.       HALEIGH Ordoñez CNP  Text Page  (M-F, 7:30 - 4:00 pm)    Interval History   Patient resting in bed. Denies chest pain or chest tightness. Denies dyspnea at rest. Denies orthopnea or PND. Denies exertional dyspnea with current level of activity. Lower extremity edema improved. Denies dizziness, lightheadedness or other presyncopal symptoms. Denies palpitations or tachycardia. Patient eating multiple meals out.     Physical Exam   Temp: 97.5  F (36.4  C) Temp src: Oral BP: 113/75   Heart Rate: 103 Resp: 16 SpO2: 98 % O2 Device: None (Room air)    Vitals:    01/28/19 0550 01/28/19 0904 01/29/19 0500   Weight: 125.3 kg (276 lb 3.2 oz) 124 kg (273 lb 4.8 oz) 124 kg (273 lb 6.4 oz)       Constitutional  alert and oriented, in no acute distress.  Skin  warm and dry to  touch  ENT  no pallor or cyanosis  Neck  JVD hard to assess r/t body habitus  Lungs clear throughout  Cardiac regular rate/rhythm, no murmur appreciated  Abdomen  abdomen soft, obese, bowel sounds normoactive, no hepatosplenomegaly  Extremities and Back   no clubbing, cyanosis. Trace edema. Bilateral pedal pulse palpable. L radial access site with no tenderness, drainage or ecchymosis.   Neurological no gross motor deficits noted, affect appropriate, oriented to time, person and place.    Medications     ACE/ARB/ARNI NOT PRESCRIBED       sodium chloride Stopped (01/28/19 1826)     Warfarin Therapy Reminder         aspirin  81 mg Oral Daily     buPROPion  150 mg Oral Daily     carvedilol  6.25 mg Oral BID     diclofenac  2 g Topical 4x Daily     lisinopril  2.5 mg Oral Daily     rosuvastatin  40 mg Oral Daily     senna-docusate  1 tablet Oral BID     sodium chloride (PF)  3 mL Intracatheter Q8H       Data   Most Recent 3 CBC's:  Recent Labs   Lab Test 01/28/19  0718 01/26/19  0810 01/25/19  1845 01/25/19  1310   WBC 9.9 8.7  --  11.3*   HGB 11.9 12.3  --  12.9   * 118*  --  119*    208 238 220     Most Recent 3 BMP's:  Recent Labs   Lab Test 01/29/19  0550 01/28/19  0718 01/27/19  0630    140 139   POTASSIUM 4.8 4.2 4.1   CHLORIDE 102 103 103   CO2 30 31 29   BUN 23 29 32*   CR 0.76 0.88 1.04   ANIONGAP 4 6 7   ELIZABETH 8.3* 8.7 8.5   GLC 97 99 101*     Most Recent 3 Troponin's:  Recent Labs   Lab Test 01/27/19  0630 01/26/19  2105 01/26/19  1718   TROPI 0.016 0.023 0.024     Most Recent 3 BNP's:  Recent Labs   Lab Test 01/25/19  1310 05/15/13  1608   NTBNPI 4,575* 456*     Last 24 hours labs reviewed   EKG: (reviewed personally) 1/27/19 sinus rhythm    Imaging: CXR 1/25/19  New patchy opacity medial right upper lung. This could  represent developing airspace disease. Stable cardiomegaly.    Tele: sinus rhythm    Echo: 1/26/19  Left ventricular systolic function is severely reduced.  The visual  ejection fraction is estimated at <20%.  The right ventricular systolic function is mildly reduced.  There is mild to moderate (1-2+) mitral regurgitation.  There appears to be coronary artery with coursealong the MV annulus. 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  Compared to 2013, EF has decreased significantly. The study was technically  difficult.    Last ischemic eval:  Cardiac Catheterization 1/28/19  SUMMARY OF CORONARY ANGIOGRAM:  1) Normal left main  2) LAD is a large vessel. Gives rise to proximal large D1 that  bifurcates and acts as Ramus, without disease. Small D2 and D3 without  disease.  LAD without disease  3) Circumflex gives rise to OM1 without disease   4) RCA is dominant but small with PDA and PLB without disease      SUMMARY OF LHC:  1) LVEDP 22 mmHg, no significant gradient on pull back  2) LVEF 20% with global hypokinesis. No significant mitral  regurgitation     SUMMARY OF RHC:  1) Elevated right-sided filling pressures (mean RA 16 mmHg)  2) Mild secondary pulmonary hypertension as a result of elevated  left-sided filling pressures (mean PA 29 mmHg, PCW 19 mmHg, LVEDP 22  mmHg)  3) Elevated left-sided filling pressures (PCWP 19 mmHg, LVEDP 22 mmHg)  4) Normal CO/CI by Marvin: 4.8/2.1

## 2019-01-29 NOTE — DISCHARGE SUMMARY
[facility]    Discharge Summary  Hospitalist    Date of Admission:  1/25/2019  Date of Discharge:  1/29/2019  Discharging Provider: Shayne Rodriguez  Date of Service (when I saw the patient): 01/29/19    Discharge Diagnoses   1. Acute systolic congestive heart failure  2. Nonischemic cardiomyopathy  3. Incidental right lung opacity  4. Depressive disorder  5. Generalized anxiety and panic attack  6. Hx of pulmonary embolism 2013  7. Hypertension  8. Obstructive sleep apnea  9. Chronic kidney disease, stage III  10. Hyperlipidemia      History of Present Illness   Christina Fletcher is a 49 year old female with PMH of HTN, KAROLINA, CKD, hyperlipidemia, GERD, depression and PE in 2013 admitted on 1/25/2019 with a CHF exacerbation. Echo 1/26/2019 with acute systolic heart failure, EF < 20%.     Please refer to the H&P from Dr. Romo on 1/25/2019 for additional information.    Hospital Course   Acute systolic congestive heart failure  Nonischemic cardiomyopathy  Chart review shows echo 2013 obtained during workup of PE with EF 45-50%. She presented here with progressive edema and WILKINSON, elevated pro BNP. CXR without edema. EKG showing sinus tachycardia without ischemic changes, troponin negative. TTE 1/26/2019 with EF < 20%, global decreased LV function. Troponins initially minimally positive at 0.037, 0.047, then negative at 0.02. Patient improving with diuresis, weight 284lb-->273lbs. Differential includes stress cardiomyopathy given multiple psychosocial stressors.  - Cardiology consulted  - Angiogram 1/28 without evidence of CAD.  LVEF 20% with global hypokinesis.  Elevated right-sided filling pressures.  - Creatinine stable.  - Lisinopril reduced to 2.5 mg daily  - Metoprolol BID  - Lasix 20 mg daily  - Cardiac rehab  - Will need outpatient re-initiation of aldactone.   - CORE follow up with labs.   - Genetics appointment.   - Outpatient Cardiac MRI.   - No alcohol. Cut salt. Healthy diet. Weight  loss. 2L fluid restriction.  - Outpatient ICD consideration depending on response to HF meds.       Incidental right lung opacity: Seen on CXR.  - Repeat CXR as outpatient to follow up lung opacity seen on admission     Depression and/or anxiety: Tearful during interview and reports recent loss of daughter with minimal support and difficulty coping.  Denies suicidal ideation.  - Ativan 0.5mg BID PRN added to address chest pain related to anxiety/panic attacks   - Psych consulted, recommendations appreciated  - Reduce Wellbutrin to 150 mg  - Social work to assist in providing outpatient psychiatric and therapy resources.   - PT consulted to clarify needs with respect to ambulating her home environment.      Hx of PE 2013: Stopped coumadin on her own but would like to resume this.  - Bridged with Lovenox   - Warfarin held for angiogram, now resumed  - Follow up INR in 2-3 days in clinic.  Pt aware.     HTN: Coreg started this admission, then changed to metoprolol.  Lisinopril reduced to 2.5 mg daily.  KAROLINA: CPAP per home settings.  CKD stage III: Baseline Cr 0.9, currently stable.  - Follow up BMP as outpatient  Hyperlipidemia: Total cholesterol 182, , HDL 61, triglycerides 104  - Continue PTA statin.      Shayne Rodriguez PA-C    I personally saw the patient on day of discharge to evaluate her and to discuss plan of care.      Significant Results and Procedures   Coronary Angiogram  SUMMARY OF CORONARY ANGIOGRAM:  1) Normal left main  2) LAD is a large vessel. Gives rise to proximal large D1 that  bifurcates and acts as Ramus, without disease. Small D2 and D3 without  disease.  LAD without disease  3) Circumflex gives rise to OM1 without disease   4) RCA is dominant but small with PDA and PLB without disease      SUMMARY OF LHC:  1) LVEDP 22 mmHg, no significant gradient on pull back  2) LVEF 20% with global hypokinesis. No significant mitral  regurgitation     SUMMARY OF RHC:  1) Elevated right-sided  filling pressures (mean RA 16 mmHg)  2) Mild secondary pulmonary hypertension as a result of elevated  left-sided filling pressures (mean PA 29 mmHg, PCW 19 mmHg, LVEDP 22  mmHg)  3) Elevated left-sided filling pressures (PCWP 19 mmHg, LVEDP 22 mmHg)  4) Normal CO/CI by Marvin: 4.8/2.1    Pending Results   None    Code Status   Full Code       Primary Care Physician   [facility] ([facility]    Discharge Disposition   Discharged to home  Condition at discharge: Stable    Consultations This Hospital Stay   Cardiology - Dr. Morales and Dr. Melendez  Psychiatry - Dr. Bert Bishop    Time Spent on this Encounter   I, Shayne Rodriguez, personally saw the patient today and spent greater than 30 minutes discharging this patient.    Discharge Orders      CARDIAC REHAB REFERRAL      Reason for your hospital stay    Non-ischemic cardiomyopathy     Follow-up and recommended labs and tests     Follow up with primary care provider, [facility] within 7 days for hospital follow- up. Follow up with Cardiology and CORE clinic as scheduled.     Activity    Your activity upon discharge: activity as tolerated     Full Code     Diet    Follow this diet upon discharge:      Fluid restriction 2000 ML FLUID      2 Gram Sodium Diet     Discharge Medications   Current Discharge Medication List      START taking these medications    Details   furosemide (LASIX) 20 MG tablet Take 1 tablet (20 mg) by mouth daily . Future refills by PCP or Cardiologist.  Qty: 30 tablet, Refills: 0    Associated Diagnoses: Cardiomyopathy, unspecified type (H)      lisinopril (PRINIVIL/ZESTRIL) 2.5 MG tablet Take 1 tablet (2.5 mg) by mouth daily  Qty: 30 tablet, Refills: 0    Comments: . Future refills by PCP or Cardiologist.  Associated Diagnoses: Cardiomyopathy, unspecified type (H)      LORazepam (ATIVAN) 0.5 MG tablet Take 1 tablet (0.5 mg) by mouth 2 times daily as needed for anxiety . Future refills by PCP or Psychiatrist.  Qty: 20 tablet, Refills:  0    Associated Diagnoses: Generalized anxiety disorder      metoprolol succinate ER (TOPROL-XL) 25 MG 24 hr tablet Take 1 tablet (25 mg) by mouth daily . Future refills by PCP or cardiologist.  Qty: 30 tablet, Refills: 0    Associated Diagnoses: Cardiomyopathy, unspecified type (H)      warfarin (COUMADIN) 5 MG tablet Take 1.5 tablets (7.5 mg) by mouth daily . Have your INR checked on 1/31 or 2/1, and further dosing adjustments will be per PCP.  Qty: 45 tablet, Refills: 0    Associated Diagnoses: History of pulmonary embolism         CONTINUE these medications which have CHANGED    Details   buPROPion (WELLBUTRIN XL) 150 MG 24 hr tablet Take 1 tablet (150 mg) by mouth daily Future refills by PCP [facility] with phone number [phone].  Qty: 30 tablet, Refills: 0    Associated Diagnoses: Major depressive disorder, recurrent episode, moderate (H)      senna-docusate (SENOKOT-S/PERICOLACE) 8.6-50 MG tablet Take 1-2 tablets by mouth 2 times daily INDICATION: CONSTIPATION, TO ACHIEVE 1-2 SOFT BMs PER DAY  Qty: 60 tablet, Refills: 0    Associated Diagnoses: Constipation, unspecified constipation type         CONTINUE these medications which have NOT CHANGED    Details   acetaminophen 500 MG CAPS Take 2 capsules by mouth every 8 hours as needed For aches, pain, fever  Qty: 60 capsule, Refills: 0    Associated Diagnoses: Primary osteoarthritis of both knees      albuterol (PROAIR HFA/PROVENTIL HFA/VENTOLIN HFA) 108 (90 Base) MCG/ACT inhaler Inhale 2-4 puffs into the lungs every 2 hours as needed for shortness of breath / dyspnea  Qty: 1 Inhaler, Refills: 1    Associated Diagnoses: Bronchitis      Ascorbic Acid Buffered (VITAMIN C EFFERVESCENT) PDEF Take 1 packet by mouth 2 times daily (before meals) EMERGEN- C, INDICATION: VITAMIN C SUPPLEMENTATION AND TO AID ABSORPTION OF IRON SUPPLEMENT  Qty: 90 g, Refills: PRN    Associated Diagnoses: Gastric bypass status for obesity; Deficient, vitamin, C       cholecalciferol (VITAMIN D3) 77999 UNITS capsule Take 1 capsule (50,000 Units) by mouth once a week TAKE FOR VITAMIN D DEFICIENCY  Qty: 60 capsule, Refills: 0    Associated Diagnoses: Vitamin D deficiency      diclofenac (VOLTAREN) 1 % GEL Apply topically 4 times daily      Multiple Vitamin (MULTI-VITAMIN DAILY PO) Take 1 tablet by mouth daily       oxyCODONE-acetaminophen (PERCOCET)  MG per tablet TK 1 AND 1/2 TS PO Q 4 TO 6 H PRF PAIN. MAX 4.5 TS PER DAY  Refills: 0      polyethylene glycol (MIRALAX) powder Take 17 g (1 capful) by mouth daily INDICATION: CONSTIPATION, TO ACHIEVE 1-2 SOFT BMs PER DAY  Qty: 1 Bottle, Refills: PRN    Associated Diagnoses: Drug-induced constipation      rosuvastatin (CRESTOR) 40 MG tablet Take 1 tablet (40 mg) by mouth daily INDICATION: TO LOWER CHOLESTEROL AND TO HELP REDUCE RISK OF HEART ATTACK AND STROKE  Qty: 90 tablet, Refills: PRN    Associated Diagnoses: Hyperlipidemia LDL goal <130         STOP taking these medications       cyanocobalamin (VITAMIN B-12) 2500 MCG sublingual tablet Comments:   Reason for Stopping:         Nebivolol HCl (BYSTOLIC) 20 MG TABS Comments:   Reason for Stopping:         spironolactone (ALDACTONE) 50 MG tablet Comments:   Reason for Stopping:             Allergies   Allergies   Allergen Reactions     Hydralazine Shortness Of Breath     WITH ASSOCIATED NAUSEA AND VOMITING     Nsaids Other (See Comments)     Other reaction(s): Other - Describe In Comment Field  Patient had gastric bypass surgery.  Should not take oral NSAID's ever.     Penicillin [Penicillins] Rash     Data   Most Recent 3 CBC's:  Recent Labs   Lab Test 01/28/19  0718 01/26/19  0810 01/25/19  1845 01/25/19  1310   WBC 9.9 8.7  --  11.3*   HGB 11.9 12.3  --  12.9   * 118*  --  119*    208 238 220      Most Recent 3 BMP's:  Recent Labs   Lab Test 01/29/19  0550 01/28/19  0718 01/27/19  0630    140 139   POTASSIUM 4.8 4.2 4.1   CHLORIDE 102 103 103   CO2 30 31  29   BUN 23 29 32*   CR 0.76 0.88 1.04   ANIONGAP 4 6 7   ELIZABETH 8.3* 8.7 8.5   GLC 97 99 101*     Most Recent 2 LFT's:  Recent Labs   Lab Test 01/25/19  1310 03/28/18  1835   AST 32 20   ALT 85* 15   ALKPHOS 85 88   BILITOTAL 0.4 0.2     Most Recent INR's and Anticoagulation Dosing History:  Anticoagulation Dose History     Recent Dosing and Labs Latest Ref Rng & Units 1/26/2017 1/25/2019 1/26/2019 1/27/2019 1/28/2019 1/29/2019 1/29/2019    Warfarin 7.5 mg - - 7.5 mg - - 7.5 mg - -    INR 0.86 - 1.14 - 1.00 1.03 0.99 1.00 Canceled, Test credited 0.95    INR 0.86 - 1.14 2.2(A) - - - - - -        Most Recent 3 Troponin's:  Recent Labs   Lab Test 01/27/19  0630 01/26/19  2105 01/26/19  1718   TROPI 0.016 0.023 0.024     Most Recent Cholesterol Panel:  Recent Labs   Lab Test 01/26/19  0810   CHOL 182   *   HDL 61   TRIG 104     Most Recent 6 Bacteria Isolates From Any Culture (See EPIC Reports for Culture Details):  Recent Labs   Lab Test 11/20/12  1039   CULT No growth     Most Recent TSH, T4 and A1c Labs:  Recent Labs   Lab Test 01/25/19  1845 01/25/19  1310 01/06/17  1555   TSH  --  1.60 3.34   T4  --   --  1.04   A1C 5.0  --   --      Results for orders placed or performed during the hospital encounter of 01/25/19   XR Chest 2 Views    Narrative    CHEST TWO VIEWS    1/25/2019 4:02 PM     HISTORY: Likely congestive heart failure.    COMPARISON: 3/28/2018.      Impression    IMPRESSION: New patchy opacity medial right upper lung. This could  represent developing airspace disease. Stable cardiomegaly.    PETE LEBRON MD

## 2019-01-29 NOTE — PLAN OF CARE
Patient A&0x4. Up with SBA and walker. L radial site CDI. Radial pulses +2. Trace generalized  edema noted. WILKINSON. Lungs diminished.

## 2019-01-29 NOTE — PROGRESS NOTES
01/29/19 0924   Quick Adds   Type of Visit Initial Occupational Therapy Evaluation   Living Environment   Lives With child(erinn), dependent   Living Arrangements apartment   Home Accessibility stairs to enter home   Living Environment Comment 2 flights of stairs, 26 yo son developmentally delayed   Self-Care   Equipment Currently Used at Home tub bench   Activity/Exercise/Self-Care Comment tub bench with tub/shower combo   Functional Level   Ambulation 1-->assistive equipment   Transferring 1-->assistive equipment   Toileting (has a sink next to toilet. )   Bathing 3-->assistive equipment and person   Dressing 2-->assistive person  (A with LE dress)   Eating 0-->independent   Communication 0-->understands/communicates without difficulty   Swallowing 0-->swallows foods/liquids without difficulty   Cognition 0 - no cognition issues reported   Fall history within last six months yes   Number of times patient has fallen within last six months 2   Prior Functional Level Comment uses SEC most of the time, but if feeling weak will use 4ww or when going out. Pt has a bath aide 2x/wk and A's with cleaning, laundry, and another HHA comes daily, cares and daily tasks, meals, shopping, , home RN sets up meds. pt looking into a handicap accessible apt, with elevator and walkin shower, pt has OA.    General Information   Onset of Illness/Injury or Date of Surgery - Date 01/25/19   Referring Physician Nancie Jiang, [facility]   Patient/Family Goals Statement home   Additional Occupational Profile Info/Pertinent History of Current Problem Christina Fletcher is a 49 year old female with past medical history significant for HTN, KAROLINA, CKD, hyperlipidemia, depression and history of PE admitted on 1/25/2019 with acute new onset systolic heart failure (progressive edema, exertional dyspnea). CM with EF 20%   Precautions/Limitations fall precautions   Cognitive Status Examination   Orientation orientation to person, place and time   Level  "of Consciousness alert   Follows Commands (Cognition) WNL   Sensory Examination   Sensory Comments had some numbness in L hip LE but after meds better   Pain Assessment   Patient Currently in Pain (knee pain, just took pain meds now no pain)   Transfer Skills   Transfer Comments Pt declined any ADl's or functional mobility as wants to take a nap first, educated that may not be able to return today, pt reports she will walk wiht nursing,    Instrumental Activities of Daily Living (IADL)   Previous Responsibilities driving   Activities of Daily Living Analysis   Impairments Contributing to Impaired Activities of Daily Living post surgical precautions;strength decreased;balance impaired  (decreased activity tolerance)   General Therapy Interventions   Planned Therapy Interventions progressive activity/exercise;home program guidelines;risk factor education;transfer training;ADL retraining   Clinical Impression   Criteria for Skilled Therapeutic Interventions Met yes, treatment indicated   OT Diagnosis decreased ADL/IADl's and functioanl mobility   Influenced by the following impairments impaired activity tolerance, swelling, balance   Assessment of Occupational Performance 1-3 Performance Deficits   Identified Performance Deficits impaired I with dressing, toilet and tub transfer, driving, etc   Clinical Decision Making (Complexity) Low complexity   Therapy Frequency daily   Predicted Duration of Therapy Intervention (days/wks) 3 days   Anticipated Discharge Disposition Home with Assist  (home with cont'd daily PCA A and RN)   Risks and Benefits of Treatment have been explained. Yes   Patient, Family & other staff in agreement with plan of care Yes   [facility] AM-PAC  \"6 Clicks\" Daily Activity Inpatient Short Form   1. Putting on and taking off regular lower body clothing? 3 - A Little   2. Bathing (including washing, rinsing, drying)? 3 - A Little   3. Toileting, which includes using toilet, bedpan or urinal? 3 " - A Little   4. Putting on and taking off regular upper body clothing? 3 - A Little   5. Taking care of personal grooming such as brushing teeth? 4 - None   6. Eating meals? 4 - None   Daily Activity Raw Score (Score out of 24.Lower scores equate to lower levels of function) 20   Total Evaluation Time   Total Evaluation Time (Minutes) 10

## 2019-01-29 NOTE — CONSULTS
REASON FOR ASSESSMENT:  CHF Consult for 2 gm NA Diet Education    NUTRITION HISTORY:  Visited with pt this morning    Pt does her own shopping and cooking  She is aware of food labels  Does use some seasonings with salt  Likes fruits and veggies - cooks greens with salt  Drinks ~4 bottles of water (16oz) per day  Asking many questions throughout visit about specific food items    Pt has not received diet teaching in the past      CURRENT DIET:  2gm Na, 2000 mL fluid restriction    NUTRITION DIAGNOSIS:  Food- and nutrition-related knowledge deficit R/t no prior diet education AEB this is a new dx for pt      INTERVENTIONS:    Nutrition Prescription:  2gm Na    Implementation:    Assessed learning needs, learning preferences, and willingness to learn    Nutrition Education (Content):  a) Provided handouts:  1) Tips for Low Na Diet  2) Label Reading  3) Low Na Foods/Drinks  4) Seasoning Your Food Without Salt  b) Discussed rational for limiting Na for CHF and stressed importance of following 2 gm Na guidelines     Nutrition Education (Application):  a) Discussed current eating habits and recommended alternative food choices    Anticipated good compliance    Diet Education - refer to Education Flowsheet    Goals:    Patient verbalizes understanding of diet     All of the above goals met during the education session    Follow Up:    Provided RD contact information for future questions.    Recommend Out-Patient Nutrition Referral, if further diet instructions are needed.

## 2019-01-29 NOTE — PLAN OF CARE
VSS. Tele: ST/ SR. Percocet given for knee pain. Cardiology adjusted medication. IV out and monitor off. Reviewed discharge instructions. Pt had several interruptions with phone calls when reviewing discharge. Pt refused to take part of CORE packet home. Medications filled here and sent home with pt. Son brought pt down to car. Pt states that she has all of her belongings. Pt says she understands that she needs to make her PCP appointment .

## 2019-01-29 NOTE — CONSULTS
[facility] referral received from Nancie Jiang, [facility].     Patient not currently established in the [facility] Clinic.         [facility] appointment made for:  2/4 CORE enrollment @ 10:50 AM, with labs prior @ 09:50 AM                Demi Burden RN, BSN  [facility] Care Coordinator  [phone]      [facility]: Cardiomyopathy, Optimization, Rehabilitation, Education

## 2019-01-29 NOTE — PLAN OF CARE
Discharge Planner OT   Patient plan for discharge: home  Current status: Eval and treatment initiated. Pt lives in an apt with her 26 yo son with developmental delays, pt reports has PCA daily to help with dressing, bathing, whatever is needed and home RN for med set up. Pt does drive occasionally, pt uses a SEC mainly for ambulation but does use a 4ww if feeling weak or when going out into the community. Pt admitted due to CHF and CM with EF 20%, pt did have chest pain, angio yesterday, revealed no need for stenting and will medically manage CM. Pt educated in CHF and CM and self care concept ie low sodium diet, weighing self daily and exercise. Pt reports she is motivated to exercise, etc but does have OA and needs a knee replacement scheduled in near future and will do what she can and is planning OP pool therapy as is better on her knees with less pain than walking or other exercise due to pain. Pt declined oob functional mobility or ADl's at this time as tired and wants to rest, pt reports will walk with nursing if this therapist is unable to return. Spoke with pt regarding set times for therapy and agreed with 9:30am tomorrow.   Barriers to return to prior living situation: B KNee pain with OA, swelling in LEs etc, decreased activity tolerance, strength.   Recommendations for discharge: home with cont'd daily PCA, HHA and home RN and OP CR at [facility] per MD.   Rationale for recommendations: OP CR for monitored progressive exercise and CHF ed.        Entered by: Mary Chowdhury 01/29/2019 9:48 AM

## 2019-01-29 NOTE — PLAN OF CARE
"Pt A&O, BP mildly soft 90's sys otherwise VSS oln RA. Tele SR/ST (90's-100's). Pt denies CP and dizziess. Pt states mild WILKINSON which is \"improving\". Pt received PRN perc x1 for chronic knee pain. BLE edema +1. Left radial site CDI, CMS intact Will continue to monitor.   "

## 2019-01-30 ENCOUNTER — TELEPHONE (OUTPATIENT)
Dept: FAMILY MEDICINE | Facility: CLINIC | Age: 49
End: 2019-01-30

## 2019-01-30 LAB
INTERPRETATION ECG - MUSE: NORMAL

## 2019-01-30 NOTE — TELEPHONE ENCOUNTER
"  ED for acute condition Discharge Protocol    \"Hi, my name is Abigail Gabriel, a registered nurse, and I am calling from [facility].  I am calling to follow up and see how things are going for you after your recent emergency visit.\"    Tell me how you are doing now that you are home?\" I just called, I will be there tomorrow, I don't have any questions at this time.       Discharge Instructions    \"Let's review your discharge instructions.  What is/are the follow-up recommendations?  Pt. Response: to follow up with the pcp    \"Has an appointment with your primary care provider been scheduled?\"  Yes. (confirm and remind to bring meds)    Medications    \"Tell me what changed about your medicines when you discharged?\"    none    \"What questions do you have about your medications?\"   None        Call Summary    \"What questions or concerns do you have about your recent visit and your follow-up care?\"     none    \"If you have questions or things don't continue to improve, we encourage you contact us through the main clinic number (give number).  Even if the clinic is not open, triage nurses are available 24/7 to help you.     We would like you to know that our clinic has extended hours (provide information).  We also have urgent care (provide details on closest location and hours/contact info)\"    \"Thank you for your time and take care!\"                "

## 2019-01-30 NOTE — PLAN OF CARE
Occupational Therapy Discharge Summary    Reason for therapy discharge:    Discharged to home with outpatient therapy.    Progress towards therapy goal(s). See goals on Care Plan in [facility] electronic health record for goal details.  Goals not met.  Barriers to achieving goals:   discharge from facility.    Therapy recommendation(s):    Continued therapy is recommended.  Rationale/Recommendations:  home with cont'd daily PCA, HHA and home RN and OP CR at [facility] per MD. .

## 2019-01-31 ENCOUNTER — TELEPHONE (OUTPATIENT)
Dept: CARDIOLOGY | Facility: CLINIC | Age: 49
End: 2019-01-31

## 2019-01-31 ENCOUNTER — OFFICE VISIT (OUTPATIENT)
Dept: FAMILY MEDICINE | Facility: CLINIC | Age: 49
End: 2019-01-31
Payer: MEDICARE

## 2019-01-31 VITALS
TEMPERATURE: 98 F | SYSTOLIC BLOOD PRESSURE: 106 MMHG | OXYGEN SATURATION: 98 % | WEIGHT: 269 LBS | HEART RATE: 88 BPM | DIASTOLIC BLOOD PRESSURE: 70 MMHG | BODY MASS INDEX: 45.93 KG/M2 | RESPIRATION RATE: 16 BRPM | HEIGHT: 64 IN

## 2019-01-31 DIAGNOSIS — E53.8 VITAMIN B12 DEFICIENCY WITHOUT ANEMIA: ICD-10-CM

## 2019-01-31 DIAGNOSIS — I50.22 CHRONIC SYSTOLIC CONGESTIVE HEART FAILURE (H): Primary | ICD-10-CM

## 2019-01-31 DIAGNOSIS — I42.8 NONISCHEMIC CARDIOMYOPATHY (H): Primary | ICD-10-CM

## 2019-01-31 DIAGNOSIS — Z86.711 HISTORY OF PULMONARY EMBOLISM: ICD-10-CM

## 2019-01-31 DIAGNOSIS — Z79.01 LONG TERM CURRENT USE OF ANTICOAGULANT THERAPY: ICD-10-CM

## 2019-01-31 DIAGNOSIS — I10 ESSENTIAL HYPERTENSION WITH GOAL BLOOD PRESSURE LESS THAN 130/80: ICD-10-CM

## 2019-01-31 DIAGNOSIS — E55.9 VITAMIN D DEFICIENCY: ICD-10-CM

## 2019-01-31 PROBLEM — I50.9 CHF (CONGESTIVE HEART FAILURE) (H): Status: ACTIVE | Noted: 2019-01-31

## 2019-01-31 PROCEDURE — 36416 COLLJ CAPILLARY BLOOD SPEC: CPT | Performed by: PHYSICIAN ASSISTANT

## 2019-01-31 PROCEDURE — 99495 TRANSJ CARE MGMT MOD F2F 14D: CPT | Performed by: PHYSICIAN ASSISTANT

## 2019-01-31 PROCEDURE — 85610 PROTHROMBIN TIME: CPT | Mod: QW | Performed by: PHYSICIAN ASSISTANT

## 2019-01-31 ASSESSMENT — MIFFLIN-ST. JEOR: SCORE: 1835.18

## 2019-01-31 ASSESSMENT — ENCOUNTER SYMPTOMS
LIGHT-HEADEDNESS: 0
CARDIOVASCULAR NEGATIVE: 1
EYES NEGATIVE: 1
RESPIRATORY NEGATIVE: 1
GASTROINTESTINAL NEGATIVE: 1
CONSTITUTIONAL NEGATIVE: 1
ARTHRALGIAS: 1
DIZZINESS: 1
SHORTNESS OF BREATH: 0

## 2019-01-31 ASSESSMENT — PATIENT HEALTH QUESTIONNAIRE - PHQ9: SUM OF ALL RESPONSES TO PHQ QUESTIONS 1-9: 13

## 2019-01-31 NOTE — Clinical Note
Which should I bill for this?  Hospital f/u or [id number].  We spent a LOT of time in discussion today.  Thanks!

## 2019-01-31 NOTE — TELEPHONE ENCOUNTER
Patient was evaluated by cardiology while inpatient for edema, SOB, acute systolic CHF and worsening nonischemic cardiomyopathy with EF less than 20%. Called patient to discuss any post hospital d/c questions, review discharge medication, and confirm f/u appts. Patient denied any questions regarding new or changes to PTA medications. Patient denied any SOB, chest pain, edema, or light headedness. RN confirmed with patient that she has an apt scheduled with Nancie Jiang on 2/4/19 for CORE enrollment, with labs prior. Genetics referral order placed. Cardiac rehab scheduled 3/12/19.  Instructed patient to take daily weights and call clinic for a weight gain of 2 lbs overnight or 5 lbs in a week. Patient advised to call clinic with any cardiac related questions or concerns prior to his appt. Patient verbalized understanding and agreed with plan. GILLIAN Rayo RN.    Assessment:  1.  Acute systolic congestive heart failure, nonischemic cardiomyopathy - EF <20%, prior EF in 2013 40-45%,  etiology unknown - Familial (likely) vs ? Stress induced with recent death of daughter (unlikely). Patient appears compensated, LHC with normal coronary arteries, RHC yesterday showed elevated right sided filling pressures (mean RA 16mmHg) and elevated left sided filing pressures (PWCP 19, LVEDP 22). Weight on admission 284#, today weight 273# (stable). Kidney function stable (creatinine 0.74 today). Will start small dose of oral diuretics (Furosemide 20mg daily).               - Home spironolactone 50mg BID on hold r/t low blood pressures, was initially decreased to daily dosing, then to 25mg and then put on hold r/t hypotension. Will trial resuming in outpatient CORE clinic.               - Lisinopril 2.5mg daily (decreased r/t hypotension)              - Start Metoprolol XL 25mg daily to allow more room in blood pressure for optimization of HF medications. STOP Carvedilol 6.25mg BID.              - Start furosemide 20mg daily              -  CORE clinic enrollment              - Right and left heart catheterization today              - Cardiac MRI outpatient              - Cardiac rehab              - Genetic testing outpatient (Tammie Pavon, cardiac genetic counselor)              - Will benefit from cardiac rehab at discharge              - Blood pressure cuff from [facility].   2. 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 Normal coronary arteries on angiogram.   3. History of PE               - Resume warfarin post cardiac catheterization  4. Hypertension - controlled  5. Hyperlipidemia - stable, continue crestor, will consider decreasing dose in clinic given normal coronary arteries.   6.Obesity - history of gastric bypass  7. Anxiety/depression - severe distress with recent death of her daughter. Psych following.   8. KAROLINA - Compliant with CPAP     Plan:   1. Stable for discharge, cardiology signing off, CORE clinic enrollment within 1 week.   2. Continue lisinopril  3. Stop Carvedilol to allow more room in BP for further titration of guideline HF therapy  4. Start metoprolol XL 25mg daily  5. Continue warfarin  6. CORE clinic enrollment at discharge  7. Start furosemide 20mg daily  8. Cardiac rehab at discharge  9. Patient to monitor weight/BP daily at home at discharge.         HALEIGH Ordoñez CNP  Text Page  (M-F, 7:30 - 4:00 pm)

## 2019-01-31 NOTE — Clinical Note
Nancie,I saw Christina today for hospital follow up.  I would appreciate your input on medication recommendations moving forward, after your appointment with her on Monday.  Today, her BP was a little low and she complained of dizziness.  It seemed very mild and I kept her on the ACE/lasix/BB. For her rosuvastatin - please let me know if you want to continue this. Last LDL was 100. I also ordered an INR to be drawn with her BNP and BMP on Monday at your clinic.Your input is appreciated.  Thank you!Davis Chaudhry PA-C

## 2019-01-31 NOTE — PROGRESS NOTES
SUBJECTIVE:   Christina Fletcher is a 49 year old female who presents to clinic today for the following health issues:          Hospital Follow-up Visit:    Hospital/Nursing Home/IP Rehab Facility: [facility]  Date of Admission: 1/25/19  Date of Discharge: 1/29/19  Reason(s) for Admission: Dyspnea, CHF            Problems taking medications regularly:  None       Medication changes since discharge: None       Problems adhering to non-medication therapy:  None      Summary of hospitalization:  Discharge summary unavailable  I have reviewed the inpatient progress notes and consults    Diagnostic Tests/Treatments reviewed.  Follow up needed: cardiology, primary care or psych, anticoagulation monitoring    Other Healthcare Providers Involved in Patient s Care:         Specialist appointment - cardiology  Update since discharge: stable.     Post Discharge Medication Reconciliation: discharge medications reconciled, continue medications without change.  Plan of care communicated with patient     Coding guidelines for this visit:  Type of Medical   Decision Making Face-to-Face Visit       within 7 Days of discharge Face-to-Face Visit        within 14 days of discharge   Moderate Complexity 05805 [id number]   High Complexity 08149 [id number]          PHQ-9 SCORE 5/24/2016 9/27/2018 1/31/2019   PHQ-9 Total Score - - -   PHQ-9 Total Score 16 18 13     BAR-7 SCORE 9/27/2018   Total Score 18     CHF - EF <20%   Cardiac Cath did not show ischemia    She started feeling dizzy this morning  She also did not eat breakfast  She sticking with a low salt diet and fluid restriction of 2L daily    She is planning to schedule cardiac rehab  She has lost 15-16 pounds of fluid (leg swelling has gone down significantly)  She has an appointment for labs and cardiology follow up on 2/4/2019    The 10-year ASCVD risk score ([employer] Crossterrence COHEN Jr., et al., 2013) is: 1%    Values used to calculate the score:      Age: 49 years      Sex: Female       Is Non- : Yes      Diabetic: No      Tobacco smoker: No      Systolic Blood Pressure: 106 mmHg      Is BP treated: Yes      HDL Cholesterol: 61 mg/dL      Total Cholesterol: 182 mg/dL    Problem list and histories reviewed & adjusted, as indicated.  Additional history: as documented    Patient Active Problem List   Diagnosis     Female genital symptoms     Other disorder of menstruation and other abnormal bleeding from female genital tract     Adjustment disorder with depressed mood     Obesity     Chronic constipation     GERD (gastroesophageal reflux disease)     Lower extremity edema     Elevated MCV     Fibroids     Fatigue     Menorrhagia     HTN (hypertension)     Gastric bypass status for obesity     CARDIOVASCULAR SCREENING; LDL GOAL LESS THAN 160     Bariatric surgery status     Vitamin B12 deficiency without anemia     Iron deficiency     Vitamin D deficiency     Weight gain     Dysmetabolic syndrome     Galactorrhea     OA (osteoarthritis) of knee     Ascorbic acid deficiency     Pyridoxine deficiency     Knee pain     KAROLINA (obstructive sleep apnea)     Hyperlipidemia LDL goal <130     KAROLINA on CPAP     BMI 50.0-59.9, adult (H)     Hyperlipidemia with target LDL less than 130     Eczema     Hyponatremia     Chronic kidney disease, stage III (moderate) (H)     Neuropathy     Morbid obesity due to excess calories (H)     Vitamin C deficiency     Osteoarthritis of both knees, unspecified osteoarthritis type     Hypomagnesemia     Essential hypertension with goal blood pressure less than 130/80     Eczema, unspecified type     Major depressive disorder, recurrent episode, moderate (H)     Long-term (current) use of anticoagulants [Z79.01]     History of pulmonary embolism     Dyspnea on exertion     CHF (congestive heart failure) (H)     Past Surgical History:   Procedure Laterality Date     CV HEART CATHETERIZATION WITH POSSIBLE INTERVENTION N/A 1/28/2019    Procedure: Heart  Catheterization with possible Intervention;  Surgeon: Eloisa Bob MD;  Location:  HEART CARDIAC CATH LAB     DEBRIDE ASSOC FX/DISLO SKIN/MUS/BONE  1990's    Infected - Right Femur     GASTRIC BYPASS  01/18/2017    conversion of gastric sleeve to gastric bypass with lysis of adhesions-      HYSTEROSCOPY,ABLATION ENDOMETRIUM  2008     LAP ADJUSTABLE GASTRIC BAND  2001    Lap Banding      LAPAROSCOPIC GASTRIC SLEEVE  2010    Dr Harris       Social History     Tobacco Use     Smoking status: Never Smoker     Smokeless tobacco: Never Used   Substance Use Topics     Alcohol use: Yes     Alcohol/week: 0.0 oz     Comment: occ      Family History   Problem Relation Age of Onset     Hypertension Mother      Breast Cancer Maternal Grandmother      Cancer Maternal Grandmother         Bone Cancer     Thyroid Disease Son      Genitourinary Problems Daughter         Kidney failure         Current Outpatient Medications   Medication Sig Dispense Refill     acetaminophen 500 MG CAPS Take 2 capsules by mouth every 8 hours as needed For aches, pain, fever 60 capsule 0     albuterol (PROAIR HFA/PROVENTIL HFA/VENTOLIN HFA) 108 (90 Base) MCG/ACT inhaler Inhale 2-4 puffs into the lungs every 2 hours as needed for shortness of breath / dyspnea 1 Inhaler 1     Ascorbic Acid Buffered (VITAMIN C EFFERVESCENT) PDEF Take 1 packet by mouth 2 times daily (before meals) EMERGEN- C, INDICATION: VITAMIN C SUPPLEMENTATION AND TO AID ABSORPTION OF IRON SUPPLEMENT 90 g PRN     buPROPion (WELLBUTRIN XL) 150 MG 24 hr tablet Take 1 tablet (150 mg) by mouth daily Future refills by PCP [facility] with phone number [phone]. 30 tablet 0     cholecalciferol (VITAMIN D3) 80121 UNITS capsule Take 1 capsule (50,000 Units) by mouth once a week TAKE FOR VITAMIN D DEFICIENCY 60 capsule 0     diclofenac (VOLTAREN) 1 % GEL Apply topically 4 times daily       furosemide (LASIX) 20 MG tablet Take 1 tablet (20 mg) by mouth daily . Future  refills by PCP or Cardiologist. 30 tablet 0     lisinopril (PRINIVIL/ZESTRIL) 2.5 MG tablet Take 1 tablet (2.5 mg) by mouth daily 30 tablet 0     LORazepam (ATIVAN) 0.5 MG tablet Take 1 tablet (0.5 mg) by mouth 2 times daily as needed for anxiety . Future refills by PCP or Psychiatrist. 20 tablet 0     metoprolol succinate ER (TOPROL-XL) 25 MG 24 hr tablet Take 1 tablet (25 mg) by mouth daily . Future refills by PCP or cardiologist. 30 tablet 0     Multiple Vitamin (MULTI-VITAMIN DAILY PO) Take 1 tablet by mouth daily        oxyCODONE-acetaminophen (PERCOCET)  MG per tablet TK 1 AND 1/2 TS PO Q 4 TO 6 H PRF PAIN. MAX 4.5 TS PER DAY  0     polyethylene glycol (MIRALAX) powder Take 17 g (1 capful) by mouth daily INDICATION: CONSTIPATION, TO ACHIEVE 1-2 SOFT BMs PER DAY 1 Bottle PRN     rosuvastatin (CRESTOR) 40 MG tablet Take 1 tablet (40 mg) by mouth daily INDICATION: TO LOWER CHOLESTEROL AND TO HELP REDUCE RISK OF HEART ATTACK AND STROKE 30 tablet 0     senna-docusate (SENOKOT-S/PERICOLACE) 8.6-50 MG tablet Take 1-2 tablets by mouth 2 times daily INDICATION: CONSTIPATION, TO ACHIEVE 1-2 SOFT BMs PER DAY 60 tablet 0     warfarin (COUMADIN) 5 MG tablet Take 1.5 tablets (7.5 mg) by mouth daily . Have your INR checked on 1/31 or 2/1, and further dosing adjustments will be per PCP. 45 tablet 0     Allergies   Allergen Reactions     Hydralazine Shortness Of Breath     WITH ASSOCIATED NAUSEA AND VOMITING     Nsaids Other (See Comments)     Other reaction(s): Other - Describe In Comment Field  Patient had gastric bypass surgery.  Should not take oral NSAID's ever.     Penicillin [Penicillins] Rash       Reviewed and updated as needed this visit by clinical staff  Tobacco  Allergies  Meds  Med Hx  Surg Hx  Fam Hx  Soc Hx      Reviewed and updated as needed this visit by Provider  Tobacco         Review of Systems   Constitutional: Negative.    HENT: Negative.    Eyes: Negative.    Respiratory: Negative.   "Negative for shortness of breath.    Cardiovascular: Negative.  Negative for chest pain and leg swelling.   Gastrointestinal: Negative.    Genitourinary: Negative.    Musculoskeletal: Positive for arthralgias (bilateral knee pain).   Skin: Negative.    Neurological: Positive for dizziness. Negative for syncope and light-headedness.   Psychiatric/Behavioral:        As in HPI       OBJECTIVE:     /70 (BP Location: Other (Comment), Cuff Size: Adult Regular)   Pulse 88   Temp 98  F (36.7  C) (Tympanic)   Resp 16   Ht 1.626 m (5' 4\")   Wt 122 kg (269 lb)   SpO2 98%   BMI 46.17 kg/m    Body mass index is 46.17 kg/m .    Physical Exam   Constitutional: She is oriented to person, place, and time. She appears well-developed and well-nourished. No distress.   HENT:   Head: Normocephalic.   Right Ear: External ear normal.   Left Ear: External ear normal.   Nose: Nose normal.   Eyes: Conjunctivae and EOM are normal.   Neck: Normal range of motion.   Cardiovascular: Normal rate, regular rhythm and normal heart sounds.   Orthostatic HR:  Sitting - 84  Standing 0 min - 96  Standing 1 min - 88   Pulmonary/Chest: Effort normal and breath sounds normal.   Neurological: She is alert and oriented to person, place, and time.   Skin: Skin is warm and dry. She is not diaphoretic.   Psychiatric: She has a normal mood and affect. Judgment normal.       No results found for this or any previous visit (from the past 24 hour(s)).    ASSESSMENT/PLAN:       ICD-10-CM    1. Chronic systolic congestive heart failure (H) I50.22 Creatinine   2. Long term current use of anticoagulant therapy Z79.01 INR point of care     INR   3. Essential hypertension with goal blood pressure less than 130/80 I10 Creatinine   4. Vitamin B12 deficiency without anemia E53.8 Methylmalonic acid   5. Vitamin D deficiency E55.9 Vitamin D Deficiency   6. History of pulmonary embolism Z86.711 INR point of care     INR      1. Creatinine ordered today - inpatient " she had a creatinine increase from 0.79 to 1.04.  At hospital discharge it was 0.76.  She is still taking Lasix 20 mg daily, so I want to make sure creatinine is still stable.   2. Dizziness - BP is stable today.  She was slightly tachycardic after rooming, however this resolved during the visit.  With orthostatics, HR max was 96.  For now, I do not want to adjust any of her cardiac medications (Lasix, lisinopril, metoprolol and rosuvastatin).  Moving forward, will see what cardiology recommends and will treat accordingly.    3. Depression - I would like her to continue with the Wellbutrin and see me back in two weeks for a recheck.   4. Anticoagulation - INR 1.1 today - dose adjusted.  Future INR ordered for 2/4 lab appointment.  Will have Kacey Rosa RN adjust her warfarin dose accordingly and plan to see her again late next week for another INR check.      50 minutes were spent with the patient, greater than 50% of our time was spent in counseling.      Return in about 1 week (around 2/7/2019) for INR recheck AND depression recheck in two weeks.    Patient Instructions   Cardiology follow up on Monday with Nancie Jiang   This will include labs   I will send her today's note so we can discuss heart medications moving forward    We need to schedule INR checks in order to dose the warfarin correctly    For depression and anxiety -   1. Call to schedule an appointment with a therapist  2. We will manage this together moving forward - meaning I can prescribe the medications and monitor for improvement.          Davis Chaudhry PA-C  [facility]

## 2019-01-31 NOTE — PATIENT INSTRUCTIONS
Cardiology follow up on Monday with Nancieisaias Jiang   This will include labs   I will send her today's note so we can discuss heart medications moving forward    We need to schedule INR checks in order to dose the warfarin correctly    For depression and anxiety -   1. Call to schedule an appointment with a therapist  2. We will manage this together moving forward - meaning I can prescribe the medications and monitor for improvement.

## 2019-02-04 ENCOUNTER — TELEPHONE (OUTPATIENT)
Dept: FAMILY MEDICINE | Facility: CLINIC | Age: 49
End: 2019-02-04

## 2019-02-04 ENCOUNTER — CARE COORDINATION (OUTPATIENT)
Dept: CARDIOLOGY | Facility: CLINIC | Age: 49
End: 2019-02-04

## 2019-02-04 ENCOUNTER — OFFICE VISIT (OUTPATIENT)
Dept: CARDIOLOGY | Facility: CLINIC | Age: 49
End: 2019-02-04
Payer: MEDICARE

## 2019-02-04 VITALS
BODY MASS INDEX: 44.9 KG/M2 | SYSTOLIC BLOOD PRESSURE: 112 MMHG | HEIGHT: 64 IN | WEIGHT: 263 LBS | OXYGEN SATURATION: 97 % | HEART RATE: 100 BPM | DIASTOLIC BLOOD PRESSURE: 74 MMHG

## 2019-02-04 DIAGNOSIS — I50.22 CHRONIC SYSTOLIC HEART FAILURE (H): Primary | ICD-10-CM

## 2019-02-04 DIAGNOSIS — Z79.01 LONG TERM CURRENT USE OF ANTICOAGULANT THERAPY: ICD-10-CM

## 2019-02-04 DIAGNOSIS — G89.29 CHRONIC PAIN OF LEFT KNEE: Primary | ICD-10-CM

## 2019-02-04 DIAGNOSIS — Z86.711 HISTORY OF PULMONARY EMBOLISM: ICD-10-CM

## 2019-02-04 DIAGNOSIS — M17.0 OSTEOARTHRITIS OF BOTH KNEES, UNSPECIFIED OSTEOARTHRITIS TYPE: ICD-10-CM

## 2019-02-04 DIAGNOSIS — I50.22 CHRONIC SYSTOLIC CONGESTIVE HEART FAILURE (H): ICD-10-CM

## 2019-02-04 DIAGNOSIS — I50.23 ACUTE ON CHRONIC SYSTOLIC HEART FAILURE (H): ICD-10-CM

## 2019-02-04 DIAGNOSIS — E78.5 HYPERLIPIDEMIA LDL GOAL <130: ICD-10-CM

## 2019-02-04 DIAGNOSIS — G47.33 OSA (OBSTRUCTIVE SLEEP APNEA): ICD-10-CM

## 2019-02-04 DIAGNOSIS — I42.9 CARDIOMYOPATHY, UNSPECIFIED TYPE (H): ICD-10-CM

## 2019-02-04 DIAGNOSIS — F33.1 MAJOR DEPRESSIVE DISORDER, RECURRENT EPISODE, MODERATE (H): ICD-10-CM

## 2019-02-04 DIAGNOSIS — M25.562 CHRONIC PAIN OF LEFT KNEE: Primary | ICD-10-CM

## 2019-02-04 LAB
ANION GAP SERPL CALCULATED.3IONS-SCNC: 11.6 MMOL/L (ref 6–17)
BUN SERPL-MCNC: 18 MG/DL (ref 7–30)
CALCIUM SERPL-MCNC: 9.6 MG/DL (ref 8.5–10.5)
CHLORIDE SERPL-SCNC: 103 MMOL/L (ref 98–107)
CO2 SERPL-SCNC: 23 MMOL/L (ref 23–29)
CREAT SERPL-MCNC: 1.18 MG/DL (ref 0.7–1.3)
GFR SERPL CREATININE-BSD FRML MDRD: 49 ML/MIN/{1.73_M2}
GLUCOSE SERPL-MCNC: 115 MG/DL (ref 70–105)
INR PPP: 1.7 (ref 0.86–1.14)
NT-PROBNP SERPL-MCNC: 1222 PG/ML (ref 0–125)
POTASSIUM SERPL-SCNC: 4.6 MMOL/L (ref 3.5–5.1)
SODIUM SERPL-SCNC: 133 MMOL/L (ref 136–145)

## 2019-02-04 PROCEDURE — 80048 BASIC METABOLIC PNL TOTAL CA: CPT | Performed by: INTERNAL MEDICINE

## 2019-02-04 PROCEDURE — 36415 COLL VENOUS BLD VENIPUNCTURE: CPT | Performed by: INTERNAL MEDICINE

## 2019-02-04 PROCEDURE — 85610 PROTHROMBIN TIME: CPT | Performed by: PHYSICIAN ASSISTANT

## 2019-02-04 PROCEDURE — 83880 ASSAY OF NATRIURETIC PEPTIDE: CPT | Performed by: PHYSICIAN ASSISTANT

## 2019-02-04 PROCEDURE — 99214 OFFICE O/P EST MOD 30 MIN: CPT | Performed by: NURSE PRACTITIONER

## 2019-02-04 RX ORDER — LISINOPRIL 2.5 MG/1
2.5 TABLET ORAL DAILY
Qty: 90 TABLET | Refills: 1 | Status: SHIPPED | OUTPATIENT
Start: 2019-02-04 | End: 2019-03-26

## 2019-02-04 RX ORDER — METOPROLOL SUCCINATE 25 MG/1
25 TABLET, EXTENDED RELEASE ORAL 2 TIMES DAILY
Qty: 180 TABLET | Refills: 1 | Status: SHIPPED | OUTPATIENT
Start: 2019-02-04 | End: 2019-03-12

## 2019-02-04 RX ORDER — FUROSEMIDE 20 MG
20 TABLET ORAL DAILY
Qty: 90 TABLET | Refills: 1 | Status: SHIPPED | OUTPATIENT
Start: 2019-02-04 | End: 2019-07-08

## 2019-02-04 RX ORDER — ROSUVASTATIN CALCIUM 20 MG/1
20 TABLET, COATED ORAL DAILY
Qty: 90 TABLET | Refills: 1 | Status: SHIPPED | OUTPATIENT
Start: 2019-02-04 | End: 2019-08-22

## 2019-02-04 ASSESSMENT — MIFFLIN-ST. JEOR: SCORE: 1807.96

## 2019-02-04 NOTE — PROGRESS NOTES
Pt has been scheduled for [facility] on 2/14 and Adult Cardiology Genetic Counseling 2/21.    Reminder sent to Team RN board to call pt weekly, starting 2/11, for weight/sx update.    Mary Carmen Pacheco, RN February 4, 2019, 2:39 PM  RN Care Coordinator  [facility] - Nancie Melo, HALEIGH CNP  [facility] Heart Core Nurse             Christina Perez was a core enrollment today, can you call weekly to check on weight/symptoms. Continued her furosemide 20mg daily today. Increase metoprolol. Testing pending - cardiac MRI and genetic testing.     Thanks,   Nancie
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
## 2019-02-04 NOTE — PATIENT INSTRUCTIONS
Call CORE nurse for any questions or concerns Mon-Fri 8am-4pm:                                                [phone]                                       For concerns after hours:                                                 [phone]     Medication changes: INCREASE metoprolol to 1 tablet (25mg) 2 x a day  Plan from today: Follow up with me in 1 month, Cardiac MRI and followup with Genetic Counselor, Tammie Pavon  Lab results: see attached.
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
## 2019-02-11 ENCOUNTER — CARE COORDINATION (OUTPATIENT)
Dept: CARDIOLOGY | Facility: CLINIC | Age: 49
End: 2019-02-11

## 2019-02-11 NOTE — PROGRESS NOTES
I called pt for an update on her weight and symptoms. She said she is feeling well and weight decreased more over the weekend. She didn't have her list of weights with her, and said she will call back this afternoon to discuss further. Pt also wondering about a lorazepam refill. I recommended she get refill from her PMD. Yudi PRITCHETT February 11, 2019, 12:08 PM

## 2019-02-12 ENCOUNTER — TELEPHONE (OUTPATIENT)
Dept: FAMILY MEDICINE | Facility: CLINIC | Age: 49
End: 2019-02-12

## 2019-02-12 NOTE — TELEPHONE ENCOUNTER
Alessia from [facility] called for orders.  They went out today to assess pt and she was not there.    Orders given for:  Eval and assess for nursing. They will do INR also

## 2019-02-14 ENCOUNTER — MEDICAL CORRESPONDENCE (OUTPATIENT)
Dept: HEALTH INFORMATION MANAGEMENT | Facility: CLINIC | Age: 49
End: 2019-02-14

## 2019-02-14 DIAGNOSIS — F41.1 GENERALIZED ANXIETY DISORDER: ICD-10-CM

## 2019-02-14 RX ORDER — LORAZEPAM 0.5 MG/1
0.5 TABLET ORAL 2 TIMES DAILY PRN
Qty: 10 TABLET | Refills: 0 | Status: SHIPPED | OUTPATIENT
Start: 2019-02-14 | End: 2019-02-26

## 2019-02-14 NOTE — TELEPHONE ENCOUNTER
Winter with home care calling to report that the patient has been out of ativan for 2 days and is very tearful, depressed, and not sleeping. Has appointment Saturday with PCP, but nurse requesting 5 pills or so to hold patient over until then. Routing to provider for review.

## 2019-02-14 NOTE — PROGRESS NOTES
I left another message for pt to call back with an update on weight and symptoms. Yudi PRITCHETT February 14, 2019, 2:05 PM

## 2019-02-15 ENCOUNTER — CARE COORDINATION (OUTPATIENT)
Dept: CARDIOLOGY | Facility: CLINIC | Age: 49
End: 2019-02-15

## 2019-02-15 NOTE — PROGRESS NOTES
" [facility]- [facility] Clinic    Call to patient for weekly assessment  Daily weights:   Patient stated that she did not weigh herself as she \"has a court date this morning. I am in the car  on the way to court.\"  The last weights she remembered were ~250-253#. She was unablt to tell  me anything more specific  Current daily diuretic: lasix 20mg/day per Epic review but patient unable to confirm this as she did not have a med list with her and she was unable to remember  Next follow up:   Future Appointments   Date Time Provider Department Center   2/16/2019 11:00 AM Mary Carmen Chaudhry PA-C BX[facility] BLCX   2/21/2019 10:30 AM Tammie Pavon, [facility] PSA CLIN   2/26/2019  2:30 PM SCIM[facility] CVIMG   3/12/2019  8:00 AM [facility]   3/12/2019 11:30 AM [facility] PSA CLIN   3/12/2019 12:30 PM Nancie Jiang, APRN CNP [facility] PSA CLIN     Patient stated that she was doing OK. She denied any SOB, PND/orthopnea.  I attempted to review her meds/recent changes and asked about taking the lisinopril 2.5mg/day. She stated that she didn't know anything about lisinopril and stated that I should call her back at 3pm to review. Will try to call back later this afternoon to review her meds.  Dominique Olivia RN on 2/15/2019 at 11:32 AM      "

## 2019-02-15 NOTE — PROGRESS NOTES
Call back at time requested by patient - LM for call back.  Dominique Olivia RN on 2/15/2019 at 3:25 PM

## 2019-02-18 NOTE — PROGRESS NOTES
Call to patient for ongoing follow up - LM for call back.  Dominique Olivia RN on 2/18/2019 at 10:34 AM

## 2019-02-19 ENCOUNTER — ANTICOAGULATION THERAPY VISIT (OUTPATIENT)
Dept: FAMILY MEDICINE | Facility: CLINIC | Age: 49
End: 2019-02-19
Payer: MEDICARE

## 2019-02-19 LAB — INR PPP: 1.7

## 2019-02-19 PROCEDURE — 99207 ZZC NO CHARGE NURSE ONLY: CPT | Performed by: PHYSICIAN ASSISTANT

## 2019-02-19 NOTE — TELEPHONE ENCOUNTER
Shobha Peterson called [phone],  Would like extension for OT evaluation.  Extend to 25th of February,

## 2019-02-19 NOTE — PROGRESS NOTES
ANTICOAGULATION FOLLOW-UP CLINIC VISIT    Patient Name:  Christina Fletcher  Date:  2019  Contact Type:  Telephone    SUBJECTIVE:     Patient Findings     Positives:   Initiation of therapy    Comments:   No Change           OBJECTIVE    INR   Date Value Ref Range Status   2019 1.7  Final       ASSESSMENT / PLAN  INR assessment SUB    Recheck INR In: 4 DAYS    INR Location [facility] INR      Anticoagulation Summary  As of 2019    INR goal:   2.0-3.0   TTR:   3.1 % (4 mo)   INR used for dosin.7! (2019)   Warfarin maintenance plan:   7.5 mg (5 mg x 1.5) every day   Full warfarin instructions:   : 5 mg; Otherwise 7.5 mg every day   Weekly warfarin total:   52.5 mg   Plan last modified:   Abigail Gabriel RN (2019)   Next INR check:   2019   Target end date:            Anticoagulation Episode Summary     INR check location:       Preferred lab:       Send INR reminders to:   [facility] INR/PROTIME    Comments:               See the Encounter Report to view Anticoagulation Flowsheet and Dosing Calendar (Go to Encounters tab in chart review, and find the Anticoagulation Therapy Visit)        Reyna Rosa RN

## 2019-02-20 NOTE — TELEPHONE ENCOUNTER
Left a detailed voice message for Shobha with verbal approval of OT order extension as requested below.

## 2019-02-21 ENCOUNTER — TELEPHONE (OUTPATIENT)
Dept: FAMILY MEDICINE | Facility: CLINIC | Age: 49
End: 2019-02-21

## 2019-02-21 NOTE — TELEPHONE ENCOUNTER
I spoke with Christina this morning ans expressed my concern that she has missed some INR appointments.  We need to check the INR closely to make sure we are achieving the desired anticoagulation.     I offered to switch to a medication that does not require monitoring.    She feels her schedule will improve over the next few weeks and would prefer to continue taking warfarin and have INR drawn at her home.      For now we will continue INR therapy.    Davis Chaudhry PA-C

## 2019-02-22 ENCOUNTER — TELEPHONE (OUTPATIENT)
Dept: FAMILY MEDICINE | Facility: CLINIC | Age: 49
End: 2019-02-22

## 2019-02-22 DIAGNOSIS — Z86.711 HISTORY OF PULMONARY EMBOLISM: ICD-10-CM

## 2019-02-22 RX ORDER — WARFARIN SODIUM 5 MG/1
7.5 TABLET ORAL DAILY
Qty: 60 TABLET | Refills: 1 | Status: SHIPPED | OUTPATIENT
Start: 2019-02-22 | End: 2019-03-18 | Stop reason: ALTCHOICE

## 2019-02-22 NOTE — TELEPHONE ENCOUNTER
Our goal is to have forms completed within 72 hours, however some forms may require a visit or additional information.    What clinic location was the form placed at [facility] or [place].?     Who is the form from?   Where did the form come from? Faxed to clinic   The form was placed in the inbox of [facility]      Please fax to [phone]   Phone number:      Additional comments: [facility] SN refused visit for 2/18/19 / SN INR 1.7 from 2/19/19    Call take on 2/22/2019 at 12:07 PM by Alessia Patel

## 2019-02-25 ENCOUNTER — CARE COORDINATION (OUTPATIENT)
Dept: CARDIOLOGY | Facility: CLINIC | Age: 49
End: 2019-02-25

## 2019-02-25 ENCOUNTER — TELEPHONE (OUTPATIENT)
Dept: FAMILY MEDICINE | Facility: CLINIC | Age: 49
End: 2019-02-25

## 2019-02-25 NOTE — PROGRESS NOTES
I called pt to get an update on her weight and symptoms. Pt said she is on a long distance call so can't really talk right now. She said she had some swelling in her legs yesterday, so she decided to take an extra water pill. Swelling is better today. Weight has been stable. Pt did not have her list with her at the moment. Pt said her home health RN is coming at 10:00 so would like to be called back at around 11:00 to discuss weight and symptoms further. Will call pt back later. Yudi PRITCHETT February 25, 2019, 9:28 AM

## 2019-02-25 NOTE — PROGRESS NOTES
I left message for pt to call back to discuss weight and symptoms. Yudi PRITCHETT February 25, 2019, 11:45 AM

## 2019-02-26 ENCOUNTER — CARE COORDINATION (OUTPATIENT)
Dept: CARDIOLOGY | Facility: CLINIC | Age: 49
End: 2019-02-26

## 2019-02-26 ENCOUNTER — TELEPHONE (OUTPATIENT)
Dept: FAMILY MEDICINE | Facility: CLINIC | Age: 49
End: 2019-02-26

## 2019-02-26 ENCOUNTER — ANTICOAGULATION THERAPY VISIT (OUTPATIENT)
Dept: FAMILY MEDICINE | Facility: CLINIC | Age: 49
End: 2019-02-26
Payer: MEDICARE

## 2019-02-26 DIAGNOSIS — G89.29 CHRONIC PAIN OF LEFT KNEE: Primary | ICD-10-CM

## 2019-02-26 DIAGNOSIS — M25.562 CHRONIC PAIN OF LEFT KNEE: Primary | ICD-10-CM

## 2019-02-26 DIAGNOSIS — F41.1 GENERALIZED ANXIETY DISORDER: ICD-10-CM

## 2019-02-26 LAB — INR PPP: 3

## 2019-02-26 PROCEDURE — 99207 ZZC NO CHARGE NURSE ONLY: CPT | Performed by: PHYSICIAN ASSISTANT

## 2019-02-26 RX ORDER — LORAZEPAM 0.5 MG/1
0.5 TABLET ORAL 2 TIMES DAILY PRN
Qty: 4 TABLET | Refills: 0 | Status: SHIPPED | OUTPATIENT
Start: 2019-02-26 | End: 2019-12-12

## 2019-02-26 NOTE — PROGRESS NOTES
I called pt again to get update on her weight and symptoms. Pt said she is on her way to get her lip ring taken out for her cardiac MRI today. Pt said her weight was around 250# today, and then said it is actually around 253#. Pt said she had some increased swelling in her legs on Sunday, and took an extra furosemide, which helped. Pt was on her feet all day with her granddaughter that day so thinks that contributed to her swelling. Pt denied any shortness of breath. Will send an update to Nancie. Yudi PRITCHETT February 26, 2019, 11:54 AM

## 2019-02-26 NOTE — TELEPHONE ENCOUNTER
Call to Shobha for follow up questions from patient fall. Shobha works with OT.     There was a nurse (Christine) out to do an assessment today 2/26/2019, after the fall. Shobha to give message to Christine Bills to call the clinic back regarding follow up questions from patient fall.      is already involved in helping patient find better housing that does not require patient to go down 7 stairs to get to apartment.     On call back: did patient hit her head, any pain, anything of note from home care nurse assessment, any other recent falls.

## 2019-02-26 NOTE — PROGRESS NOTES
ANTICOAGULATION FOLLOW-UP CLINIC VISIT    Patient Name:  Christina Fletcher  Date:  2/26/2019  Contact Type:  Telephone    SUBJECTIVE:     Patient Findings     Comments:   NO change           OBJECTIVE    INR   Date Value Ref Range Status   02/26/2019 3.0  Final       ASSESSMENT / PLAN  INR assessment THER    Recheck INR In: 3 DAYS    INR Location [facility]      Anticoagulation Summary  As of 2/26/2019    INR goal:   2.0-3.0   TTR:   4.2 % (4.3 mo)   INR used for dosing:   3.0 (2/26/2019)   Warfarin maintenance plan:   7.5 mg (5 mg x 1.5) every day   Full warfarin instructions:   2/26: 5 mg; 2/27: 5 mg; Otherwise 7.5 mg every day   Weekly warfarin total:   52.5 mg   Plan last modified:   Abigail Gabriel RN (1/31/2019)   Next INR check:   3/1/2019   Target end date:            Anticoagulation Episode Summary     INR check location:       Preferred lab:       Send INR reminders to:   [facility] INR/PROTIME    Comments:               See the Encounter Report to view Anticoagulation Flowsheet and Dosing Calendar (Go to Encounters tab in chart review, and find the Anticoagulation Therapy Visit)        Reyna Rosa, RN

## 2019-02-26 NOTE — TELEPHONE ENCOUNTER
Called Christine: [phone]. (Please use this number).       Patient Contact    Attempt # 1    Was call answered?  No.  Left message on voicemail with information to call the clinic and ask to talk with triage.     On call back: did patient hit her head, any pain, anything of note from home care nurse assessment, any other recent falls.

## 2019-02-26 NOTE — TELEPHONE ENCOUNTER
Reason for Call:  Other Report of a fall.    Detailed comments: AVELINA Yeager from [facility] called to report that yesterday she was informed that Christina had a fall on Saturday 2/23/2019. Christina has seven stairs to descend to get to her lower level apartment. She fell while going down the stairs. She felt her legs give out. Christina kept hanging onto the railing during the fall. She bruised her right knee.     Phone Number Patient can be reached at:  AVELINA Yeager, for any questions: [phone]    Best Time: any    Can we leave a detailed message on this number? YES    Call taken on 2/26/2019 at 8:16 AM by Shobha Coates

## 2019-02-26 NOTE — PROGRESS NOTES
Received call from [employer]. Pt is claustrophobic. She received oral valium today, but was still not able to complete the MRI. Will send update to Rashid Bagley RN February 26, 2019, 4:09 PM

## 2019-02-26 NOTE — TELEPHONE ENCOUNTER
"Requested Prescriptions   Pending Prescriptions Disp Refills     diclofenac (VOLTAREN) 1 % topical gel  Last Written Prescription Date:  No on list  Last Fill Quantity: ,  # refills:    Last office visit: No previous visit found with prescribing provider:     Future Office Visit:   Next 5 appointments (look out 90 days)    Feb 28, 2019 11:10 AM CST  Office Visit with Mary Carmen Chaudhry PA-C  [facility] [address] [place] [id number]  [phone]                Sig: Apply topically 4 times daily    Topical Steroids and Nonsteroidals Protocol Passed - 2/26/2019  4:07 PM       Passed - Patient is age 6 or older       Passed - Authorizing prescriber's most recent note related to this medication read.    If refill request is for ophthalmic use, please forward request to provider for approval.         Passed - High potency steroid not ordered       Passed - Recent (12 mo) or future (30 days) visit within the authorizing provider's specialty    Patient had office visit in the last 12 months or has a visit in the next 30 days with authorizing provider or within the authorizing provider's specialty.  See \"Patient Info\" tab in inbasket, or \"Choose Columns\" in Meds & Orders section of the refill encounter.             Passed - Medication is active on med list          "

## 2019-02-26 NOTE — TELEPHONE ENCOUNTER
Christine called back, patient did not hit her head. There are 7 steps down to her apartment, she was coming down and her knees gave out. She fell forward and bumped the right knee which caused a bruise, but was holding on to the raining throughout the fall. She was able to get up and walk to her apartment with some assistance from the other residents from the building. Nurse was not aware of other falls within the last year. Patient did not have any other complains today. Nurse will see patient again this Friday and reassess.

## 2019-02-27 ENCOUNTER — TELEPHONE (OUTPATIENT)
Dept: FAMILY MEDICINE | Facility: CLINIC | Age: 49
End: 2019-02-27

## 2019-02-27 NOTE — TELEPHONE ENCOUNTER
Our goal is to have forms completed within 72 hours, however some forms may require a visit or additional information.    What clinic location was the form placed at [facility] or [place].?     Who is the form from?   Where did the form come from? Faxed to clinic   The form was placed in the inbox of TATY Barnett      Please fax to [phone]  Phone number:      Additional comments: [facility] OT 1 every 10 days 1     Call take on 2/27/2019 at 10:19 AM by Alessia Patel

## 2019-02-27 NOTE — TELEPHONE ENCOUNTER
Routing refill request to provider for review/approval because:  Drug not active on patient's medication list  OV tomorrow

## 2019-02-28 ENCOUNTER — TELEPHONE (OUTPATIENT)
Dept: FAMILY MEDICINE | Facility: CLINIC | Age: 49
End: 2019-02-28

## 2019-02-28 NOTE — TELEPHONE ENCOUNTER
Our goal is to have forms completed within 72 hours, however some forms may require a visit or additional information.    What clinic location was the form placed at [facility] or [place].?     Who is the form from?   Where did the form come from? Faxed to clinic   The form was placed in the inbox of TATY Barnett      Please fax to [phone]  Phone number:      Additional comments: [facility] 2/14/19 to 4/14/19    Call take on 2/28/2019 at 2:31 PM by Alessia Patel

## 2019-02-28 NOTE — TELEPHONE ENCOUNTER
Our goal is to have forms completed within 72 hours, however some forms may require a visit or additional information.    What clinic location was the form placed at [facility] or [place].?     Who is the form from?   Where did the form come from? Faxed to clinic   The form was placed in the inbox of TATY Barnett      Please fax to [phone]  Phone number:      Additional comments: [facility]  SN INR 3.5 from 2/22/19    Call take on 2/28/2019 at 2:54 PM by Alessia Patel

## 2019-03-01 DIAGNOSIS — Z53.9 DIAGNOSIS NOT YET DEFINED: Primary | ICD-10-CM

## 2019-03-01 DIAGNOSIS — F33.1 MAJOR DEPRESSIVE DISORDER, RECURRENT EPISODE, MODERATE (H): ICD-10-CM

## 2019-03-01 PROCEDURE — G0180 MD CERTIFICATION HHA PATIENT: HCPCS | Performed by: FAMILY MEDICINE

## 2019-03-01 RX ORDER — BUPROPION HYDROCHLORIDE 150 MG/1
150 TABLET ORAL DAILY
Qty: 30 TABLET | Refills: 0 | Status: SHIPPED | OUTPATIENT
Start: 2019-03-01 | End: 2019-04-02

## 2019-03-01 NOTE — TELEPHONE ENCOUNTER
Call received from patient this afternoon. States they needs a refill on her bupropion. This is the third time it has been requested. Sent to RX refill pool as high priority.

## 2019-03-01 NOTE — TELEPHONE ENCOUNTER
Routing refill request to provider for review/approval because:  PHQ9 is above 4  FYI- pt has future appointment with PCP 03/04/2019.

## 2019-03-01 NOTE — TELEPHONE ENCOUNTER
"Requested Prescriptions   Pending Prescriptions Disp Refills     buPROPion (WELLBUTRIN XL) 150 MG 24 hr tablet  Last Written Prescription Date:  1/30/2019  Last Fill Quantity: 30 tablet,  # refills: 0   Last office visit: 1/31/2019 with prescribing provider:  Richa   Future Office Visit:   Next 5 appointments (look out 90 days)    Mar 04, 2019 11:50 AM CST  Office Visit with Mary Carmen Chaudhry PA-C  [facility] ([facility]) [address] [place] [id number]  [phone]          30 tablet 0     Sig: Take 1 tablet (150 mg) by mouth daily Future refills by PCP [facility] with phone number [phone].    SSRIs Protocol Failed - 3/1/2019  1:31 PM       Failed - PHQ-9 score less than 5 in past 6 months    Please review last PHQ-9 score.   PHQ-9 SCORE 5/24/2016 9/27/2018 1/31/2019   PHQ-9 Total Score - - -   PHQ-9 Total Score 16 18 13     BAR-7 SCORE 9/27/2018   Total Score 18            Passed - Medication is Bupropion    If the medication is Bupropion (Wellbutrin), and the patient is taking for smoking cessation; OK to refill.         Passed - Medication is active on med list       Passed - Patient is age 18 or older       Passed - No active pregnancy on record       Passed - No positive pregnancy test in last 12 months       Passed - Recent (6 mo) or future (30 days) visit within the authorizing provider's specialty    Patient had office visit in the last 6 months or has a visit in the next 30 days with authorizing provider or within the authorizing provider's specialty.  See \"Patient Info\" tab in inbasket, or \"Choose Columns\" in Meds & Orders section of the refill encounter.               "

## 2019-03-05 ENCOUNTER — TELEPHONE (OUTPATIENT)
Dept: FAMILY MEDICINE | Facility: CLINIC | Age: 49
End: 2019-03-05

## 2019-03-05 ENCOUNTER — CARE COORDINATION (OUTPATIENT)
Dept: CARDIOLOGY | Facility: CLINIC | Age: 49
End: 2019-03-05

## 2019-03-05 ENCOUNTER — ANTICOAGULATION THERAPY VISIT (OUTPATIENT)
Dept: FAMILY MEDICINE | Facility: CLINIC | Age: 49
End: 2019-03-05
Payer: MEDICARE

## 2019-03-05 LAB — INR PPP: 1.4

## 2019-03-05 PROCEDURE — 99207 ZZC NO CHARGE NURSE ONLY: CPT | Performed by: PHYSICIAN ASSISTANT

## 2019-03-05 NOTE — PROGRESS NOTES
Nancie Jiang, APRN CNP   You 2 hours ago (11:34 AM)      Will further discuss with patient during clinic visit next week.     I did review with Dr. Ferrer, no open MRI options. The other option would be to have her go to the  of  and be sedated for cardiac MRI.     Thank you for the update.     Routing Comment

## 2019-03-05 NOTE — PROGRESS NOTES
Called pt for weekly wt/sx update, no answer. LVM for call back to review. SHREYAS Aguirre 11:13 AM 03/05/19

## 2019-03-05 NOTE — PROGRESS NOTES
Pt left message earlier this morning to find out if there is an alternative plan as she was unable to complete cardiac MRI last week d/t claustrophobia. Nancie was going to discuss with . Will send update to Nancie. Yudi PRITCHETT March 5, 2019, 11:20 AM

## 2019-03-05 NOTE — PROGRESS NOTES
ANTICOAGULATION FOLLOW-UP CLINIC VISIT    Patient Name:  Christina Fletcher  Date:  3/5/2019  Contact Type:  Telephone    SUBJECTIVE:     Patient Findings     Comments:   PT non compliant with dosing. [facility] asked if Christina would be willing to change to Eliquis or one of the other oral anticogulants and she said not.            OBJECTIVE    INR   Date Value Ref Range Status   03/05/2019 1.4  Final       ASSESSMENT / PLAN  INR assessment SUB    Recheck INR In: 4 DAYS    INR Location [facility]      Anticoagulation Summary  As of 3/5/2019    INR goal:   2.0-3.0   TTR:   7.4 % (4.5 mo)   INR used for dosing:      Warfarin maintenance plan:   7.5 mg (5 mg x 1.5) every day   Full warfarin instructions:   7.5 mg every day   Weekly warfarin total:   52.5 mg   Plan last modified:   Abigail Gabriel RN (1/31/2019)   Next INR check:   3/8/2019   Target end date:            Anticoagulation Episode Summary     INR check location:       Preferred lab:       Send INR reminders to:   [facility] INR/PROTIME    Comments:               See the Encounter Report to view Anticoagulation Flowsheet and Dosing Calendar (Go to Encounters tab in chart review, and find the Anticoagulation Therapy Visit)        Reyna Rosa, RN

## 2019-03-05 NOTE — TELEPHONE ENCOUNTER
Our goal is to have forms completed within 72 hours, however some forms may require a visit or additional information.    What clinic location was the form placed at [facility] or [place].?     Who is the form from?   Where did the form come from? Faxed to clinic   The form was placed in the inbox of TATY Barnett      Please fax to [phone]  Phone number:      Additional comments: [facility] SN INR 3.0 from 2/26/19    Call take on 3/5/2019 at 4:25 PM by Alessia Patel

## 2019-03-07 ENCOUNTER — TELEPHONE (OUTPATIENT)
Dept: FAMILY MEDICINE | Facility: CLINIC | Age: 49
End: 2019-03-07

## 2019-03-07 NOTE — TELEPHONE ENCOUNTER
Patient Contact    Attempt # 1    Was call answered?  No. Please call the clinic and ask to talk with triage.

## 2019-03-07 NOTE — PROGRESS NOTES
"Called pt back for weekly weight/sx update. Pt said she was never called back regarding her inability to to the cardiac MRI. I apologized to pt, let her know I think a couple of my coworkers had tried to call her but that Nancie said she will discuss this further at her OV on 3/12 and her option is to do it at the [facility] with sedation.     Pt also states she doesn't know her weight as her scale is broken and she can't afford a new one. Pt wondering if she can get a prescription for medical equipment to have her insurance pay for a scale. Pt denies any HF symptoms, states she's \"feeling pretty good.\"     Pt also would like an OV note printed or something with her CHF diagnosis as she's trying to get home care through her PCP but needs to prove her CHF diagnosis. Pt states she will pick it up tomorrow morning at 0900 from the . Pt agreeable to wait until 3/12 OV for Rx for scale, as Nancie is out of the office until then.    Mary Carmen Pacheco RN on 3/7/2019 at 12:12 PM    "

## 2019-03-07 NOTE — TELEPHONE ENCOUNTER
Reason for Call: Request for an order or referral:    Order or referral being requested: Social Work Home Care Visit     Date needed: as soon as possible    Has the patient been seen by the PCP for this problem? YES    Additional comments: Erika the  is wanting to have one more visit with Liane    Phone number Patient can be reached at:  Other phone number:  [phone]    Best Time:  Anytime     Can we leave a detailed message on this number?  YES    Call taken on 3/7/2019 at 12:01 PM by Naomi Arce

## 2019-03-07 NOTE — PROGRESS NOTES
Called pt back to review wt/sx. Pt said she is at a meeting right now and can't talk. Requested I return the call to her in about an hour. Mary Carmen Pacheco RN on 3/7/2019 at 11:04 AM

## 2019-03-07 NOTE — TELEPHONE ENCOUNTER
visit x 1: talk with patient about [facility] program, housing information.     Okay was given for this order. They will fax to clinic for signature from physician.

## 2019-03-08 ENCOUNTER — TELEPHONE (OUTPATIENT)
Dept: FAMILY MEDICINE | Facility: CLINIC | Age: 49
End: 2019-03-08

## 2019-03-08 NOTE — TELEPHONE ENCOUNTER
Our goal is to have forms completed within 72 hours, however some forms may require a visit or additional information.    What clinic location was the form placed at [facility] or [place].?     Who is the form from?   Where did the form come from? Faxed to clinic   The form was placed in the inbox of TATY Barnett      Please fax to [phone]  Phone number:      Additional comments: [facility] SN INR 3.2 from 3/1/19    Call take on 3/8/2019 at 2:58 PM by Alessia Patel

## 2019-03-08 NOTE — TELEPHONE ENCOUNTER
Our goal is to have forms completed within 72 hours, however some forms may require a visit or additional information.    What clinic location was the form placed at [facility] or [place].?     Who is the form from?   Where did the form come from? Faxed to clinic   The form was placed in the inbox of TATY Barnett      Please fax to [phone]  Phone number:      Additional comments: [facility] SN INR 1.4 from 3/5/19    Call take on 3/8/2019 at 3:55 PM by Alessia Patel

## 2019-03-08 NOTE — TELEPHONE ENCOUNTER
Our goal is to have forms completed within 72 hours, however some forms may require a visit or additional information.    What clinic location was the form placed at [facility] or [place].?     Who is the form from?   Where did the form come from? Faxed to clinic   The form was placed in the inbox of TATY Barnett      Please fax to [phone]   Phone number:      Additional comments: [facility] SN INR 1.4 from 3/5/19    Call take on 3/8/2019 at 3:52 PM by Alessia Patel

## 2019-03-12 ENCOUNTER — OFFICE VISIT (OUTPATIENT)
Dept: CARDIOLOGY | Facility: CLINIC | Age: 49
End: 2019-03-12
Payer: MEDICARE

## 2019-03-12 ENCOUNTER — ANTICOAGULATION THERAPY VISIT (OUTPATIENT)
Dept: FAMILY MEDICINE | Facility: CLINIC | Age: 49
End: 2019-03-12
Payer: MEDICARE

## 2019-03-12 VITALS
BODY MASS INDEX: 43.79 KG/M2 | WEIGHT: 256.5 LBS | HEIGHT: 64 IN | SYSTOLIC BLOOD PRESSURE: 122 MMHG | HEART RATE: 104 BPM | OXYGEN SATURATION: 97 % | DIASTOLIC BLOOD PRESSURE: 80 MMHG

## 2019-03-12 DIAGNOSIS — I50.22 CHRONIC SYSTOLIC HEART FAILURE (H): Primary | ICD-10-CM

## 2019-03-12 DIAGNOSIS — I50.22 CHRONIC SYSTOLIC HEART FAILURE (H): ICD-10-CM

## 2019-03-12 DIAGNOSIS — G47.33 OSA ON CPAP: ICD-10-CM

## 2019-03-12 DIAGNOSIS — E78.5 HYPERLIPIDEMIA LDL GOAL <130: ICD-10-CM

## 2019-03-12 DIAGNOSIS — I42.9 CARDIOMYOPATHY, UNSPECIFIED TYPE (H): ICD-10-CM

## 2019-03-12 DIAGNOSIS — I10 ESSENTIAL HYPERTENSION: ICD-10-CM

## 2019-03-12 DIAGNOSIS — E66.01 MORBID OBESITY DUE TO EXCESS CALORIES (H): ICD-10-CM

## 2019-03-12 LAB
ANION GAP SERPL CALCULATED.3IONS-SCNC: 15 MMOL/L (ref 6–17)
BUN SERPL-MCNC: 12 MG/DL (ref 7–30)
CALCIUM SERPL-MCNC: 9.8 MG/DL (ref 8.5–10.5)
CHLORIDE SERPL-SCNC: 103 MMOL/L (ref 98–107)
CO2 SERPL-SCNC: 25 MMOL/L (ref 23–29)
CREAT SERPL-MCNC: 1.05 MG/DL (ref 0.7–1.3)
GFR SERPL CREATININE-BSD FRML MDRD: 56 ML/MIN/{1.73_M2}
GLUCOSE SERPL-MCNC: 106 MG/DL (ref 70–105)
INR PPP: 2.8 (ref 0.86–1.14)
POTASSIUM SERPL-SCNC: 4 MMOL/L (ref 3.5–5.1)
SODIUM SERPL-SCNC: 139 MMOL/L (ref 136–145)

## 2019-03-12 PROCEDURE — 80048 BASIC METABOLIC PNL TOTAL CA: CPT | Performed by: NURSE PRACTITIONER

## 2019-03-12 PROCEDURE — 99207 ZZC NO CHARGE NURSE ONLY: CPT | Performed by: PHYSICIAN ASSISTANT

## 2019-03-12 PROCEDURE — 36415 COLL VENOUS BLD VENIPUNCTURE: CPT | Performed by: NURSE PRACTITIONER

## 2019-03-12 PROCEDURE — 99214 OFFICE O/P EST MOD 30 MIN: CPT | Performed by: NURSE PRACTITIONER

## 2019-03-12 RX ORDER — METOPROLOL SUCCINATE 25 MG/1
37.5 TABLET, EXTENDED RELEASE ORAL 2 TIMES DAILY
Qty: 180 TABLET | Refills: 1 | Status: SHIPPED | OUTPATIENT
Start: 2019-03-12 | End: 2019-10-09

## 2019-03-12 ASSESSMENT — MIFFLIN-ST. JEOR: SCORE: 1778.48

## 2019-03-12 NOTE — PROGRESS NOTES
ANTICOAGULATION FOLLOW-UP CLINIC VISIT    Patient Name:  Christina Fletcher  Date:  3/12/2019  Contact Type:  Telephone    SUBJECTIVE:     Patient Findings     Comments:   No change           OBJECTIVE    INR   Date Value Ref Range Status   2019 2.8 (A) 0.86 - 1.14 Final       ASSESSMENT / PLAN  INR assessment THER    Recheck INR In: 1 WEEK    INR Location [facility]      Anticoagulation Summary  As of 3/12/2019    INR goal:   2.0-3.0   TTR:   9.8 % (4.7 mo)   INR used for dosin.8 (3/12/2019)   Warfarin maintenance plan:   7.5 mg (5 mg x 1.5) every day   Full warfarin instructions:   7.5 mg every day   Weekly warfarin total:   52.5 mg   Plan last modified:   Abigail Gabriel RN (2019)   Next INR check:   3/19/2019   Target end date:            Anticoagulation Episode Summary     INR check location:       Preferred lab:       Send INR reminders to:   [facility] INR/PROTIME    Comments:               See the Encounter Report to view Anticoagulation Flowsheet and Dosing Calendar (Go to Encounters tab in chart review, and find the Anticoagulation Therapy Visit)        Reyna Rosa RN        Susanane is interested in switching to the new Anticoagulant meds like Eliquis. I discuss with Mary Carmen Chaudhry and she would like to do that at a office visit. Left a phone message for Christina.

## 2019-03-12 NOTE — PATIENT INSTRUCTIONS
Call [facility] nurse for any questions or concerns Mon-Fri 8am-4pm:                                                 #[phone]                                       For concerns after hours:                                               #[phone]     1: Medication changes: INCREASE metoprolol to 37.5mg 2 x a day (1 1/2 tablets 2 x a day)  2: Plan from today: Followup with Nancie in 2 weeks, Followup with Dr. Morales in 6 weeks with echocardiogram  3: Lab results: see attached; kidney function stable, electrolytes stable  Component      Latest Ref Rng & Units 2/4/2019 2/26/2019 3/12/2019   Sodium      136 - 145 mmol/L 133 (L)  139   Potassium      3.5 - 5.1 mmol/L 4.6  4.0   Chloride      98 - 107 mmol/L 103  103   Carbon Dioxide      23 - 29 mmol/L 23  25   Anion Gap      6 - 17 mmol/L 11.6  15   Glucose      70 - 105 mg/dL 115 (H)  106 (H)   Urea Nitrogen      7 - 30 mg/dL 18  12   Creatinine      0.70 - 1.30 mg/dL 1.18 0.9 1.05   GFR Estimate      >60 mL/min/1.73:m2 49 (L) 67 56 (L)   GFR Estimate If Black      >60 mL/min/1.73:m2 59 (L) 81 68   Calcium      8.5 - 10.5 mg/dL 9.6  9.8   N-Terminal Pro Bnp      0 - 125 pg/mL 1,222 (H)

## 2019-03-13 ENCOUNTER — TELEPHONE (OUTPATIENT)
Dept: FAMILY MEDICINE | Facility: CLINIC | Age: 49
End: 2019-03-13

## 2019-03-13 NOTE — TELEPHONE ENCOUNTER
Our goal is to have forms completed within 72 hours, however some forms may require a visit or additional information.    What clinic location was the form placed at [facility] or [place].?     Who is the form from?   Where did the form come from? Faxed to clinic   The form was placed in the inbox of TATY Barnett      Please fax to [phone]   Phone number:      Additional comments: [facility] SW 1 every 30 days 1    Call take on 3/13/2019 at 11:44 AM by Alessia Patel

## 2019-03-14 ENCOUNTER — CARE COORDINATION (OUTPATIENT)
Dept: CARDIOLOGY | Facility: CLINIC | Age: 49
End: 2019-03-14

## 2019-03-14 NOTE — PROGRESS NOTES
Sharla, [facility] home health manager called to report they have been having difficulties with pt. She is often not home, and when their nurse has seen pt in her home she has refused vital signs and only wants INR check. Pt refused PT services. Pt does not seem to be homebound so they are likely going to have to discharge her from home care. She would like to know if Nancie has any further thoughts on whether she thinks pt needs home care from cardiac standpoint. Otherwise they will talk to PMD to talk about discharging pt from home care. Yudi PRITCHETT March 14, 2019, 11:13 AM

## 2019-03-14 NOTE — PROGRESS NOTES
Nancie Jiang APRN CNP [place], Erin B. RN   Caller: Unspecified (Today, 11:11 AM)             I don't have any strong thoughts. She noted to me during her clinic visit that it is her joint discomfort that limits her mobility and results in her being home bound.     She did mention the discord that has been occurring with home care.     I think primary care should weigh in as they have a longer relationship with the patient.     Jayne,   Nancie

## 2019-03-14 NOTE — PROGRESS NOTES
I left message with Sharla with update on Nancie's thoughts regarding whether pt needs home care. Yudi PRITCHETT March 14, 2019, 2:40 PM

## 2019-03-15 ENCOUNTER — TELEPHONE (OUTPATIENT)
Dept: FAMILY MEDICINE | Facility: CLINIC | Age: 49
End: 2019-03-15

## 2019-03-15 DIAGNOSIS — Z86.711 HISTORY OF PULMONARY EMBOLISM: Primary | ICD-10-CM

## 2019-03-15 NOTE — TELEPHONE ENCOUNTER
Call from Home Care, Sharla with a concern/update on patient.     Home Care is coming to the end of orders for this patient. There is not a reason a skilled nurse needs to be in home at this time. Patient is all of a sudden concerned that they will not be coming in to her home to check INR anymore. Having INR checks is not a reason for skilled nsg in the home, she will need to start coming to the clinic to have this done in the future if she stays on Coumadin.    She is all of a sudden concerned that staff will no longer be coming out, but would cancel on the staff often and was not interested in education from staff, or assessment. She only wanted the INR to be checked. And would cancel on these checks often.       Home Care is looking for a weigh in from the provider at this point on next steps.

## 2019-03-18 NOTE — TELEPHONE ENCOUNTER
Sharla with Home care is calling, would like a call back regarding the update to patients medications. [phone].

## 2019-03-18 NOTE — TELEPHONE ENCOUNTER
I will have Liane STOP taking warfarin and start taking Xarelto instead.      She is not compliance with INR checks and we have not been able to maintain adequate anticoagulation due to noncompliance.     I would like her to take Xarelto 10 mg daily.      Davis Chaudhry PA-C

## 2019-03-18 NOTE — TELEPHONE ENCOUNTER
I left a message with the patient with the information to   - Stop warfarin  - Start Xarelto  - Call if there are questions or concerns.     Davis Chaudhry PA-C

## 2019-03-18 NOTE — TELEPHONE ENCOUNTER
ASHER-  Called pt back and informed her of plan below.     - Stop warfarin  - Start Xarelto    She verbalized agreement with plan. Asked to schedule an appointment with PCP. Future appointment was scheduled. Home care nurse was also informed of plan. Pt will be discharged of HC since she is not home bound.

## 2019-03-19 ENCOUNTER — TELEPHONE (OUTPATIENT)
Dept: FAMILY MEDICINE | Facility: CLINIC | Age: 49
End: 2019-03-19

## 2019-03-19 NOTE — TELEPHONE ENCOUNTER
Our goal is to have forms completed within 72 hours, however some forms may require a visit or additional information.    What clinic location was the form placed at [facility] or [place].?     Who is the form from?   Where did the form come from? Faxed to clinic   The form was placed in the inbox of TATY Barnett      Please fax to [phone]  Phone number:      Additional comments: [facility] SN INR 2.0 from 3/8/19    Call take on 3/19/2019 at 11:07 AM by Alessia Patel

## 2019-03-21 ENCOUNTER — TELEPHONE (OUTPATIENT)
Dept: FAMILY MEDICINE | Facility: CLINIC | Age: 49
End: 2019-03-21

## 2019-03-21 NOTE — TELEPHONE ENCOUNTER
Christina walked in and wanted a copy of health history with CHF listed.    I printed copy for her    Lynne Zhou RN- Triage [employer]

## 2019-03-22 ENCOUNTER — TELEPHONE (OUTPATIENT)
Dept: FAMILY MEDICINE | Facility: CLINIC | Age: 49
End: 2019-03-22

## 2019-03-22 NOTE — TELEPHONE ENCOUNTER
Christina called. She was concerned because she picked up a copy of her problem list and saw that it listed Chronic kidney disease stage 3 on her list. I looked back and it was noted on 5/24/2016 by Dr Saini.  I told her I would alert Davis Chaudhry and they could discuss it at her next OV.   Information to Davis Chaudhry

## 2019-03-26 ENCOUNTER — OFFICE VISIT (OUTPATIENT)
Dept: FAMILY MEDICINE | Facility: CLINIC | Age: 49
End: 2019-03-26
Payer: MEDICARE

## 2019-03-26 ENCOUNTER — TELEPHONE (OUTPATIENT)
Dept: FAMILY MEDICINE | Facility: CLINIC | Age: 49
End: 2019-03-26

## 2019-03-26 ENCOUNTER — ANCILLARY PROCEDURE (OUTPATIENT)
Dept: GENERAL RADIOLOGY | Facility: CLINIC | Age: 49
End: 2019-03-26
Attending: PHYSICIAN ASSISTANT
Payer: MEDICARE

## 2019-03-26 VITALS
DIASTOLIC BLOOD PRESSURE: 80 MMHG | SYSTOLIC BLOOD PRESSURE: 124 MMHG | BODY MASS INDEX: 43.77 KG/M2 | OXYGEN SATURATION: 97 % | WEIGHT: 255 LBS | TEMPERATURE: 99.2 F | HEART RATE: 97 BPM

## 2019-03-26 DIAGNOSIS — R05.9 COUGH: Primary | ICD-10-CM

## 2019-03-26 DIAGNOSIS — I50.22 CHRONIC SYSTOLIC CONGESTIVE HEART FAILURE (H): ICD-10-CM

## 2019-03-26 DIAGNOSIS — G89.29 CHRONIC PAIN OF BOTH KNEES: ICD-10-CM

## 2019-03-26 DIAGNOSIS — M25.562 CHRONIC PAIN OF BOTH KNEES: ICD-10-CM

## 2019-03-26 DIAGNOSIS — I10 ESSENTIAL HYPERTENSION WITH GOAL BLOOD PRESSURE LESS THAN 130/80: ICD-10-CM

## 2019-03-26 DIAGNOSIS — M25.561 CHRONIC PAIN OF BOTH KNEES: ICD-10-CM

## 2019-03-26 DIAGNOSIS — R05.9 COUGH: ICD-10-CM

## 2019-03-26 PROCEDURE — 99214 OFFICE O/P EST MOD 30 MIN: CPT | Performed by: PHYSICIAN ASSISTANT

## 2019-03-26 PROCEDURE — 71046 X-RAY EXAM CHEST 2 VIEWS: CPT | Mod: FY

## 2019-03-26 RX ORDER — LOSARTAN POTASSIUM 25 MG/1
25 TABLET ORAL DAILY
Qty: 30 TABLET | Refills: 1 | Status: SHIPPED | OUTPATIENT
Start: 2019-03-26 | End: 2019-07-29

## 2019-03-26 RX ORDER — HYDROCODONE BITARTRATE AND ACETAMINOPHEN 5; 325 MG/1; MG/1
1 TABLET ORAL EVERY 6 HOURS PRN
Qty: 20 TABLET | Refills: 0 | Status: SHIPPED | OUTPATIENT
Start: 2019-03-26 | End: 2019-03-29

## 2019-03-26 ASSESSMENT — ENCOUNTER SYMPTOMS
ARTHRALGIAS: 1
ENDOCRINE NEGATIVE: 1
NEUROLOGICAL NEGATIVE: 1
GASTROINTESTINAL NEGATIVE: 1
WHEEZING: 0
SHORTNESS OF BREATH: 0
CONSTITUTIONAL NEGATIVE: 1
DYSPHORIC MOOD: 1
COUGH: 1
EYES NEGATIVE: 1
CARDIOVASCULAR NEGATIVE: 1

## 2019-03-26 NOTE — TELEPHONE ENCOUNTER
Called and left a message for Christina.     She should stop the lisinopril and start taking losartan.     I will put a reminder in her chart to contact her again tomorrow.     Davis Chaudhry PA-C

## 2019-03-26 NOTE — PROGRESS NOTES
SUBJECTIVE:   Christina Fletcher is a 49 year old female who presents to clinic today for the following health issues:      Pt would like to talk about leg injection  And about her new meds the blood thinner  Pt has been falling a lot, was checked by home nurses    She is working on getting cardiac MRI schedule with sedation  She is coughing a lot, feeling more tired, several falls  Breathing is fine    She now has this cough and takes lisinopril     Hypertension Follow-up      She would like to see if the cough is caused by her lisinopril.     Heart Failure Follow-up    Symptoms:    Shortness of breath: none    Lower extremity edema: none    Chest pain: No    Weight:    Checking weight daily: No    Weight change: weight decrease of 30 lb in 2 months    Cardiology visits, ER/UC, or hospital admissions since last visit: Cardiology Visit - see chart    Medication side effects: cough (possibly related)    Depression Followup    Status since last visit: Worsened - her living situation is very stressful, she has a son in nursing home     See PHQ-9 for current symptoms.  Other associated symptoms: None    Complicating factors:   Significant life event:  Yes-  Issues with landlord and other tenants in her apartment complex   Current substance abuse:  None  Anxiety or Panic symptoms:  No    PHQ 2/13/2017 9/27/2018 1/31/2019   PHQ-9 Total Score - 18 13   Q9: Suicide Ideation Not at all Not at all Not at all   F/U: Thoughts of suicide or self-harm - No -   F/U: Safety concerns - No -       Chronic Pain Initial Visit       Pain location: bilateral knees  Pain onset: years  Pain is described as Aching, Nagging, Stabbing and Throbbing   Pain rating: intensity is severe  Aggravating factors include: walking, stairs  Patient is not a candidate for surgery (bilateral TKA) at this time due to her CHF with EF <20%    Past pain treatments:   Pain Clinic:  Yes - [facility]    PT:  Yes -pool therapy  Injections:  Yes - cortisone injections every 6  weeks    Previous medication treatments:  Opiates - hydrocodone (Vicodin/Norco) - has worked for her in the past.  She does not tolerate tramadol or oxycodone.   Antiepileptics - none  Antidepressants - bupropion (Wellbutrin)   NSAIDs - contraindicated   Muscle relaxants - none  Topicals - none    History of chemical dependency:  No  Depression/mood issues:  Yes  Anxiety issues:  No    Patient is requesting pain medication today as well as a referral to [facility].   She would also like knee injections if possible - she is currently 8 weeks out from her last knee injections at [facility].     Problem list and histories reviewed & adjusted, as indicated.  Additional history: as documented    Patient Active Problem List   Diagnosis     Female genital symptoms     Other disorder of menstruation and other abnormal bleeding from female genital tract     Adjustment disorder with depressed mood     Obesity     Chronic constipation     GERD (gastroesophageal reflux disease)     Lower extremity edema     Elevated MCV     Fibroids     Fatigue     Menorrhagia     HTN (hypertension)     Gastric bypass status for obesity     CARDIOVASCULAR SCREENING; LDL GOAL LESS THAN 160     Bariatric surgery status     Vitamin B12 deficiency without anemia     Iron deficiency     Vitamin D deficiency     Weight gain     Dysmetabolic syndrome     Galactorrhea     OA (osteoarthritis) of knee     Ascorbic acid deficiency     Pyridoxine deficiency     Knee pain     KAROLINA (obstructive sleep apnea)     Hyperlipidemia LDL goal <130     KAROLINA on CPAP     BMI 50.0-59.9, adult (H)     Hyperlipidemia with target LDL less than 130     Eczema     Hyponatremia     Chronic kidney disease, stage III (moderate) (H)     Neuropathy     Morbid obesity due to excess calories (H)     Vitamin C deficiency     Osteoarthritis of both knees, unspecified osteoarthritis type     Hypomagnesemia     Essential hypertension with goal blood pressure less than 130/80     Eczema,  unspecified type     Major depressive disorder, recurrent episode, moderate (H)     Long-term (current) use of anticoagulants [Z79.01]     History of pulmonary embolism     Dyspnea on exertion     CHF (congestive heart failure) (H)     Past Surgical History:   Procedure Laterality Date     CV HEART CATHETERIZATION WITH POSSIBLE INTERVENTION N/A 1/28/2019    Procedure: Heart Catheterization with possible Intervention;  Surgeon: Eloisa Bob MD;  Location:  HEART CARDIAC CATH LAB     DEBRIDE ASSOC FX/DISLO SKIN/MUS/BONE  1990's    Infected - Right Femur     GASTRIC BYPASS  01/18/2017    conversion of gastric sleeve to gastric bypass with lysis of adhesions-      HYSTEROSCOPY,ABLATION ENDOMETRIUM  2008     LAP ADJUSTABLE GASTRIC BAND  2001    Lap Banding      LAPAROSCOPIC GASTRIC SLEEVE  2010    Dr Harris       Social History     Tobacco Use     Smoking status: Never Smoker     Smokeless tobacco: Never Used   Substance Use Topics     Alcohol use: Yes     Alcohol/week: 0.0 oz     Comment: occ      Family History   Problem Relation Age of Onset     Hypertension Mother      Breast Cancer Maternal Grandmother      Cancer Maternal Grandmother         Bone Cancer     Thyroid Disease Son      Genitourinary Problems Daughter         Kidney failure         Current Outpatient Medications   Medication Sig Dispense Refill     acetaminophen 500 MG CAPS Take 2 capsules by mouth every 8 hours as needed For aches, pain, fever 60 capsule 0     albuterol (PROAIR HFA/PROVENTIL HFA/VENTOLIN HFA) 108 (90 Base) MCG/ACT inhaler Inhale 2-4 puffs into the lungs every 2 hours as needed for shortness of breath / dyspnea 1 Inhaler 1     Ascorbic Acid Buffered (VITAMIN C EFFERVESCENT) PDEF Take 1 packet by mouth 2 times daily (before meals) EMERGEN- C, INDICATION: VITAMIN C SUPPLEMENTATION AND TO AID ABSORPTION OF IRON SUPPLEMENT 90 g PRN     buPROPion (WELLBUTRIN XL) 150 MG 24 hr tablet Take 1 tablet (150 mg) by mouth daily Future  refills by PCP [facility] with phone number [phone]. 30 tablet 0     cholecalciferol (VITAMIN D3) 40949 UNITS capsule Take 1 capsule (50,000 Units) by mouth once a week TAKE FOR VITAMIN D DEFICIENCY 60 capsule 0     diclofenac (VOLTAREN) 1 % topical gel Apply 4 g topically 4 times daily 1 Tube 3     furosemide (LASIX) 20 MG tablet Take 1 tablet (20 mg) by mouth daily . Future refills by PCP or Cardiologist. 90 tablet 1     HYDROcodone-acetaminophen (NORCO) 5-325 MG tablet Take 1 tablet by mouth every 6 hours as needed for severe pain 20 tablet 0     LORazepam (ATIVAN) 0.5 MG tablet Take 1 tablet (0.5 mg) by mouth 2 times daily as needed for anxiety . Future refills by PCP or Psychiatrist. 4 tablet 0     losartan (COZAAR) 25 MG tablet Take 1 tablet (25 mg) by mouth daily 30 tablet 1     metoprolol succinate ER (TOPROL-XL) 25 MG 24 hr tablet Take 1.5 tablets (37.5 mg) by mouth 2 times daily . Future refills by PCP or cardiologist. 180 tablet 1     Multiple Vitamin (MULTI-VITAMIN DAILY PO) Take 1 tablet by mouth daily        naloxone (NARCAN) 4 MG/0.1ML nasal spray Spray 1 spray (4 mg) into one nostril alternating nostrils once as needed for opioid reversal every 2-3 minutes until assistance arrives 0.2 mL 0     polyethylene glycol (MIRALAX) powder Take 17 g (1 capful) by mouth daily INDICATION: CONSTIPATION, TO ACHIEVE 1-2 SOFT BMs PER DAY 1 Bottle PRN     rivaroxaban ANTICOAGULANT (XARELTO) 10 MG TABS tablet Take 1 tablet (10 mg) by mouth daily (with dinner) 30 tablet 11     rosuvastatin (CRESTOR) 20 MG tablet Take 1 tablet (20 mg) by mouth daily INDICATION: TO LOWER CHOLESTEROL AND TO HELP REDUCE RISK OF HEART ATTACK AND STROKE 90 tablet 1     senna-docusate (SENOKOT-S/PERICOLACE) 8.6-50 MG tablet Take 1-2 tablets by mouth 2 times daily INDICATION: CONSTIPATION, TO ACHIEVE 1-2 SOFT BMs PER DAY 60 tablet 0     Allergies   Allergen Reactions     Hydralazine Shortness Of Breath     WITH ASSOCIATED  NAUSEA AND VOMITING     Nsaids Other (See Comments)     Other reaction(s): Other - Describe In Comment Field  Patient had gastric bypass surgery.  Should not take oral NSAID's ever.     Penicillin [Penicillins] Rash       Reviewed and updated as needed this visit by clinical staff  Tobacco  Allergies  Meds  Med Hx  Surg Hx  Fam Hx  Soc Hx      Reviewed and updated as needed this visit by Provider         Review of Systems   Constitutional: Negative.    HENT: Negative.    Eyes: Negative.    Respiratory: Positive for cough. Negative for shortness of breath and wheezing.    Cardiovascular: Negative.  Negative for chest pain.   Gastrointestinal: Negative.    Endocrine: Negative.    Genitourinary: Negative.    Musculoskeletal: Positive for arthralgias.   Skin: Negative.    Neurological: Negative.    Psychiatric/Behavioral: Positive for dysphoric mood.       OBJECTIVE:     /80 (BP Location: Other (Comment), Cuff Size: Adult Regular)   Pulse 97   Temp 99.2  F (37.3  C) (Tympanic)   Wt 115.7 kg (255 lb)   SpO2 97%   BMI 43.77 kg/m    Body mass index is 43.77 kg/m .    Physical Exam   Constitutional: She is oriented to person, place, and time. She appears well-developed and well-nourished. No distress.   HENT:   Head: Normocephalic.   Right Ear: External ear normal.   Left Ear: External ear normal.   Nose: Nose normal.   Eyes: Conjunctivae and EOM are normal.   Neck: Normal range of motion.   Cardiovascular: Normal rate, regular rhythm and normal heart sounds.   Pulmonary/Chest: Effort normal. She has no wheezes. She has no rhonchi. She has no rales.   Lung bases sound clear today.    Musculoskeletal:        Right knee: She exhibits decreased range of motion and swelling. Tenderness found.        Left knee: She exhibits decreased range of motion and swelling. Tenderness found.   Neurological: She is alert and oriented to person, place, and time.   Skin: She is not diaphoretic.   Psychiatric: She has a normal  mood and affect. Judgment normal.     CXR -   My findings - no interval change from 1/25/19    No results found for this or any previous visit (from the past 24 hour(s)).    ASSESSMENT/PLAN:       ICD-10-CM    1. Cough R05 XR Chest 2 Views   2. Chronic systolic congestive heart failure (H) I50.22 XR Chest 2 Views   3. Chronic pain of both knees M25.561 PAIN MANAGEMENT REFERRAL    M25.562 HYDROcodone-acetaminophen (NORCO) 5-325 MG tablet    G89.29 naloxone (NARCAN) 4 MG/0.1ML nasal spray   4. Essential hypertension with goal blood pressure less than 130/80 I10 losartan (COZAAR) 25 MG tablet      1. We will stop the lisinopril and start losartan 25 mg daily.  CXR looks good today.   2. Norco 20 tablets given for severe knee pain.  She will also establish with [facility] and see ortho back for repeat knee injections.  Continue with weight loss for knee pain.     Return in about 1 week (around 4/2/2019) for Specialist Appointment.    There are no Patient Instructions on file for this visit.    Davis Chaudhry PA-C  [facility]

## 2019-03-26 NOTE — Clinical Note
Nancie, Patient presents today with a dry coughShe would like to know if this is from the lisinopril.  She currently takes 2.5 mg and BP is very well controlled (124/80 in clinic today).  If I stop the lisinopril - do you want me to start an ARB for her, from a cardiology perspective. Jeevan Chaudhry PA-C

## 2019-04-02 ENCOUNTER — TELEPHONE (OUTPATIENT)
Dept: FAMILY MEDICINE | Facility: CLINIC | Age: 49
End: 2019-04-02

## 2019-04-02 DIAGNOSIS — F33.1 MAJOR DEPRESSIVE DISORDER, RECURRENT EPISODE, MODERATE (H): ICD-10-CM

## 2019-04-02 RX ORDER — BUPROPION HYDROCHLORIDE 150 MG/1
150 TABLET ORAL DAILY
Qty: 90 TABLET | Refills: 1 | Status: SHIPPED | OUTPATIENT
Start: 2019-04-02 | End: 2019-10-14

## 2019-04-02 NOTE — TELEPHONE ENCOUNTER
Patient asking to speak with Kacey. Patient would not verify date of birth and hung up the phone. Disregard this encounter.   
hypertension

## 2019-04-02 NOTE — TELEPHONE ENCOUNTER
PHQ-9 SCORE 5/24/2016 9/27/2018 1/31/2019   PHQ-9 Total Score - - -   PHQ-9 Total Score 16 18 13     Routing refill request to provider for review/approval because:  PHQ-9 >4

## 2019-04-11 ENCOUNTER — ANTICOAGULATION THERAPY VISIT (OUTPATIENT)
Dept: FAMILY MEDICINE | Facility: CLINIC | Age: 49
End: 2019-04-11

## 2019-05-06 ENCOUNTER — TELEPHONE (OUTPATIENT)
Dept: FAMILY MEDICINE | Facility: CLINIC | Age: 49
End: 2019-05-06

## 2019-05-06 NOTE — TELEPHONE ENCOUNTER
Prior Authorization Retail Medication Request    Medication/Dose:   ICD code (if different than what is on RX):     Previously Tried and Failed:     Rationale:       Insurance Name:     Insurance ID:         Pharmacy Information (if different than what is on RX)  Name:  [facility]   Phone:  [phone]    [facility] SYED: [id number]

## 2019-05-08 ENCOUNTER — TELEPHONE (OUTPATIENT)
Dept: FAMILY MEDICINE | Facility: CLINIC | Age: 49
End: 2019-05-08

## 2019-05-10 NOTE — TELEPHONE ENCOUNTER
Prior Authorization Approval    Authorization Effective Date: 12/30/2018  Authorization Expiration Date: 12/31/2019  Medication: diclofenac (VOLTAREN) 1 % topical gel-APPROVED  Approved Dose/Quantity:   Reference #:     Insurance Company: [employer] - Phone [phone] Fax [phone]  Expected CoPay:       CoPay Card Available:      Foundation Assistance Needed:    Which Pharmacy is filling the prescription (Not needed for infusion/clinic administered): [facility] [address] [place] [address] AVE [address] AT [address]  Pharmacy Notified: Yes  Patient Notified: No    Pharmacy will notify patient when medication is ready.

## 2019-05-10 NOTE — TELEPHONE ENCOUNTER
Central Prior Authorization Team   Phone: [phone]    PA Initiation    Medication: diclofenac (VOLTAREN) 1 % topical gel-Initiated  Insurance Company: [employer] - Phone [phone] Fax [phone]  Pharmacy Filling the Rx: [facility] [address] [place] [address] [address] AT [facility]  Filling Pharmacy Phone: [phone]  Filling Pharmacy Fax:    Start Date: 5/10/2019

## 2019-06-13 ENCOUNTER — TELEPHONE (OUTPATIENT)
Dept: FAMILY MEDICINE | Facility: CLINIC | Age: 49
End: 2019-06-13

## 2019-06-13 DIAGNOSIS — K59.00 CONSTIPATION, UNSPECIFIED CONSTIPATION TYPE: ICD-10-CM

## 2019-06-13 NOTE — TELEPHONE ENCOUNTER
Patient is at a pain clinic now. Reception transferred pt to triage because she is requesting a copy of her medication list.

## 2019-06-13 NOTE — TELEPHONE ENCOUNTER
"Requested Prescriptions   Pending Prescriptions Disp Refills     senna-docusate (SENOKOT-S/PERICOLACE) 8.6-50 MG tablet  Last Written Prescription Date:  1/29/2019  Last Fill Quantity: 60 tablet,  # refills: 0   Last office visit: 3/26/2019 with prescribing provider:  Richa   Future Office Visit:     60 tablet 0     Sig: Take 1-2 tablets by mouth 2 times daily INDICATION: CONSTIPATION, TO ACHIEVE 1-2 SOFT BMs PER DAY       Laxatives Protocol Passed - 6/13/2019 10:50 AM        Passed - Patient is age 6 or older        Passed - Recent (12 mo) or future (30 days) visit within the authorizing provider's specialty     Patient had office visit in the last 12 months or has a visit in the next 30 days with authorizing provider or within the authorizing provider's specialty.  See \"Patient Info\" tab in inbasket, or \"Choose Columns\" in Meds & Orders section of the refill encounter.              Passed - Medication is active on med list           "

## 2019-06-13 NOTE — TELEPHONE ENCOUNTER
Writer asked that pt have her pain clinic fax record release form to clinic in order to fax copy of medication list. BM fax number was given.

## 2019-06-14 RX ORDER — AMOXICILLIN 250 MG
1-2 CAPSULE ORAL 2 TIMES DAILY
Qty: 60 TABLET | Refills: 8 | Status: SHIPPED | OUTPATIENT
Start: 2019-06-14 | End: 2022-05-16

## 2019-06-17 ENCOUNTER — TELEPHONE (OUTPATIENT)
Dept: FAMILY MEDICINE | Facility: CLINIC | Age: 49
End: 2019-06-17

## 2019-06-17 NOTE — TELEPHONE ENCOUNTER
senna-docusate (SENOKOT-S/PERICOLACE) 8.6-50 MG tablet 8 ordered         Dose, Frequency: 1-2 tablet, 2 TIMES DAILY       Summary: Take 1-2 tablets by mouth 2 times daily INDICATION: CONSTIPATION, TO ACHIEVE 1-2 SOFT BMs PER DAY, Disp-60 tablet, R-8, E-Prescribe   Dose, Route, Frequency: 1-2 tablet, Oral, 2 TIMES DAILY        Davis Chaudhry PA-C

## 2019-06-17 NOTE — TELEPHONE ENCOUNTER
senna-docusate (SENOKOT-S/PERICOLACE) 8.6-50 MG tablet    Please clarify directions, pharmacy says 2 sets of directions

## 2019-06-17 NOTE — TELEPHONE ENCOUNTER
Herniated Disc: Exercises Your Care Instructions Here are some examples of typical rehabilitation exercises for your condition. Start each exercise slowly. Ease off the exercise if you start to have pain. Your doctor or physical therapist will tell you when you can start these exercises and which ones will work best for you. How to stay safe These exercises can help you move easier and feel better. But when you first start doing them, you may have more pain in your back. This is normal. But it is important to pay close attention to your pain during and after each exercise. · Keep doing these exercises if your pain stays the same or moves from your leg and buttock more toward the middle of your spine. Pain moving out of your leg and buttock is a good sign. · Stop doing these exercises if your pain gets worse in your leg and buttock. Stop if you start to have pain in your leg and buttock that you didn't have before. Be sure to do these exercises in the order they appear. Note how your pain changes before you move to the next one. If your pain is much worse right after exercise and stays worse the next day, do not do any of these exercises. How to do the exercises 1. Rest on belly 1. Lie on your stomach, face down, with your head turned to the side. Place your arms beside your body. If this bothers your neck, place your hands, one on top of the other, underneath your forehead. This will help support your head and neck. 2. Try to relax your lower back muscles as much as you can. 3. Continue to lie on your stomach for 2 minutes. 4. If your pain spreads down your leg or increases down your leg, stop this exercise and do not do the next exercises. 2. Press-up 1. Lie on your stomach, face down. Keep your elbows tucked into your sides and under your shoulders. 2. Press your elbows down into the floor to raise your upper back.  As you warfarin (COUMADIN) 5 MG tablet    Last Written Prescription Date: 3/31/17  Last Fill Qty: 35, # refills: 0  [place] [facility] [place] [facility] or [facility] prescribing provider: 2/7/17       Date and Result of Last PT/INR:   Lab Results   Component Value Date    INR 2.2 01/26/2017    INR 1.2 01/24/2017    INR 1.04 01/16/2017    INR 2.74 07/30/2014          Nebivolol HCl 20 MG TABS      Last Written Prescription Date: 5/24/16  Last Fill Quantity: 90, # refills: 2    Last Office Visit with [facility] or [facility] prescribing provider:  2/7/17   Future Office Visit:        BP Readings from Last 3 Encounters:   02/07/17 116/82   01/06/17 128/88   05/24/16 124/88        do this, relax your stomach muscles. Allow your back to arch without using your back muscles. Let your low back relax completely as you arch up. 3. Hold this position for 2 minutes. 4. Repeat 2 to 4 times. 5. If your pain spreads down your leg or increases down your leg, stop this exercise and do not do the next exercises. 3. Full press-up 1. Lie on your stomach, face down. Keep your elbows tucked into your sides and under your shoulders. 2. Straighten your elbows, and push your upper body up as far as you can. Allow your lower back to sag. Keep your hips, pelvis, and legs relaxed. 3. Hold this position for 5 seconds, and then relax. 4. Repeat 10 times. Each time, try to raise your upper body a little higher and hold your arms a bit straighter. 5. If your pain spreads down your leg or gets worse down your leg, stop this exercise and do not move to the next exercise. 6. If you can't do this exercise, you may instead try the backward bend exercise that follows. 4. Backward bend 1. Stand with your feet hip-width apart. Your toes should point forward. Do not lock your knees. 2. Place your hands in the small of your back. 3. Bend backward as far as you can, keeping your knees straight. Hold this position for 2 to 3 seconds. Then return to your starting position. 4. Repeat 2 to 4 times. Each time, try to bend backward a little farther, until you bend backward as far as you can. 5. If your pain spreads down your leg or increases down your leg, stop this exercise. Follow-up care is a key part of your treatment and safety. Be sure to make and go to all appointments, and call your doctor if you are having problems. It's also a good idea to know your test results and keep a list of the medicines you take. Where can you learn more? Go to http://tanna-rasheeda.info/. Enter [phone] in the search box to learn more about \"Herniated Disc: Exercises. \" Current as of: September 20, 2018 Content Version: 11.9 © 3519-8547 [employer]. Care instructions adapted under license by [employer] (which disclaims liability or warranty for this information). If you have questions about a medical condition or this instruction, always ask your healthcare professional. [address] any warranty or liability for your use of this information.

## 2019-06-19 ENCOUNTER — CARE COORDINATION (OUTPATIENT)
Dept: CARDIOLOGY | Facility: CLINIC | Age: 49
End: 2019-06-19

## 2019-06-19 NOTE — PROGRESS NOTES
Pt called and said she needs to see Nancie. Pt said she has gotten off course with her health. Pt told me she has not been weighing herself, but does not feel like she has increased shortness of breath or LE edema. She said she has been getting intermittent pain down her neck and to the top of her collar bone. She can't tell if it's cardiac or muscular related. Pt said the pain doesn't correlate with activity, and typically comes at night. Pt scheduled for CORE f/u with Nancie on 6/28 @ 1:10. Pt said she has had recent labs at her pain clinic so she doesn't want to schedule labs at this time. She said she is on her way to her pain clinic and will have them faxed over.

## 2019-06-20 NOTE — PROGRESS NOTES
Nancie Jiang, APRN CNP  You 2 hours ago (7:33 AM)      Thanks for update. If any worsening symptoms develop we can try getting her in sooner.     Thanks,   Nancie     Routing comment

## 2019-07-08 DIAGNOSIS — I42.9 CARDIOMYOPATHY, UNSPECIFIED TYPE (H): ICD-10-CM

## 2019-07-08 RX ORDER — FUROSEMIDE 20 MG
20 TABLET ORAL DAILY
Qty: 90 TABLET | Refills: 1 | Status: SHIPPED | OUTPATIENT
Start: 2019-07-08 | End: 2019-09-10

## 2019-07-25 DIAGNOSIS — I10 ESSENTIAL HYPERTENSION WITH GOAL BLOOD PRESSURE LESS THAN 130/80: ICD-10-CM

## 2019-07-25 NOTE — TELEPHONE ENCOUNTER
"Requested Prescriptions   Pending Prescriptions Disp Refills     losartan (COZAAR) 25 MG tablet  Last Written Prescription Date:  3/26/2019  Last Fill Quantity: 30 tablet,  # refills: 1   Last office visit: 3/26/2019 with prescribing provider:  Richa   Future Office Visit:     30 tablet 1     Sig: Take 1 tablet (25 mg) by mouth daily       Angiotensin-II Receptors Passed - 7/25/2019 10:52 AM        Passed - Blood pressure under 140/90 in past 12 months     BP Readings from Last 3 Encounters:   03/26/19 124/80   03/12/19 122/80   02/26/19 120/90                 Passed - Recent (12 mo) or future (30 days) visit within the authorizing provider's specialty     Patient had office visit in the last 12 months or has a visit in the next 30 days with authorizing provider or within the authorizing provider's specialty.  See \"Patient Info\" tab in inbasket, or \"Choose Columns\" in Meds & Orders section of the refill encounter.              Passed - Medication is active on med list        Passed - Patient is age 18 or older        Passed - No active pregnancy on record        Passed - Normal serum creatinine on file in past 12 months     Recent Labs   Lab Test 03/12/19  1137 02/26/19  1437   CR 1.05  --    CREAT  --  0.9             Passed - Normal serum potassium on file in past 12 months     Recent Labs   Lab Test 03/12/19  1137   POTASSIUM 4.0                    Passed - No positive pregnancy test in past 12 months           "

## 2019-07-26 ENCOUNTER — HOSPITAL ENCOUNTER (EMERGENCY)
Facility: CLINIC | Age: 49
Discharge: HOME | End: 2019-07-26
Attending: EMERGENCY MEDICINE | Admitting: EMERGENCY MEDICINE
Payer: MEDICARE

## 2019-07-26 ENCOUNTER — APPOINTMENT (OUTPATIENT)
Dept: ULTRASOUND IMAGING | Facility: CLINIC | Age: 49
End: 2019-07-26
Attending: EMERGENCY MEDICINE
Payer: MEDICARE

## 2019-07-26 VITALS
TEMPERATURE: 97.9 F | RESPIRATION RATE: 18 BRPM | DIASTOLIC BLOOD PRESSURE: 71 MMHG | SYSTOLIC BLOOD PRESSURE: 117 MMHG | OXYGEN SATURATION: 97 % | HEART RATE: 76 BPM

## 2019-07-26 DIAGNOSIS — R10.13 EPIGASTRIC PAIN: ICD-10-CM

## 2019-07-26 DIAGNOSIS — R93.2 ABNORMAL GALLBLADDER ULTRASOUND: ICD-10-CM

## 2019-07-26 DIAGNOSIS — R74.01 TRANSAMINITIS: ICD-10-CM

## 2019-07-26 LAB
ALBUMIN SERPL-MCNC: 2.9 G/DL (ref 3.4–5)
ALP SERPL-CCNC: 104 U/L (ref 40–150)
ALT SERPL W P-5'-P-CCNC: 159 U/L (ref 0–50)
ANION GAP SERPL CALCULATED.3IONS-SCNC: 5 MMOL/L (ref 3–14)
AST SERPL W P-5'-P-CCNC: 504 U/L (ref 0–45)
BASOPHILS # BLD AUTO: 0 10E9/L (ref 0–0.2)
BASOPHILS NFR BLD AUTO: 0.1 %
BILIRUB SERPL-MCNC: 0.4 MG/DL (ref 0.2–1.3)
BUN SERPL-MCNC: 35 MG/DL (ref 7–30)
CALCIUM SERPL-MCNC: 8.5 MG/DL (ref 8.5–10.1)
CHLORIDE SERPL-SCNC: 105 MMOL/L (ref 94–109)
CO2 SERPL-SCNC: 29 MMOL/L (ref 20–32)
CREAT BLD-MCNC: 0.9 MG/DL (ref 0.52–1.04)
CREAT SERPL-MCNC: 0.89 MG/DL (ref 0.52–1.04)
DIFFERENTIAL METHOD BLD: ABNORMAL
EOSINOPHIL # BLD AUTO: 0 10E9/L (ref 0–0.7)
EOSINOPHIL NFR BLD AUTO: 0.3 %
ERYTHROCYTE [DISTWIDTH] IN BLOOD BY AUTOMATED COUNT: 11.9 % (ref 10–15)
ETHANOL SERPL-MCNC: <0.01 G/DL
GFR SERPL CREATININE-BSD FRML MDRD: 67 ML/MIN/{1.73_M2}
GFR SERPL CREATININE-BSD FRML MDRD: 77 ML/MIN/{1.73_M2}
GLUCOSE SERPL-MCNC: 119 MG/DL (ref 70–99)
HCT VFR BLD AUTO: 39.4 % (ref 35–47)
HGB BLD-MCNC: 13 G/DL (ref 11.7–15.7)
IMM GRANULOCYTES # BLD: 0.1 10E9/L (ref 0–0.4)
IMM GRANULOCYTES NFR BLD: 0.5 %
INR PPP: 0.98 (ref 0.86–1.14)
LIPASE SERPL-CCNC: 147 U/L (ref 73–393)
LYMPHOCYTES # BLD AUTO: 3.1 10E9/L (ref 0.8–5.3)
LYMPHOCYTES NFR BLD AUTO: 28.2 %
MCH RBC QN AUTO: 36.9 PG (ref 26.5–33)
MCHC RBC AUTO-ENTMCNC: 33 G/DL (ref 31.5–36.5)
MCV RBC AUTO: 112 FL (ref 78–100)
MONOCYTES # BLD AUTO: 0.8 10E9/L (ref 0–1.3)
MONOCYTES NFR BLD AUTO: 7.4 %
NEUTROPHILS # BLD AUTO: 7 10E9/L (ref 1.6–8.3)
NEUTROPHILS NFR BLD AUTO: 63.5 %
NRBC # BLD AUTO: 0 10*3/UL
NRBC BLD AUTO-RTO: 0 /100
PLATELET # BLD AUTO: 261 10E9/L (ref 150–450)
POTASSIUM SERPL-SCNC: 3.9 MMOL/L (ref 3.4–5.3)
PROT SERPL-MCNC: 7.1 G/DL (ref 6.8–8.8)
RBC # BLD AUTO: 3.52 10E12/L (ref 3.8–5.2)
SODIUM SERPL-SCNC: 139 MMOL/L (ref 133–144)
TROPONIN I SERPL-MCNC: <0.015 UG/L (ref 0–0.04)
WBC # BLD AUTO: 11.1 10E9/L (ref 4–11)

## 2019-07-26 PROCEDURE — 25000125 ZZHC RX 250: Performed by: EMERGENCY MEDICINE

## 2019-07-26 PROCEDURE — 83690 ASSAY OF LIPASE: CPT | Performed by: EMERGENCY MEDICINE

## 2019-07-26 PROCEDURE — 25000128 H RX IP 250 OP 636: Performed by: EMERGENCY MEDICINE

## 2019-07-26 PROCEDURE — 96374 THER/PROPH/DIAG INJ IV PUSH: CPT

## 2019-07-26 PROCEDURE — 85610 PROTHROMBIN TIME: CPT | Performed by: EMERGENCY MEDICINE

## 2019-07-26 PROCEDURE — 84484 ASSAY OF TROPONIN QUANT: CPT | Performed by: EMERGENCY MEDICINE

## 2019-07-26 PROCEDURE — 80053 COMPREHEN METABOLIC PANEL: CPT | Performed by: EMERGENCY MEDICINE

## 2019-07-26 PROCEDURE — 93005 ELECTROCARDIOGRAM TRACING: CPT

## 2019-07-26 PROCEDURE — 80320 DRUG SCREEN QUANTALCOHOLS: CPT | Performed by: EMERGENCY MEDICINE

## 2019-07-26 PROCEDURE — 85025 COMPLETE CBC W/AUTO DIFF WBC: CPT | Performed by: EMERGENCY MEDICINE

## 2019-07-26 PROCEDURE — 82565 ASSAY OF CREATININE: CPT

## 2019-07-26 PROCEDURE — 96375 TX/PRO/DX INJ NEW DRUG ADDON: CPT

## 2019-07-26 PROCEDURE — 76705 ECHO EXAM OF ABDOMEN: CPT

## 2019-07-26 PROCEDURE — 99285 EMERGENCY DEPT VISIT HI MDM: CPT | Mod: 25

## 2019-07-26 RX ORDER — HYDROMORPHONE HYDROCHLORIDE 1 MG/ML
0.5 INJECTION, SOLUTION INTRAMUSCULAR; INTRAVENOUS; SUBCUTANEOUS
Status: DISCONTINUED | OUTPATIENT
Start: 2019-07-26 | End: 2019-07-26 | Stop reason: HOSPADM

## 2019-07-26 RX ADMIN — FAMOTIDINE 20 MG: 10 INJECTION INTRAVENOUS at 18:41

## 2019-07-26 RX ADMIN — HYDROMORPHONE HYDROCHLORIDE 0.5 MG: 1 INJECTION, SOLUTION INTRAMUSCULAR; INTRAVENOUS; SUBCUTANEOUS at 18:39

## 2019-07-26 ASSESSMENT — ENCOUNTER SYMPTOMS
ABDOMINAL PAIN: 1
CHEST TIGHTNESS: 1
FEVER: 0
SHORTNESS OF BREATH: 0
COUGH: 0
DYSURIA: 0

## 2019-07-26 NOTE — ED TRIAGE NOTES
Patient presents with complaints of chest pain and SOB which started about 1600. Patient states that the pain started when she was laying down. Pain is located on left side of chest. Of note patient states she was also recently diagnosed with CHF.  ABC intact without need for intervention at this time.

## 2019-07-26 NOTE — ED PROVIDER NOTES
History     Chief Complaint:  Chest Pain    HPI  Christina Fletcher is a 49 year old female with a history of congestive heart failure, pulmonary embolism for which she takes AC who presents with her son to the emergency department today for evaluation of chest pain. The patient reports she ate this evening, went to sleep, and woke up around 1630 with a discomfort in her left-sided lower chest and upper abdomen. She states this has felt tight and has been constant. She also notes that her discomfort is exacerbated with movement. The patient denies shortness of breath, fever, cough, or dysuria. Patient was drinking alcohol last night at a party- more than usual.    Patient's cardiac risk factors include:     Hypertension:   Positive  Hyperlipidemia:  Negative  Diabetes:   Negative  Obesity (BMI>30): Positive  Hx tobacco use:  Negative  Male Sex:   Negative  Age >45:  Positive     Summary of Coronary Angiogram (01/25/2019):  1) Normal left main  2) LAD is a large vessel. Gives rise to proximal large D1 that  bifurcates and acts as Ramus, without disease. Small D2 and D3 without  disease.  LAD without disease  3) Circumflex gives rise to OM1 without disease   4) RCA is dominant but small with PDA and PLB without disease     Echocardiogram (01/25/2019):   Left ventricular systolic function is severely reduced.  The visual ejection fraction is estimated at <20%.  The right ventricular systolic function is mildly reduced.  There is mild to moderate (1-2+) mitral regurgitation.  There appears to be coronary artery with coursealong the MV annulus. 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  Compared to 2013, EF has decreased significantly. The study was technically  difficult.    Allergies:  Allergies  Hydralazine  Nsaids  Penicillin     Medications:    Acetaminophen   Albuterol   Wellbutrin XL  Lasix  Ativan  Cozaar  Toprol-XL  Narcan  Miralax powder  Xarelto  Crestor  Senokot-S/pericolace    Past Medical  History:    Anterior cruciate ligament tear   Adjustment disorder with depressed mood   Chronic constipation   Gastroesophageal reflux disease  Intramural leiomyoma of uterus  Lower extremity edema   Major depressive disorder, single episode, moderate   Obesity   Osteoarthritis, knee  Other disorder of menstruation and other abnormal bleeding from female genital tract   Pulmonary embolism  Bariatric surgery status  Vitamin B12 deficiency without anemia  Iron deficiency  Vitamin D deficiency  Dysmetabolic syndrome  Galactorrhea  Ascorbic acid deficiency  Pyridoxine deficiency  Obstructive sleep apnea  Eczema  Hyponatremia  Chronic kidney disease, stage III   Neuropathy  Long-term (current) use of anticoagulants   Dyspnea on exertion  Congestive heart failure    Past Surgical History:    Debride assoc fx/dislo skin/mus/bone   Hysteroscopy, ablation endometrium   Lap adjustable gastric band   Laparoscopic gastric sleeve   Gastric bypass   Heart Catheterization with Possible Intervention     Family History:    The patient's family history includes Breast Cancer in her maternal grandmother; Cancer in her maternal grandmother; Genitourinary Problems in her daughter; Hypertension in her mother; Thyroid Disease in her son.    Social History:  The patient reports that she has never smoked. She has never used smokeless tobacco. She reports that she drinks alcohol. She reports that she does not use drugs.   PCP: Mary Carmen Chaudhry    Review of Systems   Constitutional: Negative for fever.   Respiratory: Positive for chest tightness. Negative for cough and shortness of breath.    Cardiovascular: Positive for chest pain (left, lower).   Gastrointestinal: Positive for abdominal pain (upper).   Genitourinary: Negative for dysuria.   All other systems reviewed and are negative.      Physical Exam     Patient Vitals for the past 24 hrs:   BP Temp Temp src Pulse Heart Rate Resp SpO2   07/26/19 1915 117/71 -- -- 76 72 -- 99 %    07/26/19 1900 125/70 -- -- 76 77 -- 94 %   07/26/19 1845 118/70 -- -- 80 80 -- 99 %   07/26/19 1830 -- -- -- -- 80 -- 97 %   07/26/19 1815 136/69 -- -- 93 -- -- --   07/26/19 1808 129/78 97.9  F (36.6  C) Oral 94 94 18 98 %     Physical Exam  VS: Reviewed per above  HENT: Mucous membranes moist  EYES: sclera anicteric  CV: Rate as noted,  regular rhythm.   RESP: Effort normal. Breath sounds are normal bilaterally.  GI: epigastric tenderness without rebound/guarding, not distended, negative montaño sign.  NEURO: Alert, moving all extremities  MSK: No deformity of the extremities  SKIN: Warm and dry    Emergency Department Course     ECG:  ECG taken at 1803, ECG read at 1809  Normal sinus rhythm  Moderate voltage criteria for LVH, may be normal variant  Nonspecific ST and T wave abnormality  Abnormal ECG  Rate 90 bpm. ND interval 140 ms. QRS duration 96 ms. QT/QTc 380/464 ms. P-R-T axes 54 -7 58.    Imaging:  Radiology findings were communicated with the patient who voiced understanding of the findings.    Abdomen US, limited (RUQ only)   1. Mild gallbladder distention. No other sonographic evidence for  cholecystitis.  2. Otherwise unremarkable right upper quadrant ultrasound. No cause  for abdominal pain is identified.     Reading per radiology     Laboratory:  Laboratory findings were communicated with the patient who voiced understanding of the findings.    Creatinine POCT: Creatinine 0.9, All WNL  CBC: WBC 11.1 (H), HGB 13.0,   CMP: Glucose 119 (H), Urea Nitrogen 35 (H), Albumin 2.9 (L),  (H),  (HH), o/w WNL (Creatinine 0.89)  Lipase: 147  Troponin I: <0.015  Alcohol ethyl: <0.01  INR: 0.98    Interventions:  1839 Dilaudid 0.5 mg IV  1841 Pepcid 20 mg IV    Emergency Department Course:  1803 EKG obtained as noted above.  1815 Nursing notes and vitals reviewed.  1820 I performed a physical examination of the patient as documented above.  1825 I rechecked the patient. She states she had some  alcohol at a party last night. She is otherwise feeling better.   1830 IV was inserted and blood was drawn for laboratory testing, results above.  1937 The patient was sent for an abdomen US, limited (RUQ only) while in the emergency department, results above.   2017 I rechecked the patient. She is PO challenging.   2028 I personally reviewed the laboratory and imaging results with the patient and answered all related questions prior to discharge.    Impression & Plan      Medical Decision Making:  Christina Fletcher is a 49 year old female who presents to the emergency department today for evaluation of upper abdominal pain/mid low chest pain after eating and waking up from a nap. Initial vital signs within normal limits. Exam reveals mild epigastric tenderness without rebound or guarding. EKG is unchanged compared to prior and a single troponin is negative. In conjunction with reassuring coronary angiogram in January of this year, I have a lower suspicion for occult ACS. With adherence to AC, doubt occult PE. No evidence of pancreatitis. There was mild transaminitis with AST predominance. Given history of alcohol use last night this could explain this. Right upper quadrant ultrasound revealed mild gallbladder distention but no signs of cholecystitis. Occult biliary colic is on the differential but given resolution of symptoms after IV pain medication in particular IV antacids, gastritis is also possible. On repeat assessment, her abdomen is completely nontender and her chest discomfort has resolved. She tolerated PO without issue. Plan to treat with 10 days of Zantac and have her follow-up after the weekend for repeat LFTs and assessment of any ongoing symptoms. Close return precautions discussed prior to discharge.    Diagnosis:    ICD-10-CM    1. Epigastric pain R10.13    2. Transaminitis R74.0    3. Abnormal gallbladder ultrasound R93.2      Disposition:   The patient is discharged to home.    Discharge  Medications:  START taking      Dose / Directions   ranitidine 150 MG tablet  Commonly known as:  ZANTAC      Dose:  150 mg  Take 1 tablet (150 mg) by mouth 2 times daily for 10 days  Quantity:  20 tablet  Refills:  0       Scribe Disclosure:  I, Chandler Alexander, am serving as a scribe at 6:15 PM on 7/26/2019 to document services personally performed by Eduardo Saunders MD based on my observations and the provider's statements to me.    [facility] EMERGENCY DEPARTMENT         Eduardo Saunders MD  07/27/19 0007

## 2019-07-29 LAB — INTERPRETATION ECG - MUSE: NORMAL

## 2019-07-29 RX ORDER — LOSARTAN POTASSIUM 25 MG/1
25 TABLET ORAL DAILY
Qty: 90 TABLET | Refills: 0 | Status: SHIPPED | OUTPATIENT
Start: 2019-07-29 | End: 2019-10-29

## 2019-08-22 DIAGNOSIS — E78.5 HYPERLIPIDEMIA LDL GOAL <130: ICD-10-CM

## 2019-08-22 RX ORDER — ROSUVASTATIN CALCIUM 20 MG/1
20 TABLET, COATED ORAL DAILY
Qty: 90 TABLET | Refills: 0 | Status: SHIPPED | OUTPATIENT
Start: 2019-08-22 | End: 2019-11-22

## 2019-09-10 DIAGNOSIS — I42.9 CARDIOMYOPATHY, UNSPECIFIED TYPE (H): ICD-10-CM

## 2019-09-10 RX ORDER — FUROSEMIDE 20 MG
20 TABLET ORAL DAILY
Qty: 90 TABLET | Refills: 0 | Status: SHIPPED | OUTPATIENT
Start: 2019-09-10 | End: 2019-09-13

## 2019-09-13 ENCOUNTER — TELEPHONE (OUTPATIENT)
Dept: CARDIOLOGY | Facility: CLINIC | Age: 49
End: 2019-09-13

## 2019-09-13 ENCOUNTER — CARE COORDINATION (OUTPATIENT)
Dept: CARDIOLOGY | Facility: CLINIC | Age: 49
End: 2019-09-13

## 2019-09-13 DIAGNOSIS — I42.9 CARDIOMYOPATHY, UNSPECIFIED TYPE (H): ICD-10-CM

## 2019-09-13 RX ORDER — FUROSEMIDE 20 MG
20 TABLET ORAL DAILY
Qty: 30 TABLET | Refills: 0 | Status: SHIPPED | OUTPATIENT
Start: 2019-09-13 | End: 2019-10-17

## 2019-09-13 NOTE — PROGRESS NOTES
Received phone call from pharmacy regarding Furosemide refill. Per pharmacy, furosemide prescription sent over on 9/10 and is too early to fill per her insurance. Patient was taking more than her prescribed 20mg daily on some days and now she is out of pills until 9/19. From Alvin's note on 3/12/19 she was to see Dr. Morales + ECHO 2 months after this appointment and schedule another CORE follow up. I sent in prescription for one month supply with no refills until these follow ups are scheduled.     SHREYAS Moffett September 13, 2019 2:43 PM

## 2019-09-13 NOTE — TELEPHONE ENCOUNTER
Received a call from [facility] pharmacist inquiring on a refill authorization for furosemide daily and as needed, as this is what the patient is stating she is currently taking. Advised there is not an order for a prn dosing and team nurse had sent a temporary refill authorization today until echo and follow up visit is completed. Advised the patient can call and speak to team nurse for any questions or concerns. Pharmacist verbalized understanding.

## 2019-09-27 ENCOUNTER — CARE COORDINATION (OUTPATIENT)
Dept: CARDIOLOGY | Facility: CLINIC | Age: 49
End: 2019-09-27

## 2019-09-27 NOTE — PROGRESS NOTES
Reviewed phone encounter. Patient was last seen in CORE clinic 3/2019 at that time 2 week follow up was recommended and patient has missed multiple follow up appointments since. Patient follows in CORE for her nonischemic cardiomyopathy, normal coronary arteries per coronary angiogram 1/2018.     Patient noting complaints of on/off chest pain during phone call that is relieved with rest, given no known coronary artery disease and normal coronary angiogram in 1/2018, recommend follow up with her PCP for an urgent visit. If pain persisting and she is unable to get in with her PCP recommended evaluation in ED.     As for her needing cardiac clearance for tooth extractions, assume this is related to patient being on xarelto and needing recommendations for anticoagulation. Patient is on xarelto for history of PE, recommended following up with PCP for recommendations for xarelto prior to tooth extractions. Patient was changed from warfarin to xarelto in March due to noncompliance with INR monitoring.     As for request for sooner follow up appointment, I have some CORE slots available at [facility] on 10/9/19, otherwise the 16th is the soonest available with me in [place]. If she wishes to be seen sooner we can see if another provider has availability and if patient is wishes to be seen by someone else, otherwise I recommend patient keeping her appointment on 10/16/19.     Please see if echocardiogram can be coordinated with her 10/16/2019 appointment.

## 2019-09-27 NOTE — PROGRESS NOTES
Received phone call from patient regarding increased symptoms. Patient disclosed that she's had many family issues happening with the recent passing of her daughter. She states that she's been having chest pain on/off that is relieved by rest. She's unsure if this is stress related. She doesn't complain of any HF symptoms, weights have stayed stable. She does say she has some LE swelling on/off. She's also needing 4 teeth extracted and cardiac clearance before then as well.      Future Appointments   Date Time Provider Department Center   10/16/2019 12:30 PM Nancie Jiang APRN CNP [facility] TOMMIE Moffett RN September 27, 2019 11:18 AM

## 2019-09-27 NOTE — PROGRESS NOTES
Reviewed phone encounter. Patient was last seen in CORE clinic 3/2019 at that time 2 week follow up was recommended and patient has missed multiple follow up appointments since. Patient follows in [facility] for her nonischemic cardiomyopathy, normal coronary arteries per coronary angiogram 1/2018.      Patient noting complaints of on/off chest pain during phone call that is relieved with rest, given no known coronary artery disease and normal coronary angiogram in 1/2018, recommend follow up with her PCP for an urgent visit. If pain persisting and she is unable to get in with her PCP recommended evaluation in ED.      As for her needing cardiac clearance for tooth extractions, assume this is related to patient being on xarelto and needing recommendations for anticoagulation. Patient is on xarelto for history of PE, recommended following up with PCP for recommendations for xarelto prior to tooth extractions. Patient was changed from warfarin to xarelto in March due to noncompliance with INR monitoring.      As for request for sooner follow up appointment, I have some CORE slots available at [facility] on 10/9/19, otherwise the 16th is the soonest available with me in [place]. If she wishes to be seen sooner we can see if another provider has availability and if patient is wishes to be seen by someone else, otherwise I recommend patient keeping her appointment on 10/16/19.      Please see if echocardiogram can be coordinated with her 10/16/2019 appointment.        Called and spoke with patient regarding upcoming appointments and recommendations from provider. Patient confirmed understanding of making an appointment with PCP regarding other symptoms prior to her appointment. She has a follow up appointment schedulued with Alvin Moffett RN September 27, 2019 2:29 PM       Future Appointments   Date Time Provider Department Center   10/9/2019 11:30 AM [facility]   10/9/2019 12:30 PM Nancie Jiang, APRN CNP  [facility]

## 2019-10-04 DIAGNOSIS — I50.22 CHRONIC SYSTOLIC CONGESTIVE HEART FAILURE (H): Primary | ICD-10-CM

## 2019-10-04 NOTE — PROGRESS NOTES
[facility]        BMP order placed for office visit with Nancie Jiang CNP on 10/9/19.      Ana Laura Castro RN    Heart Failure Coordinator, [place]  10/04/19 2:29 PM

## 2019-10-09 ENCOUNTER — OFFICE VISIT (OUTPATIENT)
Dept: CARDIOLOGY | Facility: CLINIC | Age: 49
End: 2019-10-09
Payer: MEDICARE

## 2019-10-09 VITALS
SYSTOLIC BLOOD PRESSURE: 124 MMHG | BODY MASS INDEX: 46.88 KG/M2 | WEIGHT: 274.6 LBS | HEART RATE: 94 BPM | OXYGEN SATURATION: 99 % | HEIGHT: 64 IN | DIASTOLIC BLOOD PRESSURE: 86 MMHG

## 2019-10-09 DIAGNOSIS — I50.22 CHRONIC SYSTOLIC CONGESTIVE HEART FAILURE (H): ICD-10-CM

## 2019-10-09 DIAGNOSIS — E78.5 HYPERLIPIDEMIA LDL GOAL <130: ICD-10-CM

## 2019-10-09 DIAGNOSIS — I42.9 CARDIOMYOPATHY, UNSPECIFIED TYPE (H): ICD-10-CM

## 2019-10-09 DIAGNOSIS — I10 ESSENTIAL HYPERTENSION: ICD-10-CM

## 2019-10-09 DIAGNOSIS — I50.22 CHRONIC SYSTOLIC CONGESTIVE HEART FAILURE (H): Primary | ICD-10-CM

## 2019-10-09 LAB
ANION GAP SERPL CALCULATED.3IONS-SCNC: 6 MMOL/L (ref 3–14)
BUN SERPL-MCNC: 21 MG/DL (ref 7–30)
CALCIUM SERPL-MCNC: 8.8 MG/DL (ref 8.5–10.1)
CHLORIDE SERPL-SCNC: 105 MMOL/L (ref 94–109)
CO2 SERPL-SCNC: 26 MMOL/L (ref 20–32)
CREAT SERPL-MCNC: 0.84 MG/DL (ref 0.52–1.04)
GFR SERPL CREATININE-BSD FRML MDRD: 81 ML/MIN/{1.73_M2}
GLUCOSE SERPL-MCNC: 105 MG/DL (ref 70–99)
POTASSIUM SERPL-SCNC: 4.2 MMOL/L (ref 3.4–5.3)
SODIUM SERPL-SCNC: 137 MMOL/L (ref 133–144)

## 2019-10-09 PROCEDURE — 93000 ELECTROCARDIOGRAM COMPLETE: CPT | Performed by: NURSE PRACTITIONER

## 2019-10-09 PROCEDURE — 80048 BASIC METABOLIC PNL TOTAL CA: CPT | Performed by: NURSE PRACTITIONER

## 2019-10-09 PROCEDURE — 36415 COLL VENOUS BLD VENIPUNCTURE: CPT | Performed by: NURSE PRACTITIONER

## 2019-10-09 PROCEDURE — 99214 OFFICE O/P EST MOD 30 MIN: CPT | Mod: 25 | Performed by: NURSE PRACTITIONER

## 2019-10-09 RX ORDER — METOPROLOL SUCCINATE 50 MG/1
75 TABLET, EXTENDED RELEASE ORAL 2 TIMES DAILY
Qty: 180 TABLET | Refills: 1 | Status: SHIPPED | OUTPATIENT
Start: 2019-10-09 | End: 2020-02-21

## 2019-10-09 ASSESSMENT — MIFFLIN-ST. JEOR: SCORE: 1860.58
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
## 2019-10-09 NOTE — PATIENT INSTRUCTIONS
Call C.O.R.E. nurse for any questions or concerns Mon-Fri 8am-4pm: [phone] For concerns after hours: [phone]    Medication changes: INCREASE metoprolol to 75mg 2 x a day - with current pills 75mg = 3 tablets, with new prescription 75mg = 1.5 tablets.     Plan from today:   *Schedule echocardiogram (heart ultrasound)  *Referral for cardiac rehab placed  *Follow up with Nancie in 2 weeks with labs.     Lab results: show stable kidney function and electrolytes  Component      Latest Ref Rng & Units 10/9/2019   Sodium      133 - 144 mmol/L 137   Potassium      3.4 - 5.3 mmol/L 4.2   Chloride      94 - 109 mmol/L 105   Carbon Dioxide      20 - 32 mmol/L 26   Anion Gap      3 - 14 mmol/L 6   Glucose      70 - 99 mg/dL 105 (H)   Urea Nitrogen      7 - 30 mg/dL 21   Creatinine      0.52 - 1.04 mg/dL 0.84   GFR Estimate      >60 mL/min/1.73:m2 81   GFR Estimate If Black      >60 mL/min/1.73:m2 >90   Calcium      8.5 - 10.1 mg/dL 8.8
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
## 2019-10-10 DIAGNOSIS — G89.29 CHRONIC PAIN OF LEFT KNEE: ICD-10-CM

## 2019-10-10 DIAGNOSIS — F33.1 MAJOR DEPRESSIVE DISORDER, RECURRENT EPISODE, MODERATE (H): ICD-10-CM

## 2019-10-10 DIAGNOSIS — M25.562 CHRONIC PAIN OF LEFT KNEE: ICD-10-CM

## 2019-10-10 NOTE — TELEPHONE ENCOUNTER
"Requested Prescriptions   Pending Prescriptions Disp Refills     buPROPion (WELLBUTRIN XL) 150 MG 24 hr tablet    Last Written Prescription Date:  04/02/2019  Last Fill Quantity: 90 tablet,  # refills: 1   Last office visit: 3/26/2019 with prescribing provider:  Richa   Future Office Visit:     90 tablet 1     Sig: Take 1 tablet (150 mg) by mouth daily Future refills by PCP [facility] with phone number [phone].       SSRIs Protocol Failed - 10/10/2019  9:31 AM        Failed - PHQ-9 score less than 5 in past 6 months     Please review last PHQ-9 score.   PHQ-9 SCORE 5/24/2016 9/27/2018 1/31/2019   PHQ-9 Total Score - - -   PHQ-9 Total Score 16 18 13             Failed - Recent (6 mo) or future (30 days) visit within the authorizing provider's specialty     Patient had office visit in the last 6 months or has a visit in the next 30 days with authorizing provider or within the authorizing provider's specialty.  See \"Patient Info\" tab in inbasket, or \"Choose Columns\" in Meds & Orders section of the refill encounter.            Passed - Medication is Bupropion     If the medication is Bupropion (Wellbutrin), and the patient is taking for smoking cessation; OK to refill.          Passed - Medication is active on med list        Passed - Patient is age 18 or older        Passed - No active pregnancy on record        Passed - No positive pregnancy test in last 12 months        diclofenac (VOLTAREN) 1 % topical gel    Last Written Prescription Date:  02/27/2019  Last Fill Quantity: 1 tube,  # refills: 3   Last office visit: 3/26/2019 with prescribing provider:  Richa   Future Office Visit:     1 Tube 3     Sig: Apply 4 g topically 4 times daily       Topical Steroids and Nonsteroidals Protocol Passed - 10/10/2019  9:31 AM        Passed - Patient is age 6 or older        Passed - Authorizing prescriber's most recent note related to this medication read.     If refill request is for ophthalmic use, " "please forward request to provider for approval.          Passed - High potency steroid not ordered        Passed - Recent (12 mo) or future (30 days) visit within the authorizing provider's specialty     Patient has had an office visit with the authorizing provider or a provider within the authorizing providers department within the previous 12 mos or has a future within next 30 days. See \"Patient Info\" tab in inbasket, or \"Choose Columns\" in Meds & Orders section of the refill encounter.              Passed - Medication is active on med list           "

## 2019-10-14 RX ORDER — BUPROPION HYDROCHLORIDE 150 MG/1
150 TABLET ORAL DAILY
Qty: 30 TABLET | Refills: 0 | Status: SHIPPED | OUTPATIENT
Start: 2019-10-14 | End: 2020-02-21 | Stop reason: DRUGHIGH

## 2019-10-14 NOTE — TELEPHONE ENCOUNTER
Bupropion    Medication is being filled for 1 time refill only due to:  Patient needs to be seen because due for depression follow up and PHQ-9 .    Diclofenac     Prescription approved per [facility] Refill Protocol.

## 2019-10-17 ENCOUNTER — CARE COORDINATION (OUTPATIENT)
Dept: CARDIOLOGY | Facility: CLINIC | Age: 49
End: 2019-10-17

## 2019-10-17 DIAGNOSIS — I42.9 CARDIOMYOPATHY, UNSPECIFIED TYPE (H): ICD-10-CM

## 2019-10-17 RX ORDER — FUROSEMIDE 20 MG
20 TABLET ORAL DAILY
Qty: 30 TABLET | Refills: 11 | Status: SHIPPED | OUTPATIENT
Start: 2019-10-17 | End: 2019-10-17

## 2019-10-17 RX ORDER — FUROSEMIDE 20 MG
20 TABLET ORAL DAILY
Qty: 110 TABLET | Refills: 3 | Status: SHIPPED | OUTPATIENT
Start: 2019-10-17 | End: 2020-09-22

## 2019-10-17 NOTE — PROGRESS NOTES
"Received phone call from patient this morning regarding prescription refill for Furosemide 20mg daily. Patient is frustrated because prescription did not have refills and unable to take her medication since Saturday. Patient states that Christine has contacted us for refills but no contact has been made to CORE team about this. Patient states that she's feeling bad today, she has more increased with SOB at rest and with exertion. Patient states that she is extremely \"puffy\" to the point where she's unable to put on a shirt. She states that her legs, abdomen and arms are extremely swollen. Patient does not weigh herself daily due to not having a scale, but she knows she is up in weight. She feels fatigued and overall \"horrible\".         SHREYAS Moffett October 17, 2019 10:24 AM     "

## 2019-10-17 NOTE — PROGRESS NOTES
Advise patient to take 40mg x 3 days, then resume furosemide 20mg daily. Keep CORE f/u on 10/25/19. Alvin     Called and spoke with patient about recommendations from Nancie. Patient confirmed understanding. Confirmed that she will be keeping upcoming follow up appointment on 10-25-19.         SHREYAS Moffett October 17, 2019 2:33 PM

## 2019-10-17 NOTE — PROGRESS NOTES
Advise patient to take 40mg x 3 days, then resume furosemide 20mg daily. Keep CORE f/u on 10/25/19.

## 2019-10-18 DIAGNOSIS — I50.22 CHRONIC SYSTOLIC CONGESTIVE HEART FAILURE (H): Primary | ICD-10-CM

## 2019-10-18 NOTE — PROGRESS NOTES
Cardiac rehab referral reordered per cardiac rehab request. Referral needs to be under an MD due to the patient's insurance.   Maia Dotson RN 2:36 PM 10/18/19

## 2019-10-24 ENCOUNTER — CARE COORDINATION (OUTPATIENT)
Dept: CARDIOLOGY | Facility: CLINIC | Age: 49
End: 2019-10-24

## 2019-10-24 NOTE — PROGRESS NOTES
Pt was to increase Furosemide from 20 mg daily to 40 mg daily for 3 days. Pt did not show up foe her scheduled lab or echo on 10/22. Pt will need labs prior to 10/25 appt.    left for pt.   Maia Dotson RN 12:25 PM 10/24/19

## 2019-10-28 DIAGNOSIS — I10 ESSENTIAL HYPERTENSION WITH GOAL BLOOD PRESSURE LESS THAN 130/80: ICD-10-CM

## 2019-10-28 NOTE — PROGRESS NOTES
Left VM for pt checking on her. Pt was no show to 10/25 CORE Follow-up.   Maia Dotson RN 11:50 AM 10/28/19

## 2019-10-28 NOTE — TELEPHONE ENCOUNTER
"Requested Prescriptions   Pending Prescriptions Disp Refills     losartan (COZAAR) 25 MG tablet  Last Written Prescription Date:  7/29/2019  Last Fill Quantity: 90 tablet,  # refills: 0   Last office visit: 3/26/2019 with prescribing provider:  Richa   Future Office Visit:     90 tablet 0     Sig: Take 1 tablet (25 mg) by mouth daily       Angiotensin-II Receptors Passed - 10/28/2019  9:57 AM        Passed - Last blood pressure under 140/90 in past 12 months     BP Readings from Last 3 Encounters:   10/09/19 124/86   07/26/19 117/71   03/26/19 124/80                 Passed - Recent (12 mo) or future (30 days) visit within the authorizing provider's specialty     Patient has had an office visit with the authorizing provider or a provider within the authorizing providers department within the previous 12 mos or has a future within next 30 days. See \"Patient Info\" tab in inbasket, or \"Choose Columns\" in Meds & Orders section of the refill encounter.              Passed - Medication is active on med list        Passed - Patient is age 18 or older        Passed - No active pregnancy on record        Passed - Normal serum creatinine on file in past 12 months     Recent Labs   Lab Test 10/09/19  1141 07/26/19  1838   CR 0.84  --    CREAT  --  0.9             Passed - Normal serum potassium on file in past 12 months     Recent Labs   Lab Test 10/09/19  1141   POTASSIUM 4.2                    Passed - No positive pregnancy test in past 12 months         "

## 2019-10-29 RX ORDER — LOSARTAN POTASSIUM 25 MG/1
25 TABLET ORAL DAILY
Qty: 90 TABLET | Refills: 0 | Status: SHIPPED | OUTPATIENT
Start: 2019-10-29 | End: 2020-02-04

## 2019-10-31 ENCOUNTER — TELEPHONE (OUTPATIENT)
Dept: FAMILY MEDICINE | Facility: CLINIC | Age: 49
End: 2019-10-31

## 2019-10-31 NOTE — LETTER
October 31, 2019    Christina Fletcher  [address]   [facility] [address]    Dear Ellie Vasquez cares about your health and your health plan.  I have reviewed your medical conditions, medication list and lab results, and am making recommendations based on this review to better manage your health.    You are in particular need of attention regarding:  -Depression/Anxiety  -Cervical Cancer Screening  -Wellness (Physical) Visit     I am recommending that you:     -schedule a WELLNESS (Physical) APPOINTMENT with me.   I will check fasting labs the same day - nothing to eat except water and meds for 8-10 hours prior.    -schedule a PAP SMEAR EXAM which is due.  Please disregard this reminder if you have had this exam elsewhere within the last year.  It would be helpful for us to have a copy of your recent pap smear report in our file so that we can best coordinate your care.    If you are under/uninsured, we recommend you contact the [facility]. They offer pap smears at no charge or on a sliding fee charge. You can schedule with them at [phone]. Please have them send us the results.    Please complete the enclosed PHQ9 and mail back to clinic in the envelope provided.         Please call us at the [place] location:  [phone] or use [employer] to address the above recommendations.     Thank you for trusting [facility].  We appreciate the opportunity to serve you and look forward to supporting your healthcare in the future.    If you have (or plan to have) any of these tests done at a facility other than a [facility] or a [facility], please have the results sent to the [facility] location noted above.      Best Regards,    TATY Barnett

## 2019-10-31 NOTE — TELEPHONE ENCOUNTER
Panel Management Review      Patient has the following on her problem list:     Depression / Dysthymia review    Measure:  Needs PHQ-9 score of 4 or less during index window.  Administer PHQ-9 and if score is 5 or more, send encounter to provider for next steps.    5 - 7 month window range:       PHQ-9 SCORE 5/24/2016 9/27/2018 1/31/2019   PHQ-9 Total Score - - -   PHQ-9 Total Score 16 18 13       If PHQ-9 recheck is 5 or more, route to provider for next steps.    Patient is due for:  PHQ9      Composite cancer screening  Chart review shows that this patient is due/due soon for the following Pap Smear  Summary:    Patient is due/failing the following:   PAP and PHQ9    Action needed:   Patient needs office visit for physical and pap. and Patient needs to do PHQ9.    Type of outreach:    Sent letter.    Questions for provider review:    None                                                                                                                                    Desiree Snyder CMA

## 2019-11-16 DIAGNOSIS — F33.1 MAJOR DEPRESSIVE DISORDER, RECURRENT EPISODE, MODERATE (H): ICD-10-CM

## 2019-11-16 NOTE — TELEPHONE ENCOUNTER
"Requested Prescriptions   Pending Prescriptions Disp Refills     buPROPion (WELLBUTRIN XL) 150 MG 24 hr tablet    Last Written Prescription Date:  10/14/2019  Last Fill Quantity: 30 tablet,  # refills: 0   Last office visit: 3/26/2019 with prescribing provider:  Richa   Future Office Visit:     30 tablet 0     Sig: Take 1 tablet (150 mg) by mouth daily Future refills by PCP [facility] with phone number [phone].       SSRIs Protocol Failed - 11/16/2019 10:46 AM        Failed - PHQ-9 score less than 5 in past 6 months     Please review last PHQ-9 score.   PHQ-9 SCORE 5/24/2016 9/27/2018 1/31/2019   PHQ-9 Total Score - - -   PHQ-9 Total Score 16 18 13               Failed - Recent (6 mo) or future (30 days) visit within the authorizing provider's specialty     Patient had office visit in the last 6 months or has a visit in the next 30 days with authorizing provider or within the authorizing provider's specialty.  See \"Patient Info\" tab in inbasket, or \"Choose Columns\" in Meds & Orders section of the refill encounter.            Passed - Medication is Bupropion     If the medication is Bupropion (Wellbutrin), and the patient is taking for smoking cessation; OK to refill.          Passed - Medication is active on med list        Passed - Patient is age 18 or older        Passed - No active pregnancy on record        Passed - No positive pregnancy test in last 12 months           "

## 2019-11-18 NOTE — TELEPHONE ENCOUNTER
Routing refill request to provider for review/approval because:  Evelyne given x1 and patient did not follow up, please advise    HERMELINDA HintonN, RN  [employer] [facility]

## 2019-11-19 RX ORDER — BUPROPION HYDROCHLORIDE 150 MG/1
150 TABLET ORAL DAILY
Qty: 30 TABLET | Refills: 0 | OUTPATIENT
Start: 2019-11-19

## 2019-11-19 NOTE — TELEPHONE ENCOUNTER
Patient needs to schedule follow up.   Telephone visit would be okay as well.     Davis Chaudhry PA-C

## 2019-11-22 DIAGNOSIS — E78.5 HYPERLIPIDEMIA LDL GOAL <130: ICD-10-CM

## 2019-11-22 RX ORDER — ROSUVASTATIN CALCIUM 20 MG/1
20 TABLET, COATED ORAL DAILY
Qty: 90 TABLET | Refills: 3 | Status: SHIPPED | OUTPATIENT
Start: 2019-11-22 | End: 2020-11-10

## 2019-12-23 DIAGNOSIS — I10 ESSENTIAL HYPERTENSION WITH GOAL BLOOD PRESSURE LESS THAN 130/80: Primary | ICD-10-CM

## 2020-02-03 DIAGNOSIS — I10 ESSENTIAL HYPERTENSION WITH GOAL BLOOD PRESSURE LESS THAN 130/80: ICD-10-CM

## 2020-02-03 NOTE — TELEPHONE ENCOUNTER
"Requested Prescriptions   Pending Prescriptions Disp Refills     losartan (COZAAR) 25 MG tablet  Last Written Prescription Date:  10/29/2019  Last Fill Quantity: 90 tablet,  # refills: 0   Last office visit: 3/26/2019 with prescribing provider:  Richa   Future Office Visit:     90 tablet 0     Sig: Take 1 tablet (25 mg) by mouth daily       Angiotensin-II Receptors Passed - 2/3/2020  5:15 PM        Passed - Last blood pressure under 140/90 in past 12 months     BP Readings from Last 3 Encounters:   10/09/19 124/86   07/26/19 117/71   03/26/19 124/80                 Passed - Recent (12 mo) or future (30 days) visit within the authorizing provider's specialty     Patient has had an office visit with the authorizing provider or a provider within the authorizing providers department within the previous 12 mos or has a future within next 30 days. See \"Patient Info\" tab in inbasket, or \"Choose Columns\" in Meds & Orders section of the refill encounter.              Passed - Medication is active on med list        Passed - Patient is age 18 or older        Passed - No active pregnancy on record        Passed - Normal serum creatinine on file in past 12 months     Recent Labs   Lab Test 10/09/19  1141 07/26/19  1838   CR 0.84  --    CREAT  --  0.9             Passed - Normal serum potassium on file in past 12 months     Recent Labs   Lab Test 10/09/19  1141   POTASSIUM 4.2                    Passed - No positive pregnancy test in past 12 months           "

## 2020-02-04 RX ORDER — LOSARTAN POTASSIUM 25 MG/1
25 TABLET ORAL DAILY
Qty: 30 TABLET | Refills: 0 | Status: SHIPPED | OUTPATIENT
Start: 2020-02-04 | End: 2020-02-21

## 2020-02-04 NOTE — TELEPHONE ENCOUNTER
Patient Contact    Attempt # 1    Was call answered?  No.  Left message on voicemail with information to call triage back.    On call back: Due for physical in March, schedule appointment.    Prescription approved for 30 days per [facility] Refill Protocol. Due for physical end of March.

## 2020-02-05 ENCOUNTER — TELEPHONE (OUTPATIENT)
Dept: FAMILY MEDICINE | Facility: CLINIC | Age: 50
End: 2020-02-05

## 2020-02-06 PROBLEM — N39.3 FEMALE STRESS INCONTINENCE: Status: ACTIVE | Noted: 2020-02-06

## 2020-02-21 ENCOUNTER — OFFICE VISIT (OUTPATIENT)
Dept: FAMILY MEDICINE | Facility: CLINIC | Age: 50
End: 2020-02-21
Payer: MEDICARE

## 2020-02-21 VITALS
TEMPERATURE: 100.2 F | SYSTOLIC BLOOD PRESSURE: 128 MMHG | DIASTOLIC BLOOD PRESSURE: 78 MMHG | RESPIRATION RATE: 16 BRPM | HEART RATE: 102 BPM | OXYGEN SATURATION: 98 %

## 2020-02-21 DIAGNOSIS — I10 ESSENTIAL HYPERTENSION WITH GOAL BLOOD PRESSURE LESS THAN 130/80: ICD-10-CM

## 2020-02-21 DIAGNOSIS — I42.5 OTHER RESTRICTIVE CARDIOMYOPATHY (H): ICD-10-CM

## 2020-02-21 DIAGNOSIS — I42.9 CARDIOMYOPATHY, UNSPECIFIED TYPE (H): ICD-10-CM

## 2020-02-21 DIAGNOSIS — F33.1 MAJOR DEPRESSIVE DISORDER, RECURRENT EPISODE, MODERATE (H): ICD-10-CM

## 2020-02-21 DIAGNOSIS — I10 ESSENTIAL HYPERTENSION: Primary | ICD-10-CM

## 2020-02-21 DIAGNOSIS — E88.810 DYSMETABOLIC SYNDROME: ICD-10-CM

## 2020-02-21 PROCEDURE — 99214 OFFICE O/P EST MOD 30 MIN: CPT | Performed by: FAMILY MEDICINE

## 2020-02-21 RX ORDER — LOSARTAN POTASSIUM 25 MG/1
25 TABLET ORAL DAILY
Qty: 30 TABLET | Refills: 1 | Status: SHIPPED | OUTPATIENT
Start: 2020-02-21 | End: 2020-04-22

## 2020-02-21 RX ORDER — BUPROPION HYDROCHLORIDE 150 MG/1
150 TABLET, EXTENDED RELEASE ORAL 2 TIMES DAILY
Qty: 60 TABLET | Refills: 1 | Status: SHIPPED | OUTPATIENT
Start: 2020-02-21 | End: 2020-04-14

## 2020-02-21 RX ORDER — METOPROLOL SUCCINATE 50 MG/1
TABLET, EXTENDED RELEASE ORAL
Qty: 270 TABLET | Refills: 0 | Status: SHIPPED | OUTPATIENT
Start: 2020-02-21 | End: 2020-05-04

## 2020-02-21 RX ORDER — METOPROLOL SUCCINATE 50 MG/1
75 TABLET, EXTENDED RELEASE ORAL 2 TIMES DAILY
Qty: 180 TABLET | Refills: 1 | Status: SHIPPED | OUTPATIENT
Start: 2020-02-21 | End: 2020-02-21

## 2020-02-21 ASSESSMENT — PATIENT HEALTH QUESTIONNAIRE - PHQ9: SUM OF ALL RESPONSES TO PHQ QUESTIONS 1-9: 23

## 2020-02-21 NOTE — TELEPHONE ENCOUNTER
"Requested Prescriptions   Pending Prescriptions Disp Refills     METOPROLOL SUCCINATE ER 50 MG PO 24 hr tablet [Pharmacy Med Name: METOPROLOL ER SUCCINATE 50MG TABS]  Last Written Prescription Date:  2/21/2020  Last Fill Quantity: 180 tablet,  # refills: 1   Last office visit: 2/21/2020 with prescribing provider:  Mick   Future Office Visit:   Next 5 appointments (look out 90 days)    Feb 27, 2020 11:10 AM CST  Office Visit with Mary Carmen Chaudhry PA-C  [facility] ([facility] [address] [place] [id number]  [phone]          270 tablet      Sig: TAKE ONE AND ONE-HALF TABLETS BY MOUTH TWICE DAILY       Beta-Blockers Protocol Passed - 2/21/2020  2:06 PM        Passed - Blood pressure under 140/90 in past 12 months     BP Readings from Last 3 Encounters:   02/21/20 128/78   10/09/19 124/86   07/26/19 117/71                 Passed - Patient is age 6 or older        Passed - Recent (12 mo) or future (30 days) visit within the authorizing provider's specialty     Patient has had an office visit with the authorizing provider or a provider within the authorizing providers department within the previous 12 mos or has a future within next 30 days. See \"Patient Info\" tab in inbasket, or \"Choose Columns\" in Meds & Orders section of the refill encounter.              Passed - Medication is active on med list           "

## 2020-02-21 NOTE — PROGRESS NOTES
Subjective     Christina Fletcher is a 50 year old female who presents to clinic today for the following health issues:    HPI   Hyperlipidemia Follow-Up      Are you regularly taking any medication or supplement to lower your cholesterol?   Yes- crestor    Are you having muscle aches or other side effects that you think could be caused by your cholesterol lowering medication?  No    Hypertension Follow-up      Do you check your blood pressure regularly outside of the clinic? No     Are you following a low salt diet? No    Are your blood pressures ever more than 140 on the top number (systolic) OR more   than 90 on the bottom number (diastolic), for example 140/90? No      How many servings of fruits and vegetables do you eat daily?  0-1    On average, how many sweetened beverages do you drink each day (Examples: soda, juice, sweet tea, etc.  Do NOT count diet or artificially sweetened beverages)?   0    How many days per week do you exercise enough to make your heart beat faster? 3 or less    How many minutes a day do you exercise enough to make your heart beat faster? 9 or less    How many days per week do you miss taking your medication? 0    Depression Followup    How are you doing with your depression since your last visit? Worsened has been out of medication    Are you having other symptoms that might be associated with depression? No    Have you had a significant life event?  No     Are you feeling anxious or having panic attacks?   No    Do you have any concerns with your use of alcohol or other drugs? No     Pt wants to increase the dose of the Wellbutrin but she wants to use the 12 tablets twice daily and not a 24 hr tab    Social History     Tobacco Use     Smoking status: Never Smoker     Smokeless tobacco: Never Used   Substance Use Topics     Alcohol use: Yes     Alcohol/week: 0.0 standard drinks     Comment: occ      Drug use: No     PHQ 9/27/2018 1/31/2019 2/21/2020   PHQ-9 Total Score 18 13 23   Q9:  Thoughts of better off dead/self-harm past 2 weeks Not at all Not at all Not at all   F/U: Thoughts of suicide or self-harm No - -   F/U: Safety concerns No - -     BAR-7 SCORE 9/27/2018   Total Score 18     Last PHQ-9 2/21/2020   1.  Little interest or pleasure in doing things 3   2.  Feeling down, depressed, or hopeless 3   3.  Trouble falling or staying asleep, or sleeping too much 3   4.  Feeling tired or having little energy 3   5.  Poor appetite or overeating 3   6.  Feeling bad about yourself 3   7.  Trouble concentrating 3   8.  Moving slowly or restless 2   Q9: Thoughts of better off dead/self-harm past 2 weeks 0   PHQ-9 Total Score 23   Difficulty at work, home, or with people Somewhat difficult   In the past two weeks have you had thoughts of suicide or self harm? -   Do you have concerns about your personal safety or the safety of others? -     BAR-7  9/27/2018   1. Feeling nervous, anxious, or on edge 3   2. Not being able to stop or control worrying 3   3. Worrying too much about different things 3   4. Trouble relaxing 2   5. Being so restless that it is hard to sit still 2   6. Becoming easily annoyed or irritable 3   7. Feeling afraid, as if something awful might happen 2   BAR-7 Total Score 18   If you checked any problems, how difficult have they made it for you to do your work, take care of things at home, or get along with other people? Somewhat difficult     In the past two weeks have you had thoughts of suicide or self-harm?  No.    Do you have concerns about your personal safety or the safety of others?   No    Suicide Assessment Five-step Evaluation and Treatment (SAFE-T)      BP Readings from Last 3 Encounters:   02/21/20 128/78   10/09/19 124/86   07/26/19 117/71    Wt Readings from Last 3 Encounters:   10/09/19 124.6 kg (274 lb 9.6 oz)   03/26/19 115.7 kg (255 lb)   03/12/19 116.3 kg (256 lb 8 oz)                    Reviewed and updated as needed this visit by Provider         Review of  "Systems   ROS COMP: CONSTITUTIONAL: NEGATIVE for fever, chills, change in weight  ENT/MOUTH: NEGATIVE for ear, mouth and throat problems  RESP: NEGATIVE for significant cough or SOB  CV: NEGATIVE for chest pain, palpitations or peripheral edema  PSYCHIATRIC: POSITIVE fordepressed mood and Hx depression      Objective    /78   Pulse 102   Temp 100.2  F (37.9  C) (Tympanic)   Resp 16   SpO2 98%   There is no height or weight on file to calculate BMI.  Physical Exam   GENERAL: healthy, alert and no distress  NECK: no adenopathy, no asymmetry, masses, or scars and thyroid normal to palpation  RESP: lungs clear to auscultation - no rales, rhonchi or wheezes  CV: regular rate and rhythm, normal S1 S2, no S3 or S4, no murmur, click or rub, no peripheral edema and peripheral pulses strong  ABDOMEN: soft, nontender, no hepatosplenomegaly, no masses and bowel sounds normal  MS: no gross musculoskeletal defects noted, no edema  PSYCH: mentation appears normal and affect normal/bright    Diagnostic Test Results:  Labs reviewed in Epic  none         Assessment & Plan     Christina was seen today for recheck medication.    Diagnoses and all orders for this visit:    Essential hypertension    Dysmetabolic syndrome    Other restrictive cardiomyopathy (H)    Essential hypertension with goal blood pressure less than 130/80  -     losartan 25 MG PO tablet; Take 1 tablet (25 mg) by mouth daily    Cardiomyopathy, unspecified type (H)  -     Discontinue: metoprolol succinate ER 50 MG PO 24 hr tablet; Take 1.5 tablets (75 mg) by mouth 2 times daily . Future refills by PCP or cardiologist.    Major depressive disorder, recurrent episode, moderate (H)  -     buPROPion 150 MG PO 12 hr tablet; Take 1 tablet (150 mg) by mouth 2 times daily         BMI:   Estimated body mass index is 47.13 kg/m  as calculated from the following:    Height as of 10/9/19: 1.626 m (5' 4\").    Weight as of 10/9/19: 124.6 kg (274 lb 9.6 oz).   Weight " management plan: Discussed healthy diet and exercise guidelines        FUTURE APPOINTMENTS:       - Follow-up visit in 2 weeks with Davis Chaudhry  There are no Patient Instructions on file for this visit.    Return in about 2 weeks (around 3/6/2020) for Hypertension, Depression, Physical Exam.    Bg Barton MD  [facility]

## 2020-02-28 ENCOUNTER — OFFICE VISIT (OUTPATIENT)
Dept: FAMILY MEDICINE | Facility: CLINIC | Age: 50
End: 2020-02-28
Payer: MEDICARE

## 2020-02-28 VITALS
DIASTOLIC BLOOD PRESSURE: 74 MMHG | RESPIRATION RATE: 12 BRPM | SYSTOLIC BLOOD PRESSURE: 126 MMHG | HEART RATE: 83 BPM | OXYGEN SATURATION: 100 %

## 2020-02-28 DIAGNOSIS — N39.41 URGE INCONTINENCE OF URINE: ICD-10-CM

## 2020-02-28 DIAGNOSIS — I50.22 CHRONIC SYSTOLIC CONGESTIVE HEART FAILURE (H): ICD-10-CM

## 2020-02-28 DIAGNOSIS — M17.0 PRIMARY OSTEOARTHRITIS OF BOTH KNEES: ICD-10-CM

## 2020-02-28 DIAGNOSIS — Z98.84 GASTRIC BYPASS STATUS FOR OBESITY: ICD-10-CM

## 2020-02-28 DIAGNOSIS — E53.8 VITAMIN B12 DEFICIENCY WITHOUT ANEMIA: ICD-10-CM

## 2020-02-28 DIAGNOSIS — I10 ESSENTIAL HYPERTENSION: Primary | ICD-10-CM

## 2020-02-28 DIAGNOSIS — E55.9 VITAMIN D DEFICIENCY: ICD-10-CM

## 2020-02-28 DIAGNOSIS — E78.5 HYPERLIPIDEMIA LDL GOAL <130: ICD-10-CM

## 2020-02-28 DIAGNOSIS — E61.1 IRON DEFICIENCY: ICD-10-CM

## 2020-02-28 LAB
ERYTHROCYTE [DISTWIDTH] IN BLOOD BY AUTOMATED COUNT: 11.4 % (ref 10–15)
HCT VFR BLD AUTO: 42.7 % (ref 35–47)
HGB BLD-MCNC: 13.5 G/DL (ref 11.7–15.7)
MCH RBC QN AUTO: 35.2 PG (ref 26.5–33)
MCHC RBC AUTO-ENTMCNC: 31.6 G/DL (ref 31.5–36.5)
MCV RBC AUTO: 112 FL (ref 78–100)
PLATELET # BLD AUTO: 264 10E9/L (ref 150–450)
RBC # BLD AUTO: 3.83 10E12/L (ref 3.8–5.2)
WBC # BLD AUTO: 9 10E9/L (ref 4–11)

## 2020-02-28 PROCEDURE — 36415 COLL VENOUS BLD VENIPUNCTURE: CPT | Performed by: PHYSICIAN ASSISTANT

## 2020-02-28 PROCEDURE — 99215 OFFICE O/P EST HI 40 MIN: CPT | Performed by: PHYSICIAN ASSISTANT

## 2020-02-28 PROCEDURE — 82306 VITAMIN D 25 HYDROXY: CPT | Performed by: PHYSICIAN ASSISTANT

## 2020-02-28 PROCEDURE — 83550 IRON BINDING TEST: CPT | Performed by: PHYSICIAN ASSISTANT

## 2020-02-28 PROCEDURE — 83540 ASSAY OF IRON: CPT | Performed by: PHYSICIAN ASSISTANT

## 2020-02-28 PROCEDURE — 80061 LIPID PANEL: CPT | Performed by: PHYSICIAN ASSISTANT

## 2020-02-28 PROCEDURE — 83735 ASSAY OF MAGNESIUM: CPT | Performed by: PHYSICIAN ASSISTANT

## 2020-02-28 PROCEDURE — 82728 ASSAY OF FERRITIN: CPT | Performed by: PHYSICIAN ASSISTANT

## 2020-02-28 PROCEDURE — 85027 COMPLETE CBC AUTOMATED: CPT | Performed by: PHYSICIAN ASSISTANT

## 2020-02-28 PROCEDURE — 82746 ASSAY OF FOLIC ACID SERUM: CPT | Performed by: PHYSICIAN ASSISTANT

## 2020-02-28 PROCEDURE — 80053 COMPREHEN METABOLIC PANEL: CPT | Performed by: PHYSICIAN ASSISTANT

## 2020-02-28 PROCEDURE — 83921 ORGANIC ACID SINGLE QUANT: CPT | Performed by: PHYSICIAN ASSISTANT

## 2020-02-28 RX ORDER — OXYBUTYNIN CHLORIDE 5 MG/1
5 TABLET, EXTENDED RELEASE ORAL DAILY
Qty: 90 TABLET | Refills: 1 | Status: SHIPPED | OUTPATIENT
Start: 2020-02-28 | End: 2020-02-28

## 2020-02-28 RX ORDER — OXYBUTYNIN CHLORIDE 5 MG/1
5 TABLET, EXTENDED RELEASE ORAL DAILY
Qty: 30 TABLET | Refills: 1 | Status: SHIPPED | OUTPATIENT
Start: 2020-02-28 | End: 2020-05-04

## 2020-02-28 RX ORDER — OXYCODONE HYDROCHLORIDE 5 MG/1
CAPSULE ORAL
Refills: 0 | COMMUNITY
Start: 2019-06-13 | End: 2021-09-02

## 2020-02-28 RX ORDER — GABAPENTIN 300 MG/1
CAPSULE ORAL
Refills: 0 | COMMUNITY
Start: 2019-08-15 | End: 2021-11-04

## 2020-02-28 RX ORDER — OXYBUTYNIN CHLORIDE 5 MG/1
5 TABLET, EXTENDED RELEASE ORAL DAILY
Qty: 30 TABLET | Refills: 1 | Status: CANCELLED | OUTPATIENT
Start: 2020-02-28

## 2020-02-28 NOTE — PROGRESS NOTES
Subjective     Christina Fletcher is a 50 year old female who presents to clinic today for the following health issues:    HPI   Hypertension Follow-up      Do you check your blood pressure regularly outside of the clinic? No     Are you following a low salt diet? No    Are your blood pressures ever more than 140 on the top number (systolic) OR more   than 90 on the bottom number (diastolic), for example 140/90? No    Depression Followup    How are you doing with your depression since your last visit? Worsened     Are you having other symptoms that might be associated with depression? No    Have you had a significant life event?  No     Are you feeling anxious or having panic attacks?   No    Do you have any concerns with your use of alcohol or other drugs? No    Dr. Barton increase bupropion from 150 - 300 mg daily last week - she may notice some early improvement, however it has only been 4 days.     Social History     Tobacco Use     Smoking status: Never Smoker     Smokeless tobacco: Never Used   Substance Use Topics     Alcohol use: Yes     Alcohol/week: 0.0 standard drinks     Comment: occ      Drug use: No     PHQ 9/27/2018 1/31/2019 2/21/2020   PHQ-9 Total Score 18 13 23   Q9: Thoughts of better off dead/self-harm past 2 weeks Not at all Not at all Not at all   F/U: Thoughts of suicide or self-harm No - -   F/U: Safety concerns No - -     BAR-7 SCORE 9/27/2018   Total Score 18     Last PHQ-9 2/21/2020   1.  Little interest or pleasure in doing things 3   2.  Feeling down, depressed, or hopeless 3   3.  Trouble falling or staying asleep, or sleeping too much 3   4.  Feeling tired or having little energy 3   5.  Poor appetite or overeating 3   6.  Feeling bad about yourself 3   7.  Trouble concentrating 3   8.  Moving slowly or restless 2   Q9: Thoughts of better off dead/self-harm past 2 weeks 0   PHQ-9 Total Score 23   Difficulty at work, home, or with people Somewhat difficult   In the past two weeks have you  had thoughts of suicide or self harm? -   Do you have concerns about your personal safety or the safety of others? -     BAR-7  9/27/2018   1. Feeling nervous, anxious, or on edge 3   2. Not being able to stop or control worrying 3   3. Worrying too much about different things 3   4. Trouble relaxing 2   5. Being so restless that it is hard to sit still 2   6. Becoming easily annoyed or irritable 3   7. Feeling afraid, as if something awful might happen 2   BAR-7 Total Score 18   If you checked any problems, how difficult have they made it for you to do your work, take care of things at home, or get along with other people? Somewhat difficult         Suicide Assessment Five-step Evaluation and Treatment (SAFE-T)      How many servings of fruits and vegetables do you eat daily?  0-1    On average, how many sweetened beverages do you drink each day (Examples: soda, juice, sweet tea, etc.  Do NOT count diet or artificially sweetened beverages)?   0    How many days per week do you exercise enough to make your heart beat faster? 3 or less    How many minutes a day do you exercise enough to make your heart beat faster? 9 or less    How many days per week do you miss taking your medication? 0    Vascular Disease Follow-up      How often do you take nitroglycerin? Never    Do you take an aspirin every day? No - patient takes Xarelto daily    Heart Failure Follow-up    Are you experiencing any shortness of breath? No    Are you experiencing any swelling in your legs or feet?  No    Are you using more pillows than usual? No    Do you cough at night?  No    Do you check your weight daily?  No    Have you had a weight change recently?  No    Are you having any of the following side effects from your medications? (Select all that apply)  Fatigue    Since your last visit, how many times have you gone to the cardiologist, urgent care, emergency room, or hospital because of your heart failure?   None    Patient has not been to the  [facility] for cardiology follow up.   She has severe depression and it is difficult for her to get out of bed and get to her appointments.  She also lost her apartment, and is temporarily living in a hotel.      Patient has a new constant leakage of urine - she previous had intermittent urine leakage.  Now she cannot get to the bathroom on time.  She would like something for this - it significantly impacts her quality of life.     Patient Active Problem List   Diagnosis     Female genital symptoms     Other disorder of menstruation and other abnormal bleeding from female genital tract     Adjustment disorder with depressed mood     Obesity     Chronic constipation     GERD (gastroesophageal reflux disease)     Lower extremity edema     Elevated MCV     Fibroids     Fatigue     Menorrhagia     Essential hypertension     Gastric bypass status for obesity     CARDIOVASCULAR SCREENING; LDL GOAL LESS THAN 160     Bariatric surgery status     Vitamin B12 deficiency without anemia     Iron deficiency     Vitamin D deficiency     Weight gain     Dysmetabolic syndrome     Galactorrhea     OA (osteoarthritis) of knee     Ascorbic acid deficiency     Pyridoxine deficiency     Knee pain     KAROLINA (obstructive sleep apnea)     Hyperlipidemia LDL goal <130     KAROLINA on CPAP     BMI 50.0-59.9, adult (H)     Hyperlipidemia with target LDL less than 130     Eczema     Hyponatremia     Chronic kidney disease, stage III (moderate) (H)     Neuropathy     Morbid obesity due to excess calories (H)     Vitamin C deficiency     Osteoarthritis of both knees, unspecified osteoarthritis type     Hypomagnesemia     Essential hypertension with goal blood pressure less than 130/80     Eczema, unspecified type     Major depressive disorder, recurrent episode, moderate (H)     Long-term (current) use of anticoagulants [Z79.01]     History of pulmonary embolism     Dyspnea on exertion     CHF (congestive heart failure) (H)     Female stress  incontinence     Other restrictive cardiomyopathy (H)     Past Surgical History:   Procedure Laterality Date     CV HEART CATHETERIZATION WITH POSSIBLE INTERVENTION N/A 1/28/2019    Procedure: Heart Catheterization with possible Intervention;  Surgeon: Eloisa Bob MD;  Location:  [facility]ATH [facility]     DEBRIDE ASSOC FX/DISLO SKIN/MUS/BONE  1990's    Infected - Right Femur     GASTRIC BYPASS  01/18/2017    conversion of gastric sleeve to gastric bypass with lysis of adhesions-      HYSTEROSCOPY,ABLATION ENDOMETRIUM  2008     LAP ADJUSTABLE GASTRIC BAND  2001    Lap Banding      LAPAROSCOPIC GASTRIC SLEEVE  2010    Dr Harris       Social History     Tobacco Use     Smoking status: Never Smoker     Smokeless tobacco: Never Used   Substance Use Topics     Alcohol use: Yes     Alcohol/week: 0.0 standard drinks     Comment: occ      Family History   Problem Relation Age of Onset     Hypertension Mother      Breast Cancer Maternal Grandmother      Cancer Maternal Grandmother         Bone Cancer     Thyroid Disease Son      Genitourinary Problems Daughter         Kidney failure         Current Outpatient Medications   Medication Sig Dispense Refill     acetaminophen 500 MG CAPS Take 2 capsules by mouth every 8 hours as needed For aches, pain, fever 60 capsule 0     buPROPion 150 MG PO 12 hr tablet Take 1 tablet (150 mg) by mouth 2 times daily 60 tablet 1     cholecalciferol (VITAMIN D3) 12883 UNITS capsule Take 1 capsule (50,000 Units) by mouth once a week TAKE FOR VITAMIN D DEFICIENCY 60 capsule 0     diclofenac (VOLTAREN) 1 % topical gel Apply 4 g topically 4 times daily 1 Tube 3     furosemide (LASIX) 20 MG tablet Take 1 tablet (20 mg) by mouth daily Additional 20mg when instructed by [facility] for weight gain or worsening edema/breathing 110 tablet 3     losartan 25 MG PO tablet Take 1 tablet (25 mg) by mouth daily 30 tablet 1     METOPROLOL SUCCINATE ER 50 MG PO 24 hr tablet TAKE ONE AND ONE-HALF  TABLETS BY MOUTH TWICE DAILY 270 tablet 0     Multiple Vitamin (MULTI-VITAMIN DAILY PO) Take 1 tablet by mouth daily        oxybutynin ER (DITROPAN-XL) 5 MG 24 hr tablet Take 1 tablet (5 mg) by mouth daily 30 tablet 1     rivaroxaban ANTICOAGULANT (XARELTO) 10 MG TABS tablet Take 1 tablet (10 mg) by mouth daily (with dinner) 30 tablet 11     rosuvastatin (CRESTOR) 20 MG tablet Take 1 tablet (20 mg) by mouth daily INDICATION: TO LOWER CHOLESTEROL AND TO HELP REDUCE RISK OF HEART ATTACK AND STROKE 90 tablet 3     senna-docusate (SENOKOT-S/PERICOLACE) 8.6-50 MG tablet Take 1-2 tablets by mouth 2 times daily INDICATION: CONSTIPATION, TO ACHIEVE 1-2 SOFT BMs PER DAY 60 tablet 8     gabapentin (NEURONTIN) 300 MG capsule TK 1 - 2 CS PO Q D  0     oxyCODONE (OXY-IR) 5 MG capsule TK 1 C PO UP TO QID PRN FOR SEVERE BREAKTHROUGH PAIN  0     Allergies   Allergen Reactions     Hydralazine Shortness Of Breath     WITH ASSOCIATED NAUSEA AND VOMITING     Nsaids Other (See Comments)     Other reaction(s): Other - Describe In Comment Field  Patient had gastric bypass surgery.  Should not take oral NSAID's ever.     Penicillin [Penicillins] Rash       Reviewed and updated as needed this visit by Provider  Meds         Review of Systems   Constitutional: Negative.    HENT: Negative.    Eyes: Negative.    Respiratory: Negative.    Cardiovascular: Negative.    Gastrointestinal: Negative.    Genitourinary:        As in HPI   Musculoskeletal: Positive for arthralgias.   Skin: Negative.    Neurological: Negative.    Psychiatric/Behavioral:        As in HPI         Objective    /74 (BP Location: Other (Comment), Cuff Size: Adult Large)   Pulse 83   Resp 12   SpO2 100%   Physical Exam  Constitutional:       General: She is not in acute distress.     Appearance: She is well-developed. She is not diaphoretic.   HENT:      Head: Normocephalic.      Right Ear: External ear normal.      Left Ear: External ear normal.      Nose: Nose  normal.   Eyes:      Conjunctiva/sclera: Conjunctivae normal.   Neck:      Musculoskeletal: Normal range of motion.   Cardiovascular:      Rate and Rhythm: Normal rate and regular rhythm.      Heart sounds: Normal heart sounds.   Pulmonary:      Effort: Pulmonary effort is normal.      Breath sounds: Normal breath sounds.   Musculoskeletal:      Right lower leg: No edema.      Left lower leg: No edema.      Comments: Patient is morbidly obese - leg swelling is difficult to assess, legs are swollen (consistent with baseline), no pitting edema.    Skin:     General: Skin is warm and dry.   Neurological:      Mental Status: She is alert and oriented to person, place, and time.   Psychiatric:         Mood and Affect: Mood is depressed (patient is tearful).         Judgment: Judgment normal.         Diagnostic Test Results:  No results found for this or any previous visit (from the past 24 hour(s)).        Assessment & Plan   Problem List Items Addressed This Visit        Digestive    Gastric bypass status for obesity    Relevant Orders    Magnesium (Completed)    Iron deficiency    Relevant Orders    Folate (Completed)    Iron and iron binding capacity (Completed)    Ferritin (Completed)    Vitamin B12 deficiency without anemia    Relevant Orders    Methylmalonic Acid (Completed)    Vitamin D deficiency    Relevant Orders    Vitamin D Deficiency (Completed)       Endocrine    Hyperlipidemia LDL goal <130    Relevant Orders    Lipid panel reflex to direct LDL Non-fasting (Completed)       Circulatory    CHF (congestive heart failure) (H)    Relevant Orders    Comprehensive metabolic panel (BMP + Alb, Alk Phos, ALT, AST, Total. Bili, TP) (Completed)    CBC with platelets (Completed)    Essential hypertension - Primary    Relevant Orders    Comprehensive metabolic panel (BMP + Alb, Alk Phos, ALT, AST, Total. Bili, TP) (Completed)    CBC with platelets (Completed)       Musculoskeletal and Integumentary    OA  (osteoarthritis) of knee    Relevant Medications    oxyCODONE (OXY-IR) 5 MG capsule    Other Relevant Orders    ORTHOPEDICS ADULT REFERRAL      Other Visit Diagnoses     Urge incontinence of urine        Relevant Medications    oxybutynin ER (DITROPAN-XL) 5 MG 24 hr tablet         1. Vitals are stable today - no evidence for CHF exacerbation.  Patient is encouraged to follow up with cardiology ASAP - since she has missed appointments and is behind on care.  EF <20% on 1/25/2019.  Patient is due for echocardiogram.  2.  Lipids ordered - patient due  3. S/p gastric bypass - labs ordered as above  4. Ortho referral for second opinion on options for chronic knee pain.  Patient is not a surgical candidate due to BMI and CHF.   5. Trial of low dose oxybutynin for urge incontinence.  Recheck with me in 4 weeks.     42 minutes were spent with the patient, greater than 50% of our time was spent in counseling.      There are no Patient Instructions on file for this visit.    Return in about 4 weeks (around 3/27/2020) for Medication Recheck.    Mary Carmen Chaudhry PA-C  [facility]

## 2020-02-28 NOTE — LETTER
March 5, 2020      Christina Fletcher  [address] [address]  [facility] [address]        Dear ,    We are writing to inform you of your test results.    - Your liver function tests are mildly elevated, we will continue to monitor.   - All other labs are within normal limits, or have not changed.     Resulted Orders   Comprehensive metabolic panel (BMP + Alb, Alk Phos, ALT, AST, Total. Bili, TP)   Result Value Ref Range    Sodium 136 133 - 144 mmol/L    Potassium 4.6 3.4 - 5.3 mmol/L    Chloride 104 94 - 109 mmol/L    Carbon Dioxide 27 20 - 32 mmol/L    Anion Gap 5 3 - 14 mmol/L    Glucose 91 70 - 99 mg/dL    Urea Nitrogen 23 7 - 30 mg/dL    Creatinine 0.94 0.52 - 1.04 mg/dL    GFR Estimate 71 >60 mL/min/[1.73_m2]      Comment:      Non  GFR Calc  Starting 12/18/2018, serum creatinine based estimated GFR (eGFR) will be   calculated using the Chronic Kidney Disease Epidemiology Collaboration   (CKD-EPI) equation.      GFR Estimate If Black 82 >60 mL/min/[1.73_m2]      Comment:       GFR Calc  Starting 12/18/2018, serum creatinine based estimated GFR (eGFR) will be   calculated using the Chronic Kidney Disease Epidemiology Collaboration   (CKD-EPI) equation.      Calcium 9.0 8.5 - 10.1 mg/dL    Bilirubin Total 0.4 0.2 - 1.3 mg/dL    Albumin 3.3 (L) 3.4 - 5.0 g/dL    Protein Total 8.8 6.8 - 8.8 g/dL    Alkaline Phosphatase 96 40 - 150 U/L    ALT 67 (H) 0 - 50 U/L    AST 49 (H) 0 - 45 U/L   CBC with platelets   Result Value Ref Range    WBC 9.0 4.0 - 11.0 10e9/L    RBC Count 3.83 3.8 - 5.2 10e12/L    Hemoglobin 13.5 11.7 - 15.7 g/dL    Hematocrit 42.7 35.0 - 47.0 %     (H) 78 - 100 fl    MCH 35.2 (H) 26.5 - 33.0 pg    MCHC 31.6 31.5 - 36.5 g/dL    RDW 11.4 10.0 - 15.0 %    Platelet Count 264 150 - 450 10e9/L   Lipid panel reflex to direct LDL Non-fasting   Result Value Ref Range    Cholesterol 148 <200 mg/dL    Triglycerides 125 <150 mg/dL    HDL Cholesterol 57 >49 mg/dL    LDL  Cholesterol Calculated 66 <100 mg/dL      Comment:      Desirable:       <100 mg/dl    Non HDL Cholesterol 91 <130 mg/dL   Methylmalonic Acid   Result Value Ref Range    Methylmalonic Acid 0.36 0.00 - 0.40 umol/L      Comment:      This test was developed and its performance characteristics determined by the   [facility].   It has not been cleared or approved by the FDA. The laboratory is regulated   under CLIA as qualified to perform high-complexity testing. This test is used   for clinical purposes. It should not be regarded as investigational or for   research.     Vitamin D Deficiency   Result Value Ref Range    Vitamin D Deficiency screening 30 20 - 75 ug/L      Comment:      Season, race, dietary intake, and treatment affect the concentration of   25-hydroxy-Vitamin D. Values may decrease during winter months and increase   during summer months. Values 20-29 ug/L may indicate Vitamin D insufficiency   and values <20 ug/L may indicate Vitamin D deficiency.  Vitamin D determination is routinely performed by an immunoassay specific for   25 hydroxyvitamin D3.  If an individual is on vitamin D2 (ergocalciferol)   supplementation, please specify 25 OH vitamin D2 and D3 level determination by   LCMSMS test [id number].     Folate   Result Value Ref Range    Folate 10.2 >5.4 ng/mL   Iron and iron binding capacity   Result Value Ref Range    Iron 97 35 - 180 ug/dL    Iron Binding Cap 359 240 - 430 ug/dL    Iron Saturation Index 27 15 - 46 %   Ferritin   Result Value Ref Range    Ferritin 62 8 - 252 ng/mL   Magnesium   Result Value Ref Range    Magnesium 2.1 1.6 - 2.3 mg/dL       If you have any questions or concerns, please call the clinic at the number listed above.       Sincerely,        Mary Carmen Chaudhry PA-C

## 2020-02-29 LAB
ALBUMIN SERPL-MCNC: 3.3 G/DL (ref 3.4–5)
ALP SERPL-CCNC: 96 U/L (ref 40–150)
ALT SERPL W P-5'-P-CCNC: 67 U/L (ref 0–50)
ANION GAP SERPL CALCULATED.3IONS-SCNC: 5 MMOL/L (ref 3–14)
AST SERPL W P-5'-P-CCNC: 49 U/L (ref 0–45)
BILIRUB SERPL-MCNC: 0.4 MG/DL (ref 0.2–1.3)
BUN SERPL-MCNC: 23 MG/DL (ref 7–30)
CALCIUM SERPL-MCNC: 9 MG/DL (ref 8.5–10.1)
CHLORIDE SERPL-SCNC: 104 MMOL/L (ref 94–109)
CHOLEST SERPL-MCNC: 148 MG/DL
CO2 SERPL-SCNC: 27 MMOL/L (ref 20–32)
CREAT SERPL-MCNC: 0.94 MG/DL (ref 0.52–1.04)
FERRITIN SERPL-MCNC: 62 NG/ML (ref 8–252)
FOLATE SERPL-MCNC: 10.2 NG/ML
GFR SERPL CREATININE-BSD FRML MDRD: 71 ML/MIN/{1.73_M2}
GLUCOSE SERPL-MCNC: 91 MG/DL (ref 70–99)
HDLC SERPL-MCNC: 57 MG/DL
IRON SATN MFR SERPL: 27 % (ref 15–46)
IRON SERPL-MCNC: 97 UG/DL (ref 35–180)
LDLC SERPL CALC-MCNC: 66 MG/DL
MAGNESIUM SERPL-MCNC: 2.1 MG/DL (ref 1.6–2.3)
NONHDLC SERPL-MCNC: 91 MG/DL
POTASSIUM SERPL-SCNC: 4.6 MMOL/L (ref 3.4–5.3)
PROT SERPL-MCNC: 8.8 G/DL (ref 6.8–8.8)
SODIUM SERPL-SCNC: 136 MMOL/L (ref 133–144)
TIBC SERPL-MCNC: 359 UG/DL (ref 240–430)
TRIGL SERPL-MCNC: 125 MG/DL

## 2020-02-29 NOTE — TELEPHONE ENCOUNTER
"Requested Prescriptions   Pending Prescriptions Disp Refills     oxybutynin ER (DITROPAN-XL) 5 MG 24 hr tablet    Last Written Prescription Date:  02/28/2020  Last Fill Quantity: 30 tablet,  # refills: 1   Last office visit: 2/28/2020 with prescribing provider:  Richa   Future Office Visit:     30 tablet 1     Sig: Take 1 tablet (5 mg) by mouth daily       Muscarinic Antagonists (Urinary Incontinence Agents) Passed - 2/28/2020  5:16 PM        Passed - Recent (12 mo) or future (30 days) visit within the authorizing provider's specialty     Patient has had an office visit with the authorizing provider or a provider within the authorizing providers department within the previous 12 mos or has a future within next 30 days. See \"Patient Info\" tab in inbasket, or \"Choose Columns\" in Meds & Orders section of the refill encounter.              Passed - Medication is Oxybutynin and patient is 5 years of age or older        Passed - Patient does not have a diagnosis of glaucoma on the problem list     If glaucoma diagnosis is new, refer refill to physician.          Passed - Medication is active on med list        Passed - Patient is 18 years of age or older           "

## 2020-03-01 LAB — DEPRECATED CALCIDIOL+CALCIFEROL SERPL-MC: 30 UG/L (ref 20–75)

## 2020-03-02 NOTE — TELEPHONE ENCOUNTER
Routing refill request to provider for review/approval because:  Was this a short term trial?

## 2020-03-04 ASSESSMENT — ENCOUNTER SYMPTOMS
EYES NEGATIVE: 1
ARTHRALGIAS: 1
CARDIOVASCULAR NEGATIVE: 1
GASTROINTESTINAL NEGATIVE: 1
RESPIRATORY NEGATIVE: 1
NEUROLOGICAL NEGATIVE: 1
CONSTITUTIONAL NEGATIVE: 1

## 2020-03-05 LAB — METHYLMALONATE SERPL-SCNC: 0.36 UMOL/L (ref 0–0.4)

## 2020-03-12 ENCOUNTER — TELEPHONE (OUTPATIENT)
Dept: FAMILY MEDICINE | Facility: CLINIC | Age: 50
End: 2020-03-12

## 2020-03-12 NOTE — TELEPHONE ENCOUNTER
Our goal is to have forms completed within 72 hours, however some forms may require a visit or additional information.    What clinic location was the form placed at [facility] or [place]     Who is the form from?   Where did the form come from? Faxed to clinic   The form was placed in the inbox of TATY Barnett      Please fax to [phone]  Phone number: [phone]    Additional comments: [facility] ICD-10 dx verification form     Call take on 3/12/2020 at 10:48 AM by Alessia Patel

## 2020-03-23 DIAGNOSIS — Z86.711 HISTORY OF PULMONARY EMBOLISM: ICD-10-CM

## 2020-03-23 NOTE — TELEPHONE ENCOUNTER
Requested Prescriptions   Pending Prescriptions Disp Refills     rivaroxaban ANTICOAGULANT (XARELTO) 10 MG TABS tablet  Last Written Prescription Date:  11/22/2019  Last Fill Quantity: 90 tablet,  # refills: 3   Last office visit: 2/28/2020 with prescribing provider:  BARBARA Barton   Future Office Visit:   30 tablet 11     Sig: Take 1 tablet (10 mg) by mouth daily (with dinner)       There is no refill protocol information for this order

## 2020-04-14 ENCOUNTER — VIRTUAL VISIT (OUTPATIENT)
Dept: FAMILY MEDICINE | Facility: CLINIC | Age: 50
End: 2020-04-14
Payer: MEDICARE

## 2020-04-14 ENCOUNTER — TELEPHONE (OUTPATIENT)
Dept: FAMILY MEDICINE | Facility: CLINIC | Age: 50
End: 2020-04-14

## 2020-04-14 DIAGNOSIS — I42.9 CARDIOMYOPATHY, UNSPECIFIED TYPE (H): ICD-10-CM

## 2020-04-14 DIAGNOSIS — Z79.01 LONG TERM CURRENT USE OF ANTICOAGULANT THERAPY: ICD-10-CM

## 2020-04-14 DIAGNOSIS — I50.22 CHRONIC SYSTOLIC CONGESTIVE HEART FAILURE (H): ICD-10-CM

## 2020-04-14 DIAGNOSIS — I10 ESSENTIAL HYPERTENSION WITH GOAL BLOOD PRESSURE LESS THAN 130/80: ICD-10-CM

## 2020-04-14 DIAGNOSIS — G89.29 CHRONIC PAIN OF BOTH KNEES: ICD-10-CM

## 2020-04-14 DIAGNOSIS — F33.1 MAJOR DEPRESSIVE DISORDER, RECURRENT EPISODE, MODERATE (H): ICD-10-CM

## 2020-04-14 DIAGNOSIS — M25.562 CHRONIC PAIN OF BOTH KNEES: ICD-10-CM

## 2020-04-14 DIAGNOSIS — M25.561 CHRONIC PAIN OF BOTH KNEES: ICD-10-CM

## 2020-04-14 DIAGNOSIS — F41.1 GENERALIZED ANXIETY DISORDER: ICD-10-CM

## 2020-04-14 DIAGNOSIS — M25.512 CHRONIC PAIN OF BOTH SHOULDERS: Primary | ICD-10-CM

## 2020-04-14 DIAGNOSIS — M25.511 CHRONIC PAIN OF BOTH SHOULDERS: Primary | ICD-10-CM

## 2020-04-14 DIAGNOSIS — R55 PRE-SYNCOPE: ICD-10-CM

## 2020-04-14 DIAGNOSIS — G89.29 CHRONIC PAIN OF BOTH SHOULDERS: Primary | ICD-10-CM

## 2020-04-14 DIAGNOSIS — R15.9 FECAL SOILING DUE TO FECAL INCONTINENCE: Primary | ICD-10-CM

## 2020-04-14 PROCEDURE — 99443 ZZC PHYSICIAN TELEPHONE EVALUATION 21-30 MIN: CPT | Performed by: PHYSICIAN ASSISTANT

## 2020-04-14 RX ORDER — BUPROPION HYDROCHLORIDE 300 MG/1
300 TABLET ORAL EVERY MORNING
Qty: 90 TABLET | Refills: 1 | Status: SHIPPED | OUTPATIENT
Start: 2020-04-14 | End: 2021-07-29

## 2020-04-14 RX ORDER — LORAZEPAM 0.5 MG/1
0.5 TABLET ORAL 2 TIMES DAILY PRN
Qty: 20 TABLET | Refills: 0 | Status: SHIPPED | OUTPATIENT
Start: 2020-04-14 | End: 2022-05-02

## 2020-04-14 ASSESSMENT — PATIENT HEALTH QUESTIONNAIRE - PHQ9
SUM OF ALL RESPONSES TO PHQ QUESTIONS 1-9: 17
5. POOR APPETITE OR OVEREATING: NEARLY EVERY DAY

## 2020-04-14 ASSESSMENT — ANXIETY QUESTIONNAIRES
7. FEELING AFRAID AS IF SOMETHING AWFUL MIGHT HAPPEN: NEARLY EVERY DAY
1. FEELING NERVOUS, ANXIOUS, OR ON EDGE: MORE THAN HALF THE DAYS
3. WORRYING TOO MUCH ABOUT DIFFERENT THINGS: NEARLY EVERY DAY
6. BECOMING EASILY ANNOYED OR IRRITABLE: SEVERAL DAYS
IF YOU CHECKED OFF ANY PROBLEMS ON THIS QUESTIONNAIRE, HOW DIFFICULT HAVE THESE PROBLEMS MADE IT FOR YOU TO DO YOUR WORK, TAKE CARE OF THINGS AT HOME, OR GET ALONG WITH OTHER PEOPLE: EXTREMELY DIFFICULT
GAD7 TOTAL SCORE: 13
5. BEING SO RESTLESS THAT IT IS HARD TO SIT STILL: NOT AT ALL
2. NOT BEING ABLE TO STOP OR CONTROL WORRYING: SEVERAL DAYS

## 2020-04-14 NOTE — Clinical Note
Deshaun,     It looks like you are the in office virtual provider this week.     This patient has home health nursing orders that will be coming in this week and need to be signed.  They are stable orders that just need an annual signature.     Could you also please complete an updated handicap parking form for her?  She has severe bilateral osteoarthritis in her knees and cannot walk without a walker.  She is unable to walk more than 100 feet.  This is a permanent disability (patient is unable to have knee replacement due to her heart failure and morbid obesity).     If you have any questions please call me on my cell - ([phone].      Thanks!  Davis

## 2020-04-14 NOTE — LETTER
[facility]  [address]  [place] [place] [address]  Phone: [phone]  Fax: [phone]    April 16, 2020        Christina Fletcher   [address] [address]  [place] [facility] [address]          To whom it may concern:    RE: Christina Fletcher    Home Health Order:  Please add an overnight PCA supervisor to Christina's care due to nocturnal fecal incontinence.     Please contact me for questions or concerns.      Sincerely,        Mary Carmen Chaudhry PA-C

## 2020-04-14 NOTE — PROGRESS NOTES
"Christina Fletcher is a 50 year old female who is being evaluated via a billable telephone visit.      The patient has been notified of following:     \"This telephone visit will be conducted via a call between you and your physician/provider. We have found that certain health care needs can be provided without the need for a physical exam.  This service lets us provide the care you need with a short phone conversation.  If a prescription is necessary we can send it directly to your pharmacy.  If lab work is needed we can place an order for that and you can then stop by our lab to have the test done at a later time.    Telephone visits are billed at different rates depending on your insurance coverage. During this emergency period, for some insurers they may be billed the same as an in-person visit.  Please reach out to your insurance provider with any questions.    If during the course of the call the physician/provider feels a telephone visit is not appropriate, you will not be charged for this service.\"    Patient has given verbal consent for Telephone visit?  Yes    How would you like to obtain your AVS? Mail a copy    Subjective     Christina Fletcher is a 50 year old female who presents to clinic today for the following health issues:    -order for overnight nurse??   NEEDS to be renewed  Home health with fax order form to clinic     -handicap parking pass?  This is also outdated - patient requesting updated order    -discuss lab work from February   She has elevated AST/ALT - we discussed it is likely due to fatty liver    -both arms hurt when she lies down, dull aching pain, 2-3 weeks, between shoulder and elbow    Pain on the left side of her back - not related to breathing or chest pain  Breathing is okay with CPAP - worse with cold fresh air  No SOB with her PT exercises  Denies SOB with presyncopal episodes    -right side leg goes numb, falls asleep when she sits for more than 5 minutes - returns to normal when " she stands up    -having very loose stools, runny, watery, incontinence issues, no blood  Still taking laxatives three times a week    Depression and Anxiety Follow-Up    How are you doing with your depression since your last visit? Worsened     How are you doing with your anxiety since your last visit?  Worsened     Are you having other symptoms that might be associated with depression or anxiety? Yes - son recently moved out    Have you had a significant life event? Health Concerns     Do you have any concerns with your use of alcohol or other drugs? No    Social History     Tobacco Use     Smoking status: Never Smoker     Smokeless tobacco: Never Used   Substance Use Topics     Alcohol use: Yes     Alcohol/week: 0.0 standard drinks     Comment: occ      Drug use: No     PHQ 1/31/2019 2/21/2020 4/14/2020   PHQ-9 Total Score 13 23 17   Q9: Thoughts of better off dead/self-harm past 2 weeks Not at all Not at all Not at all   F/U: Thoughts of suicide or self-harm - - -   F/U: Safety concerns - - -     BAR-7 SCORE 9/27/2018 4/14/2020   Total Score 18 13     She has been lightheaded and passed out  Blood pressure take 2x daily - its normal    Loose stools overnight - she wears adult diapers at night  She does not have loose stools during the day    Requesting a nurse in the house     Both arms ache in the middle of her upper arm - worse at night    She sleeps with a CPAP    She fell out of bed and hit her head last week  She has a hospital bed ordered by (home health? Home PT?)  Mobilized scooter also ordered for her   She is doing PT for her legs - she has had several episodes of feeling lightheaded and passing out     Son Saleem no longer lining with her - makes depression worse          Suicide Assessment Five-step Evaluation and Treatment (SAFE-T)      How many servings of fruits and vegetables do you eat daily?  4 or more    On average, how many sweetened beverages do you drink each day (Examples: soda, juice,  sweet tea, etc.  Do NOT count diet or artificially sweetened beverages)?   0    How many days per week do you exercise enough to make your heart beat faster? 3 or less    How many minutes a day do you exercise enough to make your heart beat faster? 20 - 29    How many days per week do you miss taking your medication? 0    When taking bupropion 12-hour tabs - she cannot sleep at night.   She has been taking 1.5 tabs in the morning, which helps            Patient Active Problem List   Diagnosis     Female genital symptoms     Other disorder of menstruation and other abnormal bleeding from female genital tract     Adjustment disorder with depressed mood     Obesity     Chronic constipation     GERD (gastroesophageal reflux disease)     Lower extremity edema     Elevated MCV     Fibroids     Fatigue     Menorrhagia     Essential hypertension     Gastric bypass status for obesity     CARDIOVASCULAR SCREENING; LDL GOAL LESS THAN 160     Bariatric surgery status     Vitamin B12 deficiency without anemia     Iron deficiency     Vitamin D deficiency     Weight gain     Dysmetabolic syndrome     Galactorrhea     OA (osteoarthritis) of knee     Ascorbic acid deficiency     Pyridoxine deficiency     Knee pain     KAROLINA (obstructive sleep apnea)     Hyperlipidemia LDL goal <130     KAROLINA on CPAP     BMI 50.0-59.9, adult (H)     Hyperlipidemia with target LDL less than 130     Eczema     Hyponatremia     Chronic kidney disease, stage III (moderate) (H)     Neuropathy     Morbid obesity due to excess calories (H)     Vitamin C deficiency     Osteoarthritis of both knees, unspecified osteoarthritis type     Hypomagnesemia     Essential hypertension with goal blood pressure less than 130/80     Eczema, unspecified type     Major depressive disorder, recurrent episode, moderate (H)     Long-term (current) use of anticoagulants [Z79.01]     History of pulmonary embolism     Dyspnea on exertion     CHF (congestive heart failure) (H)      Female stress incontinence     Other restrictive cardiomyopathy (H)     Past Surgical History:   Procedure Laterality Date     CV HEART CATHETERIZATION WITH POSSIBLE INTERVENTION N/A 1/28/2019    Procedure: Heart Catheterization with possible Intervention;  Surgeon: Eloisa Bob MD;  Location:  [facility]ATH [facility]     DEBRIDE ASSOC FX/DISLO SKIN/MUS/BONE  1990's    Infected - Right Femur     GASTRIC BYPASS  01/18/2017    conversion of gastric sleeve to gastric bypass with lysis of adhesions-      HYSTEROSCOPY,ABLATION ENDOMETRIUM  2008     LAP ADJUSTABLE GASTRIC BAND  2001    Lap Banding      LAPAROSCOPIC GASTRIC SLEEVE  2010    Dr Harris       Social History     Tobacco Use     Smoking status: Never Smoker     Smokeless tobacco: Never Used   Substance Use Topics     Alcohol use: Yes     Alcohol/week: 0.0 standard drinks     Comment: occ      Family History   Problem Relation Age of Onset     Hypertension Mother      Breast Cancer Maternal Grandmother      Cancer Maternal Grandmother         Bone Cancer     Thyroid Disease Son      Genitourinary Problems Daughter         Kidney failure         Current Outpatient Medications   Medication Sig Dispense Refill     acetaminophen 500 MG CAPS Take 2 capsules by mouth every 8 hours as needed For aches, pain, fever 60 capsule 0     buPROPion (WELLBUTRIN XL) 300 MG 24 hr tablet Take 1 tablet (300 mg) by mouth every morning 90 tablet 1     cholecalciferol (VITAMIN D3) 71333 UNITS capsule Take 1 capsule (50,000 Units) by mouth once a week TAKE FOR VITAMIN D DEFICIENCY 60 capsule 0     diclofenac (VOLTAREN) 1 % topical gel Apply 4 g topically 4 times daily 1 Tube 3     furosemide (LASIX) 20 MG tablet Take 1 tablet (20 mg) by mouth daily Additional 20mg when instructed by [facility] for weight gain or worsening edema/breathing 110 tablet 3     gabapentin (NEURONTIN) 300 MG capsule TK 1 - 2 CS PO Q D  0     LORazepam (ATIVAN) 0.5 MG tablet Take 1 tablet (0.5 mg)  by mouth 2 times daily as needed for anxiety . Future refills by PCP or Psychiatrist. 20 tablet 0     losartan 25 MG PO tablet Take 1 tablet (25 mg) by mouth daily 30 tablet 1     METOPROLOL SUCCINATE ER 50 MG PO 24 hr tablet TAKE ONE AND ONE-HALF TABLETS BY MOUTH TWICE DAILY 270 tablet 0     Multiple Vitamin (MULTI-VITAMIN DAILY PO) Take 1 tablet by mouth daily        naloxone (NARCAN) 4 MG/0.1ML nasal spray Spray 1 spray (4 mg) into one nostril alternating nostrils once as needed for opioid reversal every 2-3 minutes until assistance arrives 0.2 mL 1     oxybutynin ER (DITROPAN-XL) 5 MG 24 hr tablet Take 1 tablet (5 mg) by mouth daily 30 tablet 1     oxyCODONE (OXY-IR) 5 MG capsule TK 1 C PO UP TO QID PRN FOR SEVERE BREAKTHROUGH PAIN  0     rivaroxaban ANTICOAGULANT (XARELTO) 10 MG TABS tablet Take 1 tablet (10 mg) by mouth daily (with dinner) 30 tablet 11     rosuvastatin (CRESTOR) 20 MG tablet Take 1 tablet (20 mg) by mouth daily INDICATION: TO LOWER CHOLESTEROL AND TO HELP REDUCE RISK OF HEART ATTACK AND STROKE 90 tablet 3     senna-docusate (SENOKOT-S/PERICOLACE) 8.6-50 MG tablet Take 1-2 tablets by mouth 2 times daily INDICATION: CONSTIPATION, TO ACHIEVE 1-2 SOFT BMs PER DAY 60 tablet 8     Allergies   Allergen Reactions     Hydralazine Shortness Of Breath     WITH ASSOCIATED NAUSEA AND VOMITING     Nsaids Other (See Comments)     Other reaction(s): Other - Describe In Comment Field  Patient had gastric bypass surgery.  Should not take oral NSAID's ever.     Penicillin [Penicillins] Rash       Reviewed and updated as needed this visit by Provider         Review of Systems          Objective   Reported vitals:  There were no vitals taken for this visit.   alert, no distress and cooperative  PSYCH: Alert and oriented times 3; coherent speech, normal   rate and volume, able to articulate logical thoughts, able   to abstract reason, no tangential thoughts, no hallucinations   or delusions  Her affect is  normal  RESP: No cough, no audible wheezing, able to talk in full sentences  Remainder of exam unable to be completed due to telephone visits    Diagnostic Test Results:  none         Assessment/Plan:    ICD-10-CM    1. Chronic pain of both shoulders  M25.511 XR Shoulder Right G/E 3 Views    G89.29 XR Shoulder Left G/E 3 Views    M25.512    2. Generalized anxiety disorder  F41.1 LORazepam (ATIVAN) 0.5 MG tablet   3. Major depressive disorder, recurrent episode, moderate (H)  F33.1 buPROPion (WELLBUTRIN XL) 300 MG 24 hr tablet   4. Chronic pain of both knees  M25.561 naloxone (NARCAN) 4 MG/0.1ML nasal spray    M25.562     G89.29    5. Cardiomyopathy, unspecified type (H)  I42.9    6. Essential hypertension with goal blood pressure less than 130/80  I10    7. BMI 50.0-59.9, adult (H)  Z68.43    8. Chronic systolic congestive heart failure (H)  I50.22    9. Long-term (current) use of anticoagulants [Z79.01]  Z79.01    10. Pre-syncope  R55         1. We discussed episodes of presyncope - unsure of cause.  Patient is taking medications for HTN and CHF as directed, and BP has been normal (home health checking 2x/day).  I would like her to follow up with cardiology to see if they would like to complete additional testing, EKG, etc.  She will call today to discuss with cardiology.   2. Bilateral shoulder pain - patient will come in to clinic for XR only appointment, and I will review XR when available.  3. Paperwork - patient will need home health orders and disability parking form completed - note sent to in-clinic provider to complete the forms.   4. Depression and anxiety.  Bupropion changed to 24-hour tablets - hopefully this will help with insomnia.  Ok to take lorazepam as needed.  Narcan sent.  Medication and signs of respiratory distress discussed with the patient - she has had it in the past without any problems.   5. Loose stools - patient should only take stool softeners as needed.     Return in about 1 week  (around 4/21/2020) for Imaging.    Phone call duration:  35 minutes    Davis Chaudhry PA-C

## 2020-04-15 ENCOUNTER — TELEPHONE (OUTPATIENT)
Dept: FAMILY MEDICINE | Facility: CLINIC | Age: 50
End: 2020-04-15

## 2020-04-15 ASSESSMENT — ANXIETY QUESTIONNAIRES: GAD7 TOTAL SCORE: 13

## 2020-04-15 NOTE — TELEPHONE ENCOUNTER
Reason for Call:  Other Patient Call    Detailed comments: The patient called and asked if her stuff was available to be picked up from Davis Chaudhry.  The writer looked in the patient's chart and there was no specific encounter stating that the patient had to come into the clinic and  anything. The writer proceeded to ask some questions to try to figure out what the patient might be inquiring about.  The patient got upset with the writer and stated that she would call Davis Chaudhry herself and get an answer. The patient then hung up on the writer.      Call taken on 4/15/2020 at 12:50 PM by Tiffany Moss

## 2020-04-15 NOTE — TELEPHONE ENCOUNTER
Called pt to figure out what is going on.    Pt is very confused. She states that it was not the home care agency that she uses now that is requesting this, she said it is a woman that she has never met or talked to but is a ? I told her that when she comes in tomorrow to give us that woman's name and number so we can call to obtain forms or whatever we need to get this done. I have a feeling that she is a  at this agency.    Patient would like to  this [employer] parking form as well while she is here. A copy is in Dr Briseno's blue folder and the original was mailed to pt.

## 2020-04-15 NOTE — TELEPHONE ENCOUNTER
"Otilia SAUCEDO called me and let me know the woman's name and number.    Cara [phone], fax [phone]    Cara states that there is no specific form to fill out. She says that you can write a \"Rx\" (like a DME order) and also a letter stating something along the lines of:    Night supervision to help with personal needs for 8 hours per night due to patient having issues with bowel incontinence etc.      Please send to the fax number listed above when done.  "

## 2020-04-15 NOTE — TELEPHONE ENCOUNTER
Please contact patient to clarify what orders are needed.   When we spoke yesterday, my understanding was the home health orders would be faxed to clinic for us to sign.    Can we see if any order forms came into clinic?    Can her home health nurse please give us the exact orders she needs?    Davis Chaudhry PA-C

## 2020-04-15 NOTE — TELEPHONE ENCOUNTER
Called pt to let her know we contacted Davis and are waiting on order, will call her when we receive it.

## 2020-04-16 NOTE — TELEPHONE ENCOUNTER
DME order and letter completed for patient - order for overnight PCA supervisor due to nocturnal fecal incontinence.     Davis Chaudhry PA-C

## 2020-04-17 ENCOUNTER — ANCILLARY PROCEDURE (OUTPATIENT)
Dept: GENERAL RADIOLOGY | Facility: CLINIC | Age: 50
End: 2020-04-17
Attending: PHYSICIAN ASSISTANT
Payer: MEDICARE

## 2020-04-17 ENCOUNTER — APPOINTMENT (OUTPATIENT)
Dept: GENERAL RADIOLOGY | Facility: CLINIC | Age: 50
End: 2020-04-17
Payer: MEDICARE

## 2020-04-17 DIAGNOSIS — M25.511 CHRONIC PAIN OF BOTH SHOULDERS: ICD-10-CM

## 2020-04-17 DIAGNOSIS — G89.29 CHRONIC PAIN OF BOTH SHOULDERS: ICD-10-CM

## 2020-04-17 DIAGNOSIS — M25.512 CHRONIC PAIN OF BOTH SHOULDERS: ICD-10-CM

## 2020-04-17 PROCEDURE — 73030 X-RAY EXAM OF SHOULDER: CPT | Mod: RT

## 2020-04-17 PROCEDURE — 73030 X-RAY EXAM OF SHOULDER: CPT | Mod: LT

## 2020-04-20 ENCOUNTER — TELEPHONE (OUTPATIENT)
Dept: FAMILY MEDICINE | Facility: CLINIC | Age: 50
End: 2020-04-20

## 2020-04-20 DIAGNOSIS — M17.0 PRIMARY OSTEOARTHRITIS OF BOTH KNEES: ICD-10-CM

## 2020-04-20 DIAGNOSIS — M25.512 CHRONIC PAIN OF BOTH SHOULDERS: ICD-10-CM

## 2020-04-20 DIAGNOSIS — G89.29 CHRONIC PAIN OF BOTH SHOULDERS: ICD-10-CM

## 2020-04-20 DIAGNOSIS — M25.511 CHRONIC PAIN OF BOTH SHOULDERS: ICD-10-CM

## 2020-04-20 NOTE — TELEPHONE ENCOUNTER
Reason for Call:  Request for results:    Name of test or procedure: xray    Date of test of procedure: 4/17/2020    Location of the test or procedure: [facility]    OK to leave the result message on voice mail or with a family member? YES    Phone number Patient can be reached at:  Home number on file [phone] (home)    Additional comments:     Call taken on 4/20/2020 at 10:20 AM by WALTER BYNUM

## 2020-04-20 NOTE — TELEPHONE ENCOUNTER
I spoke with Christina     - bilateral shoulder XR shows mild arthritis - pain may be arthritis or cuff tear.  Patient uses a walker with likely exacerbates the pain.     - patient is encouraged to follow up with cariology due to worsening dyspnea and exhaustion.  Patient has CHF with EF <20% on 1/26/2019.  She has not returned for repeat echocardiogram as recommended.  Suspect worsening of CHF with patient's level of dyspnea and exhaustion.     - weight loss - patient is requesting medication to help with weight loss.  She has been referred to comprehensive weight loss program for consult.  CHF will limit medications available for her to take.     - chronic pain - voltaren gel refill sent    Davis Chaudhry PA-C

## 2020-04-21 ENCOUNTER — TELEPHONE (OUTPATIENT)
Dept: FAMILY MEDICINE | Facility: CLINIC | Age: 50
End: 2020-04-21

## 2020-04-21 DIAGNOSIS — R15.9 FECAL SOILING DUE TO FECAL INCONTINENCE: Primary | ICD-10-CM

## 2020-04-21 NOTE — TELEPHONE ENCOUNTER
"I placed the order in this visit.  I can see it on my end.    It is ordered as \"miscellaneous DME\".    If you are not able to see it, please let me know and I will try to order it again.     Davis Chaudhry PA-C    "

## 2020-04-21 NOTE — TELEPHONE ENCOUNTER
Reason for Call:  Other call back    Detailed comments:  girish Marroquin , asks that there be an adjustment to the PCA order.    In order for it to be a covered benefit for the girish stewart, the order needs to say: 'night supervision' not overnight PCA.    Fax: [phone] kalina Marroquin    Phone Number Patient can be reached at:   Cara: [phone]    Best Time: any    Can we leave a detailed message on this number? YES    Call taken on 4/21/2020 at 10:43 AM by Shobha Coates

## 2020-04-22 DIAGNOSIS — I10 ESSENTIAL HYPERTENSION WITH GOAL BLOOD PRESSURE LESS THAN 130/80: ICD-10-CM

## 2020-04-22 RX ORDER — LOSARTAN POTASSIUM 25 MG/1
25 TABLET ORAL DAILY
Qty: 90 TABLET | Refills: 2 | Status: SHIPPED | OUTPATIENT
Start: 2020-04-22 | End: 2021-02-07

## 2020-04-22 NOTE — TELEPHONE ENCOUNTER
Prescription approved per [facility] Protocol for 12 months of refills since last appointment, which was 04/14/20

## 2020-04-24 NOTE — TELEPHONE ENCOUNTER
Pt calling because [facility] needs to verify that PCP is aware that pt takes pain RX: oxycodone (OXY-IR) 5 MG capsule for chronic pain.- she takes up to 4 tabs per day.  This RX is prescrined  by, Dr. Eyad Peter at [facility] [place]  Last fill of RX was 3/27 for, 112 tablet.   Due for fill today.    Just wants PCP to approve, due to new order for benzodiazapine: LORazepam (ATIVAN) 0.5 MG tablet & increase for sedation.    -- Please call Christine [phone] back with okay to fill lorazepam

## 2020-04-24 NOTE — TELEPHONE ENCOUNTER
"Pt calling back to clinic.     Very frustrated that [facility] \"[facility]\" unable to  her oxycodone.    RN called back to pharmacy.    Pharmacy just needs approval from PCP, stating she understands that pt is taking both a benzo and a narcotic, and understands the risk of respiratory depression.     Triage:  -Call pharmacy back if this is cleared by PCP  -Call pt back when this is cleared  "

## 2020-05-03 DIAGNOSIS — I42.9 CARDIOMYOPATHY, UNSPECIFIED TYPE (H): ICD-10-CM

## 2020-05-03 DIAGNOSIS — N39.41 URGE INCONTINENCE OF URINE: ICD-10-CM

## 2020-05-04 RX ORDER — OXYBUTYNIN CHLORIDE 5 MG/1
5 TABLET, EXTENDED RELEASE ORAL DAILY
Qty: 30 TABLET | Refills: 10 | Status: SHIPPED | OUTPATIENT
Start: 2020-05-04 | End: 2021-07-27

## 2020-05-04 RX ORDER — METOPROLOL SUCCINATE 50 MG/1
TABLET, EXTENDED RELEASE ORAL
Qty: 270 TABLET | Refills: 0 | OUTPATIENT
Start: 2020-05-04

## 2020-05-04 RX ORDER — METOPROLOL SUCCINATE 50 MG/1
TABLET, EXTENDED RELEASE ORAL
Qty: 270 TABLET | Refills: 0 | Status: SHIPPED | OUTPATIENT
Start: 2020-05-04 | End: 2020-07-13

## 2020-06-09 ENCOUNTER — TELEPHONE (OUTPATIENT)
Dept: FAMILY MEDICINE | Facility: CLINIC | Age: 50
End: 2020-06-09

## 2020-06-09 DIAGNOSIS — M17.0 PRIMARY OSTEOARTHRITIS OF BOTH KNEES: Primary | ICD-10-CM

## 2020-06-09 NOTE — TELEPHONE ENCOUNTER
We are unfamiliar with specialists out of state so we have no idea who to refer to.  She should contact her insurance and see which providers are covered in the area.

## 2020-06-09 NOTE — TELEPHONE ENCOUNTER
Reason for Call: Request for an order or referral:    Order or referral being requested: wants referral ortho for knee replacement states has talked to Edil regarding this    Date needed: as soon as possible    Has the patient been seen by the PCP for this problem? YES    Additional comments: please call pt    Phone number Patient can be reached at:  Home number on file [phone] (home)    Best Time:      Can we leave a detailed message on this number?  YES    Call taken on 6/9/2020 at 8:55 AM by BEE WITT

## 2020-06-09 NOTE — TELEPHONE ENCOUNTER
Spoke with patient today and covering providers message was given.     States that she would prefer to wait until her provider gets back because she is sure her provider will be able to sign off on this referral order.     Sending to PCP for her review on Monday 6/15/2020.

## 2020-06-09 NOTE — TELEPHONE ENCOUNTER
RN called back to pt.    Pt states she is planning to get a surgery in [place], [place] near her family.  Needs a referral for this.    Covering providers please advise on this referral.    Triage, please fax referral when completed.

## 2020-06-09 NOTE — TELEPHONE ENCOUNTER
We do not refer to out of area or out of state providers.  She can make that decision for herself.

## 2020-06-09 NOTE — TELEPHONE ENCOUNTER
"RN called back to pt to relay this message.    Pt became immediately upset.    \"I would like to know specifically WHY out of state referrals are note done. I have medicare so I know it's not an insurance problem\"    Please advise.  "

## 2020-06-15 NOTE — TELEPHONE ENCOUNTER
I do not have any specific recommendations for orthopedic providers in [place], [place].     Patient should contact her insurance to see which orthopedic practices are covered under her insurance.  She should also call the practice and ask for the name of a knee replacement specialist.     Once she finds a practice and surgeon she would like to proceed with, I can place a referral with that information.     Davis Chaudhry PA-C

## 2020-06-15 NOTE — TELEPHONE ENCOUNTER
"Pt was called. Triage advised she call her insurance as advised by provider. Pt would not listen to triage and is requesting a call from PCP. States \"what your guys are telling me don't make any sense\". States she has seen provider for years and needs a call back from PCP today.   "

## 2020-06-16 NOTE — TELEPHONE ENCOUNTER
Ortho referral signed.     Patient would like knee injections while waiting for that appointment.     Davis Chaudhry PA-C

## 2020-07-10 ENCOUNTER — TELEPHONE (OUTPATIENT)
Dept: FAMILY MEDICINE | Facility: CLINIC | Age: 50
End: 2020-07-10

## 2020-07-10 NOTE — TELEPHONE ENCOUNTER
Yes, she can call and schedule an appointment for a knee injection at [facility].     [phone]    Davis Chaudhry PA-C

## 2020-07-10 NOTE — TELEPHONE ENCOUNTER
Reason for Call:  Other call back    Detailed comments: Christina called asking if there is anyway Mary Carmen can squeeze her into the schedule today for a knee injection.    Informed Christina there are no openings.  Christina asked if the question would be asked to Davis.    Phone Number Patient can be reached at: Cell number on file:    Telephone Information:   Mobile [phone]       Best Time: yet today    Can we leave a detailed message on this number? YES    Call taken on 7/10/2020 at 10:14 AM by Shobha Coates

## 2020-07-10 NOTE — TELEPHONE ENCOUNTER
Just FYI for PCP    Spoke with patient this afternoon.     Provider message was shared.     States she does not want to just have anyone/someone she has never met do an injection.     OV was scheduled for 8/3/2020 with provider.     Patient did not take the contact information for [facility].

## 2020-07-10 NOTE — TELEPHONE ENCOUNTER
Per chart review, PCP is unavailable for F2F visits until 08/03/2020. Triage can offer an appt then but if pt would like injection before this date does provider have any recommendations when she can go for injections [facility]?

## 2020-07-13 DIAGNOSIS — I42.9 CARDIOMYOPATHY, UNSPECIFIED TYPE (H): ICD-10-CM

## 2020-07-13 DIAGNOSIS — F33.1 MAJOR DEPRESSIVE DISORDER, RECURRENT EPISODE, MODERATE (H): ICD-10-CM

## 2020-07-13 RX ORDER — METOPROLOL SUCCINATE 50 MG/1
TABLET, EXTENDED RELEASE ORAL
Qty: 270 TABLET | Refills: 0 | Status: SHIPPED | OUTPATIENT
Start: 2020-07-13 | End: 2020-09-21

## 2020-07-15 RX ORDER — BUPROPION HYDROCHLORIDE 150 MG/1
TABLET, EXTENDED RELEASE ORAL
Qty: 60 TABLET | Refills: 1 | OUTPATIENT
Start: 2020-07-15

## 2020-07-21 ENCOUNTER — DOCUMENTATION ONLY (OUTPATIENT)
Dept: CARDIOLOGY | Facility: CLINIC | Age: 50
End: 2020-07-21

## 2020-07-21 NOTE — PROGRESS NOTES
Pt left VM on [facility] RN nursing line noting that she has missed follow-up and would like to arrange a visit.    I msg'd scheduling requesting that their team call the pt. Scheduling team informed me that the pt was agreeable to making appt for echo and declined to make appt with Nancie Polk CNP nor Dr. Melendez and preferred to make provider appt after results of echo are known.    Will await echo and update follow-up recs based on those results.     Elizabeth Hoang RN BSN   2:35 PM 07/21/20

## 2020-08-03 ENCOUNTER — TELEPHONE (OUTPATIENT)
Dept: CARDIOLOGY | Facility: CLINIC | Age: 50
End: 2020-08-03

## 2020-08-13 ENCOUNTER — TELEPHONE (OUTPATIENT)
Dept: FAMILY MEDICINE | Facility: CLINIC | Age: 50
End: 2020-08-13

## 2020-08-13 ENCOUNTER — VIRTUAL VISIT (OUTPATIENT)
Dept: FAMILY MEDICINE | Facility: CLINIC | Age: 50
End: 2020-08-13
Payer: MEDICARE

## 2020-08-13 DIAGNOSIS — N39.41 URGE INCONTINENCE OF URINE: Primary | ICD-10-CM

## 2020-08-13 DIAGNOSIS — M17.0 PRIMARY OSTEOARTHRITIS OF BOTH KNEES: ICD-10-CM

## 2020-08-13 DIAGNOSIS — R15.9 FECAL SOILING DUE TO FECAL INCONTINENCE: ICD-10-CM

## 2020-08-13 PROCEDURE — 99442 ZZC PHYSICIAN TELEPHONE EVALUATION 11-20 MIN: CPT | Mod: 95 | Performed by: PHYSICIAN ASSISTANT

## 2020-08-13 RX ORDER — ORLISTAT 120 MG/1
120 CAPSULE ORAL
Qty: 270 CAPSULE | Refills: 1 | Status: SHIPPED | OUTPATIENT
Start: 2020-08-13 | End: 2020-11-11

## 2020-08-13 NOTE — TELEPHONE ENCOUNTER
Central Prior Authorization Team   Phone: [phone]      PA Initiation    Medication: orlistat (XENICAL) 120 MG capsule - INITIATED  Insurance Company: [employer] - Phone [phone] Fax [phone]  Pharmacy Filling the Rx: [id number] [place], [place] - [address] [place] [address] [address] [address]  Filling Pharmacy Phone: [phone]  Filling Pharmacy Fax: [phone]  Start Date: 8/13/2020

## 2020-08-13 NOTE — TELEPHONE ENCOUNTER
Central Prior Authorization Team   Phone: [phone]      PRIOR AUTHORIZATION DENIED    Medication: orlistat (XENICAL) 120 MG capsule - DENIED    Denial Date: 8/13/2020    Denial Rational:     Appeal Information:

## 2020-08-13 NOTE — TELEPHONE ENCOUNTER
Prior Authorization Retail Medication Request    Medication/Dose: orlistat (XENICAL) 120 MG capsule    ICD code (if different than what is on RX):     Previously Tried and Failed:     Rationale:       Insurance Name:     Insurance ID:         Pharmacy Information (if different than what is on RX)  Name:     Phone:

## 2020-08-13 NOTE — PROGRESS NOTES
"Christina Fletcher is a 50 year old female who is being evaluated via a billable telephone visit.      The patient has been notified of following:     \"This telephone visit will be conducted via a call between you and your physician/provider. We have found that certain health care needs can be provided without the need for a physical exam.  This service lets us provide the care you need with a short phone conversation.  If a prescription is necessary we can send it directly to your pharmacy.  If lab work is needed we can place an order for that and you can then stop by our lab to have the test done at a later time.    Telephone visits are billed at different rates depending on your insurance coverage. During this emergency period, for some insurers they may be billed the same as an in-person visit.  Please reach out to your insurance provider with any questions.    If during the course of the call the physician/provider feels a telephone visit is not appropriate, you will not be charged for this service.\"    Patient has given verbal consent for Telephone visit?  Yes    What phone number would you like to be contacted at? [phone]    How would you like to obtain your AVS? Mail a copy    Subjective     Christina Fletcher is a 50 year old female who presents via phone visit today for the following health issues:    HPI    Consult      Duration:     Description (location/character/radiation): pt says that she just has some health concerns that she would like to discuss.     Intensity:  moderate    Accompanying signs and symptoms: none    History (similar episodes/previous evaluation): None    Precipitating or alleviating factors: None    Therapies tried and outcome: None     Needs name of a doctor who does knee replacements    Patient would like to discuss orlistat Rx - she has used this in the past with good results  She is very motivated to make lifestyle changes and work aggressively toward weight loss    She is also " interested in physical therapy for urinary and fecal incontinence        Patient Active Problem List   Diagnosis     Female genital symptoms     Other disorder of menstruation and other abnormal bleeding from female genital tract     Adjustment disorder with depressed mood     Obesity     Chronic constipation     GERD (gastroesophageal reflux disease)     Lower extremity edema     Elevated MCV     Fibroids     Fatigue     Menorrhagia     Essential hypertension     Gastric bypass status for obesity     CARDIOVASCULAR SCREENING; LDL GOAL LESS THAN 160     Bariatric surgery status     Vitamin B12 deficiency without anemia     Iron deficiency     Vitamin D deficiency     Weight gain     Dysmetabolic syndrome     Galactorrhea     OA (osteoarthritis) of knee     Ascorbic acid deficiency     Pyridoxine deficiency     Knee pain     KAROLINA (obstructive sleep apnea)     Hyperlipidemia LDL goal <130     KAROLINA on CPAP     BMI 50.0-59.9, adult (H)     Hyperlipidemia with target LDL less than 130     Eczema     Hyponatremia     Chronic kidney disease, stage III (moderate) (H)     Neuropathy     Morbid obesity due to excess calories (H)     Vitamin C deficiency     Osteoarthritis of both knees, unspecified osteoarthritis type     Hypomagnesemia     Essential hypertension with goal blood pressure less than 130/80     Eczema, unspecified type     Major depressive disorder, recurrent episode, moderate (H)     Long-term (current) use of anticoagulants [Z79.01]     History of pulmonary embolism     Dyspnea on exertion     CHF (congestive heart failure) (H)     Female stress incontinence     Other restrictive cardiomyopathy (H)     Past Surgical History:   Procedure Laterality Date     CV HEART CATHETERIZATION WITH POSSIBLE INTERVENTION N/A 1/28/2019    Procedure: Heart Catheterization with possible Intervention;  Surgeon: Eloisa Bob MD;  Location:  HE[facility]ATH LAB     DEBRIDE ASSOC FX/DISLO SKIN/MUS/BONE  1990's     Infected - Right Femur     GASTRIC BYPASS  01/18/2017    conversion of gastric sleeve to gastric bypass with lysis of adhesions-      HYSTEROSCOPY,ABLATION ENDOMETRIUM  2008     LAP ADJUSTABLE GASTRIC BAND  2001    Lap Banding      LAPAROSCOPIC GASTRIC SLEEVE  2010    Dr Harris       Social History     Tobacco Use     Smoking status: Never Smoker     Smokeless tobacco: Never Used   Substance Use Topics     Alcohol use: Yes     Alcohol/week: 0.0 standard drinks     Comment: occ      Family History   Problem Relation Age of Onset     Hypertension Mother      Breast Cancer Maternal Grandmother      Cancer Maternal Grandmother         Bone Cancer     Thyroid Disease Son      Genitourinary Problems Daughter         Kidney failure         Current Outpatient Medications   Medication Sig Dispense Refill     acetaminophen 500 MG CAPS Take 2 capsules by mouth every 8 hours as needed For aches, pain, fever 60 capsule 0     buPROPion (WELLBUTRIN XL) 300 MG 24 hr tablet Take 1 tablet (300 mg) by mouth every morning 90 tablet 1     cholecalciferol (VITAMIN D3) 52164 UNITS capsule Take 1 capsule (50,000 Units) by mouth once a week TAKE FOR VITAMIN D DEFICIENCY 60 capsule 0     diclofenac (VOLTAREN) 1 % topical gel Place 4 g onto the skin 4 times daily 100 g 11     diclofenac (VOLTAREN) 1 % topical gel Apply 4 g topically 4 times daily 1 Tube 3     furosemide (LASIX) 20 MG tablet Take 1 tablet (20 mg) by mouth daily Additional 20mg when instructed by [facility] for weight gain or worsening edema/breathing 110 tablet 3     gabapentin (NEURONTIN) 300 MG capsule TK 1 - 2 CS PO Q D  0     LORazepam (ATIVAN) 0.5 MG tablet Take 1 tablet (0.5 mg) by mouth 2 times daily as needed for anxiety . Future refills by PCP or Psychiatrist. 20 tablet 0     losartan (COZAAR) 25 MG tablet Take 1 tablet (25 mg) by mouth daily 90 tablet 2     metoprolol succinate ER (TOPROL-XL) 50 MG 24 hr tablet TAKE 1 AND 1/2 TABLETS BY MOUTH TWICE DAILY 270  tablet 0     Multiple Vitamin (MULTI-VITAMIN DAILY PO) Take 1 tablet by mouth daily        naloxone (NARCAN) 4 MG/0.1ML nasal spray Spray 1 spray (4 mg) into one nostril alternating nostrils once as needed for opioid reversal every 2-3 minutes until assistance arrives 0.2 mL 1     orlistat (XENICAL) 120 MG capsule Take 1 capsule (120 mg) by mouth 3 times daily (with meals) 270 capsule 1     oxybutynin ER (DITROPAN-XL) 5 MG 24 hr tablet Take 1 tablet (5 mg) by mouth daily 30 tablet 10     oxyCODONE (OXY-IR) 5 MG capsule TK 1 C PO UP TO QID PRN FOR SEVERE BREAKTHROUGH PAIN  0     rivaroxaban ANTICOAGULANT (XARELTO) 10 MG TABS tablet Take 1 tablet (10 mg) by mouth daily (with dinner) 30 tablet 11     rosuvastatin (CRESTOR) 20 MG tablet Take 1 tablet (20 mg) by mouth daily INDICATION: TO LOWER CHOLESTEROL AND TO HELP REDUCE RISK OF HEART ATTACK AND STROKE 90 tablet 3     senna-docusate (SENOKOT-S/PERICOLACE) 8.6-50 MG tablet Take 1-2 tablets by mouth 2 times daily INDICATION: CONSTIPATION, TO ACHIEVE 1-2 SOFT BMs PER DAY 60 tablet 8     Allergies   Allergen Reactions     Hydralazine Shortness Of Breath     WITH ASSOCIATED NAUSEA AND VOMITING     Nsaids Other (See Comments)     Other reaction(s): Other - Describe In Comment Field  Patient had gastric bypass surgery.  Should not take oral NSAID's ever.     Penicillin [Penicillins] Rash       Reviewed and updated as needed this visit by Provider         Review of Systems   Constitutional: Negative.    HENT: Negative.    Eyes: Negative.    Respiratory: Negative.    Cardiovascular: Negative.    Gastrointestinal: Negative.    Musculoskeletal:        As in HPI   Skin: Negative.    Neurological: Negative.    Psychiatric/Behavioral: Negative.              Objective          Vitals:  No vitals were obtained today due to virtual visit.    healthy, alert and no distress  PSYCH: Alert and oriented times 3; coherent speech, normal   rate and volume, able to articulate logical  "thoughts, able   to abstract reason, no tangential thoughts, no hallucinations   or delusions  Her affect is normal  RESP: No cough, no audible wheezing, able to talk in full sentences  Remainder of exam unable to be completed due to telephone visits    Diagnostic Test Results:  none         Assessment/Plan:    Assessment & Plan   Problem List Items Addressed This Visit        Digestive    BMI 50.0-59.9, adult (H)    Relevant Medications    orlistat (XENICAL) 120 MG capsule       Musculoskeletal and Integumentary    OA (osteoarthritis) of knee    Relevant Orders    Orthopedic & Spine  Referral      Other Visit Diagnoses     Urge incontinence of urine    -  Primary    Relevant Orders    OB/GYN REFERRAL    Fecal soiling due to fecal incontinence        Relevant Orders    OB/GYN REFERRAL         Orders as above.       BMI:   Estimated body mass index is 47.13 kg/m  as calculated from the following:    Height as of 10/9/19: 1.626 m (5' 4\").    Weight as of 10/9/19: 124.6 kg (274 lb 9.6 oz).   Weight management plan: Patient referred to endocrine and/or weight management specialty Rx orlistat prescribed            No follow-ups on file.    Mary Carmen Chaudhry PA-C  [facility]    Phone call duration:  16 minutes                "

## 2020-08-14 ASSESSMENT — ENCOUNTER SYMPTOMS
GASTROINTESTINAL NEGATIVE: 1
CARDIOVASCULAR NEGATIVE: 1
NEUROLOGICAL NEGATIVE: 1
PSYCHIATRIC NEGATIVE: 1
EYES NEGATIVE: 1
RESPIRATORY NEGATIVE: 1
CONSTITUTIONAL NEGATIVE: 1

## 2020-09-20 DIAGNOSIS — I42.9 CARDIOMYOPATHY, UNSPECIFIED TYPE (H): ICD-10-CM

## 2020-09-21 DIAGNOSIS — I42.9 CARDIOMYOPATHY, UNSPECIFIED TYPE (H): ICD-10-CM

## 2020-09-21 RX ORDER — METOPROLOL SUCCINATE 50 MG/1
TABLET, EXTENDED RELEASE ORAL
Qty: 270 TABLET | Refills: 3 | Status: SHIPPED | OUTPATIENT
Start: 2020-09-21 | End: 2021-02-10

## 2020-09-22 ENCOUNTER — TELEPHONE (OUTPATIENT)
Dept: FAMILY MEDICINE | Facility: CLINIC | Age: 50
End: 2020-09-22

## 2020-09-22 RX ORDER — FUROSEMIDE 20 MG
20 TABLET ORAL DAILY
Qty: 110 TABLET | Refills: 1 | Status: SHIPPED | OUTPATIENT
Start: 2020-09-22 | End: 2020-12-31

## 2020-09-22 NOTE — TELEPHONE ENCOUNTER
Reason for Call:  Other call back    Detailed comments: Patient wants a call back regarding her Metoprolol medication    Phone Number Patient can be reached at: Home number on file [phone] (home)    Best Time: Anytime    Can we leave a detailed message on this number? YES    Call taken on 9/22/2020 at 3:53 PM by Rakan Tucker

## 2020-09-22 NOTE — TELEPHONE ENCOUNTER
"RN called back to pt.  Pt states she did not have any questions about metoprolol.    States she wants her \"heart medications for congestive heart failure\" refilled.  RN advised furosemide and metoprolol were refilled and receipt confirmed by pharmacy.    RN called to pharmacy to clarify.   Pharmacy states they will refill at this time.  "

## 2020-10-21 ENCOUNTER — CARE COORDINATION (OUTPATIENT)
Dept: CARDIOLOGY | Facility: CLINIC | Age: 50
End: 2020-10-21

## 2020-10-21 DIAGNOSIS — I50.22 CHRONIC SYSTOLIC CONGESTIVE HEART FAILURE (H): Primary | ICD-10-CM

## 2020-10-21 NOTE — PROGRESS NOTES
"[facility]    Incoming call from patient stating, \"I need an appt, I feel like something is going on.\"  She reports that her feet (L>R) are becoming increasingly swollen in the last week. Her ankles are swollen as well. She is still able to get her usual shoes and socks on. She denied any sensation of abdominal bloating.     She denied having any SOB currently. She stated that she felt some SOB a week ago when getting out of her car. She denied having orthopnea/PND.     She has not been weighing herself    Current diuretic regimen:   Lasix 20mg every day - she has not missed any doses and has occasionally taken extra 20mg to \"get the fluid off.\"    Patient is overdue for ECHO, labs and f/u. Orders updated for above. Will message scheduling to contact patient and arrange and will forward to Nancie as FYI. Patient verbalized understanding and had no additional questions. Dominique Olivia RN on 10/21/2020 at 3:37 PM      "

## 2020-10-23 ENCOUNTER — TELEPHONE (OUTPATIENT)
Dept: FAMILY MEDICINE | Facility: CLINIC | Age: 50
End: 2020-10-23

## 2020-10-23 NOTE — TELEPHONE ENCOUNTER
Reason for Call:  Form, our goal is to have forms completed with 72 hours, however, some forms may require a visit or additional information.    Type of letter, form or note:  medical    Who is the form from?: [facility] (if other please explain)    Where did the form come from: form was faxed in    [facility]    Where the form was placed: Given to physician    What number is listed as a contact on the form?: (F): [phone], (P): [phone]       Additional comments: [facility]- Prescription request (bath stool round w/o back)    Call taken on 10/23/2020 at 3:19 PM by Kasey Osorio

## 2020-10-27 DIAGNOSIS — I50.22 CHRONIC SYSTOLIC CONGESTIVE HEART FAILURE (H): Primary | ICD-10-CM

## 2020-11-03 ENCOUNTER — TELEPHONE (OUTPATIENT)
Dept: FAMILY MEDICINE | Facility: CLINIC | Age: 50
End: 2020-11-03

## 2020-11-03 NOTE — TELEPHONE ENCOUNTER
Reason for Call:  Form, our goal is to have forms completed with 72 hours, however, some forms may require a visit or additional information.    Type of letter, form or note:  medical    Who is the form from?: Patient    Where did the form come from: form was faxed in    [facility]  Providers name TATY Barnett  Where the form was placed: DOF    What number is listed as a contact on the form?: [phone]  Phone Number [phone]       Additional comments: [facility] pullups and chucks     Call taken on 11/3/2020 at 11:36 AM by Alessia Patel

## 2020-11-10 DIAGNOSIS — E78.5 HYPERLIPIDEMIA LDL GOAL <130: ICD-10-CM

## 2020-11-10 RX ORDER — ROSUVASTATIN CALCIUM 20 MG/1
20 TABLET, COATED ORAL DAILY
Qty: 90 TABLET | Refills: 0 | Status: SHIPPED | OUTPATIENT
Start: 2020-11-10 | End: 2021-03-02

## 2020-12-31 DIAGNOSIS — I42.9 CARDIOMYOPATHY, UNSPECIFIED TYPE (H): ICD-10-CM

## 2020-12-31 RX ORDER — FUROSEMIDE 20 MG
20 TABLET ORAL DAILY
Qty: 110 TABLET | Refills: 0 | Status: SHIPPED | OUTPATIENT
Start: 2020-12-31 | End: 2021-04-02

## 2020-12-31 NOTE — TELEPHONE ENCOUNTER
Prescription approved per [facility] Refill Protocol. Estephania Noble RN

## 2020-12-31 NOTE — TELEPHONE ENCOUNTER
Refill request was faxed for Lasix 20mg    Not sure if you got it but can you send them one    Thank you    Patient did exhaust the supply sent on 9/22/2020

## 2021-01-08 ENCOUNTER — OFFICE VISIT (OUTPATIENT)
Dept: FAMILY MEDICINE | Facility: CLINIC | Age: 51
End: 2021-01-08
Payer: MEDICARE

## 2021-01-08 VITALS
DIASTOLIC BLOOD PRESSURE: 80 MMHG | SYSTOLIC BLOOD PRESSURE: 132 MMHG | OXYGEN SATURATION: 96 % | HEART RATE: 116 BPM | TEMPERATURE: 98.8 F

## 2021-01-08 DIAGNOSIS — I50.22 CHRONIC SYSTOLIC CONGESTIVE HEART FAILURE (H): ICD-10-CM

## 2021-01-08 DIAGNOSIS — E55.9 VITAMIN D DEFICIENCY: ICD-10-CM

## 2021-01-08 DIAGNOSIS — N39.45 CONTINUOUS LEAKAGE OF URINE: ICD-10-CM

## 2021-01-08 DIAGNOSIS — M17.0 PRIMARY OSTEOARTHRITIS OF BOTH KNEES: Primary | ICD-10-CM

## 2021-01-08 DIAGNOSIS — E53.8 VITAMIN B12 DEFICIENCY (NON ANEMIC): ICD-10-CM

## 2021-01-08 LAB
ERYTHROCYTE [DISTWIDTH] IN BLOOD BY AUTOMATED COUNT: 13.3 % (ref 10–15)
HCT VFR BLD AUTO: 41.7 % (ref 35–47)
HGB BLD-MCNC: 13.4 G/DL (ref 11.7–15.7)
MCH RBC QN AUTO: 35.4 PG (ref 26.5–33)
MCHC RBC AUTO-ENTMCNC: 32.1 G/DL (ref 31.5–36.5)
MCV RBC AUTO: 110 FL (ref 78–100)
NT-PROBNP SERPL-MCNC: 153 PG/ML (ref 0–125)
PLATELET # BLD AUTO: 269 10E9/L (ref 150–450)
RBC # BLD AUTO: 3.78 10E12/L (ref 3.8–5.2)
WBC # BLD AUTO: 10.5 10E9/L (ref 4–11)

## 2021-01-08 PROCEDURE — 83880 ASSAY OF NATRIURETIC PEPTIDE: CPT | Performed by: PHYSICIAN ASSISTANT

## 2021-01-08 PROCEDURE — 99213 OFFICE O/P EST LOW 20 MIN: CPT | Mod: 25 | Performed by: PHYSICIAN ASSISTANT

## 2021-01-08 PROCEDURE — 80053 COMPREHEN METABOLIC PANEL: CPT | Performed by: PHYSICIAN ASSISTANT

## 2021-01-08 PROCEDURE — 85027 COMPLETE CBC AUTOMATED: CPT | Performed by: PHYSICIAN ASSISTANT

## 2021-01-08 PROCEDURE — 36415 COLL VENOUS BLD VENIPUNCTURE: CPT | Performed by: PHYSICIAN ASSISTANT

## 2021-01-08 PROCEDURE — 20610 DRAIN/INJ JOINT/BURSA W/O US: CPT | Mod: 50 | Performed by: PHYSICIAN ASSISTANT

## 2021-01-08 RX ORDER — CYANOCOBALAMIN (VITAMIN B-12) 2500 MCG
2500 TABLET, SUBLINGUAL SUBLINGUAL DAILY
Qty: 90 TABLET | Refills: 3 | Status: SHIPPED | OUTPATIENT
Start: 2021-01-08

## 2021-01-08 RX ORDER — TRIAMCINOLONE ACETONIDE 40 MG/ML
40 INJECTION, SUSPENSION INTRA-ARTICULAR; INTRAMUSCULAR ONCE
Status: COMPLETED | OUTPATIENT
Start: 2021-01-08 | End: 2021-01-08

## 2021-01-08 RX ORDER — ORLISTAT 120 MG/1
120 CAPSULE ORAL
Qty: 60 CAPSULE | Refills: 4 | Status: SHIPPED | OUTPATIENT
Start: 2021-01-08 | End: 2023-10-27

## 2021-01-08 RX ADMIN — TRIAMCINOLONE ACETONIDE 40 MG: 40 INJECTION, SUSPENSION INTRA-ARTICULAR; INTRAMUSCULAR at 15:00

## 2021-01-08 NOTE — LETTER
January 11, 2021      Christina Fletcher  [address] [address]  [facility] [address]        Dear ,    We are writing to inform you of your test results.    - Your labs look very good.   - The liver function tests are now normal (they had been elevated in the past)  - The BNP (a test for heart failure) is much better than before.   - Your CBC (blood counts) are stable.     Resulted Orders   Comprehensive metabolic panel (BMP + Alb, Alk Phos, ALT, AST, Total. Bili, TP)   Result Value Ref Range    Sodium 141 133 - 144 mmol/L    Potassium 4.1 3.4 - 5.3 mmol/L    Chloride 111 (H) 94 - 109 mmol/L    Carbon Dioxide 25 20 - 32 mmol/L    Anion Gap 5 3 - 14 mmol/L    Glucose 82 70 - 99 mg/dL    Urea Nitrogen 17 7 - 30 mg/dL    Creatinine 0.77 0.52 - 1.04 mg/dL    GFR Estimate 89 >60 mL/min/[1.73_m2]      Comment:      Non  GFR Calc  Starting 12/18/2018, serum creatinine based estimated GFR (eGFR) will be   calculated using the Chronic Kidney Disease Epidemiology Collaboration   (CKD-EPI) equation.      GFR Estimate If Black >90 >60 mL/min/[1.73_m2]      Comment:       GFR Calc  Starting 12/18/2018, serum creatinine based estimated GFR (eGFR) will be   calculated using the Chronic Kidney Disease Epidemiology Collaboration   (CKD-EPI) equation.      Calcium 8.8 8.5 - 10.1 mg/dL    Bilirubin Total 0.3 0.2 - 1.3 mg/dL    Albumin 3.2 (L) 3.4 - 5.0 g/dL    Protein Total 7.6 6.8 - 8.8 g/dL    Alkaline Phosphatase 81 40 - 150 U/L    ALT 30 0 - 50 U/L    AST 21 0 - 45 U/L   N terminal pro BNP outpatient   Result Value Ref Range    N-Terminal Pro Bnp 153 (H) 0 - 125 pg/mL      Comment:         Reference range shown and results flagged as abnormal are for the outpatient,   non acute settings. Establishing a baseline value for each individual patient   is useful for follow-up.  Suggested inpatient cut points for confirming diagnosis of CHF in an acute   setting are:   >450 pg/mL (age 18 to less than  50)   >900 pg/mL (age 50 to less than 75)   >1800 pg/mL (75 yrs and older)  An inpatient or emergency department NT-proPBNP <300 pg/mL effectively rules   out acute CHF, with 99% negative predictive value.      CBC with platelets   Result Value Ref Range    WBC 10.5 4.0 - 11.0 10e9/L    RBC Count 3.78 (L) 3.8 - 5.2 10e12/L    Hemoglobin 13.4 11.7 - 15.7 g/dL    Hematocrit 41.7 35.0 - 47.0 %     (H) 78 - 100 fl    MCH 35.4 (H) 26.5 - 33.0 pg    MCHC 32.1 31.5 - 36.5 g/dL    RDW 13.3 10.0 - 15.0 %    Platelet Count 269 150 - 450 10e9/L       If you have any questions or concerns, please call the clinic at the number listed above.       Sincerely,      Mary Carmen Chaudhry PA-C

## 2021-01-08 NOTE — PROGRESS NOTES
Assessment & Plan   Problem List Items Addressed This Visit        Digestive    BMI 50.0-59.9, adult (H)    Relevant Medications    orlistat (XENICAL) 120 MG capsule    Other Relevant Orders    Comprehensive metabolic panel (BMP + Alb, Alk Phos, ALT, AST, Total. Bili, TP) (Completed)    N terminal pro BNP outpatient (Completed)    CBC with platelets (Completed)    Vitamin D deficiency    Relevant Medications    vitamin D3 (CHOLECALCIFEROL) 1.25 MG (72726 UT) capsule       Circulatory    CHF (congestive heart failure) (H)    Relevant Orders    Comprehensive metabolic panel (BMP + Alb, Alk Phos, ALT, AST, Total. Bili, TP) (Completed)    N terminal pro BNP outpatient (Completed)    CBC with platelets (Completed)       Musculoskeletal and Integumentary    OA (osteoarthritis) of knee - Primary    Relevant Medications    vitamin D3 (CHOLECALCIFEROL) 1.25 MG (58018 UT) capsule    Other Relevant Orders    Orthopedic & Spine  Referral    DRAIN/INJECT LARGE JOINT/BURSA (Completed)    DRAIN/INJECT LARGE JOINT/BURSA (Completed)      Other Visit Diagnoses     Continuous leakage of urine        Relevant Orders    UROLOGY ADULT REFERRAL    Vitamin B12 deficiency (non anemic)        Relevant Medications    vitamin B-12 (CYANOCOBALAMIN) 2500 MCG sublingual tablet        Labs and medications updated as above for maintenance.   Bilateral knee cortisone injections - tolerated well.  Ok to repeat in 3 months.   Ok to try orlistat for weight loss - if not covered by insurance, will refer to comprehensive weight loss program for other medical weight loss options.                     26 minutes spent on the date of the encounter doing chart review, review of test results, patient visit and documentation [id number]}           Return in 3 months (on 4/8/2021) for repeat knee injections.    Mary Carmen Chaudhry PA-C  [facility] 50 year [facility] who presents to clinic today for the  following health issues  accompanied by her self :    HPI       Concern - Knee injection     Description: bilateral knees     Obesity -   Patient is requesting a prescription for Orlistat today for weight loss.  She has tried in the past and had good results.  She has to lose weight in order to qualify for a knee replacement.     Review of Systems   Constitutional: Negative.    HENT: Negative.    Eyes: Negative.    Respiratory: Negative.    Cardiovascular: Negative.    Gastrointestinal: Negative.    Genitourinary: Negative.    Musculoskeletal:        As in HPI   Skin: Negative.    Neurological: Negative.    Psychiatric/Behavioral: Negative.             Objective    /80   Pulse 116   Temp 98.8  F (37.1  C) (Tympanic)   SpO2 96%   There is no height or weight on file to calculate BMI.  Physical Exam  Constitutional:       General: She is not in acute distress.     Appearance: She is well-developed. She is not diaphoretic.   HENT:      Head: Normocephalic.      Right Ear: External ear normal.      Left Ear: External ear normal.      Nose: Nose normal.   Eyes:      Conjunctiva/sclera: Conjunctivae normal.   Neck:      Musculoskeletal: Normal range of motion.   Pulmonary:      Effort: Pulmonary effort is normal.   Skin:     General: Skin is warm and dry.   Neurological:      Mental Status: She is alert and oriented to person, place, and time.   Psychiatric:         Judgment: Judgment normal.        PROCEDURE NOTE:  Right  knee was cleaned with alcohol.    40 mg Kenalog and 5 cc of 1 percent lidocaine was injected into the right knee using a lateral approach.   Appropriate wound care dressing applied.    Patient tolerated the procedure well.    PROCEDURE NOTE:  Left knee was cleaned with alcohol.    40 mg Kenalog and 5 cc of 1 percent lidocaine was injected into the left knee using a lateral approach.   Appropriate wound care dressing applied.    Patient tolerated the procedure well.

## 2021-01-09 LAB
ALBUMIN SERPL-MCNC: 3.2 G/DL (ref 3.4–5)
ALP SERPL-CCNC: 81 U/L (ref 40–150)
ALT SERPL W P-5'-P-CCNC: 30 U/L (ref 0–50)
ANION GAP SERPL CALCULATED.3IONS-SCNC: 5 MMOL/L (ref 3–14)
AST SERPL W P-5'-P-CCNC: 21 U/L (ref 0–45)
BILIRUB SERPL-MCNC: 0.3 MG/DL (ref 0.2–1.3)
BUN SERPL-MCNC: 17 MG/DL (ref 7–30)
CALCIUM SERPL-MCNC: 8.8 MG/DL (ref 8.5–10.1)
CHLORIDE SERPL-SCNC: 111 MMOL/L (ref 94–109)
CO2 SERPL-SCNC: 25 MMOL/L (ref 20–32)
CREAT SERPL-MCNC: 0.77 MG/DL (ref 0.52–1.04)
GFR SERPL CREATININE-BSD FRML MDRD: 89 ML/MIN/{1.73_M2}
GLUCOSE SERPL-MCNC: 82 MG/DL (ref 70–99)
POTASSIUM SERPL-SCNC: 4.1 MMOL/L (ref 3.4–5.3)
PROT SERPL-MCNC: 7.6 G/DL (ref 6.8–8.8)
SODIUM SERPL-SCNC: 141 MMOL/L (ref 133–144)

## 2021-01-11 ENCOUNTER — TELEPHONE (OUTPATIENT)
Dept: FAMILY MEDICINE | Facility: CLINIC | Age: 51
End: 2021-01-11

## 2021-01-11 DIAGNOSIS — I10 ESSENTIAL HYPERTENSION WITH GOAL BLOOD PRESSURE LESS THAN 130/80: ICD-10-CM

## 2021-01-11 DIAGNOSIS — I42.9 CARDIOMYOPATHY, UNSPECIFIED TYPE (H): ICD-10-CM

## 2021-01-11 DIAGNOSIS — I50.22 CHRONIC SYSTOLIC CONGESTIVE HEART FAILURE (H): ICD-10-CM

## 2021-01-11 DIAGNOSIS — E78.5 HYPERLIPIDEMIA LDL GOAL <130: ICD-10-CM

## 2021-01-13 NOTE — TELEPHONE ENCOUNTER
Central Prior Authorization Team   Phone: [phone]      PRIOR AUTHORIZATION DENIED    Medication: orlistat (XENICAL) 120 MG capsule - DENIED    Denial Date: 1/13/2021    Denial Rational:       Appeal Information:

## 2021-01-13 NOTE — TELEPHONE ENCOUNTER
Central Prior Authorization Team   Phone: [phone]      PA Initiation    Medication: orlistat (XENICAL) 120 MG capsule - INITIATED  Insurance Company: [employer] - Phone [phone] Fax [phone]  Pharmacy Filling the Rx: [id number] [place], [place] - [address] AT [facility] & [facility]  Filling Pharmacy Phone: [phone]  Filling Pharmacy Fax: [phone]  Start Date: 1/13/2021

## 2021-01-14 NOTE — TELEPHONE ENCOUNTER
Referral placed for [facility] in [place].   Specific referral for medical weight management.     Davis Chaudhry PA-C

## 2021-01-14 NOTE — TELEPHONE ENCOUNTER
Tc spoke with patient and relayed referral information.    .Arlene RUTHERFORD    [facility] [place]

## 2021-01-14 NOTE — TELEPHONE ENCOUNTER
Pt called back and was given the PA denial information. She would like to know if there is a cheaper drug similar to xenical that she could take instead. Please advise. Pt can be reached at [phone]. Thank you.  Destiny Damon,

## 2021-01-19 ASSESSMENT — ENCOUNTER SYMPTOMS
CARDIOVASCULAR NEGATIVE: 1
CONSTITUTIONAL NEGATIVE: 1
PSYCHIATRIC NEGATIVE: 1
GASTROINTESTINAL NEGATIVE: 1
NEUROLOGICAL NEGATIVE: 1
EYES NEGATIVE: 1
RESPIRATORY NEGATIVE: 1

## 2021-01-26 ENCOUNTER — TELEPHONE (OUTPATIENT)
Dept: FAMILY MEDICINE | Facility: CLINIC | Age: 51
End: 2021-01-26

## 2021-01-26 NOTE — LETTER
January 26, 2021    Christina Fletcher  [address] [address]  [facility] [address]    Dear Ellie Vasquez cares about your health and your health plan.  I have reviewed your medical conditions, medication list and lab results, and am making recommendations based on this review to better manage your health.    You are in particular need of attention regarding:  -Depression/Anxiety  -Breast Cancer Screening  -Colon Cancer Screening  -Cervical Cancer Screening  -Wellness (Physical) Visit     I am recommending that you:     -schedule a WELLNESS (Physical) APPOINTMENT with me.   I will check fasting labs the same day - nothing to eat except water and meds for 8-10 hours prior.    -schedule a MAMMOGRAM which is due.    1 in 8 women will develop invasive breast cancer during her lifetime and it is the most common non-skin cancer in American women.  EARLY detection, new treatments, and a better understanding of the disease have increased survival rates - the 5 year survival rate in the 1960s was 63% and today it is close to 90%.    If you are under/uninsured, we recommend you contact the [facility]. They offer mammograms at no charge or on a sliding fee charge. You can schedule with them at [phone]. Please have them send us the results.      Please disregard this reminder if you have had this exam elsewhere within the last year.  It would be helpful for us to have a copy of your mammogram report in your file so that we can best coordinate your care - please contact us with when your test was done so we can update your record.             -schedule a COLONOSCOPY to look for colon cancer (due every 10 years or 5 years in higher risk situations.)        Colon cancer is now the second leading cause of cancer-related deaths in the [place] for both men and women and there are over 130,000 new cases and 50,000 deaths per year from colon cancer.  Colonoscopies can prevent 90-95% of these deaths.  Problem lesions can be  removed before they ever become cancer.  This test is not only looking for cancer, but also getting rid of precancerious lesions.    If you are under/uninsured, we recommend you contact the [facility] program. [facility] is a free colorectal cancer screening program that provides colonoscopies for eligible under/uninsured [place] men and women. If you are interested in receiving a free colonoscopy, please call [facility] at [phone] (mention code [facility]) to see if you re eligible.      If you do not wish to do a colonoscopy or cannot afford to do one, at this time, there is another option. It is called a FIT test or Fecal Immunochemical Occult Blood Test (take home stool sample kit).  It does not replace the colonoscopy for colorectal cancer screening, but it can detect hidden bleeding in the lower colon.  It does need to be repeated every year and if a positive result is obtained, you would be referred for a colonoscopy.          If you have completed either one of these tests at another facility, please call with the details of when and where the tests were done and if they were normal or not. Or have the records sent to our clinic so that we can best coordinate your care.    -schedule a PAP SMEAR EXAM which is due.  Please disregard this reminder if you have had this exam elsewhere within the last year.  It would be helpful for us to have a copy of your recent pap smear report in our file so that we can best coordinate your care.    If you are under/uninsured, we recommend you contact the [facility]. They offer pap smears at no charge or on a sliding fee charge. You can schedule with them at [phone]. Please have them send us the results.      Please call us at the [facility] or use [facility] to address the above recommendations.     Thank you for trusting [facility]  We appreciate the opportunity to serve you and look forward to supporting your healthcare in the future.    If you  have (or plan to have) any of these tests done at a facility other than a [facility] or a [facility], please have the results sent to the [facility] location noted above.      Best Regards,    TATY Barnett

## 2021-02-01 ENCOUNTER — NURSE TRIAGE (OUTPATIENT)
Dept: FAMILY MEDICINE | Facility: CLINIC | Age: 51
End: 2021-02-01

## 2021-02-01 DIAGNOSIS — Z86.711 HISTORY OF PULMONARY EMBOLISM: ICD-10-CM

## 2021-02-01 DIAGNOSIS — Z76.89 ENCOUNTER FOR SUPPORT AND COORDINATION OF TRANSITION OF CARE: Primary | ICD-10-CM

## 2021-02-01 NOTE — TELEPHONE ENCOUNTER
Calling about test results hasn't gotten any results. Labs were completed on 1/8. She is very frustrated because she states she has called 3 times but hasn't gotten a call back.     Per chart review, labs not reviewed by Davis. Routing to covering provider to review.
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
## 2021-02-01 NOTE — TELEPHONE ENCOUNTER
Routing refill request to provider for review/approval because:  Labs not current:  Cr [facility]  Per patient she has not been taking for weeks because she couldn't get a refill. Confirmed with pharmacy they do not have it on file.

## 2021-02-01 NOTE — TELEPHONE ENCOUNTER
Davis is currently out of office, labs reviewed. Metabolic panel shows normal kidney function, normal liver function, and normal electrolytes. BNP is slightly high due to history of heart failure but stable so not a cause for concern. Glucose is normal. Blood counts are normal.

## 2021-02-02 ENCOUNTER — PATIENT OUTREACH (OUTPATIENT)
Dept: CARE COORDINATION | Facility: CLINIC | Age: 51
End: 2021-02-02

## 2021-02-02 NOTE — PROGRESS NOTES
Clinic Care Coordination Contact  [facility]/Voicemail    Referral Source: Care Team  Clinical Data: Care Coordinator Outreach  Care Team referral to Care Coordination related to patient with chronic medical diagnosis of CHF, concerns regarding medication compliance, and patient's request for assistance with exploring options for acute rehab.    Outreach attempted x 1.  Unable to leave message as patient's voicemail is not set up.    Plan: Care Coordinator will try to reach patient again in 1-2 business days.    Woo Reeder RN  Clinic Care Coordinator  [facility]  [facility] [place] [facility]  Ph: [phone]

## 2021-02-04 ENCOUNTER — PATIENT OUTREACH (OUTPATIENT)
Dept: NURSING | Facility: CLINIC | Age: 51
End: 2021-02-04
Payer: MEDICARE

## 2021-02-04 NOTE — PROGRESS NOTES
Clinic Care Coordination Contact    OUTREACH    Referral Information:  Referral Source: Care Team  Primary Diagnosis: Psychosocial  Chief Complaint   Patient presents with     Clinic Care Coordination - Initial     care team referral      Universal Utilization: reviewed on this date  Utilization    Last refreshed: 2/2/2021  3:43 PM: Hospital Admissions 0           Last refreshed: 2/2/2021  3:43 PM: ED Visits 0           Last refreshed: 2/2/2021  3:43 PM: No Show Count (past year) 6              Current as of: 2/2/2021  3:43 PM          Care Coordination referral received from PCP clinic care team related to patient with chronic medical diagnosis of CHF, concerns regarding medication compliance, and patient's request for assistance with exploring options for acute rehab.    CC RN called and spoke with patient; introduced self, discussed role of Care Coordination, and explained reason for call.    Patient primarily noted her desire to have bilateral knee surgery and need for assistance with accomplishing this.  She reported she has had several appointments with various Orthopedic providers; she has been given many knee injections and was told she needed to lose weight (to 240 lbs) before she could be cleared for knee surgery.  Patient stated that even when she got close to her goal weight, patient was not cleared for knee surgery and she doesn't understand why nobody will help her.    Patient did inquire with CC RN about doing an inpatient rehab program.  CC RN explained that patient likely would not be a candidate for something like TCU as she has not had a qualifying hospitalization and her level of functioning doesn't indicate TCU level care needs.  Patient stated understanding and was instead open to pursuing outpatient Physical Therapy options which CC RN discussed with her.    Patient confirmed her upcoming phone visit with PCP.  She will discuss referral to outpatient Physical Therapy.    Patient noted she has  also been referred to [facility]; she hasn't had an appointment yet but plans to follow up.    Patient would like continued follow-up with Orthopedics but isn't sure who to go to.  She isn't very open to continuing to be seen by [facility].  CC RN provided a couple of alternative community options; patient stated she will call [facility] to inquire about appointment options.    Patient was on her way to some appointments a time of CC RN call so ability for thorough patient assessment was limited.  Patient agreeable to follow-up call from CC RN next week for ongoing discussion.    Clinical Concerns:  Current Medical Concerns: osteoarthritis of bilateral knees with associated knee pain, morbid obesity, CHF  Current Behavioral Concerns: adjustment disorder with depressed mood, major depressive disorder    Education Provided to patient: provided information about Care Coordination program     Pain  Pain (GOAL): Yes  Type: Chronic (>3mo)  Location of chronic pain: bilateral knees  Progression: Worsening    Medication Management:  Unable to review.    Functional Status:  Dependent ADLs: Ambulation-walker  Mobility Status: Independent w/Device    Living Situation:  Unable to assess.    Lifestyle & Psychosocial Needs:  Inadequate nutrition (GOAL): No  Tube Feeding: No  Inadequate activity/exercise (GOAL): Yes  Significant changes in sleep pattern (GOAL): No    Socioeconomic History     Marital status: Single     Spouse name: Not on file     Number of children: 3     Years of education: Not on file     Highest education level: Not on file   Occupational History     Occupation: CNA     Employer: UNEMPLOYED     Comment: Not working at the moment     Tobacco Use     Smoking status: Never Smoker     Smokeless tobacco: Never Used   Substance and Sexual Activity     Alcohol use: Yes     Alcohol/week: 0.0 standard drinks     Comment: occ      Drug use: No     Sexual activity: Not Currently      Partners: Male     Birth control/protection: Condom     Care Coordinator has reviewed patient's Social Determinants of Health (SDoH) on this date. Upon review, changes were not  made.      Resources and Interventions:  Equipment Currently Used at Home: walker, standard    Advance Care Plan/Directive  Advanced Care Plans/Directives on file: No  Advanced Care Plan/Directive Status: (not discussed this encounter)    Referrals Placed: None     Goals: None established this encounter; patient agreeable to further discussion next week.     Patient/Caregiver understanding: Yes    Outreach Frequency: weekly  Future Appointments              Today Rn, [facility]    In 4 days Mary Carmen Chaudhry PA-C [facility], [facility]    In 5 days Rn, [facility]        Plan:    Patient will inquire with [facility] about scheduling an appointment for evaluation and ongoing management of her bilateral knee pain/osteoarthritis.    Patient will attend upcoming PCP appointment as scheduled and will inquire about referral for outpatient physical therapy.    CC RN will follow-up with patient next week as planned for continued Care Coordination assessment and discussion.  Phone visit scheduled for 2/9/21; patient aware.    Woo Reeder, RN  Clinic Care Coordinator  [facility]  [facility]  Ph: [phone]

## 2021-02-07 ENCOUNTER — NURSE TRIAGE (OUTPATIENT)
Dept: NURSING | Facility: CLINIC | Age: 51
End: 2021-02-07

## 2021-02-07 DIAGNOSIS — I10 ESSENTIAL HYPERTENSION WITH GOAL BLOOD PRESSURE LESS THAN 130/80: ICD-10-CM

## 2021-02-07 RX ORDER — LOSARTAN POTASSIUM 25 MG/1
25 TABLET ORAL DAILY
Qty: 90 TABLET | Refills: 0 | Status: SHIPPED | OUTPATIENT
Start: 2021-02-07 | End: 2021-06-03

## 2021-02-07 NOTE — TELEPHONE ENCOUNTER
Requesting refill of Losartan, has been out of medication.  Refill request faxed in on Friday also.    Refilled via [facility] protocol        Mary Huynh RN on 2/7/2021 at 10:36 AM

## 2021-02-07 NOTE — TELEPHONE ENCOUNTER
Losartan, Patient is completely out of medication.    Christine sent a fax on Friday.    Refilled in refill encounter per [facility] refill protocol.    Mary Huynh RN on 2/7/2021 at 10:38 AM                Reason for Disposition    Caller requesting a NON-URGENT new prescription or refill and triager unable to refill per unit policy    Additional Information    Negative: Drug overdose and nurse unable to answer question    Negative: Caller requesting information not related to medicine    Negative: Caller requesting a prescription for Strep throat and has a positive culture result    Negative: Rash while taking a medication or within 3 days of stopping it    Negative: Immunization reaction suspected    Negative: [1] Asthma AND [2] having symptoms of asthma (cough, wheezing, etc)    Negative: MORE THAN A DOUBLE DOSE of a prescription or over-the-counter (OTC) drug    Negative: [1] DOUBLE DOSE (an extra dose or lesser amount) of over-the-counter (OTC) drug AND [2] any symptoms (e.g., dizziness, nausea, pain, sleepiness)    Negative: [1] DOUBLE DOSE (an extra dose or lesser amount) of prescription drug AND [2] any symptoms (e.g., dizziness, nausea, pain, sleepiness)    Negative: Took another person's prescription drug    Negative: Caller has NON-URGENT medication question about med that PCP prescribed and triager unable to answer question    Protocols used: MEDICATION QUESTION CALL-A-

## 2021-02-08 ENCOUNTER — VIRTUAL VISIT (OUTPATIENT)
Dept: FAMILY MEDICINE | Facility: CLINIC | Age: 51
End: 2021-02-08
Payer: MEDICARE

## 2021-02-08 DIAGNOSIS — M16.2 OSTEOARTHRITIS OF BOTH HIPS RESULTING FROM HIP DYSPLASIA: ICD-10-CM

## 2021-02-08 DIAGNOSIS — I50.22 CHRONIC SYSTOLIC CONGESTIVE HEART FAILURE (H): ICD-10-CM

## 2021-02-08 DIAGNOSIS — G47.00 PERSISTENT INSOMNIA: Primary | ICD-10-CM

## 2021-02-08 DIAGNOSIS — M17.0 PRIMARY OSTEOARTHRITIS OF BOTH KNEES: ICD-10-CM

## 2021-02-08 PROCEDURE — 99442 PR PHYSICIAN TELEPHONE EVALUATION 11-20 MIN: CPT | Mod: 95 | Performed by: PHYSICIAN ASSISTANT

## 2021-02-08 RX ORDER — TRAZODONE HYDROCHLORIDE 50 MG/1
50 TABLET, FILM COATED ORAL AT BEDTIME
Qty: 90 TABLET | Refills: 0 | Status: SHIPPED | OUTPATIENT
Start: 2021-02-08 | End: 2021-04-02

## 2021-02-08 ASSESSMENT — ENCOUNTER SYMPTOMS
GASTROINTESTINAL NEGATIVE: 1
CARDIOVASCULAR NEGATIVE: 1
NEUROLOGICAL NEGATIVE: 1
RESPIRATORY NEGATIVE: 1
EYES NEGATIVE: 1
CONSTITUTIONAL NEGATIVE: 1

## 2021-02-08 ASSESSMENT — PATIENT HEALTH QUESTIONNAIRE - PHQ9: SUM OF ALL RESPONSES TO PHQ QUESTIONS 1-9: 11

## 2021-02-08 NOTE — PROGRESS NOTES
Christina is a 51 year old who is being evaluated via a billable telephone visit.      What phone number would you like to be contacted at? [phone]  How would you like to obtain your AVS? 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  Patient to find out what her weight loss goal is in order to move forward with TKA or SHILOH.   - trazodone - will try for sleep                      17 minutes spent on the date of the encounter doing chart review, review of test results, patient visit and documentation              Return in about 2 weeks (around 2/22/2021) for Specialist Appointment.    Mary Carmen Chaudhry PA-C  [facility]     Christina is a 51 year old who presents to clinic today for the following health issues     HPI       Concern - Multiple issues  Onset:   Description: pt states that she has many issues to discuss.  Pt does have anxiety and is having a hard time sleeping.  Intensity: moderate  Progression of Symptoms:  same  Accompanying Signs & Symptoms: none  Previous history of similar problem: none  Precipitating factors:        Worsened by: none  Alleviating factors:        Improved by: none  Therapies tried and outcome:  none     Hard time sleeping - taking Tylenol PM  Melatonin 30 mg from [facility] - hard to sleep due to anxiety? Pain?      Hip and knee  pain - weight loss.   Patient frustrated with previous consults - looking to talk with a new surgeon    CHF.  BNP looked very good 2 weeks ago  Patient is overdue for [facility] clinic, echo.  She understands and will call to make the appt.         Review of Systems   Constitutional: Negative.    HENT: Negative.    Eyes: Negative.    Respiratory: Negative.    Cardiovascular: Negative.    Gastrointestinal: Negative.    Genitourinary: Negative.    Skin: Negative.    Neurological: Negative.             Objective           Vitals:  No vitals were obtained today due to virtual visit.    Physical Exam   healthy, alert and no distress  PSYCH: Alert and oriented times 3; coherent speech, normal   rate and volume, able to articulate logical thoughts, able   to abstract reason, no tangential thoughts, no hallucinations   or delusions  Her affect is normal  RESP: No cough, no audible wheezing, able to talk in full sentences  Remainder of exam unable to be completed due to telephone visits                Phone call duration: 12 minutes

## 2021-02-09 ENCOUNTER — PATIENT OUTREACH (OUTPATIENT)
Dept: NURSING | Facility: CLINIC | Age: 51
End: 2021-02-09
Payer: MEDICARE

## 2021-02-09 NOTE — LETTER
[facility]  Complex Care Plan  About Me:    Patient Name:  Christina Fletcher    YOB: 1970  Age:         51 year old   Ellie MRN:    [id number] Telephone Information:  Home Phone [phone]   Mobile [phone]       Address:   [address] [address]  [facility] [place] [address] Email address:  [email or web address]      Emergency Contact(s)    Name Relationship Lgl Grd Work Phone Home Phone Mobile Phone   1. COLLEEN FLETCHER Son    [phone]   2. CRISTIAN GOMEZ Son  [phone]           Primary language:  English     needed? No   [place] [employer]:  [phone] op. 1  Other communication barriers: Physical impairment  Preferred Method of Communication:  Phone  Current living arrangement: I live in a private home with family  Mobility Status/ Medical Equipment: Independent w/Device    Health Maintenance  Health Maintenance Reviewed: Due/Overdue   Health Maintenance Due   Topic Date Due     HF ACTION PLAN  1970     URINE DRUG SCREEN  1970     COLORECTAL CANCER SCREENING  01/22/1981     HIV SCREENING  01/22/1986     HEPATITIS C SCREENING  01/22/1989     MEDICARE ANNUAL WELLNESS VISIT  06/19/2013     MAMMO SCREENING  11/01/2014     Pneumococcal Vaccine: Pediatrics (0 to 5 Years) and At-Risk Patients (6 to 64 Years) (1 of 1 - PPSV23) 07/19/2016     PAP  01/20/2017     INFLUENZA VACCINE (1) 09/01/2020     ZOSTER IMMUNIZATION (1 of 2) 01/22/2021     LIPID  02/28/2021     My Access Plan  Medical Emergency 911   Primary Clinic Line [facility] - [phone]   24 Hour Appointment Line [phone] or  [phone] ([phone]) (toll-free)   24 Hour Nurse Line [phone] (toll-free)   Preferred Urgent Care [facility], [phone]   [facility]  [phone]   [id number] [address]  [address] AT [facility]     Behavioral Health Crisis Line The National Suicide Prevention Lifeline at 1-923.445.2458 or 911       My Care Team Members  Patient Care Team       Relationship Specialty Notifications Start End    Mary Carmen Chaudhry PA-C PCP - General Physician Assistant  2/4/19     Phone: [phone] Fax: [phone]         [address] [place] [address] [address] [place] [place] [address]    Mary Carmen Chaudhry PA-C Assigned PCP   11/18/18     Phone: [phone] Fax: [phone]         [address] [place] [address] DR [place] [address]    Woo Reeder, RN Lead Care Coordinator Primary Care - CC  2/9/21     Phone: [phone]         Simran Mancuso MA Community Health Worker Primary Care - CC  2/9/21     Phone: [phone]                 My Care Plans  Self Management and Treatment Plan  Goals and (Comments)  Goals        Patient Stated      1. Weight Loss (pt-stated)      Goal Statement: I want to lose weight to improve my overall health and wellbeing.  Date Goal set: 2/9/21  Barriers: limited mobility and activity tolerance  Strengths: motivated to lose weight  Date to Achieve By: 8/31/21  Patient expressed understanding of goal: Yes    Action steps to achieve this goal:  1. I will establish care with [facility].  2. I will follow recommendations as directed by [facility].  3. I will consider implementing regular exercise when I am able to do so.  4. I will follow a healthy, well-balanced diet.  5. I will explore additional weight-loss supports and resources as needed.  6. I will continue working with Care Coordination to identify and address barriers to achieving my goal.        2. Improve chronic symptoms (pt-stated)      Goal Statement: I want to improve my knee pain.  Date Goal set: 2/8/21  Barriers: has to lose weight, likely requires knee surgery  Strengths: motivated  Date to Achieve By: 8/31/21  Patient expressed understanding of goal: Yes    Action steps to  achieve this goal:  1. I will establish and regularly follow up with Orthopedics.  2. I will work toward weight loss through assistance/guidance from [facility].  3. I will continue using my walker or other assistive devices to safely walk and minimize strain on my knees.  4. I will consider Physical Therapy to help strength and mobility.  5. I will continue working with Care Coordination to identify and address barriers to achieving my goal.        Action Plans on File: None  Advance Care Plans/Directives Type: None on file       My Medical and Care Information  Problem List   Patient Active Problem List   Diagnosis     Female genital symptoms     Other disorder of menstruation and other abnormal bleeding from female genital tract     Adjustment disorder with depressed mood     Obesity     Chronic constipation     GERD (gastroesophageal reflux disease)     Lower extremity edema     Elevated MCV     Fibroids     Fatigue     Menorrhagia     Essential hypertension     Gastric bypass status for obesity     CARDIOVASCULAR SCREENING; LDL GOAL LESS THAN 160     Bariatric surgery status     Vitamin B12 deficiency without anemia     Iron deficiency     Vitamin D deficiency     Weight gain     Dysmetabolic syndrome     Galactorrhea     OA (osteoarthritis) of knee     Ascorbic acid deficiency     Pyridoxine deficiency     Knee pain     KAROLINA (obstructive sleep apnea)     Hyperlipidemia LDL goal <130     KAROLINA on CPAP     BMI 50.0-59.9, adult (H)     Hyperlipidemia with target LDL less than 130     Eczema     Hyponatremia     Chronic kidney disease, stage III (moderate)     Neuropathy     Morbid obesity due to excess calories (H)     Vitamin C deficiency     Osteoarthritis of both knees, unspecified osteoarthritis type     Hypomagnesemia     Essential hypertension with goal blood pressure less than 130/80     Eczema, unspecified type     Major depressive disorder, recurrent episode, moderate (H)     Long-term  (current) use of anticoagulants [Z79.01]     History of pulmonary embolism     Dyspnea on exertion     CHF (congestive heart failure) (H)     Female stress incontinence     Other restrictive cardiomyopathy (H)      Current Medications:  Current Outpatient Medications   Medication Instructions     acetaminophen 500 MG CAPS 2 capsules, Oral, EVERY 8 HOURS PRN, For aches, pain, fever     buPROPion (WELLBUTRIN XL) 300 mg, Oral, EVERY MORNING     diclofenac (VOLTAREN) 4 g, Topical, 4 TIMES DAILY     diclofenac (VOLTAREN) 4 g, Transdermal, 4 TIMES DAILY     furosemide (LASIX) 20 mg, Oral, DAILY, Additional 20mg when instructed by CORE clinic for weight gain or worsening edema/breathing     gabapentin (NEURONTIN) 300 MG capsule TK 1 - 2 CS PO Q D     LORazepam (ATIVAN) 0.5 mg, Oral, 2 TIMES DAILY PRN, . Future refills by PCP or Psychiatrist.     losartan (COZAAR) 25 mg, Oral, DAILY     metoprolol succinate ER (TOPROL-XL) 50 MG 24 hr tablet TAKE 1 AND 1/2 TABLETS BY MOUTH TWICE DAILY     Multiple Vitamin (MULTI-VITAMIN DAILY PO) 1 tablet, Oral, DAILY     naloxone (NARCAN) 4 mg, Alternating Nostrils, ONCE PRN, every 2-3 minutes until assistance arrives     orlistat (XENICAL) 120 mg, Oral, 3 TIMES DAILY WITH MEALS     oxybutynin ER (DITROPAN-XL) 5 mg, Oral, DAILY     oxyCODONE (OXY-IR) 5 MG capsule TK 1 C PO UP TO QID PRN FOR SEVERE BREAKTHROUGH PAIN     rivaroxaban ANTICOAGULANT (XARELTO) 10 mg, Oral, DAILY WITH SUPPER     rosuvastatin (CRESTOR) 20 mg, Oral, DAILY, INDICATION: TO LOWER CHOLESTEROL AND TO HELP REDUCE RISK OF HEART ATTACK AND STROKE     senna-docusate (SENOKOT-S/PERICOLACE) 8.6-50 MG tablet 1-2 tablets, Oral, 2 TIMES DAILY, INDICATION: CONSTIPATION, TO ACHIEVE 1-2 SOFT BMs PER DAY     traZODone (DESYREL) 50 mg, Oral, AT BEDTIME     vitamin B-12 (CYANOCOBALAMIN) 2,500 mcg, Oral, DAILY     vitamin D3 (CHOLECALCIFEROL) 50,000 Units, Oral, WEEKLY, TAKE FOR VITAMIN D DEFICIENCY     Care Coordination Start Date:  2/9/2021   Frequency of Care Coordination: monthly   Form Last Updated: 02/09/2021

## 2021-02-09 NOTE — LETTER
[facility] CARE COORDINATION  [facility]  [address] [place] [address]    February 9, 2021    Christina Fletcher   [address] [id number]  [facility] [address]      Dear Christina,    I am a clinic care coordinator who works with Mary Carmen Chaudhry PA-C at [facility]. I wanted to thank you for spending the time to talk with me.  Below is a description of clinic care coordination and how I can further assist you.      The clinic care coordination team is made up of a registered nurse,  and community health worker who understand the health care system. The goal of clinic care coordination is to help you manage your health and improve access to the health care system in the most efficient manner. The team can assist you in meeting your health care goals by providing education, coordinating services, strengthening the communication among your providers and supporting you with any resource needs.    Please feel free to contact me at [phone] or Simran Mancuso, our Community Health Worker, at [phone] with any questions or concerns. We are focused on providing you with the highest-quality healthcare experience possible and that all starts with you.     Sincerely,     Woo Reeder RN  Clinic Care Coordinator  [facility]  Ph: [phone]      Enclosed: I have enclosed a copy of the Complex Care Plan. This has helpful information and goals that we have talked about. Please keep this in an easy to access place to use as needed.

## 2021-02-09 NOTE — PROGRESS NOTES
Clinic Care Coordination Contact    OUTREACH    Referral Information:  Referral Source: Care Team  Primary Diagnosis: Psychosocial  Chief Complaint   Patient presents with     Clinic Care Coordination - Initial     assessment of needs      Universal Utilization: reviewed on this date  Difficulty keeping appointments: No  Compliance Concerns: Yes  No-Show Concerns: No  No PCP office visit in Past Year: No  Utilization    Last refreshed: 2/9/2021  2:17 PM: Hospital Admissions 0           Last refreshed: 2/9/2021  2:17 PM: ED Visits 0           Last refreshed: 2/9/2021  2:17 PM: No Show Count (past year) 6              Current as of: 2/9/2021  2:17 PM          CC RN outreach to patient for scheduled phone assessment related to Care Coordination referral received and patient's desire to enroll with Care Coordination for ongoing support and resources to reach health care goals.    CC RN called and spoke with patient; re-introduced self, discussed role of Care Coordination, and re-visted reason for call as follow-up form last week's conversation.    Patient confirmed her PCP appointment yesterday and stated it went well.  They discussed her recent trouble with sleeping; patient was prescribed Trazodone to help with this.  They also discussed her CHF for which she needs Cardiology follow-up and osteoarthritis of her knees for which she was referred to Orthopedics.    CC RN provided patient with number for [facility]; she will call them is she doesn't hear about scheduling within 1-2 business days.    Patient wants to hold off on PT for the moment but will discuss this further with PCP when she is ready.    Patient reiterated her desire to lose weight for her general health but also so she can be cleared for potential knee surgery.  CC RN provided patient with phon number for [facility] (ph: [phone]); she will call them to schedule an  appointment to establish care.  Patient isn't sure what her current weight is as she's been avoiding the scale, but she guessed it might be around 295 lbs.    Patient aware of her need for Cardiology follow-up and noted that her PCA is helping her with this; they are awaiting a return call.    Patient noted that she does have a CADI waiver and associated CM and also has PCA services.  Patient was unable to recall names/phone numbers or other specifics about her services during call, so she will call CC RN back when she's able to reference her old phone for this information.    During conversation, patient noted history of many stressors including untimely death of her daughter in 2011 as well as patient's many personal health struggles.  Patient noted she did used to see a psychiatrist for mental health support but didn't find it to be a good experience at the time so she stopped following up.  Patient not interested in discussing this further today.    Patient agreeable to and appreciative of enrollment with Care Coordination for Goals as below.    Clinical Concerns:  Current Medical Concerns: many diagnoses (see problem list); concerns discussed today include osteoarthritis of bilateral knees and morbid obesity    Current Behavioral Concerns: adjustment disorder with depressed mood, major depressive disorder    Education Provided to patient: provided information about Care Coordination     Pain  Pain (GOAL): Yes  Type: Chronic (>3mo)  Location of chronic pain: bilateral knees  Progression: Worsening    Medication Management:  Medication reconciliation status: reviewed/addressed at PCP appointment yesterday 2/10/21; patient denied any current medication questions or concerns.    Functional Status:  Dependent ADLs: Ambulation-walker  Dependent IADLs: Transportation, Medication Management, Shopping, Meal Preparation, Laundry, Cooking, Cleaning  Bed or wheelchair confined: No  Mobility Status: Independent  w/Device  Fallen 2 or more times in the past year?: Yes  Any fall with injury in the past year?: No    Living Situation:  Current living arrangement: I live in a private home with family  Type of residence: Apartment    Lifestyle & Psychosocial Needs:  Diet: Regular  Inadequate nutrition (GOAL): No  Tube Feeding: No  Inadequate activity/exercise (GOAL): Yes  Significant changes in sleep pattern (GOAL): No  Transportation means: Regular car, Family     Rastafari or spiritual beliefs that impact treatment: No  Mental health DX: Yes  Mental health DX how managed: Medication  Informal Support system: Children, Family   Socioeconomic History     Marital status: Single     Spouse name: Not on file     Number of children: 3     Years of education: Not on file     Highest education level: Not on file   Occupational History     Occupation: CNA     Employer: UNEMPLOYED     Comment: Not working at the moment     Tobacco Use     Smoking status: Never Smoker     Smokeless tobacco: Never Used   Substance and Sexual Activity     Alcohol use: Yes     Alcohol/week: 0.0 standard drinks     Comment: occ      Drug use: No     Sexual activity: Not Currently     Partners: Male     Birth control/protection: Condom     Care Coordinator has reviewed patient's Social Determinants of Health (SDoH) on this date. Upon review, changes were not  made.     CC RN sent SDoH questionnaire via [employer] for patient to review and complete.     Resources and Interventions:  Community Resources: PCA, County Worker, County Programs, DME, Financial/Insurance  Supplies used at home: Incontinence Supplies  Equipment Currently Used at Home: walker, standard, grab bar, toilet, grab bar, tub/shower, shower chair  Employment Status: disabled    Advance Care Plan/Directive  Advanced Care Plans/Directives on file: No  Advanced Care Plan/Directive Status: (not discussed this encounter)    Referrals Placed: None     Goals:   Goals        General    1. Weight Loss  (pt-stated)     Notes - Note edited  2/9/2021  2:43 PM by Woo Reeder, RN    Goal Statement: I want to lose weight to improve my overall health and wellbeing.  Date Goal set: 2/9/21  Barriers: limited mobility and activity tolerance  Strengths: motivated to lose weight  Date to Achieve By: 8/31/21  Patient expressed understanding of goal: Yes    Action steps to achieve this goal:  1. I will establish care with [facility].  2. I will follow recommendations as directed by [facility] Management [facility].  3. I will consider implementing regular exercise when I am able to do so.  4. I will follow a healthy, well-balanced diet.  5. I will explore additional weight-loss supports and resources as needed.  6. I will continue working with Care Coordination to identify and address barriers to achieving my goal.      2. Improve chronic symptoms (pt-stated)     Notes - Note created  2/9/2021  2:41 PM by Woo Reeder, RN    Goal Statement: I want to improve my knee pain.  Date Goal set: 2/8/21  Barriers: has to lose weight, likely requires knee surgery  Strengths: motivated  Date to Achieve By: 8/31/21  Patient expressed understanding of goal: Yes    Action steps to achieve this goal:  1. I will establish and regularly follow up with Orthopedics.  2. I will work toward weight loss through assistance/guidance from [facility].  3. I will continue using my walker or other assistive devices to safely walk and minimize strain on my knees.  4. I will consider Physical Therapy to help strength and mobility.  5. I will continue working with Care Coordination to identify and address barriers to achieving my goal.        Patient/Caregiver understanding: Yes    Outreach Frequency: monthly    Plan:    CC RN will send patient Care Coordination introduction letter and Complex Care Plan for reference.    Patient will schedule appointments with Cardiology, Orthopedics, and Weight Management as discussed.    Patient  will call back to CC RN with information about her CADI CM and PCA so this can be added to her Care Team for future reference and collaboration.    Patient agreed to contact CC RN with additional questions, concerns, or needs.    CC CHW to outreach to patient in approximately 2 weeks to follow-up on status of patient's appointment scheduling and assist as indicated; see CHW Delegation below. CC CHW will inform CC RN of any concerns or changes regarding patient as needed.    CC RN will review patient's chart in approximately 6 weeks and outreach to patient as indicated.    CHW Delegation:   1)  Due Date: 2/23/21       Delegation: Outreach to patient to ensure she scheduled with Orthopedic Clinic (ph: [phone]).  Assist patient to schedule if needed.    2)  Due Date: 2/23/21       Delegation: Outreach to patient to ensure she scheduled with [facility] (ph: [phone]).  Assist patient to schedule if needed.    Woo Reeder, RN  Clinic Care Coordinator  [facility]  [facility]  Ph: [phone]

## 2021-02-14 ENCOUNTER — HEALTH MAINTENANCE LETTER (OUTPATIENT)
Age: 51
End: 2021-02-14

## 2021-02-17 ENCOUNTER — TELEPHONE (OUTPATIENT)
Dept: OTHER | Age: 51
End: 2021-02-17

## 2021-02-17 NOTE — TELEPHONE ENCOUNTER
Patient was called and a message was left.      Her images are being gathered and Dr. Jara will review them. She is limiting her practice and she will need to approve of the patient being scheduled with her.  She will received a call back when we have heard back from Dr. Jara.  Our number was left for any other questions.

## 2021-02-17 NOTE — TELEPHONE ENCOUNTER
Christina called asking about seeing a female knee surgeon.  Her knees are beginning to be troublesome enough that it is leading to hip pain but only wants to be seen for her knees now.  Dr. Rubio first opening is in June but some video/telephone visits are on hold prior to that.  Could one of those be offered to the pt?  Please contact the pt.

## 2021-02-18 ENCOUNTER — TELEPHONE (OUTPATIENT)
Dept: ENDOCRINOLOGY | Facility: CLINIC | Age: 51
End: 2021-02-18

## 2021-02-18 NOTE — TELEPHONE ENCOUNTER
"Coordinator discussed the gastric balloon with patient and she is not a candidate due to BMI> 40.  Ht: 5'4\", wt 296 lbs, BMI 50.8  Discussed other options.  Does not want bariatric surgery but wants to lose weight so she can have double knee replacements.    Plan:  Sent Contact Center a message to call patient to schedule a New Medical Weight Management Visit and a New Dietitian visit.  "

## 2021-02-23 ENCOUNTER — PATIENT OUTREACH (OUTPATIENT)
Dept: NURSING | Facility: CLINIC | Age: 51
End: 2021-02-23
Payer: MEDICARE

## 2021-02-23 NOTE — PROGRESS NOTES
Clinic Care Coordination Contact  The Community Health Worker called for a Care Coordination outreach to offer assistance with scheduling appointments.  Spoke to Liane.    Patient stated that she scheduled an appointment with the Orthopedic Clinic for 3/2/21 at 4:00pm.    Patient stated that she called the [facility] to schedule an appointment.  Patient stated that she did not schedule any appointments due to costs and her out of pocket expenses.  Patient declined.    W Delegation:   1)  Due Date: 2/23/21        Delegation: Outreach to patient to ensure she scheduled with Orthopedic Clinic (ph: [phone]).  Assist patient to schedule if needed.     2)  Due Date: 2/23/21        Delegation: Outreach to patient to ensure she scheduled with [facility] (ph: [phone]).  Assist patient to schedule if needed.    Delegation Complete    Simran Mancuso BARBARA  Clinic Care Coordination  [facility] Clin[facility]: [facility] [place], and [facility]  Phone: [phone]

## 2021-02-25 NOTE — TELEPHONE ENCOUNTER
Patient was called back and a message was not left.      Dr. Jara was consulted and stated that she is limiting her practice and not seeing new total knee patients.  Patient is already scheduled to see Dr. Hoyos on 3/2/21

## 2021-03-02 ENCOUNTER — ANCILLARY PROCEDURE (OUTPATIENT)
Dept: GENERAL RADIOLOGY | Facility: CLINIC | Age: 51
End: 2021-03-02
Attending: STUDENT IN AN ORGANIZED HEALTH CARE EDUCATION/TRAINING PROGRAM
Payer: MEDICARE

## 2021-03-02 ENCOUNTER — OFFICE VISIT (OUTPATIENT)
Dept: ORTHOPEDICS | Facility: CLINIC | Age: 51
End: 2021-03-02
Payer: MEDICARE

## 2021-03-02 VITALS — DIASTOLIC BLOOD PRESSURE: 76 MMHG | SYSTOLIC BLOOD PRESSURE: 122 MMHG

## 2021-03-02 DIAGNOSIS — M17.12 PRIMARY OSTEOARTHRITIS OF LEFT KNEE: ICD-10-CM

## 2021-03-02 DIAGNOSIS — M17.11 PRIMARY OSTEOARTHRITIS OF RIGHT KNEE: ICD-10-CM

## 2021-03-02 DIAGNOSIS — G89.29 CHRONIC PAIN OF BOTH KNEES: Primary | ICD-10-CM

## 2021-03-02 DIAGNOSIS — E78.5 HYPERLIPIDEMIA LDL GOAL <130: ICD-10-CM

## 2021-03-02 DIAGNOSIS — M25.569 CHRONIC KNEE PAIN: ICD-10-CM

## 2021-03-02 DIAGNOSIS — G89.29 CHRONIC KNEE PAIN: ICD-10-CM

## 2021-03-02 DIAGNOSIS — M25.561 CHRONIC PAIN OF BOTH KNEES: Primary | ICD-10-CM

## 2021-03-02 DIAGNOSIS — M25.562 CHRONIC PAIN OF BOTH KNEES: Primary | ICD-10-CM

## 2021-03-02 PROCEDURE — 99203 OFFICE O/P NEW LOW 30 MIN: CPT | Performed by: STUDENT IN AN ORGANIZED HEALTH CARE EDUCATION/TRAINING PROGRAM

## 2021-03-02 PROCEDURE — 73562 X-RAY EXAM OF KNEE 3: CPT | Mod: RT | Performed by: RADIOLOGY

## 2021-03-02 RX ORDER — ROSUVASTATIN CALCIUM 20 MG/1
20 TABLET, COATED ORAL DAILY
Qty: 90 TABLET | Refills: 0 | Status: SHIPPED | OUTPATIENT
Start: 2021-03-02 | End: 2021-05-18

## 2021-03-02 ASSESSMENT — KOOS JR
STRAIGHTENING KNEE FULLY: SEVERE
GOING UP OR DOWN STAIRS: SEVERE
RISING FROM SITTING: SEVERE
TWISING OR PIVOTING ON KNEE: SEVERE
KOOS JR SCORING: 31.31
HOW SEVERE IS YOUR KNEE STIFFNESS AFTER FIRST WAKING IN MORNING: EXTREME
STANDING UPRIGHT: SEVERE
BENDING TO THE FLOOR TO PICK UP OBJECT: SEVERE

## 2021-03-02 NOTE — TELEPHONE ENCOUNTER
Patient due for fasting office visit- 90 days supply given.  Routing to team to schedule appointment     Tawanna GARCIA RN  [facility]  [phone]

## 2021-03-02 NOTE — PATIENT INSTRUCTIONS
1. Chronic knee pain      Continue with Physical Therapy, and weight loss management.     Call to schedule follow up appointment in April, 3 months after recent cortisone injection.   Call [phone] to schedule at your convenience.     Ace Wraps provided for compression.       Call my office with any questions or concerns, [phone].
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
## 2021-03-23 ENCOUNTER — PATIENT OUTREACH (OUTPATIENT)
Dept: CARE COORDINATION | Facility: CLINIC | Age: 51
End: 2021-03-23

## 2021-03-23 NOTE — PROGRESS NOTES
Care Coordination Clinician Chart Review    Situation: Patient chart reviewed by care coordinator.    Background: Care Coordination initial assessment and enrollment to Care Coordination was 2/9/2021.  Patient centered goals were developed with participation from patient.  CC RN handed patient off to CHW for continued outreach every 30 days.    Assessment: Per chart review, patient outreach completed by CHW on 2/23/21 for delegation follow-up.  Patient is actively working to accomplish goals.  Patient scheduled and attended an appointment with Ortho on 3/2/21 regarding her chronic knee pain.  At time of CHW outreach, patient declined to schedule with [facility] due to concern for cost.  Patient's goals remain appropriate and relevant at this time.  Patient is not due for updated Complex Care Plan.  Annual assessment to be completed annually and as needed.    Goals        Patient Stated      1. Weight Loss (pt-stated)      Goal Statement: I want to lose weight to improve my overall health and wellbeing.  Date Goal set: 2/9/21  Barriers: limited mobility and activity tolerance  Strengths: motivated to lose weight  Date to Achieve By: 8/31/21  Patient expressed understanding of goal: Yes    Action steps to achieve this goal:  1. I will establish care with [facility] Management Clinic.  2. I will follow recommendations as directed by [facility] Management [facility].  3. I will consider implementing regular exercise when I am able to do so.  4. I will follow a healthy, well-balanced diet.  5. I will explore additional weight-loss supports and resources as needed.  6. I will continue working with Care Coordination to identify and address barriers to achieving my goal.        2. Improve chronic symptoms (pt-stated)      Goal Statement: I want to improve my knee pain.  Date Goal set: 2/8/21  Barriers: has to lose weight, likely requires knee surgery  Strengths: motivated  Date to Achieve By: 8/31/21  Patient expressed  understanding of goal: Yes    Action steps to achieve this goal:  1. I will establish and regularly follow up with Orthopedics.  2. I will work toward weight loss through assistance/guidance from [facility] Management [facility].  3. I will continue using my walker or other assistive devices to safely walk and minimize strain on my knees.  4. I will consider Physical Therapy to help strength and mobility.  5. I will continue working with Care Coordination to identify and address barriers to achieving my goal.        Plan/Recommendations: The patient will continue working with Care Coordination to achieve goals as above.  CHW to continue monthly patient outreaches.  CHW will involve CC RN as needed or if patient is ready to move to maintenance.  CC RN will continue to monitor progress to goals and CHW outreaches every 6 weeks.  Care Plan updated and sent to patient: Shannan Reeder RN  Clinic Care Coordinator  [facility]  Ph: [phone]

## 2021-04-01 ENCOUNTER — PATIENT OUTREACH (OUTPATIENT)
Dept: NURSING | Facility: CLINIC | Age: 51
End: 2021-04-01
Payer: MEDICARE

## 2021-04-01 NOTE — PROGRESS NOTES
Clinic Care Coordination Contact  The Community Health Worker called for a Care Coordination outreach to follow up on goals and action steps. Spoke to Liane.    Spoke to the patient.  Patient reported that she is working on her goals, but did not go into detail with CHW.    Patient requested CC RN to follow up as needed.    Simran Mancuso, BARBARA  Clinic Care Coordination  [facility]: Kanika Nuckolls, Aimee, [facility], and [facility]  Phone: [phone]

## 2021-04-02 ENCOUNTER — NURSE TRIAGE (OUTPATIENT)
Dept: NURSING | Facility: CLINIC | Age: 51
End: 2021-04-02

## 2021-04-02 DIAGNOSIS — I42.9 CARDIOMYOPATHY, UNSPECIFIED TYPE (H): ICD-10-CM

## 2021-04-02 DIAGNOSIS — G47.00 PERSISTENT INSOMNIA: ICD-10-CM

## 2021-04-02 RX ORDER — TRAZODONE HYDROCHLORIDE 50 MG/1
100 TABLET, FILM COATED ORAL AT BEDTIME
Qty: 180 TABLET | Refills: 1 | Status: SHIPPED | OUTPATIENT
Start: 2021-04-02 | End: 2021-11-04

## 2021-04-02 RX ORDER — FUROSEMIDE 20 MG
20 TABLET ORAL DAILY
Qty: 110 TABLET | Refills: 2 | Status: SHIPPED | OUTPATIENT
Start: 2021-04-02 | End: 2022-02-27

## 2021-04-02 NOTE — TELEPHONE ENCOUNTER
Per Provider:  Rx sent - ok to take 2 tabs daily for insomnia.     Davis Chaudhry PA-C     Patient Contact    Called patient and relayed provider message. She will check with pharmacy on xarelto order. Advised it is on file with pharmacy.

## 2021-04-02 NOTE — TELEPHONE ENCOUNTER
"Pt calls to request Trazodone refill.  Discussed most recent fill of 2/8/21 for #90 tablets.  Pt states \"I need to take two tablets Trazodone nightly.\"  States \"One tablet doesn't work.\"  Therefore has nearly depleted Rx of 2/8/21.    Pt also requests scheduling OV with PCP \"to discuss my various health concerns\" (such as ortho status of knee and meds).  Transferred to a  for an appointment now.    Pt requests change of Sig please on trazodone, allowing up to two tablets nightly, with quantity of #180 if agreeable.    Pharmacy verified -> [facility], [address], [place].    Thank you-    Debra [facility] Nurse Advisor     Reason for Disposition    Caller requesting a NON-URGENT new prescription or refill and triager unable to refill per department policy    Protocols used: MEDICATION QUESTION CALL-A-OH      "

## 2021-04-05 ENCOUNTER — VIRTUAL VISIT (OUTPATIENT)
Dept: FAMILY MEDICINE | Facility: CLINIC | Age: 51
End: 2021-04-05
Payer: MEDICARE

## 2021-04-05 DIAGNOSIS — M17.0 OSTEOARTHRITIS OF BOTH KNEES, UNSPECIFIED OSTEOARTHRITIS TYPE: Primary | ICD-10-CM

## 2021-04-05 DIAGNOSIS — E66.01 CLASS 3 SEVERE OBESITY DUE TO EXCESS CALORIES WITH SERIOUS COMORBIDITY AND BODY MASS INDEX (BMI) OF 45.0 TO 49.9 IN ADULT (H): ICD-10-CM

## 2021-04-05 DIAGNOSIS — E66.813 CLASS 3 SEVERE OBESITY DUE TO EXCESS CALORIES WITH SERIOUS COMORBIDITY AND BODY MASS INDEX (BMI) OF 45.0 TO 49.9 IN ADULT (H): ICD-10-CM

## 2021-04-05 PROCEDURE — 99441 PR PHYSICIAN TELEPHONE EVALUATION 5-10 MIN: CPT | Mod: 95 | Performed by: PHYSICIAN ASSISTANT

## 2021-04-05 ASSESSMENT — ENCOUNTER SYMPTOMS
CARDIOVASCULAR NEGATIVE: 1
PSYCHIATRIC NEGATIVE: 1
EYES NEGATIVE: 1
RESPIRATORY NEGATIVE: 1
CONSTITUTIONAL NEGATIVE: 1
GASTROINTESTINAL NEGATIVE: 1
NEUROLOGICAL NEGATIVE: 1

## 2021-04-05 NOTE — PROGRESS NOTES
Christina is a 51 year old who is being evaluated via a billable telephone visit.      What phone number would you like to be contacted at? [phone]  How would you like to obtain your AVS? Frank      ICD-10-CM    1. Osteoarthritis of both knees, unspecified osteoarthritis type  M17.0    2. Class 3 severe obesity due to excess calories with serious comorbidity and body mass index (BMI) of 45.0 to 49.9 in adult (H)  E66.01     Z68.42         Referral completed for [facility] for ortho second opinion                   Return in about 2 weeks (around 4/19/2021) for Specialist Appointment.    JACQUIE Dominguez [facility]   Christina is a 51 year old who presents for the following health issues     HPI     Concern -Discuss ortho     Legs are weak today  Wants to talk about her knees - needs referral to [facility]     Wants to talk about her vaccination - needs information.           Review of Systems   Constitutional: Negative.    HENT: Negative.    Eyes: Negative.    Respiratory: Negative.    Cardiovascular: Negative.    Gastrointestinal: Negative.    Genitourinary: Negative.    Musculoskeletal:        As in HPI   Skin: Negative.    Neurological: Negative.    Psychiatric/Behavioral: Negative.             Objective           Vitals:  No vitals were obtained today due to virtual visit.    Physical Exam   healthy, alert and no distress  PSYCH: Alert and oriented times 3; coherent speech, normal   rate and volume, able to articulate logical thoughts, able   to abstract reason, no tangential thoughts, no hallucinations   or delusions  Her affect is normal  RESP: No cough, no audible wheezing, able to talk in full sentences  Remainder of exam unable to be completed due to telephone visits                Phone call duration: 7 minutes

## 2021-04-07 ENCOUNTER — PATIENT OUTREACH (OUTPATIENT)
Dept: CARE COORDINATION | Facility: CLINIC | Age: 51
End: 2021-04-07

## 2021-04-07 NOTE — PROGRESS NOTES
Clinic Care Coordination Contact  [facility]/Voicemail    Referral Source: Care Team  Clinical Data: Care Coordinator Outreach  CC RN outreach to get updates on patient status, assess goal progress, and provide support and additional resources as needed.      Per CHW communication, patient unwilling to provide updates to CHW at recent outreach; CHW removed from Care Team, CC RN will follow-up with patient moving forward.    Outreach attempted x 1.  Left message on patient's voicemail with call back information and requested return call.    Plan: Care Coordinator will try to reach patient again in approximately 1 week.    Woo Reeder, RN  Clinic Care Coordinator  [facility]  Ph: [phone]

## 2021-04-10 ENCOUNTER — MEDICAL CORRESPONDENCE (OUTPATIENT)
Dept: HEALTH INFORMATION MANAGEMENT | Facility: CLINIC | Age: 51
End: 2021-04-10

## 2021-04-13 ENCOUNTER — TELEPHONE (OUTPATIENT)
Dept: FAMILY MEDICINE | Facility: CLINIC | Age: 51
End: 2021-04-13

## 2021-04-13 ENCOUNTER — VIRTUAL VISIT (OUTPATIENT)
Dept: FAMILY MEDICINE | Facility: CLINIC | Age: 51
End: 2021-04-13
Payer: MEDICARE

## 2021-04-13 DIAGNOSIS — K04.7 DENTAL INFECTION: Primary | ICD-10-CM

## 2021-04-13 DIAGNOSIS — B37.31 VULVOVAGINAL CANDIDIASIS: Primary | ICD-10-CM

## 2021-04-13 DIAGNOSIS — Z86.711 HISTORY OF PULMONARY EMBOLISM: ICD-10-CM

## 2021-04-13 PROCEDURE — 99213 OFFICE O/P EST LOW 20 MIN: CPT | Mod: 95 | Performed by: PHYSICIAN ASSISTANT

## 2021-04-13 RX ORDER — CLINDAMYCIN HCL 150 MG
450 CAPSULE ORAL 3 TIMES DAILY
Qty: 63 CAPSULE | Refills: 0 | Status: SHIPPED | OUTPATIENT
Start: 2021-04-13 | End: 2021-04-20

## 2021-04-13 NOTE — PROGRESS NOTES
Christina is a 51 year old who is being evaluated via a billable video visit.      How would you like to obtain your AVS? MyChart  If the video visit is dropped, the invitation should be resent by: Text to cell phone: [phone]  Will anyone else be joining your video visit? No      Video Start Time: 3:57 PM    Assessment & Plan   Problem List Items Addressed This Visit        Other    History of pulmonary embolism    Relevant Medications    rivaroxaban ANTICOAGULANT (XARELTO) 10 MG TABS tablet      Other Visit Diagnoses     Dental infection    -  Primary    Relevant Medications    clindamycin (CLEOCIN) 150 MG capsule         Discussed importance of taking Xarelto daily - she should not hesitate to call me again if this runs out.                  Return in about 1 week (around 4/20/2021) for dentist.    Mary Carmen Chaudhry PA-C  [facility]   Christina is a 51 year old who presents for the following health issues     HPI     Concern - Tooth pain  Onset: Yesterday  Description: states that filling fell out yesterday, is having left lower jaw pain and swelling.   Intensity: moderate  Progression of Symptoms:  worsening  Therapies tried and outcome: None    Filling came out yesterday  Feeling cold and felt fevering  Feeling out of breath  Anxiety is high  Breathing is better today    Has not been able to find xarelto for the past week and has not taken        Review of Systems         Objective           Vitals:  No vitals were obtained today due to virtual visit.    Physical Exam   GENERAL: Healthy, alert and no distress  EYES: Eyes grossly normal to inspection.  No discharge or erythema, or obvious scleral/conjunctival abnormalities.  HENT: left jaw swollen  RESP: No audible wheeze, cough, or visible cyanosis.  No visible retractions or increased work of breathing.    SKIN: Visible skin clear. No significant rash, abnormal pigmentation or lesions.  NEURO: Cranial nerves grossly  intact.  Mentation and speech appropriate for age.  PSYCH: Mentation appears normal, affect normal/bright, judgement and insight intact, normal speech and appearance well-groomed.                Video-Visit Details    Type of service:  Video Visit    Video End Time:4:07 PM    Originating Location (pt. Location): Home    Distant Location (provider location):  [facility]     Platform used for Video Visit: [employer]

## 2021-04-13 NOTE — TELEPHONE ENCOUNTER
Reason for call:  Other   Patient called regarding (reason for call): call back    Additional comments:   Patient wanting antibiotics ASAP. She has CHF; her LEFT side jaw / swollen face, patient was feeling cold earlier today, some fatigue.  Dentist not avail until Friday.  She has an appt this afternoon but she is very concerned about waiting too long for antibiotics and would like a call back ASAP.      Phone number to reach patient:  Cell number on file:    Telephone Information:   Mobile [phone]       Best Time:  any    Can we leave a detailed message on this number?  YES    Travel screening: Not Applicable

## 2021-04-13 NOTE — TELEPHONE ENCOUNTER
"Pt called very upset that she has not heard back from clinic yet. Also sounds like pt thought visit was virtual, and it was set up as an inclinic visit, so changed this to virtual.    Pt is asking about antibiotics, and is scared because left sided facial swelling and has CHF, and is very concerned about infection.  Asked if she could take amox at home, and advised to wait until talking to Mary Carmen.     Did try to help pt and explain that it will be a good idea for her to be seen just to be careful due to facial swelling.  Pt stated \"I'm too upset to talk right now.\"  But is still planning on visit with provider, as wants to start abx for facial swelling right away.      Cara Higgins, RN  [facility] RN Triage Team        "

## 2021-04-13 NOTE — TELEPHONE ENCOUNTER
Pt calling states she has an infected tooth on the left lower side and cannot get into the dentist until Friday 4/16.  Pt states her face is swollen on the left side and she is having pain.  States she is scheduled to get her covid shot on Monday.  States she is concerned due to hx of CHF.  Wondering if can get an abx?    Pharmacy pended.  Allergy to PCNs.    Pt can be reached at [phone]    Lizett HUYNH RN  EP Triage

## 2021-04-15 ENCOUNTER — TELEPHONE (OUTPATIENT)
Dept: FAMILY MEDICINE | Facility: CLINIC | Age: 51
End: 2021-04-15

## 2021-04-15 DIAGNOSIS — K04.7 DENTAL INFECTION: Primary | ICD-10-CM

## 2021-04-15 RX ORDER — PREDNISONE 20 MG/1
40 TABLET ORAL DAILY
Qty: 6 TABLET | Refills: 0 | Status: SHIPPED | OUTPATIENT
Start: 2021-04-15 | End: 2021-04-18

## 2021-04-15 NOTE — TELEPHONE ENCOUNTER
Swelling and pain.   I sent prednisone to help with inflammation.   Keep icing.  Dentist appointment tomorrow.    Davis Chaudhry PA-C

## 2021-04-15 NOTE — TELEPHONE ENCOUNTER
Pt calling would like to speak with Davis Arreolauriel about how she is feeling today.  States her face is swollen this morning and hurts due to possible infected tooth.  States she is not able to take an anti-inflammatory medication since she in xarelto.  Has been taking the clindamycin and has appt with dentist tomorrow.  Was going to do salt water rinse after speaking with nurse.    Nurse advised to apply ice pack to the side of jaw that is swollen to bring the swelling down.  Can try to call dental office to see if can be seen sooner.  Pt states she still would like a call from provider.    Pt can be reached at [phone].    Lizett HUYNH RN  EP Triage

## 2021-04-16 NOTE — PROGRESS NOTES
Clinic Care Coordination Contact  [facility]    Referral Source: Care Team  Clinical Data: Care Coordinator Outreach  CC RN outreach to get updates on patient status, assess goal progress, and provide support and additional resources as needed.      Per CHW communication, patient unwilling to provide updates to CHW at recent outreach; CHW removed from Care Team, CC RN will follow-up with patient moving forward.    Outreach attempted x 2.  Left message on patient's voicemail with call back information and requested return call.  Plan: Care Coordinator will try to reach patient again in approximately 1 week.    Woo Reeder, RN  Clinic Care Coordinator  [facility]  Ph: [phone]

## 2021-04-19 ENCOUNTER — TELEPHONE (OUTPATIENT)
Dept: FAMILY MEDICINE | Facility: CLINIC | Age: 51
End: 2021-04-19

## 2021-04-19 NOTE — TELEPHONE ENCOUNTER
Pt calling states she is in a hospital in [place] due to the abx that Davis put her on last week for tooth pain.  Pt was admitted but would not answer any questions the nurse was asking and just kept saying she would like Davis to call her.  Nurse asked how long she would be in the hospital and pt states as soon as she speaks with Davis. Pt stated something about they think she is bleeding.    Can be reached at [phone].    Lizett HUYNH RN  EP Triage

## 2021-04-21 ENCOUNTER — PATIENT OUTREACH (OUTPATIENT)
Dept: CARE COORDINATION | Facility: CLINIC | Age: 51
End: 2021-04-21

## 2021-04-21 NOTE — LETTER
[facility] CARE COORDINATION  [facility]  [address] [place] [address]    April 21, 2021    Christina Fletcher   [address] [id number]  [place] [address]      Dear Christina,    I have been unsuccessful in reaching you since our last contact. At this time the Care Coordination team will make no further attempts to reach you, however this does not change your ability to continue receiving care from your providers at your primary care clinic. If you need additional support from a care coordinator in the future please contact me at [phone].    All of us at the [facility] are invested in your health and are here to assist you in meeting your goals.    Sincerely,    Woo Reeder RN  Clinic Care Coordinator  [facility] [place] [place] [facility]  Ph: [phone]

## 2021-04-21 NOTE — PROGRESS NOTES
"Clinic Care Coordination Contact    CC RN outreach to patient for hospital discharge follow-up.  Patient was admitted to [facility] in [place] from 4/16/21 - 4/20/21 for multiloculated abscess of left mandible; [place] in [place] when she was admitted to hospital.    CC RN spoke with patient; patient unwilling to speak with CC RN for hospital discharge follow-up at this time stating \"I'm not feeling well and have a lot of things going on\"; patient stated she would call CC RN back at another time.  CC RN attempted to inquire about timeline of when patient would call back and whether or not patient is now back in [place]; patient became upset by further questioning but did respond that she is back in [place].  Patient ended phone conversation.    Shortly thereafter, patient called back to CC RN to express dissatisfaction with previous CC outreach interactions and asked \"How did I even get connected with this [Care Coordination]?\"; CC RN reminded patient of CC referral by PCP back in February for additional support.  Patient indicated she hasn't found CC to be helpful and doesn't want further follow-up.    Patient will be disenrolled from Care Coordination at this time; no further outreaches will be made per patient request.  CANDACEI to PCP.    Woo Reeder, RN  Clinic Care Coordinator  [facility]  [facility]  Ph: [phone]  "

## 2021-04-27 ENCOUNTER — VIRTUAL VISIT (OUTPATIENT)
Dept: FAMILY MEDICINE | Facility: CLINIC | Age: 51
End: 2021-04-27
Payer: MEDICARE

## 2021-04-27 DIAGNOSIS — Z53.9 ERRONEOUS ENCOUNTER--DISREGARD: Primary | ICD-10-CM

## 2021-04-27 NOTE — PROGRESS NOTES
Erroneous entry - patient was not able to connect via video or phone visit today.     Davis Chaudhry PA-C

## 2021-04-28 RX ORDER — FLUCONAZOLE 150 MG/1
150 TABLET ORAL ONCE
Qty: 2 TABLET | Refills: 0 | Status: SHIPPED | OUTPATIENT
Start: 2021-04-28 | End: 2021-04-28

## 2021-04-28 RX ORDER — MICONAZOLE NITRATE 2 %
1 CREAM WITH APPLICATOR VAGINAL AT BEDTIME
Qty: 45 G | Refills: 0 | Status: SHIPPED | OUTPATIENT
Start: 2021-04-28 | End: 2022-09-06

## 2021-04-28 NOTE — TELEPHONE ENCOUNTER
Reason for call:  Please call PT ASAP    Phone number to reach patient:  Cell number on file:    Telephone Information:   Mobile [phone]       Best Time:  anytime    Can we leave a detailed message on this number?  YES    Travel screening: Not Applicable

## 2021-04-28 NOTE — TELEPHONE ENCOUNTER
When in [place], patient was seen in ED for facial swelling and multilobar submandibular abscess.  She was admitted to the ICU.     Underlying CHF.   Also has severe yeast infection (happens for her with antibiotics).   She stopped clindamycin 2 days early.  She stopped taking it after 5 days (missed 8 doses).  Side effect from the antibiotic was significant feet swelling and joint pain.     Dentist appointment is today.       PLAN -   Hospital follow up needed in person to recheck labs and examine patient.    Rx for yeast infection sent.     Davis Chaudhry PA-C

## 2021-05-06 ENCOUNTER — VIRTUAL VISIT (OUTPATIENT)
Dept: FAMILY MEDICINE | Facility: CLINIC | Age: 51
End: 2021-05-06
Payer: MEDICARE

## 2021-05-06 DIAGNOSIS — K12.2 SUBMANDIBULAR ABSCESS: Primary | ICD-10-CM

## 2021-05-06 PROCEDURE — 99213 OFFICE O/P EST LOW 20 MIN: CPT | Mod: 95 | Performed by: PHYSICIAN ASSISTANT

## 2021-05-06 NOTE — PROGRESS NOTES
Christina is a 51 year old who is being evaluated via a billable telephone visit.      What phone number would you like to be contacted at? [phone]  How would you like to obtain your AVS? [facility]    Assessment & Plan   Problem List Items Addressed This Visit     None      Visit Diagnoses     Submandibular abscess    -  Primary    Relevant Orders    REVIEW OF HEALTH MAINTENANCE PROTOCOL ORDERS (Completed)    CBC with platelets and differential    Comprehensive metabolic panel (BMP + Alb, Alk Phos, ALT, AST, Total. Bili, TP)         She saw the dentist post- hospital discharge.   She was told she was improving - tooth was pulled.   Now swelling is getting worse again  Patient is out of town.   Next steps - she needs to follow up with her dentist or the ED to evaluate for recurrent submandibular abscess.  I cannot treat this outpatient.                  Return today (on 5/6/2021) for ED.    Mary Carmen Chaudhry PA-C  [facility] [place]            Bhumi Vasquez is a 51 year old who presents for the following health issues     HPI        Hospital Follow-up Visit:    Hospital/Nursing Home/ Rehab Facility: [facility]  Date of Admission: 4/16/2021  Date of Discharge: 4/20/2021  Reason(s) for Admission: Submandibular abscess       Was your hospitalization related to COVID-19? No   Problems taking medications regularly:  None  Medication changes since discharge: None  Problems adhering to non-medication therapy:  None    Summary of hospitalization:  [facility] discharge summary reviewed  Diagnostic Tests/Treatments reviewed.  Follow up needed: dentist, repeat labs  Other Healthcare Providers Involved in Patient s Care:         None  Update since discharge: worsened.       Post Discharge Medication Reconciliation: discharge medications reconciled, continue medications without change.  Plan of care communicated with patient                  Review of Systems         Objective            Vitals:  No vitals were obtained today due to virtual visit.    Physical Exam   healthy, alert and no distress  PSYCH: Alert and oriented times 3; coherent speech, normal   rate and volume, able to articulate logical thoughts, able   to abstract reason, no tangential thoughts, no hallucinations   or delusions  Her affect is normal  RESP: No cough, no audible wheezing, able to talk in full sentences  Remainder of exam unable to be completed due to telephone visits                Phone call duration: 8 minutes   details… detailed exam decreased ROM/decreased ROM due to pain/no calf tenderness

## 2021-05-17 DIAGNOSIS — E78.5 HYPERLIPIDEMIA LDL GOAL <130: ICD-10-CM

## 2021-05-18 RX ORDER — ROSUVASTATIN CALCIUM 20 MG/1
20 TABLET, COATED ORAL DAILY
Qty: 90 TABLET | Refills: 0 | Status: SHIPPED | OUTPATIENT
Start: 2021-05-18 | End: 2021-07-29

## 2021-05-18 NOTE — TELEPHONE ENCOUNTER
Routing refill request to provider for review/approval because:  Labs not current:  LDL last done 2/28/20    Lizett HUYNH RN  EP Triage

## 2021-05-28 DIAGNOSIS — M25.50 PAIN IN JOINT: Primary | ICD-10-CM

## 2021-06-01 DIAGNOSIS — I10 ESSENTIAL HYPERTENSION WITH GOAL BLOOD PRESSURE LESS THAN 130/80: ICD-10-CM

## 2021-06-02 ENCOUNTER — RECORDS - HEALTHEAST (OUTPATIENT)
Dept: ADMINISTRATIVE | Facility: CLINIC | Age: 51
End: 2021-06-02

## 2021-06-03 RX ORDER — LOSARTAN POTASSIUM 25 MG/1
25 TABLET ORAL DAILY
Qty: 90 TABLET | Refills: 1 | Status: SHIPPED | OUTPATIENT
Start: 2021-06-03 | End: 2021-10-22

## 2021-06-03 NOTE — TELEPHONE ENCOUNTER
Prescription approved per [facility] Refill Protocol.    Lizett HUYNH RN  EP Triage

## 2021-06-28 ENCOUNTER — APPOINTMENT (OUTPATIENT)
Dept: URGENT CARE | Facility: CLINIC | Age: 51
End: 2021-06-28
Payer: MEDICARE

## 2021-06-29 ENCOUNTER — VIRTUAL VISIT (OUTPATIENT)
Dept: FAMILY MEDICINE | Facility: CLINIC | Age: 51
End: 2021-06-29
Payer: MEDICARE

## 2021-06-29 ENCOUNTER — MEDICAL CORRESPONDENCE (OUTPATIENT)
Dept: HEALTH INFORMATION MANAGEMENT | Facility: CLINIC | Age: 51
End: 2021-06-29

## 2021-06-29 DIAGNOSIS — M17.0 OSTEOARTHRITIS OF BOTH KNEES, UNSPECIFIED OSTEOARTHRITIS TYPE: Primary | ICD-10-CM

## 2021-06-29 DIAGNOSIS — K04.7 CHRONIC DENTAL INFECTION: ICD-10-CM

## 2021-06-29 PROCEDURE — 99213 OFFICE O/P EST LOW 20 MIN: CPT | Mod: 95 | Performed by: PHYSICIAN ASSISTANT

## 2021-06-29 NOTE — PROGRESS NOTES
Christina Fletcher is a 51 year old who is being evaluated via a billable video visit.      How would you like to obtain your AVS? MyChart  If the video visit is dropped, the invitation should be resent by: Text to cell phone: [phone]  Will anyone else be joining your video visit? No      Video Start Time: 3:07 PM    Assessment & Plan   Problem List Items Addressed This Visit        Musculoskeletal and Integumentary    Osteoarthritis of both knees, unspecified osteoarthritis type - Primary      Other Visit Diagnoses     Chronic dental infection             - [facility] referral resent today.  XR findings were sent earlier this week.  I also asked TC to send the most recent notes from orthopedics and me regarding the knee arthritis.   - Dental infection - patient has seen the dentist several times for the infection, XR were taken at those visits, patient states the dentist has instructed her to follow up with me.  I do not typically treat chronic dental infections, because they are due to dental caries.  I am happy to discuss the patient with her dentist in order to get a clear understanding of next steps.  If needed, and ENT referral may be an appropriate next step.                    No follow-ups on file.    Mary Carmen Chaudhry PA-C  [facility]   Christina Fletcher is a 51 year old who presents for the following health issues     HPI     Concern - Multiple concerns  Onset:   Description: pt states that she has multiple things to discuss, states her knee, her infection and other things.  Pt states that dentist keeps telling her to go to her dr and her dr keeps telling her to go to the dentist, she wants to know really what she should be doing  Intensity: moderate  Progression of Symptoms:  same  Accompanying Signs & Symptoms: none  Previous history of similar problem: yes  Precipitating factors:        Worsened by: none  Alleviating factors:        Improved by:  none  Therapies tried and outcome:  none     She spoke with [facility]          Review of Systems         Objective           Vitals:  No vitals were obtained today due to virtual visit.    Physical Exam   GENERAL: Healthy, alert and no distress  EYES: Eyes grossly normal to inspection.  No discharge or erythema, or obvious scleral/conjunctival abnormalities.  RESP: No audible wheeze, cough, or visible cyanosis.  No visible retractions or increased work of breathing.    SKIN: Visible skin clear. No significant rash, abnormal pigmentation or lesions.  NEURO: Cranial nerves grossly intact.  Mentation and speech appropriate for age.  PSYCH: Mentation appears normal, affect normal/bright, judgement and insight intact, normal speech and appearance well-groomed.                Video-Visit Details    Type of service:  Video Visit    Video End Time:3:17 PM    Originating Location (pt. Location): Home    Distant Location (provider location):  [facility]     Platform used for Video Visit: [employer]

## 2021-07-03 ENCOUNTER — MEDICAL CORRESPONDENCE (OUTPATIENT)
Dept: HEALTH INFORMATION MANAGEMENT | Facility: CLINIC | Age: 51
End: 2021-07-03

## 2021-07-22 ENCOUNTER — OFFICE VISIT (OUTPATIENT)
Dept: FAMILY MEDICINE | Facility: CLINIC | Age: 51
End: 2021-07-22
Payer: MEDICARE

## 2021-07-22 VITALS — OXYGEN SATURATION: 96 % | RESPIRATION RATE: 14 BRPM | HEART RATE: 89 BPM | TEMPERATURE: 98.1 F

## 2021-07-22 DIAGNOSIS — E66.01 CLASS 3 SEVERE OBESITY DUE TO EXCESS CALORIES WITH SERIOUS COMORBIDITY AND BODY MASS INDEX (BMI) OF 45.0 TO 49.9 IN ADULT (H): ICD-10-CM

## 2021-07-22 DIAGNOSIS — M25.512 CHRONIC PAIN OF BOTH SHOULDERS: ICD-10-CM

## 2021-07-22 DIAGNOSIS — E66.813 CLASS 3 SEVERE OBESITY DUE TO EXCESS CALORIES WITH SERIOUS COMORBIDITY AND BODY MASS INDEX (BMI) OF 45.0 TO 49.9 IN ADULT (H): ICD-10-CM

## 2021-07-22 DIAGNOSIS — M25.511 CHRONIC PAIN OF BOTH SHOULDERS: ICD-10-CM

## 2021-07-22 DIAGNOSIS — M17.0 OSTEOARTHRITIS OF BOTH KNEES, UNSPECIFIED OSTEOARTHRITIS TYPE: Primary | ICD-10-CM

## 2021-07-22 DIAGNOSIS — G89.29 CHRONIC PAIN OF BOTH SHOULDERS: ICD-10-CM

## 2021-07-22 PROCEDURE — 99213 OFFICE O/P EST LOW 20 MIN: CPT | Mod: 25 | Performed by: PHYSICIAN ASSISTANT

## 2021-07-22 PROCEDURE — 20610 DRAIN/INJ JOINT/BURSA W/O US: CPT | Mod: 50 | Performed by: PHYSICIAN ASSISTANT

## 2021-07-22 RX ORDER — TRIAMCINOLONE ACETONIDE 40 MG/ML
40 INJECTION, SUSPENSION INTRA-ARTICULAR; INTRAMUSCULAR ONCE
Status: COMPLETED | OUTPATIENT
Start: 2021-07-22 | End: 2021-07-22

## 2021-07-22 RX ADMIN — TRIAMCINOLONE ACETONIDE 40 MG: 40 INJECTION, SUSPENSION INTRA-ARTICULAR; INTRAMUSCULAR at 14:46

## 2021-07-22 RX ADMIN — TRIAMCINOLONE ACETONIDE 40 MG: 40 INJECTION, SUSPENSION INTRA-ARTICULAR; INTRAMUSCULAR at 14:47

## 2021-07-22 ASSESSMENT — PAIN SCALES - GENERAL: PAINLEVEL: WORST PAIN (10)

## 2021-07-22 NOTE — PATIENT INSTRUCTIONS
SUBQ: Initiate and adjust dose using the following schedule: In patients who do not tolerate a dosage increase, consider delaying the increase for an additional 4 weeks:  Week 1 through week 4 : 0.25 mg once weekly.  Week 5 through week 8: 0.5 mg once weekly.  Week 9 through week 12: 1 mg once weekly.  Week 13 through week 16: 1.7 mg once weekly.  Week 17 and thereafter (maintenance dosage): 2.4 mg once weekly; if not tolerated, may temporarily decrease dosage to 1.7 mg once weekly for up to 4 additional weeks, then increase to 2.4 mg once weekly.

## 2021-07-22 NOTE — LETTER
[address] [address]  Phone: [phone]    July 22, 2021        Christina Fletcher  [address] [address]  [facility] [facility] [address]          To whom it may concern: Dr. PANIAGUA: Christina Fletcher    Patient was seen and treated today at our clinic.  Patient states you have requested my approval to increase pain medication dose.   I do not have any specific concerns and defer to your decision-making in regards to the patient's opioid prescriptions and pain control plan.     Please contact me for questions or concerns.      Sincerely,        Mary Carmen Chaudhry PA-C

## 2021-07-22 NOTE — PROGRESS NOTES
Assessment & Plan   Problem List Items Addressed This Visit        Digestive    Obesity    Relevant Medications    Semaglutide,0.25 or 0.5MG/DOS, 2 MG/1.5ML SOPN       Musculoskeletal and Integumentary    Osteoarthritis of both knees, unspecified osteoarthritis type - Primary    Relevant Orders    Pain Management Referral    DRAIN/INJECT MEDIUM JOINT/BURSA (Completed)    DRAIN/INJECT LARGE JOINT/BURSA (Completed)      Other Visit Diagnoses     Chronic pain of both shoulders        Relevant Orders    Pain Management Referral         - Bilateral knee injections today.  Must wait 3 months for next injections.   - Patient advised to focus on weight loss - this will help with her knee and pain future recovery from knee replacement.   - Wegovy is a reasonable medication to try for weight loss - patient will start and follow up in one month for a recheck to discuss results.                  Return in about 4 weeks (around 8/19/2021) for Weight Check, Medication Recheck.    Mary Carmen Chaudhry PA-C  [facility]   Christina Fletcher is a 51 year old who presents for the following health issues     HPI     Patient is here regarding follow up on knee pain and dental pain.  - [facility] has stated that she is a candidate for knee replacement  Pain 10/10.      Dental pain: none         Review of Systems         Objective    Pulse 89   Temp 98.1  F (36.7  C) (Tympanic)   Resp 14   SpO2 96%   There is no height or weight on file to calculate BMI.  Physical Exam  Constitutional:       General: She is not in acute distress.     Appearance: She is well-developed. She is not diaphoretic.   HENT:      Head: Normocephalic.      Right Ear: External ear normal.      Left Ear: External ear normal.      Nose: Nose normal.   Eyes:      Conjunctiva/sclera: Conjunctivae normal.   Pulmonary:      Effort: Pulmonary effort is normal.   Musculoskeletal:      Cervical back: Normal range of motion.    Skin:     General: Skin is warm and dry.   Neurological:      Mental Status: She is alert and oriented to person, place, and time.   Psychiatric:         Judgment: Judgment normal.            PROCEDURE NOTE:  Right  knee was cleaned with betadine then wiped away with alcohol.    40 mg Kenalog and 5 cc of 1 percent lidocaine was injected into the right knee using a lateral approach.   Appropriate wound care dressing applied.    Patient tolerated the procedure well.    PROCEDURE NOTE:  Left knee was cleaned with betadine then wiped away with alcohol.    40 mg Kenalog and 5 cc of 1 percent lidocaine was injected into the left knee using a lateral approach.   Appropriate wound care dressing applied.    Patient tolerated the procedure well.          Hemoglobin A1C   Date Value Ref Range Status   01/25/2019 5.0 0 - 5.6 % Final     Comment:     Normal <5.7% Prediabetes 5.7-6.4%  Diabetes 6.5% or higher - adopted from ADA   consensus guidelines.

## 2021-07-23 ENCOUNTER — TELEPHONE (OUTPATIENT)
Dept: FAMILY MEDICINE | Facility: CLINIC | Age: 51
End: 2021-07-23

## 2021-07-23 NOTE — TELEPHONE ENCOUNTER
Received fax that the ozempic rx needs to be sent with 3 different scripts for the different doses of 0.25mg, 0.5mg and 1 mg.    Pharmacy pended.    Lizett HUYNH RN  EP Triage

## 2021-07-24 NOTE — TELEPHONE ENCOUNTER
Reason for Call:  Other prescription    Detailed comments: PHARM DISPENSED 3 MLS TO PT SO SHE CAN DO THE .1/4 AND 1/2 MG DOSES OF OZEMPIC. CANNOT DO 1 MG, NEEDS NEW SCRIPT FOR THAT    Phone Number Patient can be reached at: Other phone number: [phone], PHARMACY    Best Time: ANYTIME    Can we leave a detailed message on this number? Not Applicable    Call taken on 7/24/2021 at 12:06 PM by Mary Yang

## 2021-07-26 DIAGNOSIS — F33.1 MAJOR DEPRESSIVE DISORDER, RECURRENT EPISODE, MODERATE (H): ICD-10-CM

## 2021-07-26 DIAGNOSIS — N39.41 URGE INCONTINENCE OF URINE: ICD-10-CM

## 2021-07-27 RX ORDER — OXYBUTYNIN CHLORIDE 5 MG/1
5 TABLET, EXTENDED RELEASE ORAL DAILY
Qty: 90 TABLET | Refills: 1 | Status: SHIPPED | OUTPATIENT
Start: 2021-07-27 | End: 2022-09-06

## 2021-07-28 DIAGNOSIS — E78.5 HYPERLIPIDEMIA LDL GOAL <130: ICD-10-CM

## 2021-07-28 NOTE — TELEPHONE ENCOUNTER
Patient Contact    Called patient. She declines to complete PHQ-9 right now, states she will complete online through [employer] later as she has company at her house.     Triage - check in 7/29 to see if completed.

## 2021-07-29 RX ORDER — ROSUVASTATIN CALCIUM 20 MG/1
20 TABLET, COATED ORAL DAILY
Qty: 90 TABLET | Refills: 0 | Status: SHIPPED | OUTPATIENT
Start: 2021-07-29 | End: 2021-10-14

## 2021-07-29 RX ORDER — BUPROPION HYDROCHLORIDE 300 MG/1
300 TABLET ORAL EVERY MORNING
Qty: 90 TABLET | Refills: 1 | Status: SHIPPED | OUTPATIENT
Start: 2021-07-29 | End: 2022-09-06

## 2021-07-29 NOTE — TELEPHONE ENCOUNTER
Failed protocol.  please route to  team if patient needs an appointment     Tawanna GARCIARN BSN  [facility]  [phone]

## 2021-08-16 ENCOUNTER — TELEPHONE (OUTPATIENT)
Dept: FAMILY MEDICINE | Facility: CLINIC | Age: 51
End: 2021-08-16

## 2021-08-16 DIAGNOSIS — G89.29 CHRONIC PAIN OF BOTH SHOULDERS: ICD-10-CM

## 2021-08-16 DIAGNOSIS — M17.0 OSTEOARTHRITIS OF BOTH KNEES, UNSPECIFIED OSTEOARTHRITIS TYPE: ICD-10-CM

## 2021-08-16 DIAGNOSIS — M25.512 CHRONIC PAIN OF BOTH SHOULDERS: ICD-10-CM

## 2021-08-16 DIAGNOSIS — M25.511 CHRONIC PAIN OF BOTH SHOULDERS: ICD-10-CM

## 2021-08-16 DIAGNOSIS — G89.29 OTHER CHRONIC PAIN: Primary | ICD-10-CM

## 2021-08-16 RX ORDER — OXYCODONE HYDROCHLORIDE 5 MG/1
CAPSULE ORAL
Refills: 0 | Status: CANCELLED | OUTPATIENT
Start: 2021-08-16

## 2021-08-16 NOTE — TELEPHONE ENCOUNTER
"Reason for Call:  Other prescription    Detailed comments: Pt calling about how she does not like her current pain management doctor and feels like something off about him. She is referring to the letter written on 7/22 by you. She states that the doctor ripped up your letter saying \"Davis is not a doctor and you and her are a dummies\" Pt then states the provider spoke about her care with another pt  who goes to clinic so she reported him to management. She is requesting pain management assistance since the other doctor pain management doctor  will not see her. She is hoping that you can fill some pain med's in the interim. Her next pain management appt is not until 8/31.Pt hoping to have the care team call to update her about if you will continue to fill meds and medication is pended in the interim and Rx is pended.       Phone Number Patient can be reached at: Cell number on file:    Telephone Information:   Mobile [phone]       Best Time: Any    Can we leave a detailed message on this number? YES    Call taken on 8/16/2021 at 11:18 AM by Smita Amezquita    "

## 2021-08-16 NOTE — TELEPHONE ENCOUNTER
Something is not right with this story - she has been established with a pain clinic, and my letter suggested they determine if she is able to have more pain medication.  My letter states they should make that decision and I support their decision, since they have been treating her pain.  I do not wish to increase her pain medication without the pain provider's permission.     I am not comfortable refilling her pain medications - I will need a note or call from that provider to make sure we are following the same plan.     PDMP shows she sees Dr. Eyad Schmid for narcotic prescriptions.  Last refill on 7/23/21, patient should have enough medication to get through the week until 8/20/21.     Davis Chaudhry PA-C

## 2021-08-17 RX ORDER — GABAPENTIN 400 MG/1
400 CAPSULE ORAL 3 TIMES DAILY
Qty: 42 CAPSULE | Refills: 0 | Status: SHIPPED | OUTPATIENT
Start: 2021-08-17 | End: 2021-08-31

## 2021-08-17 RX ORDER — OXYCODONE HYDROCHLORIDE 5 MG/1
5 TABLET ORAL EVERY 6 HOURS PRN
Qty: 56 TABLET | Refills: 0 | Status: SHIPPED | OUTPATIENT
Start: 2021-08-17 | End: 2021-09-01

## 2021-08-17 NOTE — TELEPHONE ENCOUNTER
S/w Pain clinic and was advised pt was discharged from the pain clinic program after her last visit.  Pt was last seen on 7/23/21 and given 28 days worth rx.  Ok for PCP to refill pain medication at this time since pain contract has been voided.    Lizett HUYNH RN  EP Triage

## 2021-08-17 NOTE — TELEPHONE ENCOUNTER
[facility].   Pt states that you can call to verify with him.    [address], [place] [address] (closed location)   [phone] ph. (current still active phone)  [address] [address].  (TC noted pain clinic says permanently closed on google) Called the clinic address above is new location and still in business.   TC asked the last time she was there and pt struggled to come up with an answer. She asks a family member in the background and then pt asked when she was last here for [facility] appt on 7/22 and she was like day before that is when I went last so 7/21. Pt states she is out of this medication totally and she will call in a couple hours to follow up.   Teetee Amezquita

## 2021-08-17 NOTE — TELEPHONE ENCOUNTER
Ok to refill pain medications for 2 weeks, until she established care with pain management on August 31  Appt is already scheduled.     It seems like there was some confusion that led to her discontinuing care at her previous pain clinic.   I had told her it was up to the pain clinic physician to determine if she could take more pain medications, and it sounds like she started taking more medication without speaking to that physician.      She cannot abruptly discontinue opioids medications, so I will refill for 2 weeks (based on the Rx sent by the pain clinic prior to today) and she will see pain management at [facility] in 2 weeks.     Davis Chaudhry PA-C

## 2021-08-17 NOTE — TELEPHONE ENCOUNTER
S/w pt and advised Davis wrote for 2 weeks worth of pain medication the same way that the pain clinic provider wrote it for.  This should get her to the appt on 8/30 with the  pain clinic.    Pt states understanding.    Lizett HUYNH RN  EP Triage

## 2021-08-31 ENCOUNTER — OFFICE VISIT (OUTPATIENT)
Dept: ANESTHESIOLOGY | Facility: CLINIC | Age: 51
End: 2021-08-31
Payer: MEDICARE

## 2021-08-31 ENCOUNTER — TELEPHONE (OUTPATIENT)
Dept: FAMILY MEDICINE | Facility: CLINIC | Age: 51
End: 2021-08-31

## 2021-08-31 VITALS — DIASTOLIC BLOOD PRESSURE: 77 MMHG | HEART RATE: 84 BPM | OXYGEN SATURATION: 99 % | SYSTOLIC BLOOD PRESSURE: 109 MMHG

## 2021-08-31 DIAGNOSIS — M25.512 CHRONIC PAIN OF BOTH SHOULDERS: ICD-10-CM

## 2021-08-31 DIAGNOSIS — M25.511 CHRONIC PAIN OF BOTH SHOULDERS: ICD-10-CM

## 2021-08-31 DIAGNOSIS — G89.29 CHRONIC PAIN OF BOTH SHOULDERS: ICD-10-CM

## 2021-08-31 DIAGNOSIS — M17.0 OSTEOARTHRITIS OF BOTH KNEES, UNSPECIFIED OSTEOARTHRITIS TYPE: ICD-10-CM

## 2021-08-31 DIAGNOSIS — G89.29 OTHER CHRONIC PAIN: ICD-10-CM

## 2021-08-31 PROCEDURE — 99204 OFFICE O/P NEW MOD 45 MIN: CPT | Performed by: ANESTHESIOLOGY

## 2021-08-31 RX ORDER — GABAPENTIN 400 MG/1
400 CAPSULE ORAL 3 TIMES DAILY
Qty: 42 CAPSULE | Refills: 0 | Status: SHIPPED | OUTPATIENT
Start: 2021-08-31 | End: 2021-11-04

## 2021-08-31 ASSESSMENT — PAIN SCALES - GENERAL: PAINLEVEL: EXTREME PAIN (9)
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
## 2021-08-31 NOTE — TELEPHONE ENCOUNTER
Pt calling seen today at [facility] pain clinic by Dr. Chatman.  Pt states she was referred to the wrong place per Davis Chaudhry and would like to speak with Davis about this.    Pt is requesting a refill on the gabapentin 400 mg that she will run out of today.  Pt states she has been on pain medication awhile and does not want to end up in the hospital.    Nurse advised Davis is out of the office until Thursday but can send a message to covering providers about the refill on the medication.  Will send message to Davis about pt wanting to speak with her about the appt and wrong referral.    Pharmacy pended.    Pt can be reached at [phone].    iLzett HUYNH RN  EP Triage

## 2021-08-31 NOTE — PATIENT INSTRUCTIONS
Treatment planning:    Recommendations will be written in the providers note for your Primary Care provider (OR other providers in your care team) to review and make changes to your therapies based on their discretion.    Recommended Follow up:      As needed with the provider.        To speak with a nurse, schedule/reschedule/cancel a clinic appointment [phone], option #1.    You can also reach us by [employer]: [email or web address]
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
## 2021-09-01 RX ORDER — OXYCODONE HYDROCHLORIDE 5 MG/1
CAPSULE ORAL
Refills: 0 | Status: CANCELLED | OUTPATIENT
Start: 2021-09-01

## 2021-09-01 RX ORDER — OXYCODONE HYDROCHLORIDE 5 MG/1
5 TABLET ORAL EVERY 6 HOURS PRN
Qty: 56 TABLET | Refills: 0 | Status: SHIPPED | OUTPATIENT
Start: 2021-09-01 | End: 2021-09-02

## 2021-09-01 NOTE — TELEPHONE ENCOUNTER
Please have patient schedule a virtual appointment to discuss chronic pain.     I reviewed Dr. Chatman's note and also understand she has been discharged from her previous pain provider.      There is some conflicting information between the notes and the patient - I would like to discuss in a visit.     Oxycodone has been refilled for 14 days.     Davis Chaudhry PA-C

## 2021-09-01 NOTE — TELEPHONE ENCOUNTER
Pt calling back saying that she was told by elly that she would receive a call before 6 pm yesterday 8/31 and never did. TC reader her note rom below. Pt states she never asked for gabapentin that she gets a year supply from a different doctor.   TC then asked what she can assist with . Pt is hoping to have pain meds refilled that are not the gabapentin and will talk to hollis when she gets back. Please advise if incorrect.   Teetee Amezquita

## 2021-09-01 NOTE — TELEPHONE ENCOUNTER
Called patient and relayed provider message. She verbalizes understanding.     Scheduled appointment 9/7/21 as virtual appointment. Routing to provider - would you prefer in-person?

## 2021-09-02 RX ORDER — OXYCODONE HYDROCHLORIDE 5 MG/1
5 TABLET ORAL EVERY 6 HOURS PRN
Qty: 56 TABLET | Refills: 0 | Status: SHIPPED | OUTPATIENT
Start: 2021-09-02 | End: 2021-09-07

## 2021-09-02 NOTE — TELEPHONE ENCOUNTER
Pt requesting that this get sent to new Rx please cancel previous prescription and resend too new Rx in [place] pt brought mom home and was unable to get her other Rx in time.   Teetee Amezquita

## 2021-09-07 ENCOUNTER — VIRTUAL VISIT (OUTPATIENT)
Dept: FAMILY MEDICINE | Facility: CLINIC | Age: 51
End: 2021-09-07
Payer: MEDICARE

## 2021-09-07 DIAGNOSIS — G89.29 OTHER CHRONIC PAIN: ICD-10-CM

## 2021-09-07 DIAGNOSIS — G89.29 CHRONIC PAIN OF BOTH SHOULDERS: ICD-10-CM

## 2021-09-07 DIAGNOSIS — M25.511 CHRONIC PAIN OF BOTH SHOULDERS: ICD-10-CM

## 2021-09-07 DIAGNOSIS — M17.0 OSTEOARTHRITIS OF BOTH KNEES, UNSPECIFIED OSTEOARTHRITIS TYPE: ICD-10-CM

## 2021-09-07 DIAGNOSIS — M25.512 CHRONIC PAIN OF BOTH SHOULDERS: ICD-10-CM

## 2021-09-07 PROCEDURE — 99214 OFFICE O/P EST MOD 30 MIN: CPT | Mod: 95 | Performed by: PHYSICIAN ASSISTANT

## 2021-09-07 RX ORDER — OXYCODONE HYDROCHLORIDE 5 MG/1
5 TABLET ORAL EVERY 6 HOURS PRN
Qty: 120 TABLET | Refills: 0 | Status: SHIPPED | OUTPATIENT
Start: 2021-09-07 | End: 2021-10-08

## 2021-09-07 NOTE — PROGRESS NOTES
Christina Fletcher is a 51 year old who is being evaluated via a billable video visit.      How would you like to obtain your AVS? MyChart  If the video visit is dropped, the invitation should be resent by: Text to cell phone: [phone]  Will anyone else be joining your video visit? No      Video Start Time: 10:54 AM    Assessment & Plan   Problem List Items Addressed This Visit        Musculoskeletal and Integumentary    Osteoarthritis of both knees, unspecified osteoarthritis type    Relevant Medications    oxyCODONE (ROXICODONE) 5 MG tablet      Other Visit Diagnoses     Other chronic pain        Relevant Medications    oxyCODONE (ROXICODONE) 5 MG tablet    Chronic pain of both shoulders        Relevant Medications    oxyCODONE (ROXICODONE) 5 MG tablet         Patient was recently discharged from pain clinic - exact reasoning is unclear, however they are not willing to continue with opioid refills.   Met with [facility] - they will not be filling pain medication moving forward, see not for details.   I have agreed to fill her prescriptions until her meeting with orthopedics for a knee replacement consult.   Patient will need to complete CSA at next visit.   She cannot have another knee injection until 10/22/2021.        32 minutes spent on the date of the encounter doing chart review, history and exam, documentation and further activities per the note           Return in about 6 weeks (around 10/22/2021) for Injection.    Mary Carmen Chaudhry PA-C  [facility] [place]    Bhumi   Christina Fletcher is a 51 year old who presents for the following health issues     HPI     Chronic Pain Follow-Up    Where in your body do you have pain? Bilateral lower legs, back pain  How has your pain affected your ability to work? Unable to work due to pain   What type of work do you or did you do? NA  Which of these pain treatments have you tried since your last clinic visit? Pain Clinic and  Steroid Injections  Have you had a significant life event? No  Other aggravating factors: prolonged sitting, prolonged standing and constantly pain  Taking medication as directed? Yes    PHQ-9 SCORE 2/21/2020 4/14/2020 2/8/2021   PHQ-9 Total Score - - -   PHQ-9 Total Score 23 17 11     BAR-7 SCORE 9/27/2018 4/14/2020   Total Score 18 13     No flowsheet data found.  Encounter-Level CSA:    There are no encounter-level csa.     Patient-Level CSA:    There are no patient-level csa.       Patient discussed she had been to [facility] and [facility].   At [facility], she was told they would not take over prescribing the pain medication.   Has been recommended she should get a pain stimulator, however she does not want to have this procedure.     Now discharged from previous pain clinic - she had been getting pain medication from Dr. Navarro.    - consistent with patient reported hx        Review of Systems         Objective           Vitals:  No vitals were obtained today due to virtual visit.    Physical Exam   GENERAL: Healthy, alert and no distress  EYES: Eyes grossly normal to inspection.  No discharge or erythema, or obvious scleral/conjunctival abnormalities.  RESP: No audible wheeze, cough, or visible cyanosis.  No visible retractions or increased work of breathing.    SKIN: Visible skin clear. No significant rash, abnormal pigmentation or lesions.  NEURO: Cranial nerves grossly intact.  Mentation and speech appropriate for age.  PSYCH: Mentation appears normal, affect normal/bright, judgement and insight intact, normal speech and appearance well-groomed.                Video-Visit Details    Type of service:  Video Visit    Video End Time:11:20 AM    Originating Location (pt. Location): Home    Distant Location (provider location):  [facility]     Platform used for Video Visit: [employer]

## 2021-09-25 ENCOUNTER — HEALTH MAINTENANCE LETTER (OUTPATIENT)
Age: 51
End: 2021-09-25

## 2021-10-06 DIAGNOSIS — M17.0 OSTEOARTHRITIS OF BOTH KNEES, UNSPECIFIED OSTEOARTHRITIS TYPE: ICD-10-CM

## 2021-10-06 DIAGNOSIS — M25.512 CHRONIC PAIN OF BOTH SHOULDERS: ICD-10-CM

## 2021-10-06 DIAGNOSIS — M25.511 CHRONIC PAIN OF BOTH SHOULDERS: ICD-10-CM

## 2021-10-06 DIAGNOSIS — G89.29 CHRONIC PAIN OF BOTH SHOULDERS: ICD-10-CM

## 2021-10-06 DIAGNOSIS — G89.29 OTHER CHRONIC PAIN: ICD-10-CM

## 2021-10-08 DIAGNOSIS — E66.01 CLASS 3 SEVERE OBESITY DUE TO EXCESS CALORIES WITH SERIOUS COMORBIDITY AND BODY MASS INDEX (BMI) OF 45.0 TO 49.9 IN ADULT (H): ICD-10-CM

## 2021-10-08 DIAGNOSIS — E66.813 CLASS 3 SEVERE OBESITY DUE TO EXCESS CALORIES WITH SERIOUS COMORBIDITY AND BODY MASS INDEX (BMI) OF 45.0 TO 49.9 IN ADULT (H): ICD-10-CM

## 2021-10-08 RX ORDER — SEMAGLUTIDE 1.34 MG/ML
INJECTION, SOLUTION SUBCUTANEOUS
Qty: 6 ML | Refills: 1 | Status: SHIPPED | OUTPATIENT
Start: 2021-10-08 | End: 2021-10-12

## 2021-10-08 RX ORDER — OXYCODONE HYDROCHLORIDE 5 MG/1
5 TABLET ORAL EVERY 6 HOURS PRN
Qty: 120 TABLET | Refills: 0 | Status: SHIPPED | OUTPATIENT
Start: 2021-10-08 | End: 2021-10-12

## 2021-10-08 NOTE — TELEPHONE ENCOUNTER
Oxycodone 5 mg      Last Written Prescription Date:  9/7/21  Last Fill Quantity: 120,   # refills: 0  Last Office Visit: 9/7/21 Virtual Fitterer  Future Office visit:       Routing refill request to provider for review/approval because:  Drug not on the FM[facility], UMP or  Health refill protocol or controlled substance    RX monitoring program [place]) reviewed: nurse cannot access  for this provider    [place]:  [email or web address]    Lizett HUYNH RN  EP Triage

## 2021-10-08 NOTE — TELEPHONE ENCOUNTER
Routing refill request to provider for review/approval because:  Labs not current:  A1C    Lizett HUYNH RN  EP Triage

## 2021-10-12 DIAGNOSIS — E66.01 CLASS 3 SEVERE OBESITY DUE TO EXCESS CALORIES WITH SERIOUS COMORBIDITY AND BODY MASS INDEX (BMI) OF 45.0 TO 49.9 IN ADULT (H): ICD-10-CM

## 2021-10-12 DIAGNOSIS — G89.29 CHRONIC PAIN OF BOTH SHOULDERS: ICD-10-CM

## 2021-10-12 DIAGNOSIS — M17.0 OSTEOARTHRITIS OF BOTH KNEES, UNSPECIFIED OSTEOARTHRITIS TYPE: ICD-10-CM

## 2021-10-12 DIAGNOSIS — E66.813 CLASS 3 SEVERE OBESITY DUE TO EXCESS CALORIES WITH SERIOUS COMORBIDITY AND BODY MASS INDEX (BMI) OF 45.0 TO 49.9 IN ADULT (H): ICD-10-CM

## 2021-10-12 DIAGNOSIS — G89.29 OTHER CHRONIC PAIN: ICD-10-CM

## 2021-10-12 DIAGNOSIS — M25.511 CHRONIC PAIN OF BOTH SHOULDERS: ICD-10-CM

## 2021-10-12 DIAGNOSIS — M25.512 CHRONIC PAIN OF BOTH SHOULDERS: ICD-10-CM

## 2021-10-12 RX ORDER — SEMAGLUTIDE 1.34 MG/ML
INJECTION, SOLUTION SUBCUTANEOUS
Qty: 25.5 MG | Refills: 0 | Status: SHIPPED | OUTPATIENT
Start: 2021-10-12 | End: 2021-10-28

## 2021-10-12 RX ORDER — SEMAGLUTIDE 1.34 MG/ML
INJECTION, SOLUTION SUBCUTANEOUS
Qty: 6 ML | Refills: 1 | Status: CANCELLED | OUTPATIENT
Start: 2021-10-12

## 2021-10-12 RX ORDER — OXYCODONE HYDROCHLORIDE 5 MG/1
5 TABLET ORAL EVERY 6 HOURS PRN
Qty: 120 TABLET | Refills: 0 | Status: SHIPPED | OUTPATIENT
Start: 2021-10-12 | End: 2021-11-11

## 2021-10-12 NOTE — TELEPHONE ENCOUNTER
Pt wanting this refilled at normal pharmacy and she is wondering does her dosage increase now or does she maintain current dosage please advise on plan of care. Tomorrow will be a week she has not took this medication and is starting to feel hungry.   Teetee Amezquita

## 2021-10-12 NOTE — TELEPHONE ENCOUNTER
Called and spoke with the patient and scheduled an appointment with Davis for 10/21.  She wanted her prescription sent to the [facility] in [place], [place] and not [place], [place], so I told her that if she calls the pharmacy they should be able to transfer the script to the [place] pharmacy.    Fariha Ortega on 10/12/2021 at 5:33 PM

## 2021-10-12 NOTE — TELEPHONE ENCOUNTER
Medication refills sent and dose adjusted to continue to increase dose gradually.     Davis Chaudhry PA-C

## 2021-10-12 NOTE — TELEPHONE ENCOUNTER
Pt requesting that we send oxycodone to [facility] since she will be visiting her mom.  She is requesting that we cancel other and resend this one instead.   Thank you,   Teetee Amezquita

## 2021-10-14 ENCOUNTER — VIRTUAL VISIT (OUTPATIENT)
Dept: FAMILY MEDICINE | Facility: CLINIC | Age: 51
End: 2021-10-14
Payer: MEDICARE

## 2021-10-14 DIAGNOSIS — Z53.9 ERRONEOUS ENCOUNTER--DISREGARD: Primary | ICD-10-CM

## 2021-10-19 ENCOUNTER — TRANSFERRED RECORDS (OUTPATIENT)
Dept: HEALTH INFORMATION MANAGEMENT | Facility: CLINIC | Age: 51
End: 2021-10-19
Payer: MEDICARE

## 2021-10-19 ENCOUNTER — TELEPHONE (OUTPATIENT)
Dept: FAMILY MEDICINE | Facility: CLINIC | Age: 51
End: 2021-10-19

## 2021-10-19 NOTE — TELEPHONE ENCOUNTER
Form faxed in from [facility] for incontinence supplies. Form given to Davis to sign.   Teetee Amezquita

## 2021-10-21 DIAGNOSIS — I10 ESSENTIAL HYPERTENSION WITH GOAL BLOOD PRESSURE LESS THAN 130/80: ICD-10-CM

## 2021-10-22 RX ORDER — LOSARTAN POTASSIUM 25 MG/1
TABLET ORAL
Qty: 90 TABLET | Refills: 0 | Status: SHIPPED | OUTPATIENT
Start: 2021-10-22 | End: 2022-04-15

## 2021-10-28 DIAGNOSIS — E66.813 CLASS 3 SEVERE OBESITY DUE TO EXCESS CALORIES WITH SERIOUS COMORBIDITY AND BODY MASS INDEX (BMI) OF 45.0 TO 49.9 IN ADULT (H): ICD-10-CM

## 2021-10-28 DIAGNOSIS — E66.01 CLASS 3 SEVERE OBESITY DUE TO EXCESS CALORIES WITH SERIOUS COMORBIDITY AND BODY MASS INDEX (BMI) OF 45.0 TO 49.9 IN ADULT (H): ICD-10-CM

## 2021-11-04 ENCOUNTER — VIRTUAL VISIT (OUTPATIENT)
Dept: FAMILY MEDICINE | Facility: CLINIC | Age: 51
End: 2021-11-04
Payer: MEDICARE

## 2021-11-04 DIAGNOSIS — G47.00 PERSISTENT INSOMNIA: ICD-10-CM

## 2021-11-04 DIAGNOSIS — M17.0 PRIMARY OSTEOARTHRITIS OF BOTH KNEES: Primary | ICD-10-CM

## 2021-11-04 PROCEDURE — 99442 PR PHYSICIAN TELEPHONE EVALUATION 11-20 MIN: CPT | Mod: 95 | Performed by: PHYSICIAN ASSISTANT

## 2021-11-04 NOTE — PROGRESS NOTES
Christina is a 51 year old who is being evaluated via a billable telephone visit.      What phone number would you like to be contacted at? [phone]  How would you like to obtain your AVS? [facility]    Assessment & Plan   Problem List Items Addressed This Visit        Digestive    BMI 50.0-59.9, adult (H)       Musculoskeletal and Integumentary    OA (osteoarthritis) of knee - Primary      Other Visit Diagnoses     Persistent insomnia        Relevant Medications    Suvorexant (BELSOMRA) 5 MG tablet           We can repeat the knee injection any time - she cannot have the knee replacement until 3 months after the injection.     We will start a trial of low-dose Belsomra for severe insomnia.   Patient was informed she should NOT mix this with oxycodone.    We discussed potential side effects - she with start at a half dose for two weeks to monitor for side effects.     Continue with weight loss - she continues to take Ozempic.                  Return in about 1 week (around 11/11/2021) for injection.    Mary Carmen Chaudhry PA-C  [facility]    Bhumi   Christina is a 51 year old who presents for the following health issues     HPI     Insomnia  Onset/Duration: several months - had tried Trazodone but was not helping       Concern - Follow up multiple concerns     Approved for the knee replacement  Weight loss is ok  Called [facility] pain clinic for medications.  Patient does not think she needs a referral for this.   Doing physical therapy for the knee now  Sleep is a major problem - sometimes she does not fall asleep until morning.       Review of Systems         Objective           Vitals:  No vitals were obtained today due to virtual visit.    Physical Exam   healthy, alert and no distress  PSYCH: Alert and oriented times 3; coherent speech, normal   rate and volume, able to articulate logical thoughts, able   to abstract reason, no tangential thoughts, no hallucinations   or delusions  Her  affect is normal  RESP: No cough, no audible wheezing, able to talk in full sentences  Remainder of exam unable to be completed due to telephone visits                Phone call duration: 11 minutes

## 2021-11-05 ENCOUNTER — TELEPHONE (OUTPATIENT)
Dept: FAMILY MEDICINE | Facility: CLINIC | Age: 51
End: 2021-11-05
Payer: MEDICARE

## 2021-11-05 DIAGNOSIS — G47.00 PERSISTENT INSOMNIA: ICD-10-CM

## 2021-11-05 NOTE — TELEPHONE ENCOUNTER
Message to Prescriber: RE:Suvorexant (BELSOMRA) 5 MG tablet  Message:  Please clarify directions, is this one-half for 14 days? Then 1 tablet prn?

## 2021-11-09 DIAGNOSIS — M25.512 CHRONIC PAIN OF BOTH SHOULDERS: ICD-10-CM

## 2021-11-09 DIAGNOSIS — G89.29 CHRONIC PAIN OF BOTH SHOULDERS: ICD-10-CM

## 2021-11-09 DIAGNOSIS — M25.511 CHRONIC PAIN OF BOTH SHOULDERS: ICD-10-CM

## 2021-11-09 DIAGNOSIS — G89.29 OTHER CHRONIC PAIN: ICD-10-CM

## 2021-11-09 DIAGNOSIS — M17.0 OSTEOARTHRITIS OF BOTH KNEES, UNSPECIFIED OSTEOARTHRITIS TYPE: ICD-10-CM

## 2021-11-10 NOTE — TELEPHONE ENCOUNTER
Controlled Substance Refill Request for Oxycodone 5mg   Problem List Complete:  Yes    Last Written Prescription Date:  10/12/21  Last Fill Quantity: 120,   # refills: 0    THE MOST RECENT OFFICE VISIT MUST BE WITHIN THE PAST 3 MONTHS. AT LEAST ONE FACE TO FACE VISIT MUST OCCUR EVERY 6 MONTHS. ADDITIONAL VISITS CAN BE VIRTUAL.  (THIS STATEMENT SHOULD BE DELETED.)    [place] Office Visit [place] [facility] [place]: 11/4/21    Future Office visit:     Controlled substance agreement:   CSA -- Encounter Level:    CSA: None found at the encounter level.     CSA -- Patient Level:    CSA: None found at the patient level.         Last Urine Drug Screen: No results found for: CDAUT, No results found for: COMDAT, No results found for: THC13, PCP13, COC13, MAMP13, OPI13, AMP13, BZO13, TCA13, MTD13, BAR13, OXY13, PPX13, BUP13     Processing:  Rx to be electronically transmitted to pharmacy by provider     [email or web address]   checked in past 3 months?      Anayeli Mayen RN on 11/10/2021 at 10:16 AM

## 2021-11-11 RX ORDER — OXYCODONE HYDROCHLORIDE 5 MG/1
5 TABLET ORAL EVERY 6 HOURS PRN
Qty: 120 TABLET | Refills: 0 | Status: SHIPPED | OUTPATIENT
Start: 2021-11-11 | End: 2021-12-09

## 2021-11-11 NOTE — TELEPHONE ENCOUNTER
Pt checking on status of medication please advise . Pt is out of medication.   Teetee Amezquita

## 2021-11-22 ENCOUNTER — TELEPHONE (OUTPATIENT)
Dept: CARDIOLOGY | Facility: CLINIC | Age: 51
End: 2021-11-22
Payer: MEDICARE

## 2021-11-22 DIAGNOSIS — I50.22 CHRONIC SYSTOLIC CONGESTIVE HEART FAILURE (H): Primary | ICD-10-CM
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
## 2021-11-22 NOTE — TELEPHONE ENCOUNTER
See notes below re: request for cardiac clearance. Placed order for echo and labs and requested scheduling arrange prior to upcoming visit.    Future Appointments   Date Time Provider Department Center   12/13/2021  4:30 PM [facility]k [facility]Y Hoang RN BSN   9:53 AM 11/22/21

## 2021-11-29 ENCOUNTER — TELEPHONE (OUTPATIENT)
Dept: FAMILY MEDICINE | Facility: CLINIC | Age: 51
End: 2021-11-29
Payer: MEDICARE

## 2021-11-29 DIAGNOSIS — G89.29 OTHER CHRONIC PAIN: ICD-10-CM

## 2021-11-29 DIAGNOSIS — M17.0 OSTEOARTHRITIS OF BOTH KNEES, UNSPECIFIED OSTEOARTHRITIS TYPE: Primary | ICD-10-CM

## 2021-11-29 NOTE — TELEPHONE ENCOUNTER
Pt calling and would like a DME order for a new walker. States she has 4 wheel walker with brakes. She does not need a wide walker just the standard size that she can use at home. Please place order if appropriate.Pt can be reached at [phone]  Thank you.  Destiny Damon,

## 2021-12-01 NOTE — TELEPHONE ENCOUNTER
Left message on voicemail for patient to call back. Ron GARCIA CMA  Need to know what pt would like us to do with DME order.

## 2021-12-07 DIAGNOSIS — M25.512 CHRONIC PAIN OF BOTH SHOULDERS: ICD-10-CM

## 2021-12-07 DIAGNOSIS — M17.0 OSTEOARTHRITIS OF BOTH KNEES, UNSPECIFIED OSTEOARTHRITIS TYPE: ICD-10-CM

## 2021-12-07 DIAGNOSIS — G89.29 CHRONIC PAIN OF BOTH SHOULDERS: ICD-10-CM

## 2021-12-07 DIAGNOSIS — M25.511 CHRONIC PAIN OF BOTH SHOULDERS: ICD-10-CM

## 2021-12-07 DIAGNOSIS — G89.29 OTHER CHRONIC PAIN: ICD-10-CM

## 2021-12-09 RX ORDER — OXYCODONE HYDROCHLORIDE 5 MG/1
5 TABLET ORAL EVERY 6 HOURS PRN
Qty: 120 TABLET | Refills: 0 | Status: SHIPPED | OUTPATIENT
Start: 2021-12-09 | End: 2022-01-04

## 2021-12-09 NOTE — TELEPHONE ENCOUNTER
Controlled Substance Refill Request for oxycodone 5 mg  Problem List Complete:  Yes    Last Written Prescription Date:  11/11/21  Last Fill Quantity: 120,   # refills: 0    THE MOST RECENT OFFICE VISIT MUST BE WITHIN THE PAST 3 MONTHS. AT LEAST ONE FACE TO FACE VISIT MUST OCCUR EVERY 6 MONTHS. ADDITIONAL VISITS CAN BE VIRTUAL.  (THIS STATEMENT SHOULD BE DELETED.)    [place] [facility] [place] 11/4/21 virtual Fitterer    Future Office visit:   Next 5 appointments (look out 90 days)    Dec 13, 2021  4:30 PM  Return Visit with HALEIGH Briceno CNP  [facility] ([facility] ) [address] [place] [id number] [phone]          Controlled substance agreement:   CSA -- Encounter Level:    CSA: None found at the encounter level.     CSA -- Patient Level:    CSA: None found at the patient level.         Last Urine Drug Screen: No results found for: CDAUT, No results found for: COMDAT, No results found for: THC13, PCP13, COC13, MAMP13, OPI13, AMP13, BZO13, TCA13, MTD13, BAR13, OXY13, PPX13, BUP13     Processing:  Rx to be electronically transmitted to pharmacy by provider     [email or web address]   checked in past 3 months?      Lizett HUYNH RN  EP Triage

## 2022-01-03 DIAGNOSIS — M25.511 CHRONIC PAIN OF BOTH SHOULDERS: ICD-10-CM

## 2022-01-03 DIAGNOSIS — G89.29 OTHER CHRONIC PAIN: ICD-10-CM

## 2022-01-03 DIAGNOSIS — G89.29 CHRONIC PAIN OF BOTH SHOULDERS: ICD-10-CM

## 2022-01-03 DIAGNOSIS — M17.0 OSTEOARTHRITIS OF BOTH KNEES, UNSPECIFIED OSTEOARTHRITIS TYPE: ICD-10-CM

## 2022-01-03 DIAGNOSIS — M25.512 CHRONIC PAIN OF BOTH SHOULDERS: ICD-10-CM

## 2022-01-04 RX ORDER — OXYCODONE HYDROCHLORIDE 5 MG/1
5 TABLET ORAL EVERY 6 HOURS PRN
Qty: 120 TABLET | Refills: 0 | Status: SHIPPED | OUTPATIENT
Start: 2022-01-06 | End: 2022-02-02

## 2022-01-04 NOTE — TELEPHONE ENCOUNTER
Controlled Substance Refill Request for oxycodone 5 mg  Problem List Complete:  Yes    Last Written Prescription Date:  12/9/21  Last Fill Quantity: 120,   # refills: 0    THE MOST RECENT OFFICE VISIT MUST BE WITHIN THE PAST 3 MONTHS. AT LEAST ONE FACE TO FACE VISIT MUST OCCUR EVERY 6 MONTHS. ADDITIONAL VISITS CAN BE VIRTUAL.  (THIS STATEMENT SHOULD BE DELETED.)    Last Office Visit with [facility] [place] care provider: 11/4/21 virtual Fitterer    Future Office visit:     Controlled substance agreement:   CSA -- Encounter Level:    CSA: None found at the encounter level.     CSA -- Patient Level:    CSA: None found at the patient level.         Last Urine Drug Screen: No results found for: CDAUT, No results found for: COMDAT, No results found for: THC13, PCP13, COC13, MAMP13, OPI13, AMP13, BZO13, TCA13, MTD13, BAR13, OXY13, PPX13, BUP13     Processing:  Rx to be electronically transmitted to pharmacy by provider     [email or web address]   checked in past 3 months?      Lizett HUYNH RN  EP Triage

## 2022-02-01 DIAGNOSIS — M25.511 CHRONIC PAIN OF BOTH SHOULDERS: ICD-10-CM

## 2022-02-01 DIAGNOSIS — M25.512 CHRONIC PAIN OF BOTH SHOULDERS: ICD-10-CM

## 2022-02-01 DIAGNOSIS — M17.0 OSTEOARTHRITIS OF BOTH KNEES, UNSPECIFIED OSTEOARTHRITIS TYPE: ICD-10-CM

## 2022-02-01 DIAGNOSIS — G89.29 CHRONIC PAIN OF BOTH SHOULDERS: ICD-10-CM

## 2022-02-01 DIAGNOSIS — G89.29 OTHER CHRONIC PAIN: ICD-10-CM

## 2022-02-01 NOTE — TELEPHONE ENCOUNTER
Reason for Call:  Medication or medication refill:    Do you use a [facility]?  Name of the pharmacy and phone number for the current request:  [facility] [place], [place] [phone]    Name of the medication requested: oxyCODONE (ROXICODONE) 5 MG tablet    Other request: None    Can we leave a detailed message on this number? YES    Phone number patient can be reached at: Home number on file [phone] (home)    Best Time: Any    Call taken on 2/1/2022 at 1:43 PM by Sue Muniz

## 2022-02-02 RX ORDER — OXYCODONE HYDROCHLORIDE 5 MG/1
5 TABLET ORAL EVERY 6 HOURS PRN
Qty: 120 TABLET | Refills: 0 | Status: SHIPPED | OUTPATIENT
Start: 2022-02-02 | End: 2022-03-01

## 2022-02-02 NOTE — TELEPHONE ENCOUNTER
Controlled Substance Refill Request for oxycodone 5 mg  Problem List Complete:  Yes    Last Written Prescription Date:  1/6/22  Last Fill Quantity: 120,   # refills: 0    THE MOST RECENT OFFICE VISIT MUST BE WITHIN THE PAST 3 MONTHS. AT LEAST ONE FACE TO FACE VISIT MUST OCCUR EVERY 6 MONTHS. ADDITIONAL VISITS CAN BE VIRTUAL.  (THIS STATEMENT SHOULD BE DELETED.)    Last Office Visit with [facility] [place] care provider: 11/4/21 Richa    Future Office visit:   Next 5 appointments (look out 90 days)    Feb 22, 2022  5:30 PM  (Arrive by 5:10 PM)  Provider Visit with Mary Carmen Chaudhry PA-C  [facility] ([facility] ) [address] [place] [id number] [phone]          Controlled substance agreement:   CSA -- Encounter Level:    CSA: None found at the encounter level.     CSA -- Patient Level:    CSA: None found at the patient level.         Last Urine Drug Screen: No results found for: CDAUT, No results found for: COMDAT, No results found for: THC13, PCP13, COC13, MAMP13, OPI13, AMP13, BZO13, TCA13, MTD13, BAR13, OXY13, PPX13, BUP13     Processing:  Rx to be electronically transmitted to pharmacy by provider     [email or web address]   checked in past 3 months?      Lizett HUYNH RN  EP Triage

## 2022-02-18 ENCOUNTER — OFFICE VISIT (OUTPATIENT)
Dept: CARDIOLOGY | Facility: CLINIC | Age: 52
End: 2022-02-18
Payer: MEDICARE

## 2022-02-18 ENCOUNTER — TELEPHONE (OUTPATIENT)
Dept: CARDIOLOGY | Facility: CLINIC | Age: 52
End: 2022-02-18

## 2022-02-18 VITALS
DIASTOLIC BLOOD PRESSURE: 84 MMHG | HEART RATE: 84 BPM | BODY MASS INDEX: 47.13 KG/M2 | SYSTOLIC BLOOD PRESSURE: 132 MMHG | HEIGHT: 64 IN

## 2022-02-18 DIAGNOSIS — E66.01 MORBID OBESITY (H): ICD-10-CM

## 2022-02-18 DIAGNOSIS — E78.5 HYPERLIPIDEMIA LDL GOAL <130: ICD-10-CM

## 2022-02-18 DIAGNOSIS — I10 ESSENTIAL HYPERTENSION: ICD-10-CM

## 2022-02-18 DIAGNOSIS — G47.33 OSA ON CPAP: ICD-10-CM

## 2022-02-18 DIAGNOSIS — I42.8 NONISCHEMIC CARDIOMYOPATHY (H): ICD-10-CM

## 2022-02-18 DIAGNOSIS — I50.22 CHRONIC SYSTOLIC HEART FAILURE (H): Primary | ICD-10-CM

## 2022-02-18 PROCEDURE — 99215 OFFICE O/P EST HI 40 MIN: CPT | Performed by: NURSE PRACTITIONER

## 2022-02-18 NOTE — PATIENT INSTRUCTIONS
1. My nurse will call to review medications  2. Get echocardiogram  3. Get labs  4. Follow up with Nancie in 1 month after echo/labs  5. Please call with any questions/concerns [phone]
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
## 2022-02-21 ENCOUNTER — CARE COORDINATION (OUTPATIENT)
Dept: CARDIOLOGY | Facility: CLINIC | Age: 52
End: 2022-02-21
Payer: MEDICARE

## 2022-02-22 ENCOUNTER — VIRTUAL VISIT (OUTPATIENT)
Dept: FAMILY MEDICINE | Facility: CLINIC | Age: 52
End: 2022-02-22
Payer: MEDICARE

## 2022-02-22 DIAGNOSIS — M17.0 OSTEOARTHRITIS OF BOTH KNEES, UNSPECIFIED OSTEOARTHRITIS TYPE: Primary | ICD-10-CM

## 2022-02-22 DIAGNOSIS — E66.813 CLASS 3 SEVERE OBESITY DUE TO EXCESS CALORIES WITH SERIOUS COMORBIDITY AND BODY MASS INDEX (BMI) OF 45.0 TO 49.9 IN ADULT (H): ICD-10-CM

## 2022-02-22 DIAGNOSIS — F33.1 MAJOR DEPRESSIVE DISORDER, RECURRENT EPISODE, MODERATE (H): ICD-10-CM

## 2022-02-22 DIAGNOSIS — E66.01 CLASS 3 SEVERE OBESITY DUE TO EXCESS CALORIES WITH SERIOUS COMORBIDITY AND BODY MASS INDEX (BMI) OF 45.0 TO 49.9 IN ADULT (H): ICD-10-CM

## 2022-02-22 DIAGNOSIS — I42.9 CARDIOMYOPATHY, UNSPECIFIED TYPE (H): ICD-10-CM

## 2022-02-22 PROCEDURE — 99214 OFFICE O/P EST MOD 30 MIN: CPT | Mod: 95 | Performed by: PHYSICIAN ASSISTANT

## 2022-02-22 NOTE — PROGRESS NOTES
" Spoke to Christina. She asked if she can call back tomorrow when she is \"ready. My med bottles are upstairs and I am downstairs. I asked [facility] to fax you my list.\"    Checking to see if fax received in [place] as Christina was given a card with fax number      Future Appointments   Date Time Provider Department Center   2/22/2022  5:30 PM Mary Carmen Chaudhry PA-C [facility]   3/18/2022  3:00 PM [facility]   3/18/2022  4:00 PM [facility]   4/1/2022  1:30 PM Nancie Polk APRN CNP [facility]IN     Maia Dotson, RN 12:16 PM 02/22/22      "

## 2022-02-22 NOTE — PROGRESS NOTES
Christina is a 52 year old who is being evaluated via a billable telephone visit.      What phone number would you like to be contacted at? [phone]  How would you like to obtain your AVS? [facility]    Assessment & Plan   Problem List Items Addressed This Visit        Digestive    BMI 50.0-59.9, adult (H)    Relevant Medications    Semaglutide, 1 MG/DOSE, 4 MG/3ML SOPN    Obesity    Relevant Medications    Semaglutide, 1 MG/DOSE, 4 MG/3ML SOPN       Musculoskeletal and Integumentary    Osteoarthritis of both knees, unspecified osteoarthritis type - Primary       Behavioral    Major depressive disorder, recurrent episode, moderate (H)      Other Visit Diagnoses     Cardiomyopathy, unspecified type (H)               For weight loss, we will increase the Ozempic dose to 2 mg per week.   We will stay at this dose for 3 months, then have a weight recheck.      reviewed - oxycodone dose reviewed, patient requesting a increase to 10 mg daily which was denied.     Cardiomyopathy - patient follows with cardiology and had an appointment last week.                      Return in about 3 months (around 5/22/2022) for Weight Check, Medication Recheck.    Mary Carmen Chaudhry PA-C  [facility]    Bhumi Vasquez is a 52 year old who presents for the following health issues     HPI     Concern - Medication Questions    - Ozempic - she has lost about 40 pounds.     - She went to the cardiologist, scheduling an echocardiogram for preop clearance for possible total knee replacement.     - Pain medication discussed        Review of Systems         Objective           Vitals:  No vitals were obtained today due to virtual visit.    Physical Exam   healthy, alert and no distress  PSYCH: Alert and oriented times 3; coherent speech, normal   rate and volume, able to articulate logical thoughts, able   to abstract reason, no tangential thoughts, no hallucinations   or delusions  Her affect is normal  RESP:  No cough, no audible wheezing, able to talk in full sentences  Remainder of exam unable to be completed due to telephone visits                Phone call duration: 14 minutes

## 2022-02-24 NOTE — PROGRESS NOTES
Left her a voice mail to please call back [facility] so we can review her medications.     Called [facility] to see if they can fax a list of her current medications and prescriber(s).   She fills her prescriptions in [place], [place]. Pharmacist recommended I call there for a list.     Called to [facility] in [place]. The pharmacist can not fax a list due to privacy issues. He was able to verify medications asked about:     - Wellbutrin - not on their list  - Furosemide 20 mg tablet - take 20 mg once daily, may take a additional 20 mg as directed by [facility] - LAST  WAS 8/2021   - Losartan 25 mg tablet - take 25 mg once daily  - Metoprolol succinate 50 mg tablet - take 100 mg twice a day   - Xenical 120 mg tablets - NOT ON THEIR LIST  - Oxycodone 5 mg PRN every 6 hrs  - Rivaroxaban 10 mg tablet - take 10 mg once a day  - Rosuvastatin 20 mg tablet - take 20 mg once a day  - semaglutide 1 mg/3ml - we have inject 2 mg once every 7 days. THEY HAVE 1 mg every 7 days.       Will route to Nancie and keep trying to reach Christina.     Future Appointments   Date Time Provider Department Center   3/18/2022  3:00 PM [facility]3 [facility]   3/18/2022  4:00 PM [facility]   4/1/2022  1:30 PM Nancie Polk APRN CNP [facility] CL[facility]     Maia Dotson, RN 4:40 PM 02/24/22

## 2022-02-25 ENCOUNTER — TELEPHONE (OUTPATIENT)
Dept: FAMILY MEDICINE | Facility: CLINIC | Age: 52
End: 2022-02-25
Payer: MEDICARE

## 2022-02-25 DIAGNOSIS — I42.9 CARDIOMYOPATHY, UNSPECIFIED TYPE (H): ICD-10-CM

## 2022-02-25 NOTE — TELEPHONE ENCOUNTER
Prior Authorization Retail Medication Request    Medication/Dose: Ozempic 1 mg per dose   ICD code (if different than what is on RX):    Previously Tried and Failed:    Rationale:      Insurance Name:    Insurance ID:  [id number]      Pharmacy Information (if different than what is on RX)  [facility]   Phone:  [phone]

## 2022-02-25 NOTE — TELEPHONE ENCOUNTER
Routing refill request to provider for review/approval because:  Labs not current    Lizett HUYNH RN  EP Triage

## 2022-02-27 RX ORDER — FUROSEMIDE 20 MG
TABLET ORAL
Qty: 110 TABLET | Refills: 2 | Status: SHIPPED | OUTPATIENT
Start: 2022-02-27 | End: 2023-10-24

## 2022-03-01 DIAGNOSIS — M25.511 CHRONIC PAIN OF BOTH SHOULDERS: ICD-10-CM

## 2022-03-01 DIAGNOSIS — G89.29 OTHER CHRONIC PAIN: ICD-10-CM

## 2022-03-01 DIAGNOSIS — M17.0 OSTEOARTHRITIS OF BOTH KNEES, UNSPECIFIED OSTEOARTHRITIS TYPE: ICD-10-CM

## 2022-03-01 DIAGNOSIS — M25.512 CHRONIC PAIN OF BOTH SHOULDERS: ICD-10-CM

## 2022-03-01 DIAGNOSIS — G89.29 CHRONIC PAIN OF BOTH SHOULDERS: ICD-10-CM

## 2022-03-01 RX ORDER — OXYCODONE HYDROCHLORIDE 5 MG/1
5 TABLET ORAL EVERY 6 HOURS PRN
Qty: 120 TABLET | Refills: 0 | Status: SHIPPED | OUTPATIENT
Start: 2022-03-01 | End: 2022-04-03

## 2022-03-01 NOTE — TELEPHONE ENCOUNTER
Controlled Substance Refill Request for oxycodone 5 mg  Problem List Complete:  Yes    Last Written Prescription Date:  2/2/22  Last Fill Quantity: 120,   # refills: 0    THE MOST RECENT OFFICE VISIT MUST BE WITHIN THE PAST 3 MONTHS. AT LEAST ONE FACE TO FACE VISIT MUST OCCUR EVERY 6 MONTHS. ADDITIONAL VISITS CAN BE VIRTUAL.  (THIS STATEMENT SHOULD BE DELETED.)    Last Office Visit with [facility] primary care provider: 2/22/22 virtual Fitterer    Future Office visit:     Controlled substance agreement:   CSA -- Encounter Level:    CSA: None found at the encounter level.     CSA -- Patient Level:    CSA: None found at the patient level.         Last Urine Drug Screen: No results found for: CDAUT, No results found for: COMDAT, No results found for: THC13, PCP13, COC13, MAMP13, OPI13, AMP13, BZO13, TCA13, MTD13, BAR13, OXY13, PPX13, BUP13     Processing:  Rx to be electronically transmitted to pharmacy by provider     [email or web address]   checked in past 3 months?      Lizett HUYNH RN  EP Triage

## 2022-03-04 ENCOUNTER — TELEPHONE (OUTPATIENT)
Dept: FAMILY MEDICINE | Facility: CLINIC | Age: 52
End: 2022-03-04
Payer: MEDICARE

## 2022-03-04 NOTE — PROGRESS NOTES
Spoke to Christina. We did review her medications.     Our medication list is update.   We discussed her weights, she is very proud of her self for losing weight. She is feeling well. She was not able to tell me her weight.     Will let Nancie know medications were reviewed and she is taking appropriately.   Lasix she is taking one 20 mg table daily.     She is going to call Monday to with weight updates and phone number to her      Update to Nancie     Future Appointments   Date Time Provider Department Center   3/18/2022  3:00 PM [facility]3 [facility]   3/18/2022  4:00 PM [facility]   3/31/2022 10:10 AM Nancie Polk APRN CNP [facility] PSA CLIN         Maia Dotson, RN 4:58 PM 03/04/22

## 2022-03-04 NOTE — TELEPHONE ENCOUNTER
"Maia, nurse with [facility] called regarding pt. She noted Davis's last visit with pt documentation was unsigned and wanted to let Davis know of her most recent visit with pt and ask Davis if there are any behaviors or mental health concerns the nurse should be aware of. She experienced an odd situation with her last visit with pt in which the pt told the cardiology nurse that the nurse had called Davis and spoke to her about the pt's rudeness and the nurse would be hearing from the pt's  on Monday. The pt also stated this situation put her into a \"downward spiral\". The nurse states at the end of their visit the pt perked up and did review her medications which the nurse confirms she is taking her medication as prescribed. She just wants to understand if anything is going on or wants Davis's take so she has the information to properly care for the pt.     Maia call back: [phone], confidential     Anayeli GUEVARA RN  [facility]   "

## 2022-03-07 NOTE — TELEPHONE ENCOUNTER
"I called and left a message for Maia at the number listed.   I was not referring to cardiology staff when I spoke to the patient.   I was specifically talking about our rooming staff in primary care.        Phone call was made to patient today to discuss.   Patient states that she does not always answer our staff's questions when they are rooming her.   She has a hard time remembering which medications she is taking and which health concerns she wants to discuss each visit.    She has a notebook where she keeps all her medical questions and her medications listed for reference.     We discussed in the future it may be helpful to keep her notebook by her when she has an appointment so our rooming staff can get their questions answered.   If she does not have her notebook available, please politely let our staff know.       Prior to ending the phone call, the patient asked if I could tell her which staff members said she was rude.  I refused to give her any names of staff.  She then told me, \"That's okay, I will find out.\"  Patient then ended the phone call.     Davis Chaudhry PA-C        "

## 2022-03-09 NOTE — PROGRESS NOTES
Future Appointments   Date Time Provider Department Center   3/18/2022  3:00 PM [id number]   3/18/2022  4:00 PM [facility]   3/31/2022 10:10 AM Nancie Polk APRN CNP [facility] PSA CLIN     Ana Laura Granados, RN BSN   [place], [place]  [email or web address]. Clinic Care Coordinator  03/09/22, 4:59 PM

## 2022-03-12 ENCOUNTER — HEALTH MAINTENANCE LETTER (OUTPATIENT)
Age: 52
End: 2022-03-12

## 2022-03-17 ENCOUNTER — TELEPHONE (OUTPATIENT)
Dept: FAMILY MEDICINE | Facility: CLINIC | Age: 52
End: 2022-03-17
Payer: MEDICARE

## 2022-03-17 NOTE — TELEPHONE ENCOUNTER
Called pt and relayed message from Davis Chaudhry, see below. She stated her understanding. She states if this is still not covered or approved she may not want this dose. She will touch base with pharmacy as well when processed and call back if needed.     Anayeli GUEVARA RN  [facility]

## 2022-03-17 NOTE — TELEPHONE ENCOUNTER
I now ordered the 1.7 mg per dose prescription instead.   On our end - it looks like the insurance will not cover this dose either.  We can see if it is actually covered when processed at the pharmacy.     Davis Chaudhry PA-C

## 2022-03-17 NOTE — TELEPHONE ENCOUNTER
Pt called regarding Semaglutide rx. She was concerned about the dosage and was unsure if the pharmacy received the increased dose she was told by the provider. She states she was told by PCP the dose would increase to 1.7 mg every 7 days. What is currently ordered and the pharmacy confirms they received 2 mg every 7 days. Pharmacy stated the 2 mg every 7 days would require a PA. Do you want a PA started and keep the 2 mg every 7 days or change to 1.7 mg every 7 days? She states she is now out of medication and wants this addressed as soon as possible. Pharmacy pended if needed.     Anayeli GUEVARA RN  [facility]

## 2022-03-18 NOTE — TELEPHONE ENCOUNTER
This is a duplicate refill request, that has been refused to the pharmacy.   Jhon De La Cruz RN  [facility] Triage Nurse

## 2022-03-31 DIAGNOSIS — M25.511 CHRONIC PAIN OF BOTH SHOULDERS: ICD-10-CM

## 2022-03-31 DIAGNOSIS — G89.29 CHRONIC PAIN OF BOTH SHOULDERS: ICD-10-CM

## 2022-03-31 DIAGNOSIS — M25.512 CHRONIC PAIN OF BOTH SHOULDERS: ICD-10-CM

## 2022-03-31 DIAGNOSIS — G89.29 OTHER CHRONIC PAIN: ICD-10-CM

## 2022-03-31 DIAGNOSIS — M17.0 OSTEOARTHRITIS OF BOTH KNEES, UNSPECIFIED OSTEOARTHRITIS TYPE: ICD-10-CM

## 2022-03-31 NOTE — TELEPHONE ENCOUNTER
Oxycodone 5 mg tablet   Last Written Prescription Date:  3/1/22  Last Fill Quantity: 120,   # refills: 0  Last Office Visit: 2/22/22  [facility]   Future Office visit:       Routing refill request to provider for review/approval because:  Drug not on the [facility], [facility] or [facility] refill protocol or controlled substance    The 3rd falls on Sunday which is why pt requesting at this time.     Anayeli GUEVARA RN  [facility]

## 2022-04-03 RX ORDER — OXYCODONE HYDROCHLORIDE 5 MG/1
5 TABLET ORAL EVERY 6 HOURS PRN
Qty: 120 TABLET | Refills: 0 | Status: SHIPPED | OUTPATIENT
Start: 2022-04-03 | End: 2022-04-04

## 2022-04-03 NOTE — TELEPHONE ENCOUNTER
Patient calling again about her oxycodone refill. She is upset when I told her that we do not do controlled substance refills on the weekend and I will forward this to her care team for Monday.  Debra Meyer RN  [facility]

## 2022-04-08 DIAGNOSIS — I42.9 CARDIOMYOPATHY, UNSPECIFIED TYPE (H): ICD-10-CM

## 2022-04-08 RX ORDER — METOPROLOL SUCCINATE 50 MG/1
TABLET, EXTENDED RELEASE ORAL
Qty: 360 TABLET | Refills: 2 | Status: SHIPPED | OUTPATIENT
Start: 2022-04-08 | End: 2023-02-16

## 2022-04-08 NOTE — TELEPHONE ENCOUNTER
Prescription approved per [facility] Refill Protocol.    Lizett HUYNH RN  EP Triage

## 2022-04-13 ENCOUNTER — HOSPITAL ENCOUNTER (OUTPATIENT)
Dept: CARDIOLOGY | Facility: CLINIC | Age: 52
Discharge: HOME | End: 2022-04-13
Attending: NURSE PRACTITIONER | Admitting: NURSE PRACTITIONER
Payer: MEDICARE

## 2022-04-13 ENCOUNTER — LAB (OUTPATIENT)
Dept: LAB | Facility: CLINIC | Age: 52
End: 2022-04-13
Payer: MEDICARE

## 2022-04-13 DIAGNOSIS — I50.22 CHRONIC SYSTOLIC HEART FAILURE (H): ICD-10-CM

## 2022-04-13 LAB
ANION GAP SERPL CALCULATED.3IONS-SCNC: 3 MMOL/L (ref 3–14)
BUN SERPL-MCNC: 17 MG/DL (ref 7–30)
CALCIUM SERPL-MCNC: 9.5 MG/DL (ref 8.5–10.1)
CHLORIDE BLD-SCNC: 105 MMOL/L (ref 94–109)
CO2 SERPL-SCNC: 28 MMOL/L (ref 20–32)
CREAT SERPL-MCNC: 0.83 MG/DL (ref 0.52–1.04)
GFR SERPL CREATININE-BSD FRML MDRD: 85 ML/MIN/1.73M2
GLUCOSE BLD-MCNC: 101 MG/DL (ref 70–99)
HGB BLD-MCNC: 13.4 G/DL (ref 11.7–15.7)
LVEF ECHO: NORMAL
NT-PROBNP SERPL-MCNC: 51 PG/ML (ref 0–125)
POTASSIUM BLD-SCNC: 3.7 MMOL/L (ref 3.4–5.3)
SODIUM SERPL-SCNC: 136 MMOL/L (ref 133–144)
TSH SERPL DL<=0.005 MIU/L-ACNC: 2.02 MU/L (ref 0.4–4)

## 2022-04-13 PROCEDURE — 36415 COLL VENOUS BLD VENIPUNCTURE: CPT | Performed by: NURSE PRACTITIONER

## 2022-04-13 PROCEDURE — 85018 HEMOGLOBIN: CPT | Performed by: NURSE PRACTITIONER

## 2022-04-13 PROCEDURE — 80048 BASIC METABOLIC PNL TOTAL CA: CPT | Performed by: NURSE PRACTITIONER

## 2022-04-13 PROCEDURE — 83880 ASSAY OF NATRIURETIC PEPTIDE: CPT | Performed by: NURSE PRACTITIONER

## 2022-04-13 PROCEDURE — 84443 ASSAY THYROID STIM HORMONE: CPT | Performed by: NURSE PRACTITIONER

## 2022-04-13 PROCEDURE — 93306 TTE W/DOPPLER COMPLETE: CPT | Mod: 26 | Performed by: INTERNAL MEDICINE

## 2022-04-13 PROCEDURE — 93306 TTE W/DOPPLER COMPLETE: CPT

## 2022-04-14 ENCOUNTER — VIRTUAL VISIT (OUTPATIENT)
Dept: CARDIOLOGY | Facility: CLINIC | Age: 52
End: 2022-04-14
Attending: NURSE PRACTITIONER
Payer: MEDICARE

## 2022-04-14 ENCOUNTER — MYC MEDICAL ADVICE (OUTPATIENT)
Dept: FAMILY MEDICINE | Facility: CLINIC | Age: 52
End: 2022-04-14

## 2022-04-14 DIAGNOSIS — Z86.711 HISTORY OF PULMONARY EMBOLISM: ICD-10-CM

## 2022-04-14 DIAGNOSIS — E78.5 HYPERLIPIDEMIA LDL GOAL <130: ICD-10-CM

## 2022-04-14 DIAGNOSIS — I50.22 CHRONIC SYSTOLIC HEART FAILURE (H): Primary | ICD-10-CM

## 2022-04-14 DIAGNOSIS — G47.33 OSA ON CPAP: ICD-10-CM

## 2022-04-14 DIAGNOSIS — I10 ESSENTIAL HYPERTENSION: ICD-10-CM

## 2022-04-14 DIAGNOSIS — E66.01 MORBID OBESITY DUE TO EXCESS CALORIES (H): ICD-10-CM

## 2022-04-14 PROCEDURE — 99442 PR PHYSICIAN TELEPHONE EVALUATION 11-20 MIN: CPT | Mod: 95 | Performed by: NURSE PRACTITIONER
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
## 2022-04-15 ENCOUNTER — MYC MEDICAL ADVICE (OUTPATIENT)
Dept: FAMILY MEDICINE | Facility: CLINIC | Age: 52
End: 2022-04-15
Payer: MEDICARE

## 2022-04-18 ENCOUNTER — TELEPHONE (OUTPATIENT)
Dept: FAMILY MEDICINE | Facility: CLINIC | Age: 52
End: 2022-04-18
Payer: MEDICARE

## 2022-04-18 ENCOUNTER — MYC MEDICAL ADVICE (OUTPATIENT)
Dept: CARDIOLOGY | Facility: CLINIC | Age: 52
End: 2022-04-18
Payer: MEDICARE

## 2022-04-18 DIAGNOSIS — K44.9 HIATAL HERNIA: Primary | ICD-10-CM

## 2022-04-18 NOTE — TELEPHONE ENCOUNTER
"Tried to call pt's as has sent multple my charts about medications/asking for pt relations.  Tried to address medications and make sure pt has everything that she needs.  Pt refused to discuss medicaitons.  States \"I don't want to talk about that, I already took care of that and I'm taking care of that somewhere else. I called just to speak to Davis and I want to talk to Davis\"  Phone disconnected    Called back to try to discuss and see if anyway triage can help.   Did explain purpose of triage nurse, and can see what concerns pt has, and help guide care.   Pt states \"That's not what I want, I want to talk to Davis, and that's what I said\"    \"I already left a message for Davis to call me   I have done it a couple times prior to and she is ignoring me.  I'm going to let you go and make some calls.  When she's not busy, she can call me.\"     Cara Higgins, RN  [facility] RN Triage Team  "

## 2022-04-18 NOTE — TELEPHONE ENCOUNTER
Reason for Call:  Other call back    Detailed comments:Pt would like to speak to provider regarding a heart situation from her heart doctor. States that she has a hernia in chest and would like some more information on it.     Phone Number Patient can be reached at: Home number on file [phone] (home)    Best Time: Any    Can we leave a detailed message on this number? YES    Call taken on 4/18/2022 at 12:22 PM by Fabi Wade

## 2022-04-19 NOTE — TELEPHONE ENCOUNTER
[facility]    I spoke briefly with Christina this AM, calling her in response to request via [employer]. She advised she'd call me back.    In further review of her record, looks like she's been reaching out re: refills, medications, and results over last week, with specific requests to speak to her providers.    Nancie--I blocked your 1230 slot today. Would you have time to call Christina? I think she'd appreciate speaking directly to you.    Thanks!    Elizabeth Hoang RN BSN   10:32 AM 04/19/22

## 2022-04-19 NOTE — TELEPHONE ENCOUNTER
Patient states that her heart doctor told her to speak with Mary Carmen Chaudhry PA-C about her results. States that she got the information below from patient relations.  Estephania Noble RN

## 2022-04-19 NOTE — TELEPHONE ENCOUNTER
[facility]    Called Christina to review questions (see MyChart message). She advised she'd call me back.    Elizabeth Hoang RN BSN   10:01 AM 04/19/22

## 2022-04-19 NOTE — TELEPHONE ENCOUNTER
"Reviewed my chart encounters. Called patient back per request. Notes she is \"distressed today\". States she has been trying to reach out to her PCP regarding recommendations related to hiatal hernia noted on echocardiogram. \"I am getting to put off by her PCP. She is tearful on the phone\". We reviewed what hiatal hernia is. She shares she has been struggling with her depression. Denies any suicidal or homicidal ideations. Has support with her  and is looking into finding a therapist.     GI referral placed today for further guidance related to incidentally found hiatal hernia.     Support given today. All questions answered.       HALEIGH Costello [facility]  Pager: [phone]    "

## 2022-04-19 NOTE — TELEPHONE ENCOUNTER
Please inform the patient I just got her message (at 9:00 PM) and it is too late to call her.   I will be out of the office for the next 3 days.     I reviewed the message from the cardiology provider - I recommend the patient follow up with a gastroenterologist to discuss the hiatal hernia size and treatment recommendations.     Please have patient call to schedule an appointment.   [address] [address]   Phone: [phone]         Davis Chaudhry PA-C

## 2022-04-20 NOTE — TELEPHONE ENCOUNTER
Please see multiple MyChart messages. Writer did confirm which meds at [facility] are able to be refilled today and started high priority refill for Xarelto. After doing all that, I came across this message in the triage basket.  Routing as TOBIAS Noble RN

## 2022-04-20 NOTE — TELEPHONE ENCOUNTER
"Left non-detailed message to call the clinic back at [phone] and ask to speak with a nurse. Appears that patient does have refills at the pharmacy.  Writer called Christine and spoke with [employer] who confirmed that patient does have refills on \"heart medications\" except Xarelto.   MyChart reply sent to the patient. Estephania Noble RN    "

## 2022-04-20 NOTE — TELEPHONE ENCOUNTER
Last visit 2/22/22 - virtual     Routing refill request to provider for review/approval because:        Christine CONNER, Triage RN  [facility]

## 2022-04-22 NOTE — TELEPHONE ENCOUNTER
Called and discussed hiatal hernia and GI referral.     All questions are answered.     Davis Chaudhry PA-C

## 2022-05-02 ENCOUNTER — MYC REFILL (OUTPATIENT)
Dept: FAMILY MEDICINE | Facility: CLINIC | Age: 52
End: 2022-05-02
Payer: MEDICARE

## 2022-05-02 DIAGNOSIS — F41.1 GENERALIZED ANXIETY DISORDER: ICD-10-CM

## 2022-05-02 DIAGNOSIS — E78.5 HYPERLIPIDEMIA LDL GOAL <130: ICD-10-CM

## 2022-05-02 DIAGNOSIS — M25.512 CHRONIC PAIN OF BOTH SHOULDERS: ICD-10-CM

## 2022-05-02 DIAGNOSIS — G89.29 CHRONIC PAIN OF BOTH SHOULDERS: ICD-10-CM

## 2022-05-02 DIAGNOSIS — M25.511 CHRONIC PAIN OF BOTH SHOULDERS: ICD-10-CM

## 2022-05-02 DIAGNOSIS — M17.0 OSTEOARTHRITIS OF BOTH KNEES, UNSPECIFIED OSTEOARTHRITIS TYPE: ICD-10-CM

## 2022-05-02 DIAGNOSIS — M17.0 PRIMARY OSTEOARTHRITIS OF BOTH KNEES: ICD-10-CM

## 2022-05-02 DIAGNOSIS — G89.29 OTHER CHRONIC PAIN: ICD-10-CM

## 2022-05-02 DIAGNOSIS — I10 ESSENTIAL HYPERTENSION WITH GOAL BLOOD PRESSURE LESS THAN 130/80: ICD-10-CM

## 2022-05-02 RX ORDER — OXYCODONE HYDROCHLORIDE 5 MG/1
5 TABLET ORAL EVERY 6 HOURS PRN
Qty: 120 TABLET | Refills: 0 | Status: CANCELLED | OUTPATIENT
Start: 2022-05-02

## 2022-05-03 DIAGNOSIS — E55.9 VITAMIN D DEFICIENCY: ICD-10-CM

## 2022-05-03 RX ORDER — CHOLECALCIFEROL (VITAMIN D3) 1250 MCG
CAPSULE ORAL
Qty: 60 CAPSULE | Refills: 0 | Status: SHIPPED | OUTPATIENT
Start: 2022-05-03 | End: 2023-01-17

## 2022-05-03 NOTE — TELEPHONE ENCOUNTER
Routing refill request to provider for review/approval because:  High dose Vitamin D not ordered    Last office vist: 2/22/2022    Pended 90 day supply & 1 refills. Please Review.    Next office visit: nonscheduled      Kala Bond RN  [facility]

## 2022-05-05 RX ORDER — ROSUVASTATIN CALCIUM 20 MG/1
20 TABLET, COATED ORAL DAILY
Qty: 90 TABLET | Refills: 3 | Status: SHIPPED | OUTPATIENT
Start: 2022-05-05 | End: 2023-05-12

## 2022-05-05 RX ORDER — LOSARTAN POTASSIUM 25 MG/1
25 TABLET ORAL DAILY
Qty: 90 TABLET | Refills: 2 | Status: SHIPPED | OUTPATIENT
Start: 2022-05-05 | End: 2023-05-14

## 2022-05-05 RX ORDER — LORAZEPAM 0.5 MG/1
0.5 TABLET ORAL 2 TIMES DAILY PRN
Qty: 20 TABLET | Refills: 0 | Status: SHIPPED | OUTPATIENT
Start: 2022-05-05 | End: 2022-11-25

## 2022-05-05 NOTE — TELEPHONE ENCOUNTER
Routing refill request to provider for review/approval because:  Drug not on the [facility] refill protocol   Drug dose warning  Labs not current:  LDL      LDL Cholesterol Calculated   Date Value Ref Range Status   02/28/2020 66 <100 mg/dL Final     Comment:     Desirable:       <100 mg/dl

## 2022-05-16 ENCOUNTER — TELEPHONE (OUTPATIENT)
Dept: FAMILY MEDICINE | Facility: CLINIC | Age: 52
End: 2022-05-16
Payer: MEDICARE

## 2022-05-16 NOTE — TELEPHONE ENCOUNTER
[employer] Coverage request forms for OZEMPIC and faxed back. Form then sent t abstraction.  Destiny Damon,

## 2022-05-18 ENCOUNTER — TELEPHONE (OUTPATIENT)
Dept: FAMILY MEDICINE | Facility: CLINIC | Age: 52
End: 2022-05-18
Payer: MEDICARE

## 2022-05-19 ENCOUNTER — TELEPHONE (OUTPATIENT)
Dept: FAMILY MEDICINE | Facility: CLINIC | Age: 52
End: 2022-05-19

## 2022-05-19 NOTE — TELEPHONE ENCOUNTER
No documentation regarding which pharmacy the medication request is from.  Prescription for Ozempic was ordered on 5/5/22 to [facility] in [place], [place].  Estephania Noble RN

## 2022-05-26 ENCOUNTER — TELEPHONE (OUTPATIENT)
Dept: FAMILY MEDICINE | Facility: CLINIC | Age: 52
End: 2022-05-26
Payer: MEDICARE

## 2022-05-31 DIAGNOSIS — G89.29 CHRONIC PAIN OF BOTH SHOULDERS: ICD-10-CM

## 2022-05-31 DIAGNOSIS — G89.29 OTHER CHRONIC PAIN: ICD-10-CM

## 2022-05-31 DIAGNOSIS — M17.0 OSTEOARTHRITIS OF BOTH KNEES, UNSPECIFIED OSTEOARTHRITIS TYPE: ICD-10-CM

## 2022-05-31 DIAGNOSIS — M25.512 CHRONIC PAIN OF BOTH SHOULDERS: ICD-10-CM

## 2022-05-31 DIAGNOSIS — M25.511 CHRONIC PAIN OF BOTH SHOULDERS: ICD-10-CM

## 2022-05-31 NOTE — TELEPHONE ENCOUNTER
Pt calling to request refill for oxycodone. Please refill when possible, thanks.    Ele Giron,  [facility]

## 2022-06-01 NOTE — TELEPHONE ENCOUNTER
Routing refill request to provider for review/approval because:  Drug not on the [facility] refill protocol     Jhon De La Cruz RN  [facility] Triage Nurse

## 2022-06-02 RX ORDER — OXYCODONE HYDROCHLORIDE 5 MG/1
5 TABLET ORAL EVERY 6 HOURS PRN
Qty: 120 TABLET | Refills: 0 | Status: SHIPPED | OUTPATIENT
Start: 2022-06-02 | End: 2022-06-30

## 2022-06-15 ENCOUNTER — TRANSFERRED RECORDS (OUTPATIENT)
Dept: MULTI SPECIALTY CLINIC | Facility: CLINIC | Age: 52
End: 2022-06-15

## 2022-06-16 RX ORDER — SEMAGLUTIDE 1.34 MG/ML
INJECTION, SOLUTION SUBCUTANEOUS
Qty: 18 ML | Refills: 0 | OUTPATIENT
Start: 2022-06-16

## 2022-06-17 ENCOUNTER — TELEPHONE (OUTPATIENT)
Dept: NURSING | Facility: CLINIC | Age: 52
End: 2022-06-17

## 2022-06-17 NOTE — TELEPHONE ENCOUNTER
Calling pt to inquire about medications that she needs. Semaglutide 1mg/dose 4mg/3ml was sent to pharmacy by Davis on 6/16/22, but pt states it is the wrong dosage. Pt states that she is supposed to take 4mg instead of 2mg. Pt is very frustrated that she hasn't been able to take her medication for 2 weeks now. Please advise    Pharmacy: [facility] [address]    Ele Giron,  [facility]

## 2022-06-17 NOTE — TELEPHONE ENCOUNTER
4 mg is not the correct dose - the max dose of this medication is 2.4 mg every week.     Patient should only take 2 mg per dose, and 1 dose per week.   I have sent over the correct medication, which is appropriate for this patient.     Patient should take 2 mg once a week.    I send enough medication with refills for 6 months.     Davis Chaudhry PA-C

## 2022-06-17 NOTE — TELEPHONE ENCOUNTER
Called pt to relay message from Davis below. No answer, left voice message for pt to return call.    Ele Giron,  [facility]

## 2022-06-17 NOTE — TELEPHONE ENCOUNTER
Patient is calling and states that Christina has been asking for a refill for her medications.  Patient states that her heart medications are not filled.  Patient states that prescription for Semaglutide 1 mg/dose 4 mg/3ml is wrong.  Patient has not taken her medication in weeks due to prescription being wrong.  Please phone Christina to get her medications straightened out.     COVID 19 Nurse Triage Plan/Patient Instructions    Please be aware that novel coronavirus (COVID-19) may be circulating in the community. If you develop symptoms such as fever, cough, or SOB or if you have concerns about the presence of another infection including coronavirus (COVID-19), please contact your health care provider or visit [email or web address].     Disposition/Instructions    Home care recommended. Follow home care protocol based instructions.    Thank you for taking steps to prevent the spread of this virus.  o Limit your contact with others.  o Wear a simple mask to cover your cough.  o Wash your hands well and often.    Resources    [facility]: About COVID-19: [email or web address]/    CDC: What to Do If You're Sick: www.cdc.gov/coronavirus/2019-ncov/about/steps-when-sick.html    CDC: Ending Home Isolation: www.cdc.gov/coronavirus/2019-ncov/hcp/disposition-in-home-patients.html     CDC: Caring for Someone: www.cdc.gov/coronavirus/2019-ncov/if-you-are-sick/care-for-someone.html     [facility]: Interim Guidance for Hospital Discharge to Home: www.health.[facility].[place].us/diseases/coronavirus/hcp/hospdischarge.pdf    [facility] clinical trials (COVID-19 research studies): clinicalaffairs.[facility]/n-clinical-trials     Below are the COVID-19 hotlines at the [facility] ([facility]). Interpreters are available.   o For health questions: Call 502-258-6865 or 1-180.774.1816 (7 a.m. to 7 p.m.)  o For questions about schools and childcare: Call 525-966-6390 or 1-700.577.2350 (7 a.m. to 7 p.m.)

## 2022-06-20 NOTE — TELEPHONE ENCOUNTER
Medicare Drug Coverage Request Form for Ozempic (1 mg/Dose) 4mg/3ml pen injectors received and completed by Davis Chaudhry. Copy made and sent to abstraction. Form faxed back to [facility] Fax: [phone].    Ele Giron,  [facility]

## 2022-06-20 NOTE — TELEPHONE ENCOUNTER
This refill request is a duplicate previously sent to pharmacy or currently in review.  Refused medication to pharmacy as duplicate.   Olivia Gutierrez RN

## 2022-06-20 NOTE — TELEPHONE ENCOUNTER
Central Prior Authorization Team   Phone: [phone]    PA Initiation    Medication: Semaglutide-Weight Management 1.7 MG/0.75ML SOAJ  Insurance Company: [employer] - Phone [phone] Fax [phone]  Pharmacy Filling the Rx: [facility][id number] [place], [place] - [address] AT [facility]  Filling Pharmacy Phone: [phone]  Filling Pharmacy Fax:    Start Date: 5/31/2022      
Medicare Drug Coverage Request Form for Ozempic (1 mg/Dose) 4mg/3ml pen injectors received and completed by Davis Chaudhry. Copy made and sent to abstraction. Form faxed back to [facility] Fax: [phone].    Ele Giron,  [facility]  
PRIOR AUTHORIZATION DENIED    Medication: Semaglutide-Weight Management 1.7 MG/0.75ML SOAJ    Denial Date: 6/1/2022    Denial Rational:  Per insurance, medication is excluded from patient's benefit plan and will not be covered. Review and appeal are not available because of this exclusion.            Appeal Information:  N/A      
Please see TATY carmona below and advise. Thank you.  Destiny Damon,     
Prior Authorization Retail Medication Request    Medication/Dose: Semaglutide-Weight Management 1.7 MG/0.75ML SOAJ  ICD code (if different than what is on RX):    Previously Tried and Failed:    Rationale:      Insurance Name:  na  Insurance ID:  Na       Pharmacy Information (if different than what is on RX)  Name:  [facility]  Phone:  na    
Pt was waiting on Ozempic script, called to verify dose.    Has been taking 2 mg per week ongoing.  Has been out for couple of weeks.  Appetite is coming back, and can tell gaining weight.  Would like to get back on Ozempic asap.    Internet is out, so if needing Liane please call   [phone]  Ok to leave vm.      Please send appropriate script.      
Rx was sent.   Davis Chaudhry PA-C    
Spoke with the pt and she will send a My Chart message back with the information. Will wait for the My Chart message before closing the encounter  Destiny Damon,     
There are two different prescriptions for semaglutide in the med list.   Please contact patient to find out if she is out of medication.  If she has medication, which kind is she taking (pens are either 4mg/3mL or 1.7mg/0.75mL).     If needed, I can work on the LMN and PA further.     Davis Chaudhry PA-C    
Statement Selected

## 2022-06-24 NOTE — TELEPHONE ENCOUNTER
Faxed notice received from [employer] that Semaglutide medication was not approved for coverage. Called pt to inform. Pt states understanding and will continue with 2.4 mg dose.    Ele Giron,  [facility]

## 2022-06-29 DIAGNOSIS — G89.29 CHRONIC PAIN OF BOTH SHOULDERS: ICD-10-CM

## 2022-06-29 DIAGNOSIS — M25.512 CHRONIC PAIN OF BOTH SHOULDERS: ICD-10-CM

## 2022-06-29 DIAGNOSIS — M17.0 OSTEOARTHRITIS OF BOTH KNEES, UNSPECIFIED OSTEOARTHRITIS TYPE: ICD-10-CM

## 2022-06-29 DIAGNOSIS — M25.511 CHRONIC PAIN OF BOTH SHOULDERS: ICD-10-CM

## 2022-06-29 DIAGNOSIS — G89.29 OTHER CHRONIC PAIN: ICD-10-CM

## 2022-06-30 RX ORDER — OXYCODONE HYDROCHLORIDE 5 MG/1
5 TABLET ORAL EVERY 6 HOURS PRN
Qty: 120 TABLET | Refills: 0 | Status: SHIPPED | OUTPATIENT
Start: 2022-06-30 | End: 2022-08-02

## 2022-07-16 DIAGNOSIS — Z86.711 HISTORY OF PULMONARY EMBOLISM: ICD-10-CM

## 2022-07-19 RX ORDER — RIVAROXABAN 10 MG/1
TABLET, FILM COATED ORAL
Qty: 90 TABLET | Refills: 1 | Status: SHIPPED | OUTPATIENT
Start: 2022-07-19 | End: 2023-02-17

## 2022-07-19 NOTE — TELEPHONE ENCOUNTER
Last visit virtual 2/22/22     Routing refill request to provider for review/approval because:        Christine CONNER, Triage RN  [facility]

## 2022-08-02 DIAGNOSIS — M25.511 CHRONIC PAIN OF BOTH SHOULDERS: ICD-10-CM

## 2022-08-02 DIAGNOSIS — M17.0 OSTEOARTHRITIS OF BOTH KNEES, UNSPECIFIED OSTEOARTHRITIS TYPE: ICD-10-CM

## 2022-08-02 DIAGNOSIS — G89.29 CHRONIC PAIN OF BOTH SHOULDERS: ICD-10-CM

## 2022-08-02 DIAGNOSIS — G89.29 OTHER CHRONIC PAIN: ICD-10-CM

## 2022-08-02 DIAGNOSIS — M25.512 CHRONIC PAIN OF BOTH SHOULDERS: ICD-10-CM

## 2022-08-02 RX ORDER — OXYCODONE HYDROCHLORIDE 5 MG/1
5 TABLET ORAL EVERY 6 HOURS PRN
Qty: 120 TABLET | Refills: 0 | Status: SHIPPED | OUTPATIENT
Start: 2022-08-02 | End: 2022-09-01

## 2022-08-02 NOTE — TELEPHONE ENCOUNTER
Patient called in upset that medication has not been filled yet states she was told was going to be sent high priority today     Patient is going to be out today and would like filled today    Routing high priority to provider    Patient would like provider to know she was in the hospital    Jhon De La Cruz RN

## 2022-08-02 NOTE — TELEPHONE ENCOUNTER
Medication Question or Refill    What medication are you calling about (include dose and sig)?: oxyCODONE (ROXICODONE) 5 MG tablet     Controlled Substance Agreement on file:   CSA -- Patient Level:    CSA: None found at the patient level.       Who prescribed the medication?: PCP    Do you need a refill? Yes: Pt has 1 tablet left and is hoping to have Rx refilled soon    When did you use the medication last? 08.02.22    Patient offered an appointment? Yes, Pt transferred to [place]     Do you have any questions or concerns?  No    Preferred Pharmacy:   [facility][id number] [place] [address] [address] [id number]  Phone: [phone] Fax: [phone]    Could we send this information to you in [facility] or would you prefer to receive a phone call?:   Patient would prefer a phone call   Okay to leave a detailed message?: Yes at Cell number on file:    Telephone Information:   Mobile [phone]

## 2022-08-08 ENCOUNTER — TELEPHONE (OUTPATIENT)
Dept: FAMILY MEDICINE | Facility: CLINIC | Age: 52
End: 2022-08-08

## 2022-08-08 RX ORDER — SEMAGLUTIDE 1.34 MG/ML
INJECTION, SOLUTION SUBCUTANEOUS
Qty: 18 ML | Refills: 0 | Status: SHIPPED | OUTPATIENT
Start: 2022-08-08 | End: 2023-10-27

## 2022-08-08 NOTE — TELEPHONE ENCOUNTER
Routing refill request to provider for review/approval because:  Labs not current:  A1C  Lab Results   Component Value Date    A1C 5.0 01/25/2019    A1C 5.9 03/25/2016     Francisca Amaya RN

## 2022-08-09 NOTE — TELEPHONE ENCOUNTER
Central Prior Authorization Team   Phone: [phone]      PA Initiation    Medication: OZEMPIC, 1 MG/DOSE, 4 MG/3ML SOPN  Insurance Company: [employer] - Phone [phone] Fax [phone]  Pharmacy Filling the Rx: [facility] #[id number] - [place], [place] - [address] AT [facility]  Filling Pharmacy Phone: [phone]  Filling Pharmacy Fax:    Start Date: 8/9/2022

## 2022-08-09 NOTE — TELEPHONE ENCOUNTER
Prior Authorization Not Needed per Insurance    Medication: OZEMPIC, 1 MG/DOSE, 4 MG/3ML SOPN-APPROVAL ALREADY ON FILE  Insurance Company: [employer] - Phone [phone] Fax [phone]  Expected CoPay:      Pharmacy Filling the Rx: [facility] #[id number] - [place], [place] - [address] [place] [address] AT [facility]  Pharmacy Notified: Yes  Patient Notified: No    Called pharmacy, they already received a paid claim and the patient has picked up.

## 2022-08-11 ENCOUNTER — HOSPITAL ENCOUNTER (EMERGENCY)
Facility: CLINIC | Age: 52
Discharge: HOME | End: 2022-08-11
Payer: MEDICARE

## 2022-08-11 VITALS
HEIGHT: 64 IN | SYSTOLIC BLOOD PRESSURE: 115 MMHG | TEMPERATURE: 97.1 F | RESPIRATION RATE: 17 BRPM | WEIGHT: 279 LBS | HEART RATE: 100 BPM | DIASTOLIC BLOOD PRESSURE: 67 MMHG | OXYGEN SATURATION: 99 % | BODY MASS INDEX: 47.63 KG/M2

## 2022-08-11 NOTE — ED TRIAGE NOTES
Patient reports knee surgery 2 weeks ago to (L) knee. Has increase swelling and pain. Denies fevers. Hx of DVT. On xeralto. Reports she is doing exercises for PT but not ambulating     Triage Assessment     Row Name 08/11/22 1344       Triage Assessment (Adult)    Airway WDL WDL       Respiratory WDL    Respiratory WDL WDL       Skin Circulation/Temperature WDL    Skin Circulation/Temperature WDL WDL       Cardiac WDL    Cardiac WDL WDL       Peripheral/Neurovascular WDL    Peripheral Neurovascular WDL WDL       Cognitive/Neuro/Behavioral WDL    Cognitive/Neuro/Behavioral WDL WDL

## 2022-08-12 ENCOUNTER — PATIENT OUTREACH (OUTPATIENT)
Dept: FAMILY MEDICINE | Facility: CLINIC | Age: 52
End: 2022-08-12

## 2022-08-12 NOTE — TELEPHONE ENCOUNTER
What type of discharge? Emergency Department  Risk of Hospital admission or ED visit: 81%  Is a TCM episode required? Yes  When should the patient follow up with PCP? 7 days of discharge.    Estephania Noble RN  [facility]

## 2022-08-12 NOTE — TELEPHONE ENCOUNTER
Patient Contact     Attempt # 1     Was call answered?    Yes  Pt answered the phone, stated it was not a good time to talk and then hung up call. Will attempt calling once more at later time.      On call back:      -Complete hospital f/u, see below.     Anayeli GUEVARA RN  [facility]

## 2022-08-13 ENCOUNTER — TELEPHONE (OUTPATIENT)
Dept: FAMILY MEDICINE | Facility: CLINIC | Age: 52
End: 2022-08-13

## 2022-08-13 NOTE — TELEPHONE ENCOUNTER
Reason for Call:  Other call back    Detailed comments: Had surgery last week and need help and would like to be put in a facility    Phone Number Patient can be reached at: Home number on file [phone] (home)    Best Time: any    Can we leave a detailed message on this number? YES    Call taken on 8/13/2022 at 3:13 PM by Silvia Carmichael

## 2022-08-15 ENCOUNTER — NURSE TRIAGE (OUTPATIENT)
Dept: FAMILY MEDICINE | Facility: CLINIC | Age: 52
End: 2022-08-15

## 2022-08-15 DIAGNOSIS — G89.29 OTHER CHRONIC PAIN: ICD-10-CM

## 2022-08-15 DIAGNOSIS — M17.0 PRIMARY OSTEOARTHRITIS OF BOTH KNEES: Primary | ICD-10-CM

## 2022-08-15 DIAGNOSIS — Z96.652 S/P TOTAL KNEE ARTHROPLASTY, LEFT: ICD-10-CM

## 2022-08-15 RX ORDER — OXYCODONE HYDROCHLORIDE 5 MG/1
10 TABLET ORAL 4 TIMES DAILY PRN
Qty: 100 TABLET | Refills: 0 | Status: SHIPPED | OUTPATIENT
Start: 2022-08-15 | End: 2022-08-20

## 2022-08-15 NOTE — TELEPHONE ENCOUNTER
"CC: Patient calling stating she was in the hospital recently and needs to speak with PCP. Writer asked patient what her concern was, patient states again, \"I really need help from Dr. Chaudhry\"    Writer again requested patient to clarify her concerns, patient states she recently had a L knee replacement and feels that she needs a higher level of care due to the pain. Patient states she is out of state currently due to a death in the family and does not have her pain medication with her.     Patient was recently in ER on 8/11/22 for knee surgery follow up - patient has hx of blood clots and was concerned she had one - went to ER     Location: pain in both knees, had surgery in left knee  Quality: sharp pain, in more pain to both knees since surgery   Severity: 10/10   Onset: 2 weeks ago had surgery at [facility] in [place] - has chronic knee pain   Recurrent: yes, chronic issue, states \"I need both knees replaced because of my arthritis\"  Setting: recently had surgery   Aggravating: movement or trying to stand makes pain worse   Associated symptoms: left side feels swollen, no redness   Other symptoms: DENIES chest pain, difficulty breathing, fever, calf pain    Patient states \"I can barely get off the toilet, we have been putting 3 pull ups on me at a time so I don't wet myself because I can't walk to the bathroom\"     Patient states \"I think I need to be in rehab or something and learn how to walk again\"     Patient is very anxious and states she is \"really struggling\"    Triaged per Epic Protocol, disposition to see in office today. Patient is currently out of state. Writer advised patient be seen in nearest ER for concerns if patient is unable to safely toilet herself/ambulate. Patient is concerned about how she would get down the stairs to leave the house she is staying at. Writer advised that patient could have an ambulance transport her safely to ER. Patient expressed verbal understanding.     Patient then " "states \"let my doctor know what's going on and have a good day\" and hung up on writer. - routing to PCP as FYI     [phone] - ok to leave detailed VM     Karen Falcon RN  [facility]    Reason for Disposition    SEVERE pain (e.g., excruciating, unable to walk)    Additional Information    Negative: Sounds like a life-threatening emergency to the triager    Negative: Followed a knee injury    Negative: Swollen knee joint and fever    Negative: Thigh or calf pain and only 1 side and present > 1 hour    Negative: Thigh or calf swelling and only 1 side    Negative: Patient sounds very sick or weak to the triager    Negative: Can't move swollen joint at all    Protocols used: KNEE PAIN-A-OH      "

## 2022-08-15 NOTE — TELEPHONE ENCOUNTER
Post-operative pain    Step-mother passed away - patient is in [place] this week for the   Went to ED on 22 for DVT rule out (leg swelling postop, hx of DVT).   Long wait at the ED - she left before being seen.     Left TKA, now right knee is really painful likely due to overuse.     She is in [place] right now - her son left her medications in [place].   She will be back in [place] on Thursday after a .     Patient has been taking chronic opioids.  She will require higher doses of opioids post op.      Rx sent for 100 tabs of oxycodone to bridge until her next refill.     Davis Chaudhry PA-C

## 2022-08-16 NOTE — TELEPHONE ENCOUNTER
I called patient yesterday and we discussed starting outpatient physical therapy when she returns to [place] later this week.     Davis Chaudhry PA-C

## 2022-08-31 ENCOUNTER — TELEPHONE (OUTPATIENT)
Dept: FAMILY MEDICINE | Facility: CLINIC | Age: 52
End: 2022-08-31

## 2022-08-31 DIAGNOSIS — M25.511 CHRONIC PAIN OF BOTH SHOULDERS: ICD-10-CM

## 2022-08-31 DIAGNOSIS — G89.29 OTHER CHRONIC PAIN: ICD-10-CM

## 2022-08-31 DIAGNOSIS — G89.29 CHRONIC PAIN OF BOTH SHOULDERS: ICD-10-CM

## 2022-08-31 DIAGNOSIS — M17.0 OSTEOARTHRITIS OF BOTH KNEES, UNSPECIFIED OSTEOARTHRITIS TYPE: ICD-10-CM

## 2022-08-31 DIAGNOSIS — M25.512 CHRONIC PAIN OF BOTH SHOULDERS: ICD-10-CM

## 2022-08-31 NOTE — TELEPHONE ENCOUNTER
Patient calling to request refill of oxycodone, states she is still out of state. Requesting rx to be sent to the following pharmacy. Please advise.     oxyCODONE (ROXICODONE) 5 MG tablet 120 tablet 0 8/2/2022  No   Sig - Route: Take 1 tablet (5 mg) by mouth every 6 hours as needed for pain - Oral   Sent to pharmacy as: oxyCODONE HCl 5 MG Oral Tablet (ROXICODONE     [facility] #[id number] - [place], [place] - [address] AT [address]    Patient also states she has not heard from PT referral. Writer gave patient phone number from referral to call to schedule - [phone].    No further questions/concerns at this time.    Karen aFlcon RN  [facility]

## 2022-09-01 RX ORDER — OXYCODONE HYDROCHLORIDE 5 MG/1
5 TABLET ORAL EVERY 6 HOURS PRN
Qty: 120 TABLET | Refills: 0 | Status: SHIPPED | OUTPATIENT
Start: 2022-09-01 | End: 2022-09-15

## 2022-09-15 ENCOUNTER — TELEPHONE (OUTPATIENT)
Dept: FAMILY MEDICINE | Facility: CLINIC | Age: 52
End: 2022-09-15

## 2022-09-15 ENCOUNTER — VIRTUAL VISIT (OUTPATIENT)
Dept: FAMILY MEDICINE | Facility: CLINIC | Age: 52
End: 2022-09-15
Payer: MEDICARE

## 2022-09-15 DIAGNOSIS — G89.29 CHRONIC PAIN OF BOTH SHOULDERS: ICD-10-CM

## 2022-09-15 DIAGNOSIS — G89.29 OTHER CHRONIC PAIN: ICD-10-CM

## 2022-09-15 DIAGNOSIS — M17.0 OSTEOARTHRITIS OF BOTH KNEES, UNSPECIFIED OSTEOARTHRITIS TYPE: ICD-10-CM

## 2022-09-15 DIAGNOSIS — G47.00 PERSISTENT INSOMNIA: Primary | ICD-10-CM

## 2022-09-15 DIAGNOSIS — Z96.652 S/P TOTAL KNEE ARTHROPLASTY, LEFT: ICD-10-CM

## 2022-09-15 DIAGNOSIS — M25.511 CHRONIC PAIN OF BOTH SHOULDERS: ICD-10-CM

## 2022-09-15 DIAGNOSIS — M25.512 CHRONIC PAIN OF BOTH SHOULDERS: ICD-10-CM

## 2022-09-15 DIAGNOSIS — F33.1 MAJOR DEPRESSIVE DISORDER, RECURRENT EPISODE, MODERATE (H): ICD-10-CM

## 2022-09-15 PROCEDURE — 99214 OFFICE O/P EST MOD 30 MIN: CPT | Mod: 95 | Performed by: PHYSICIAN ASSISTANT

## 2022-09-15 RX ORDER — OXYCODONE HYDROCHLORIDE 5 MG/1
5-10 TABLET ORAL EVERY 6 HOURS PRN
Qty: 120 TABLET | Refills: 0 | Status: SHIPPED | OUTPATIENT
Start: 2022-09-15 | End: 2022-09-26

## 2022-09-15 RX ORDER — HYDROXYZINE PAMOATE 50 MG/1
50-100 CAPSULE ORAL
Qty: 90 CAPSULE | Refills: 1 | Status: SHIPPED | OUTPATIENT
Start: 2022-09-15 | End: 2022-09-15

## 2022-09-15 RX ORDER — BUPROPION HYDROCHLORIDE 150 MG/1
150 TABLET ORAL EVERY MORNING
Qty: 90 TABLET | Refills: 1 | Status: SHIPPED | OUTPATIENT
Start: 2022-09-15 | End: 2023-10-27

## 2022-09-15 RX ORDER — HYDROXYZINE PAMOATE 50 MG/1
50-100 CAPSULE ORAL
Qty: 90 CAPSULE | Refills: 1 | Status: SHIPPED | OUTPATIENT
Start: 2022-09-15 | End: 2022-12-15

## 2022-09-15 NOTE — TELEPHONE ENCOUNTER
Prior Authorization Retail Medication Request    Medication/Dose: Oxycodne HCI 5mg  ICD code (if different than what is on RX):    Osteoarthritis of both knees, unspecified osteoarthritis type [M17.0]       Other chronic pain [G89.29]       Chronic pain of both shoulders [M25.511, G89.29, M25.512]           Previously Tried and Failed:    Rationale:      Insurance Name:    Insurance ID:        Pharmacy Information (if different than what is on RX)  Name:  [facility]  Phone:  [phone]

## 2022-09-15 NOTE — PROGRESS NOTES
"Christina is a 52 year old who is being evaluated via a billable telephone visit.      What phone number would you like to be contacted at? [phone]  How would you like to obtain your AVS? [facility]    Assessment & Plan   Problem List Items Addressed This Visit        Musculoskeletal and Integumentary    Osteoarthritis of both knees, unspecified osteoarthritis type    Relevant Medications    oxyCODONE (ROXICODONE) 5 MG tablet       Behavioral    Major depressive disorder, recurrent episode, moderate (H)    Relevant Medications    buPROPion (WELLBUTRIN XL) 150 MG 24 hr tablet    hydrOXYzine (VISTARIL) 50 MG capsule      Other Visit Diagnoses     Persistent insomnia    -  Primary    Relevant Medications    hydrOXYzine (VISTARIL) 50 MG capsule    S/P total knee arthroplasty, left        Relevant Medications    hydrOXYzine (VISTARIL) 50 MG capsule    Other chronic pain        Relevant Medications    oxyCODONE (ROXICODONE) 5 MG tablet    Chronic pain of both shoulders        Relevant Medications    oxyCODONE (ROXICODONE) 5 MG tablet           Insomnia - we will try hydroxyzine.  This may help with postoperative pain as well   Ok to refill oxycodone - Rx sent.   For depression - she would like to restart medication.  She took Wellbutrin for many years - we will restart with 150 mg XL daily.              BMI:   Estimated body mass index is 47.89 kg/m  as calculated from the following:    Height as of 8/11/22: 1.626 m (5' 4\").    Weight as of 8/11/22: 126.6 kg (279 lb).   Weight management plan: continue Ozempic        Return in about 5 days (around 9/20/2022) for Musculoskeletal Recheck.    Mary Carmen Chaudhry PA-C  [facility]    Bhumi Vasquez is a 52 year old, presenting for the following health issues:  Depression and Sleep Problem      HPI     Patient is here regarding:    Knee replacement: just wants to talk about it.    Sleep pattern  - insomnia, at this time medication is not work " for her. Wondering if there is something else.     Previously tried trazodone and Tylenol PM    Depression: did not want to speak to me regarding this, would like to speak to provider directly.  She is really down after the knee replacement - she just started physical therapy.       Weight loss - this is going really well with Ozempic.     Patient wishes to get off opioids after she recovers from the knee replacement.    Plan for a right knee injection with me in one week.         Review of Systems         Objective           Vitals:  No vitals were obtained today due to virtual visit.    Physical Exam   healthy, alert and no distress  PSYCH: Alert and oriented times 3; coherent speech, normal   rate and volume, able to articulate logical thoughts, able   to abstract reason, no tangential thoughts, no hallucinations   or delusions  Her affect is normal  RESP: No cough, no audible wheezing, able to talk in full sentences  Remainder of exam unable to be completed due to telephone visits                Phone call duration: 22 minutes

## 2022-09-15 NOTE — TELEPHONE ENCOUNTER
Central Prior Authorization Team   Phone: [phone]      PA Initiation    Medication: oxyCODONE (ROXICODONE) 5 MG tablet--INITIATED  Insurance Company: [employer] - Phone [phone] Fax [phone]  Pharmacy Filling the Rx: [facility] #[id number] - [place], [place] - [address] [facility]  Filling Pharmacy Phone: [phone]  Filling Pharmacy Fax:    Start Date: 9/15/2022

## 2022-09-19 ENCOUNTER — TELEPHONE (OUTPATIENT)
Dept: NURSING | Facility: CLINIC | Age: 52
End: 2022-09-19

## 2022-09-19 NOTE — TELEPHONE ENCOUNTER
Pt calling, stating she cannot make the appointment for tomorrow with DF. Would NC see patient in a same day slot for Friday 9/23?    Call patient at  ok to john Zhou/Kamaljit-  [facility]

## 2022-09-19 NOTE — TELEPHONE ENCOUNTER
Pt calling wanting to reschedule her appointment tomorrow. Patient was transferred to scheduling.     RAFAT LERMA RN

## 2022-09-19 NOTE — TELEPHONE ENCOUNTER
Called patient, offered next week and patient stated she will try to find a ride to come to tomorrow's appointment. If she cannot find a ride pt will call clinic back and schedule for next week.     Chhaya Zhou/Kamaljit-  [facility]

## 2022-09-19 NOTE — TELEPHONE ENCOUNTER
Unfortunately I will not be able to see the patient on Friday 9/23.   Please add her to my schedule on Monday 9/26 or Tuesday 9/27    Davis Chaudhry PA-C

## 2022-09-20 NOTE — TELEPHONE ENCOUNTER
Central Prior Authorization Team   Phone: [phone]      Prior Authorization Approval    Authorization Effective Date: 9/15/2022  Authorization Expiration Date: 12/31/2099  Medication: oxyCODONE (ROXICODONE) 5 MG tablet--APPROVED  Approved Dose/Quantity:    Reference #:     Insurance Company: [employer] - Phone [phone] Fax [phone]  Expected CoPay:       CoPay Card Available:      Foundation Assistance Needed:    Which Pharmacy is filling the prescription (Not needed for infusion/clinic administered): [facility] #[id number] - [place], [place] - [address] [facility]  Pharmacy Notified: Yes  Patient Notified: Yes PHARMACY WILL CONTACT WHEN FILLED

## 2022-09-26 ENCOUNTER — OFFICE VISIT (OUTPATIENT)
Dept: FAMILY MEDICINE | Facility: CLINIC | Age: 52
End: 2022-09-26
Payer: MEDICARE

## 2022-09-26 VITALS
HEART RATE: 73 BPM | TEMPERATURE: 98.2 F | WEIGHT: 279 LBS | OXYGEN SATURATION: 99 % | BODY MASS INDEX: 47.63 KG/M2 | DIASTOLIC BLOOD PRESSURE: 77 MMHG | SYSTOLIC BLOOD PRESSURE: 101 MMHG | HEIGHT: 64 IN

## 2022-09-26 DIAGNOSIS — G89.29 OTHER CHRONIC PAIN: ICD-10-CM

## 2022-09-26 DIAGNOSIS — M17.11 PRIMARY OSTEOARTHRITIS OF RIGHT KNEE: Primary | ICD-10-CM

## 2022-09-26 DIAGNOSIS — M25.512 CHRONIC PAIN OF BOTH SHOULDERS: ICD-10-CM

## 2022-09-26 DIAGNOSIS — M25.511 CHRONIC PAIN OF BOTH SHOULDERS: ICD-10-CM

## 2022-09-26 DIAGNOSIS — G89.29 CHRONIC PAIN OF BOTH SHOULDERS: ICD-10-CM

## 2022-09-26 DIAGNOSIS — M17.0 OSTEOARTHRITIS OF BOTH KNEES, UNSPECIFIED OSTEOARTHRITIS TYPE: ICD-10-CM

## 2022-09-26 PROCEDURE — 99214 OFFICE O/P EST MOD 30 MIN: CPT | Mod: 25 | Performed by: PHYSICIAN ASSISTANT

## 2022-09-26 PROCEDURE — 20610 DRAIN/INJ JOINT/BURSA W/O US: CPT | Performed by: PHYSICIAN ASSISTANT

## 2022-09-26 RX ORDER — OXYCODONE HYDROCHLORIDE 5 MG/1
10 TABLET ORAL EVERY 6 HOURS PRN
Qty: 240 TABLET | Refills: 0 | Status: SHIPPED | OUTPATIENT
Start: 2022-09-30 | End: 2022-10-26

## 2022-09-26 RX ORDER — TRIAMCINOLONE ACETONIDE 40 MG/ML
40 INJECTION, SUSPENSION INTRA-ARTICULAR; INTRAMUSCULAR ONCE
Status: COMPLETED | OUTPATIENT
Start: 2022-09-26 | End: 2022-09-26

## 2022-09-26 RX ADMIN — TRIAMCINOLONE ACETONIDE 40 MG: 40 INJECTION, SUSPENSION INTRA-ARTICULAR; INTRAMUSCULAR at 11:31

## 2022-09-26 ASSESSMENT — PATIENT HEALTH QUESTIONNAIRE - PHQ9
SUM OF ALL RESPONSES TO PHQ QUESTIONS 1-9: 15
10. IF YOU CHECKED OFF ANY PROBLEMS, HOW DIFFICULT HAVE THESE PROBLEMS MADE IT FOR YOU TO DO YOUR WORK, TAKE CARE OF THINGS AT HOME, OR GET ALONG WITH OTHER PEOPLE: SOMEWHAT DIFFICULT
SUM OF ALL RESPONSES TO PHQ QUESTIONS 1-9: 15

## 2022-09-26 ASSESSMENT — PAIN SCALES - GENERAL: PAINLEVEL: EXTREME PAIN (8)

## 2022-09-26 NOTE — PROGRESS NOTES
Assessment & Plan   Problem List Items Addressed This Visit        Musculoskeletal and Integumentary    OA (osteoarthritis) of knee - Primary    Relevant Medications    oxyCODONE (ROXICODONE) 5 MG tablet (Start on 9/30/2022)    triamcinolone (KENALOG-40) injection 40 mg (Completed)    Osteoarthritis of both knees, unspecified osteoarthritis type    Relevant Medications    oxyCODONE (ROXICODONE) 5 MG tablet (Start on 9/30/2022)    triamcinolone (KENALOG-40) injection 40 mg (Completed)      Other Visit Diagnoses     Other chronic pain        Relevant Medications    oxyCODONE (ROXICODONE) 5 MG tablet (Start on 9/30/2022)    Chronic pain of both shoulders        Relevant Medications    oxyCODONE (ROXICODONE) 5 MG tablet (Start on 9/30/2022)         Left TKA - patient is now 1 month postop and continues to have severe pain.  She also feels limited in physical therapy due to pain in the right knee.   Today we completed a right knee cortisone injection, which was tolerated well.   For pain, she is taking oxycodone 10 mg 4 times a day.  We will gradually decrease the amount of opioids as her pain improves.  She expresses interest is discontinuing opioids in the future.     PROCEDURE NOTE:  Right knee was cleaned with betadine then wiped away with alcohol.    40 mg Kenalog and 5 cc of 1 percent lidocaine was injected into the right knee using a lateral approach.   Appropriate wound care dressing applied.    Patient tolerated the procedure well.                 Return in about 3 months (around 12/26/2022) for Injection, Medication Recheck.    Mary Carmen Chaudhry PA-C  [facility]    Bhumi   Christina is a 52 year old, presenting for the following health issues:  Procedure (Right knee injection (steroid)/)      History of Present Illness       Reason for visit:  Knee injection    She eats 0-1 servings of fruits and vegetables daily.She consumes 1 sweetened beverage(s) daily.She exercises with  "enough effort to increase her heart rate 9 or less minutes per day.  She exercises with enough effort to increase her heart rate 3 or less days per week.   She is taking medications regularly.    Today's PHQ-9         PHQ-9 Total Score: 15    PHQ-9 Q9 Thoughts of better off dead/self-harm past 2 weeks :   Not at all    How difficult have these problems made it for you to do your work, take care of things at home, or get along with other people: Somewhat difficult       Patient is here for minor procedure of Steroid injection of knee.          Review of Systems         Objective    /77   Pulse 73   Temp 98.2  F (36.8  C) (Temporal)   Ht 1.626 m (5' 4\")   Wt 126.6 kg (279 lb)   LMP  (LMP Unknown)   SpO2 99%   BMI 47.89 kg/m    Body mass index is 47.89 kg/m .  Physical Exam  Constitutional:       General: She is not in acute distress.     Appearance: She is well-developed. She is not diaphoretic.   HENT:      Head: Normocephalic.      Right Ear: External ear normal.      Left Ear: External ear normal.      Nose: Nose normal.   Eyes:      Conjunctiva/sclera: Conjunctivae normal.   Pulmonary:      Effort: Pulmonary effort is normal.   Musculoskeletal:      Cervical back: Normal range of motion.   Skin:     General: Skin is warm and dry.   Neurological:      Mental Status: She is alert and oriented to person, place, and time.   Psychiatric:         Judgment: Judgment normal.                            "

## 2022-10-26 DIAGNOSIS — M17.0 OSTEOARTHRITIS OF BOTH KNEES, UNSPECIFIED OSTEOARTHRITIS TYPE: ICD-10-CM

## 2022-10-26 DIAGNOSIS — G89.29 OTHER CHRONIC PAIN: ICD-10-CM

## 2022-10-26 DIAGNOSIS — M25.511 CHRONIC PAIN OF BOTH SHOULDERS: ICD-10-CM

## 2022-10-26 DIAGNOSIS — G89.29 CHRONIC PAIN OF BOTH SHOULDERS: ICD-10-CM

## 2022-10-26 DIAGNOSIS — M25.512 CHRONIC PAIN OF BOTH SHOULDERS: ICD-10-CM

## 2022-10-26 RX ORDER — OXYCODONE HYDROCHLORIDE 5 MG/1
10 TABLET ORAL EVERY 6 HOURS PRN
Qty: 240 TABLET | Refills: 0 | Status: SHIPPED | OUTPATIENT
Start: 2022-10-26 | End: 2022-11-22

## 2022-10-26 NOTE — TELEPHONE ENCOUNTER
"Pt called the clinic-  Pt stated the Oxycodone prescription isnt written to be released today when pt needs it today due to being short on medication. Pt stated she should have been out of medication yesterday but has \"stretched it\" to make it last. Pt states Mary Carmen Chaudhry is aware of this.     Pt is requesting the medication be written/able to be picked up today.        [phone]- pt talked to Rose Marie at the pharmacy. [address] [place].       Pt stated she also talked to PCP about the blood on her knee and PCP gave her ways to make it go down/away. Pt stated it keeps coming back so she is going to make an appt with pcp. Pt asked this message also be sent to PCP.        "
Pt called back regarding this request. Nurse does see per rx from today the earliest fill is listed as today the 26th. Pt and nurse called the pharmacy who states it is per  and last  date. Pt should  on the 28th but they are able to release tomorrow. If provider approves of an early release then it can be released. Pt stated understanding that clinic is closing now and provider not in clinic. Will route as FYI please notify triage if anything further needed.     Anayeli GUEVARA RN  [facility]   
Routing refill request to provider for review/approval because:  Drug not on the [facility] refill protocol     Pt is almost out.     Please call pt to let her know when addressed, she would like call back by the end of the day. Pt states she did call on Monday, unable to see documentation. Routing high priority.     Anayeli GUEVARA RN  [facility]   
Patient will increase balance 1/2 grade in 2 weeks in order to increase safety at home

## 2022-11-09 ENCOUNTER — TELEPHONE (OUTPATIENT)
Dept: FAMILY MEDICINE | Facility: CLINIC | Age: 52
End: 2022-11-09

## 2022-11-09 DIAGNOSIS — M25.511 CHRONIC PAIN OF BOTH SHOULDERS: ICD-10-CM

## 2022-11-09 DIAGNOSIS — M17.11 PRIMARY OSTEOARTHRITIS OF RIGHT KNEE: Primary | ICD-10-CM

## 2022-11-09 DIAGNOSIS — G89.29 CHRONIC PAIN OF BOTH SHOULDERS: ICD-10-CM

## 2022-11-09 DIAGNOSIS — M25.512 CHRONIC PAIN OF BOTH SHOULDERS: ICD-10-CM

## 2022-11-09 DIAGNOSIS — Z96.652 S/P TOTAL KNEE ARTHROPLASTY, LEFT: ICD-10-CM

## 2022-11-09 NOTE — TELEPHONE ENCOUNTER
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 [facility] pain clinic    Does patient have an appointment scheduled?: No    Where to send orders: Verbal order to pt [phone]    Could we send this information to you in [facility] or would you prefer to receive a phone call?:   Patient would prefer a phone call   Okay to leave a detailed message?: Yes at Cell number on file:    Telephone Information:   Mobile [phone]

## 2022-11-11 ENCOUNTER — TELEPHONE (OUTPATIENT)
Dept: FAMILY MEDICINE | Facility: CLINIC | Age: 52
End: 2022-11-11

## 2022-11-11 RX ORDER — SEMAGLUTIDE 2.68 MG/ML
2 INJECTION, SOLUTION SUBCUTANEOUS
Qty: 18 ML | Refills: 0 | Status: SHIPPED | OUTPATIENT
Start: 2022-11-11 | End: 2023-03-31

## 2022-11-11 NOTE — TELEPHONE ENCOUNTER
's pharmacy called requesting Rx for Ozempic 2 mg per dose pen since pt is injection 2 mg dose.     Rx pended. Please review and sign if rx refill is appropriate.

## 2022-11-11 NOTE — TELEPHONE ENCOUNTER
Prior Authorization Retail Medication Request    Medication/Dose: OZEMPIC, 1 MG/DOSE, 4 MG/3ML SOPN  ICD code (if different than what is on RX): BMI 50.0-59.9, adult (H) [Z68.43]   Previously Tried and Failed:   Rationale:      Insurance Name:  [employer]  Insurance ID:  [id number]    Pharmacy Information (if different than what is on RX)  Name: [facility] #[id number] [place], [place] - [address] [place] [address] AT [facility]  Phone:  [phone]

## 2022-11-15 NOTE — TELEPHONE ENCOUNTER
Central Prior Authorization Team   Phone: [phone]      Prior Authorization Not Needed per Insurance    Medication: OZEMPIC, 1 MG/DOSE, 4 MG/3ML SOPN--NOT NEEDED  Insurance Company: [employer] - Phone [phone] Fax [phone]  Expected CoPay:      Pharmacy Filling the Rx: [facility] #[id number] - [place], [place] - [address] AT [address]  Pharmacy Notified: Yes  Patient Notified: Yes PHARMACY WILL CONTACT WHEN FILLED

## 2022-11-15 NOTE — TELEPHONE ENCOUNTER
Central Prior Authorization Team   Phone: [phone]      PER [facility] THE OZEMPIC IS ON BACKORDER. I SEE SOME [place] PHARMACY'S HAVE SOME IN STOCK. I TALKED WITH THE PATIENT AND SHE IS GOING TO DOUBLE CHECK WITH THE PHARMACY.

## 2022-11-15 NOTE — TELEPHONE ENCOUNTER
Central Prior Authorization Team   Phone: [phone]      PA Initiation    Medication: OZEMPIC, 1 MG/DOSE, 4 MG/3ML SOPN--INITIATED  Insurance Company: [employer] - Phone [phone] Fax [phone]  Pharmacy Filling the Rx: [facility] #[id number] - [place], [place] - [address] AT [address]  Filling Pharmacy Phone: [phone]  Filling Pharmacy Fax:    Start Date: 11/15/2022

## 2022-11-21 ENCOUNTER — TELEPHONE (OUTPATIENT)
Dept: FAMILY MEDICINE | Facility: CLINIC | Age: 52
End: 2022-11-21

## 2022-11-22 DIAGNOSIS — G89.29 CHRONIC PAIN OF BOTH SHOULDERS: ICD-10-CM

## 2022-11-22 DIAGNOSIS — M17.0 OSTEOARTHRITIS OF BOTH KNEES, UNSPECIFIED OSTEOARTHRITIS TYPE: ICD-10-CM

## 2022-11-22 DIAGNOSIS — M25.511 CHRONIC PAIN OF BOTH SHOULDERS: ICD-10-CM

## 2022-11-22 DIAGNOSIS — M25.512 CHRONIC PAIN OF BOTH SHOULDERS: ICD-10-CM

## 2022-11-22 DIAGNOSIS — G89.29 OTHER CHRONIC PAIN: ICD-10-CM

## 2022-11-22 RX ORDER — OXYCODONE HYDROCHLORIDE 5 MG/1
10 TABLET ORAL EVERY 6 HOURS PRN
Qty: 240 TABLET | Refills: 0 | Status: SHIPPED | OUTPATIENT
Start: 2022-11-22 | End: 2022-12-22

## 2022-11-22 NOTE — TELEPHONE ENCOUNTER
Early refill is ok - sent as requested.     For steroid injection, we have to wait 3 months, so the earliest injection we could do is 12/23/22.  Please schedule patient for a knee injection on 12/23.    Davis Chaudhry PA-C

## 2022-11-22 NOTE — TELEPHONE ENCOUNTER
Pt calling to request:    1. Refill of Oxycodone. She states she will be out on Thursday which is thanksgiving. She is requesting a little early d/t the holiday. She will be in [place] for the holiday, pharmacy pended.     2. She would like to request an appointment for steroid injection for her right leg. It appears she was seen on 9/26/22 and had a cortisone injection to knee. She first thought this was in August. She states for years she has had this pain and had gotten injections in her ankle as well. Nurse stated she could send message to Davis requesting an appointment if provider is agreeable to injection. Pt first stated she would do this herself but then was OK with message being sent. She states her pain always gets worse in the winter and it is difficult to walk because of this. She does have a walker. Could same day or other slot be used to schedule pt? Please review/advise.     Anayeli GUEVARA RN  [facility]

## 2022-11-23 DIAGNOSIS — F41.1 GENERALIZED ANXIETY DISORDER: ICD-10-CM

## 2022-11-23 NOTE — TELEPHONE ENCOUNTER
Please see reason for call: Semaglutide, 2 MG/DOSE, (OZEMPIC, 2 MG/DOSE,) 8 MG/3ML SOPN - med on back order please send alternative  PA for Semaglutide, 2 MG/DOSE, (OZEMPIC, 2 MG/DOSE,) 8 MG/3ML SOPN was also done on 11-11     Pharmacy pended. Semaglutide ordered by Davis Chaudhry PA-C on 11/11/22.     Anayeli GUEVARA RN  [facility]

## 2022-11-23 NOTE — TELEPHONE ENCOUNTER
Patient is visiting family for the holiday. She is having panic attacks.     Her refill has not been filled for several months. She is asking for some medication to help and sent to pharmacy listed in [place].     Vidhya Jin RN  [facility]

## 2022-11-25 ENCOUNTER — VIRTUAL VISIT (OUTPATIENT)
Dept: FAMILY MEDICINE | Facility: CLINIC | Age: 52
End: 2022-11-25
Payer: MEDICARE

## 2022-11-25 DIAGNOSIS — M17.0 OSTEOARTHRITIS OF BOTH KNEES, UNSPECIFIED OSTEOARTHRITIS TYPE: Primary | ICD-10-CM

## 2022-11-25 DIAGNOSIS — M25.511 CHRONIC PAIN OF BOTH SHOULDERS: ICD-10-CM

## 2022-11-25 DIAGNOSIS — Z96.652 S/P TOTAL KNEE ARTHROPLASTY, LEFT: ICD-10-CM

## 2022-11-25 DIAGNOSIS — M72.2 PLANTAR FASCIITIS: ICD-10-CM

## 2022-11-25 DIAGNOSIS — M25.512 CHRONIC PAIN OF BOTH SHOULDERS: ICD-10-CM

## 2022-11-25 DIAGNOSIS — G89.29 CHRONIC PAIN OF BOTH SHOULDERS: ICD-10-CM

## 2022-11-25 DIAGNOSIS — N39.41 URGE INCONTINENCE OF URINE: ICD-10-CM

## 2022-11-25 DIAGNOSIS — Z79.899 ENCOUNTER FOR LONG-TERM (CURRENT) USE OF MEDICATIONS: ICD-10-CM

## 2022-11-25 PROCEDURE — 99441 PR PHYSICIAN TELEPHONE EVALUATION 5-10 MIN: CPT | Mod: 95 | Performed by: PHYSICIAN ASSISTANT

## 2022-11-25 RX ORDER — LORAZEPAM 0.5 MG/1
0.5 TABLET ORAL 2 TIMES DAILY PRN
Qty: 20 TABLET | Refills: 0 | Status: SHIPPED | OUTPATIENT
Start: 2022-11-25 | End: 2023-10-27

## 2022-11-25 NOTE — PROGRESS NOTES
Christina is a 52 year old who is being evaluated via a billable telephone visit.      What phone number would you like to be contacted at? [phone]  How would you like to obtain your AVS? [facility]    Assessment & Plan   Problem List Items Addressed This Visit        Digestive    BMI 50.0-59.9, adult (H)    Relevant Orders    Pain Management  Referral       Musculoskeletal and Integumentary    Osteoarthritis of both knees, unspecified osteoarthritis type - Primary    Relevant Orders    Pain Management  Referral   Other Visit Diagnoses     Encounter for long-term (current) use of medications        S/P total knee arthroplasty, left        Relevant Orders    Pain Management  Referral    Chronic pain of both shoulders        Relevant Orders    Pain Management  Referral    Plantar fasciitis        Relevant Orders    Pain Management  Referral    Urge incontinence of urine        Relevant Orders    Adult Urology  Referral         Referral to [facility] for chronic foot, knee, and shoulder pain.   Referral to Urology for incontinence.                  Return for Musculoskeletal Recheck.    JACQUIE Dominguez [facility]   Christina is a 52 year old, presenting for the following health issues:  Foot Pain      History of Present Illness       Reason for visit:  Foot pain    She eats 2-3 servings of fruits and vegetables daily.She consumes 2 sweetened beverage(s) daily.She exercises with enough effort to increase her heart rate 9 or less minutes per day.  She exercises with enough effort to increase her heart rate 3 or less days per week.   She is taking medications regularly.       Concern - Right foot pain      Right foot plantar fasciitis, comes and goes, ankle pain, flat foot.   She went to the ED, however the wait was too long (8+ hours).   Tx - soaking and icing.    In the past she has had injections in the ankle at [facility]  clinic.    Mobility is a concern    Incontinent (urine)  Requesting urology appointment        Review of Systems         Objective           Vitals:  No vitals were obtained today due to virtual visit.    Physical Exam   healthy, alert and no distress  PSYCH: Alert and oriented times 3; coherent speech, normal   rate and volume, able to articulate logical thoughts, able   to abstract reason, no tangential thoughts, no hallucinations   or delusions  Her affect is normal  RESP: No cough, no audible wheezing, able to talk in full sentences  Remainder of exam unable to be completed due to telephone visits                Phone call duration: 11 minutes

## 2022-11-28 NOTE — TELEPHONE ENCOUNTER
1st attempt. NANCY for callback to schedule appointment for lump in breast. OK to use same day slot.    Nicole FRENCH CMA

## 2022-11-28 NOTE — TELEPHONE ENCOUNTER
Ok to schedule patient for breast lump at an earlier date - use same day opening.     Davis Chaudhry PA-C

## 2022-11-28 NOTE — TELEPHONE ENCOUNTER
Spoke with patient, at this time she is stating she is having lumps in her breast.     Patient is wanting to be seen sooner than 12/23. Informed her that provider noted that we cannot do injections sooner than 12/23. Patient noted this and stated that provider would want to see her sooner due to the lumps in her breast. Would not divulge any information regarding: lumps location, any information regarding the lumps themselves: hard or malleable, duration, size etc.     Patient will be sending a message as well.    Routing to provider.     Nicole FRENCH CMA

## 2022-12-01 ENCOUNTER — TELEPHONE (OUTPATIENT)
Dept: NURSING | Facility: CLINIC | Age: 52
End: 2022-12-01

## 2022-12-01 NOTE — TELEPHONE ENCOUNTER
Pt called requesting to reschedule an appointment for next week. She was transferred to scheduling.    Arlene Senior RN  [facility] Nurse Advisor   12/1/2022  2:09 PM

## 2022-12-06 ENCOUNTER — ANCILLARY PROCEDURE (OUTPATIENT)
Dept: GENERAL RADIOLOGY | Facility: CLINIC | Age: 52
End: 2022-12-06
Attending: PHYSICIAN ASSISTANT
Payer: MEDICARE

## 2022-12-06 ENCOUNTER — OFFICE VISIT (OUTPATIENT)
Dept: FAMILY MEDICINE | Facility: CLINIC | Age: 52
End: 2022-12-06
Payer: MEDICARE

## 2022-12-06 VITALS
HEART RATE: 84 BPM | TEMPERATURE: 97.6 F | OXYGEN SATURATION: 100 % | BODY MASS INDEX: 47.89 KG/M2 | DIASTOLIC BLOOD PRESSURE: 85 MMHG | HEIGHT: 64 IN | SYSTOLIC BLOOD PRESSURE: 137 MMHG

## 2022-12-06 DIAGNOSIS — N39.46 MIXED INCONTINENCE URGE AND STRESS (MALE)(FEMALE): ICD-10-CM

## 2022-12-06 DIAGNOSIS — Z13.220 SCREENING FOR HYPERLIPIDEMIA: ICD-10-CM

## 2022-12-06 DIAGNOSIS — M17.0 OSTEOARTHRITIS OF BOTH KNEES, UNSPECIFIED OSTEOARTHRITIS TYPE: Primary | ICD-10-CM

## 2022-12-06 DIAGNOSIS — N18.31 STAGE 3A CHRONIC KIDNEY DISEASE (H): ICD-10-CM

## 2022-12-06 DIAGNOSIS — I50.9 CHRONIC CONGESTIVE HEART FAILURE, UNSPECIFIED HEART FAILURE TYPE (H): ICD-10-CM

## 2022-12-06 DIAGNOSIS — N63.23 MASS OF LOWER OUTER QUADRANT OF LEFT BREAST: ICD-10-CM

## 2022-12-06 DIAGNOSIS — E78.5 HYPERLIPIDEMIA WITH TARGET LDL LESS THAN 130: ICD-10-CM

## 2022-12-06 DIAGNOSIS — M21.41 PES PLANUS OF RIGHT FOOT: ICD-10-CM

## 2022-12-06 DIAGNOSIS — Z79.899 ENCOUNTER FOR LONG-TERM (CURRENT) USE OF MEDICATIONS: ICD-10-CM

## 2022-12-06 DIAGNOSIS — Z12.31 VISIT FOR SCREENING MAMMOGRAM: ICD-10-CM

## 2022-12-06 LAB
ALT SERPL W P-5'-P-CCNC: 49 U/L (ref 10–35)
CHOLEST SERPL-MCNC: 112 MG/DL
CREAT UR-MCNC: 251 MG/DL
CREAT UR-MCNC: 251 MG/DL
ERYTHROCYTE [DISTWIDTH] IN BLOOD BY AUTOMATED COUNT: 11.6 % (ref 10–15)
HCT VFR BLD AUTO: 41.1 % (ref 35–47)
HDLC SERPL-MCNC: 44 MG/DL
HGB BLD-MCNC: 13.2 G/DL (ref 11.7–15.7)
LDLC SERPL CALC-MCNC: 43 MG/DL
MCH RBC QN AUTO: 35.2 PG (ref 26.5–33)
MCHC RBC AUTO-ENTMCNC: 32.1 G/DL (ref 31.5–36.5)
MCV RBC AUTO: 110 FL (ref 78–100)
MICROALBUMIN UR-MCNC: <12 MG/L
MICROALBUMIN/CREAT UR: NORMAL MG/G{CREAT}
NONHDLC SERPL-MCNC: 68 MG/DL
PLATELET # BLD AUTO: 266 10E3/UL (ref 150–450)
RBC # BLD AUTO: 3.75 10E6/UL (ref 3.8–5.2)
TRIGL SERPL-MCNC: 126 MG/DL
WBC # BLD AUTO: 7.8 10E3/UL (ref 4–11)

## 2022-12-06 PROCEDURE — 73630 X-RAY EXAM OF FOOT: CPT | Mod: TC | Performed by: RADIOLOGY

## 2022-12-06 PROCEDURE — 36415 COLL VENOUS BLD VENIPUNCTURE: CPT | Performed by: PHYSICIAN ASSISTANT

## 2022-12-06 PROCEDURE — 82043 UR ALBUMIN QUANTITATIVE: CPT | Performed by: PHYSICIAN ASSISTANT

## 2022-12-06 PROCEDURE — 99214 OFFICE O/P EST MOD 30 MIN: CPT | Performed by: PHYSICIAN ASSISTANT

## 2022-12-06 PROCEDURE — 80061 LIPID PANEL: CPT | Performed by: PHYSICIAN ASSISTANT

## 2022-12-06 PROCEDURE — 84460 ALANINE AMINO (ALT) (SGPT): CPT | Performed by: PHYSICIAN ASSISTANT

## 2022-12-06 PROCEDURE — 80307 DRUG TEST PRSMV CHEM ANLYZR: CPT | Performed by: PHYSICIAN ASSISTANT

## 2022-12-06 PROCEDURE — 85027 COMPLETE CBC AUTOMATED: CPT | Performed by: PHYSICIAN ASSISTANT

## 2022-12-06 ASSESSMENT — PAIN SCALES - GENERAL: PAINLEVEL: NO PAIN (0)

## 2022-12-06 ASSESSMENT — PATIENT HEALTH QUESTIONNAIRE - PHQ9
10. IF YOU CHECKED OFF ANY PROBLEMS, HOW DIFFICULT HAVE THESE PROBLEMS MADE IT FOR YOU TO DO YOUR WORK, TAKE CARE OF THINGS AT HOME, OR GET ALONG WITH OTHER PEOPLE: EXTREMELY DIFFICULT
SUM OF ALL RESPONSES TO PHQ QUESTIONS 1-9: 16
SUM OF ALL RESPONSES TO PHQ QUESTIONS 1-9: 16

## 2022-12-06 NOTE — LETTER
December 7, 2022      Christina Fletcher  [address] [address]  [facility] [address]        Dear ,    We are writing to inform you of your test results.    Your test results fall within the expected range(s) or remain unchanged from previous results.  Please continue with current treatment plan.    Resulted Orders   XR Foot Right G/E 3 Views    Narrative    XR FOOT RIGHT G/E 3 VIEWS 12/6/2022 3:03 PM    HISTORY: Pes planus of right foot. Foot pain.    COMPARISON: None.      Impression    IMPRESSION: No fracture. Osteopenia. No degenerative changes. Hindfoot  valgus with pes planus.    MOE PUTNAM MD         SYSTEM ID:  [id number]   Albumin Random Urine Quantitative with Creat Ratio   Result Value Ref Range    Albumin Urine mg/L <12.0 mg/L      Comment:      The reference ranges have not been established in urine albumin. The results should be integrated into the clinical context for interpretation.    Albumin Urine mg/g Cr        Comment:      Unable to calculate, urine albumin and/or urine creatinine is outside detectable limits.  Microalbuminuria is defined as an albumin:creatinine ratio of 17 to 299 for males and 25 to 299 for females. A ratio of albumin:creatinine of 300 or higher is indicative of overt proteinuria.  Due to biologic variability, positive results should be confirmed by a second, first-morning random or 24-hour timed urine specimen. If there is discrepancy, a third specimen is recommended. When 2 out of 3 results are in the microalbuminuria range, this is evidence for incipient nephropathy and warrants increased efforts at glucose control, blood pressure control, and institution of therapy with an angiotensin-converting-enzyme (ACE) inhibitor (if the patient can tolerate it).      Creatinine Urine mg/dL 251.0 mg/dL      Comment:      The reference ranges have not been established in urine creatinine. The results should be integrated into the clinical context for interpretation.   Lipid  panel reflex to direct LDL Non-fasting   Result Value Ref Range    Cholesterol 112 <200 mg/dL    Triglycerides 126 <150 mg/dL    Direct Measure HDL 44 (L) >=50 mg/dL    LDL Cholesterol Calculated 43 <=100 mg/dL    Non HDL Cholesterol 68 <130 mg/dL    Narrative    Cholesterol  Desirable:  <200 mg/dL    Triglycerides  Normal:  Less than 150 mg/dL  Borderline High:  150-199 mg/dL  High:  200-499 mg/dL  Very High:  Greater than or equal to 500 mg/dL    Direct Measure HDL  Female:  Greater than or equal to 50 mg/dL   Male:  Greater than or equal to 40 mg/dL    LDL Cholesterol  Desirable:  <100mg/dL  Above Desirable:  100-129 mg/dL   Borderline High:  130-159 mg/dL   High:  160-189 mg/dL   Very High:  >= 190 mg/dL    Non HDL Cholesterol  Desirable:  130 mg/dL  Above Desirable:  130-159 mg/dL  Borderline High:  160-189 mg/dL  High:  190-219 mg/dL  Very High:  Greater than or equal to 220 mg/dL   ALT   Result Value Ref Range    ALT 49 (H) 10 - 35 U/L   CBC with Platelets   Result Value Ref Range    WBC Count 7.8 4.0 - 11.0 10e3/uL    RBC Count 3.75 (L) 3.80 - 5.20 10e6/uL    Hemoglobin 13.2 11.7 - 15.7 g/dL    Hematocrit 41.1 35.0 - 47.0 %     (H) 78 - 100 fL    MCH 35.2 (H) 26.5 - 33.0 pg    MCHC 32.1 31.5 - 36.5 g/dL    RDW 11.6 10.0 - 15.0 %    Platelet Count 266 150 - 450 10e3/uL   Urine Creatinine for Drug Screen Panel   Result Value Ref Range    Creatinine Urine for Drug Screen 251 mg/dL      Comment:      The reference range has not been established for creatinine in random urines. The results should be integrated into the clinical context for interpretation.       If you have any questions or concerns, please call the clinic at the number listed above.       Sincerely,      Mary Carmen Chaudhry PA-C

## 2022-12-06 NOTE — PROGRESS NOTES
Assessment & Plan   Problem List Items Addressed This Visit        Digestive    BMI 50.0-59.9, adult (H)    Relevant Medications    Semaglutide, 2 MG/DOSE, 8 MG/3ML SOPN       Endocrine    Hyperlipidemia with target LDL less than 130    Relevant Orders    ALT (Completed)       Circulatory    CHF (congestive heart failure) (H)    Relevant Orders    Lipid panel reflex to direct LDL Non-fasting (Completed)    CBC with Platelets (Completed)       Musculoskeletal and Integumentary    Osteoarthritis of both knees, unspecified osteoarthritis type - Primary       Urinary    Chronic kidney disease, stage III (moderate) (H)    Relevant Orders    Albumin Random Urine Quantitative with Creat Ratio (Completed)   Other Visit Diagnoses     Encounter for long-term (current) use of medications        Relevant Orders    [id number] - Urine Drug Confirmation Panel (Comprehensive) (Completed)    Screen for colon cancer        Visit for screening mammogram        Cervical cancer screening        Screening for hyperlipidemia        Relevant Orders    Lipid panel reflex to direct LDL Non-fasting (Completed)    Mixed incontinence urge and stress (male)(female)        Relevant Orders    Adult Urology  Referral    Mass of lower outer quadrant of left breast        Relevant Orders    MA Diagnostic Digital Bilateral    US Breast Left Complete 4 Quadrants    Pes planus of right foot        Relevant Orders    XR Foot Right G/E 3 Views (Completed)    Orthopedic  Referral    Ankle/Foot Bracing Supplies Order for DME - ONLY FOR DME         1. Left breast, patient feels a mass that was not there previously.  US and diagnostic mammogram ordered.   2. Right foot pain - patient has history of pes planus, however pain is recently much worse.  She has increased swelling in the right foot and ankle, and cannot bear weight.  Referral for podiatry placed.  Patient was given a CAM boot.    3. Incontinence.  Patient has not heard from urology  "- new referral was placed today.   4. Refill semaglutide.   5. Labs updated as above.                    No follow-ups on file.    JACQUIE Dominguez [facility] KATERINA PRALUIS EDUARDO Vasquez is a 52 year old, presenting for the following health issues:  Breast Problem (left) and Musculoskeletal Problem (right)      Lump in breast    HPI           Review of Systems         Objective    /85   Pulse 84   Temp 97.6  F (36.4  C) (Temporal)   Ht 1.626 m (5' 4\")   SpO2 100%   BMI 47.89 kg/m    Body mass index is 47.89 kg/m .  Physical Exam  Constitutional:       General: She is not in acute distress.     Appearance: She is well-developed. She is not diaphoretic.   HENT:      Head: Normocephalic.      Right Ear: External ear normal.      Left Ear: External ear normal.      Nose: Nose normal.   Eyes:      Conjunctiva/sclera: Conjunctivae normal.   Cardiovascular:      Rate and Rhythm: Normal rate and regular rhythm.      Heart sounds: Normal heart sounds.   Pulmonary:      Effort: Pulmonary effort is normal.      Breath sounds: Normal breath sounds.   Chest:   Breasts:     Right: No nipple discharge, skin change or tenderness.      Left: No nipple discharge, skin change or tenderness.      Comments: Dense, fibrous breast tissue bilaterally  Musculoskeletal:      Cervical back: Normal range of motion.      Right ankle: Swelling present.      Right foot: Deformity (flat foot) present.   Skin:     General: Skin is warm and dry.   Neurological:      Mental Status: She is alert and oriented to person, place, and time.   Psychiatric:         Judgment: Judgment normal.            Results for orders placed or performed in visit on 12/06/22   XR Foot Right G/E 3 Views     Status: None    Narrative    XR FOOT RIGHT G/E 3 VIEWS 12/6/2022 3:03 PM    HISTORY: Pes planus of right foot. Foot pain.    COMPARISON: None.      Impression    IMPRESSION: No fracture. Osteopenia. No degenerative changes. " Hindfoot  valgus with pes planus.    MOE PUTNAM MD         SYSTEM ID:  [id number]   Albumin Random Urine Quantitative with Creat Ratio     Status: None   Result Value Ref Range    Albumin Urine mg/L <12.0 mg/L    Albumin Urine mg/g Cr      Creatinine Urine mg/dL 251.0 mg/dL   Lipid panel reflex to direct LDL Non-fasting     Status: Abnormal   Result Value Ref Range    Cholesterol 112 <200 mg/dL    Triglycerides 126 <150 mg/dL    Direct Measure HDL 44 (L) >=50 mg/dL    LDL Cholesterol Calculated 43 <=100 mg/dL    Non HDL Cholesterol 68 <130 mg/dL    Narrative    Cholesterol  Desirable:  <200 mg/dL    Triglycerides  Normal:  Less than 150 mg/dL  Borderline High:  150-199 mg/dL  High:  200-499 mg/dL  Very High:  Greater than or equal to 500 mg/dL    Direct Measure HDL  Female:  Greater than or equal to 50 mg/dL   Male:  Greater than or equal to 40 mg/dL    LDL Cholesterol  Desirable:  <100mg/dL  Above Desirable:  100-129 mg/dL   Borderline High:  130-159 mg/dL   High:  160-189 mg/dL   Very High:  >= 190 mg/dL    Non HDL Cholesterol  Desirable:  130 mg/dL  Above Desirable:  130-159 mg/dL  Borderline High:  160-189 mg/dL  High:  190-219 mg/dL  Very High:  Greater than or equal to 220 mg/dL   ALT     Status: Abnormal   Result Value Ref Range    ALT 49 (H) 10 - 35 U/L   CBC with Platelets     Status: Abnormal   Result Value Ref Range    WBC Count 7.8 4.0 - 11.0 10e3/uL    RBC Count 3.75 (L) 3.80 - 5.20 10e6/uL    Hemoglobin 13.2 11.7 - 15.7 g/dL    Hematocrit 41.1 35.0 - 47.0 %     (H) 78 - 100 fL    MCH 35.2 (H) 26.5 - 33.0 pg    MCHC 32.1 31.5 - 36.5 g/dL    RDW 11.6 10.0 - 15.0 %    Platelet Count 266 150 - 450 10e3/uL   Urine Drug Confirmation Panel     Status: Abnormal   Result Value Ref Range    Oxycodone ng/mL >14,000 (H) <50 ng/mL    Oxycodone      Oxymorphone ng/mL >7,000 (H) <50 ng/mL    Oxymorphone      Lorazepam Present (A) Absent    Narrative    This test was developed and its performance  characteristics determined by the [facility],  Special Chemistry Laboratory. It has not been cleared or approved by the FDA. The laboratory is regulated under CLIA as qualified to perform high-complexity testing. This test is used for clinical purposes. It should not be regarded as investigational or for research.   Urine Creatinine for Drug Screen Panel     Status: None   Result Value Ref Range    Creatinine Urine for Drug Screen 251 mg/dL   [id number] - Urine Drug Confirmation Panel (Comprehensive)     Status: Abnormal    Narrative    The following orders were created for panel order [id number] - Urine Drug Confirmation Panel (Comprehensive).  Procedure                               Abnormality         Status                     ---------                               -----------         ------                     Urine Drug Confirmation ...[[id number]]  Abnormal            Final result               Urine Creatinine for Nick...[[id number]]                      Final result                 Please view results for these tests on the individual orders.                   Answers for HPI/ROS submitted by the patient on 12/6/2022  If you checked off any problems, how difficult have these problems made it for you to do your work, take care of things at home, or get along with other people?: Extremely difficult  PHQ9 TOTAL SCORE: 16

## 2022-12-07 ENCOUNTER — MYC MEDICAL ADVICE (OUTPATIENT)
Dept: FAMILY MEDICINE | Facility: CLINIC | Age: 52
End: 2022-12-07

## 2022-12-08 DIAGNOSIS — M25.50 PAIN IN JOINT: ICD-10-CM

## 2022-12-08 LAB
LORAZEPAM UR QL CFM: PRESENT
OXYCODONE UR CFM-MCNC: ABNORMAL NG/ML
OXYCODONE/CREAT UR: ABNORMAL
OXYMORPHONE UR CFM-MCNC: >7000 NG/ML
OXYMORPHONE/CREAT UR: ABNORMAL

## 2022-12-08 NOTE — TELEPHONE ENCOUNTER
Prescription approved per [facility] Refill Protocol.  Cara Higgins, RN  [facility] RN Triage Team

## 2022-12-12 ENCOUNTER — TELEPHONE (OUTPATIENT)
Dept: FAMILY MEDICINE | Facility: CLINIC | Age: 52
End: 2022-12-12

## 2022-12-12 ENCOUNTER — MYC MEDICAL ADVICE (OUTPATIENT)
Dept: FAMILY MEDICINE | Facility: CLINIC | Age: 52
End: 2022-12-12

## 2022-12-12 DIAGNOSIS — Z96.652 S/P TOTAL KNEE ARTHROPLASTY, LEFT: ICD-10-CM

## 2022-12-12 DIAGNOSIS — M17.0 OSTEOARTHRITIS OF BOTH KNEES, UNSPECIFIED OSTEOARTHRITIS TYPE: ICD-10-CM

## 2022-12-12 DIAGNOSIS — M21.41 PES PLANUS OF RIGHT FOOT: ICD-10-CM

## 2022-12-12 DIAGNOSIS — N39.41 URGE INCONTINENCE OF URINE: ICD-10-CM

## 2022-12-12 DIAGNOSIS — G89.29 OTHER CHRONIC PAIN: ICD-10-CM

## 2022-12-12 NOTE — TELEPHONE ENCOUNTER
Unfortunately we cannot order inpatient PT from clinic.   I placed an order for care coordination to help with resources available to her.   I also placed an outpatient PT referral as well.     Davis Chaudhry PA-C

## 2022-12-12 NOTE — TELEPHONE ENCOUNTER
Patient crying over the phone stating that she needs in patient physical therapy.     Please advise.     Triage to call the patient back ASAP.  Estephania Noble RN

## 2022-12-12 NOTE — TELEPHONE ENCOUNTER
Patient Contact    Attempt # 1    Was Call answered? No    Non-detailed voicemail left to call clinic at: [phone].     On Call Back:    Please get more information for Care cordination:    Can we get more information please? Is it more than the pt needing PT where they may need TCU or care because they are struggling at home? Please determine this before care coordination assists with therapy.     Francisca Amaya RN  [facility] Triage Team

## 2022-12-13 NOTE — TELEPHONE ENCOUNTER
Patient Contact    Attempt # 2    Was call answered?  No.  Left message on voicemail with information to call me back.    On call back, please determine if patient is well enough to wait for TCU placement or if she needs to be cared for in the hospital.

## 2022-12-13 NOTE — TELEPHONE ENCOUNTER
I took the original call. Patient did want to go to a TCU for her physical therapy. She is struggling at home. Estephania Noble RN

## 2022-12-13 NOTE — TELEPHONE ENCOUNTER
Reviewed RN notes.      Patient does not remember getting the boot (I confirmed with support staff she did leave with a boot on 12/7/22).    Instead of calling the patient again, I wrote a [employer] message with detailed instructions.  This way, she can review the message as needed and refer back to it in the future.   We also have a documented line of communication.     It sounds like the patient is not open to TCU placement for physical therapy, so we will instead try outpatient therapy.     The visit with podiatry is important so we can get her a diagnosis and definitive plan.     Please see my [employer] message for mor details.     Davis Chaudhry PA-C

## 2022-12-13 NOTE — TELEPHONE ENCOUNTER
"Called patient to check on status of health. Patient became increasingly frustrated on the phone regarding the condition of her foot and getting a boot. Patient stated while she was testing th boot that it was extremely helpful and does not understand why she has not gotten one yet. Patient stated that the last orthopedic appt she had that X-rays had not been sent over so they were unable to see her. Patient stated she was extremely depressed yesterday.    Patient is still adamant her foot/ankle is sprained and needs help. Patient stated she is doing worse since knee replacement and is extremely frustrated with PCP care, \"My doctor is not doing her job, she is constantly trying to get me out of the office when I need help!\". Patient is very frustrated with PCP management of care, patient stated her ankle is hurting very badly and it's hard for her to go to appts.    Patient also stated she does not need a  and has her own , \"Everything is fine at home, if I was having trouble at home I would call 911!\" Patient is also inquiring about an operation for her urinary incontinence? Patient also repeatedly emphasized the need for a boot to help with pain, patient was extremely frustrated for the whole phone call.    Routing to PCP for review. This is a lot, patient disregarded previous messages as not necessary and wants PCP input.  "

## 2022-12-14 ENCOUNTER — PATIENT OUTREACH (OUTPATIENT)
Dept: CARE COORDINATION | Facility: CLINIC | Age: 52
End: 2022-12-14

## 2022-12-14 NOTE — TELEPHONE ENCOUNTER
Left message to please check MyChart message from PCP.     Patient Contact    Attempt # 1    Was call answered?  No.  Left message on voicemail with information to call me back.

## 2022-12-14 NOTE — TELEPHONE ENCOUNTER
Please see  message from 12/13/22. No further action needed at this time.    Narcisa ROSE RN  EP Triage

## 2022-12-14 NOTE — PROGRESS NOTES
Clinic Care Coordination Contact  Care Team Conversations    RNCC reviewed chart and PCP ordered outpatient therapy; no further care coordination outreaches at this time.     Dona Herrera RN, BSN, PHN  Primary Care / Care Coordinator   [facility]  E-mail [email or web address]   [phone]

## 2022-12-15 DIAGNOSIS — G47.00 PERSISTENT INSOMNIA: ICD-10-CM

## 2022-12-15 DIAGNOSIS — Z96.652 S/P TOTAL KNEE ARTHROPLASTY, LEFT: ICD-10-CM

## 2022-12-15 RX ORDER — HYDROXYZINE PAMOATE 50 MG/1
CAPSULE ORAL
Qty: 90 CAPSULE | Refills: 1 | Status: SHIPPED | OUTPATIENT
Start: 2022-12-15

## 2022-12-21 DIAGNOSIS — G89.29 CHRONIC PAIN OF BOTH SHOULDERS: ICD-10-CM

## 2022-12-21 DIAGNOSIS — M17.0 OSTEOARTHRITIS OF BOTH KNEES, UNSPECIFIED OSTEOARTHRITIS TYPE: ICD-10-CM

## 2022-12-21 DIAGNOSIS — M25.511 CHRONIC PAIN OF BOTH SHOULDERS: ICD-10-CM

## 2022-12-21 DIAGNOSIS — M25.512 CHRONIC PAIN OF BOTH SHOULDERS: ICD-10-CM

## 2022-12-21 DIAGNOSIS — G89.29 OTHER CHRONIC PAIN: ICD-10-CM

## 2022-12-21 NOTE — TELEPHONE ENCOUNTER
Routing refill request to provider for review/approval because:  Drug not on the [facility] refill protocol     Christine CONNER, Triage RN  [facility]

## 2022-12-22 ENCOUNTER — MYC REFILL (OUTPATIENT)
Dept: FAMILY MEDICINE | Facility: CLINIC | Age: 52
End: 2022-12-22

## 2022-12-22 DIAGNOSIS — G89.29 OTHER CHRONIC PAIN: ICD-10-CM

## 2022-12-22 DIAGNOSIS — M25.511 CHRONIC PAIN OF BOTH SHOULDERS: ICD-10-CM

## 2022-12-22 DIAGNOSIS — G89.29 CHRONIC PAIN OF BOTH SHOULDERS: ICD-10-CM

## 2022-12-22 DIAGNOSIS — M25.512 CHRONIC PAIN OF BOTH SHOULDERS: ICD-10-CM

## 2022-12-22 DIAGNOSIS — M17.0 OSTEOARTHRITIS OF BOTH KNEES, UNSPECIFIED OSTEOARTHRITIS TYPE: ICD-10-CM

## 2022-12-22 RX ORDER — OXYCODONE HYDROCHLORIDE 5 MG/1
10 TABLET ORAL EVERY 6 HOURS PRN
Qty: 240 TABLET | Refills: 0 | Status: SHIPPED | OUTPATIENT
Start: 2022-12-22 | End: 2023-01-20

## 2022-12-22 RX ORDER — OXYCODONE HYDROCHLORIDE 5 MG/1
10 TABLET ORAL EVERY 6 HOURS PRN
Qty: 240 TABLET | Refills: 0 | OUTPATIENT
Start: 2022-12-22

## 2022-12-26 ENCOUNTER — HEALTH MAINTENANCE LETTER (OUTPATIENT)
Age: 52
End: 2022-12-26

## 2023-01-05 NOTE — PROGRESS NOTES
{PROVIDER CHARTING PREFERENCE:523222}    Subjective   Liane is a 52 year old{ACCOMPANIED BY STATEMENT (Optional):139945}, presenting for the following health issues:  No chief complaint on file.      HPI     {SUPERLIST (Optional):674621}  {additonal problems for provider to add (Optional):307659}    Review of Systems   {ROS COMP (Optional):263760}      Objective    There were no vitals taken for this visit.  There is no height or weight on file to calculate BMI.  Physical Exam   {Exam List (Optional):960841}    {Diagnostic Test Results (Optional):333124}    {AMBULATORY ATTESTATION (Optional):416482}

## 2023-01-06 ENCOUNTER — OFFICE VISIT (OUTPATIENT)
Dept: FAMILY MEDICINE | Facility: CLINIC | Age: 53
End: 2023-01-06
Payer: MEDICARE

## 2023-01-06 VITALS
HEART RATE: 90 BPM | OXYGEN SATURATION: 97 % | TEMPERATURE: 99.2 F | DIASTOLIC BLOOD PRESSURE: 72 MMHG | RESPIRATION RATE: 16 BRPM | SYSTOLIC BLOOD PRESSURE: 130 MMHG

## 2023-01-06 DIAGNOSIS — N30.00 ACUTE CYSTITIS WITHOUT HEMATURIA: ICD-10-CM

## 2023-01-06 DIAGNOSIS — N18.31 STAGE 3A CHRONIC KIDNEY DISEASE (H): ICD-10-CM

## 2023-01-06 DIAGNOSIS — I50.9 CHRONIC CONGESTIVE HEART FAILURE, UNSPECIFIED HEART FAILURE TYPE (H): ICD-10-CM

## 2023-01-06 DIAGNOSIS — F33.1 MAJOR DEPRESSIVE DISORDER, RECURRENT EPISODE, MODERATE (H): ICD-10-CM

## 2023-01-06 DIAGNOSIS — Z96.652 S/P TOTAL KNEE ARTHROPLASTY, LEFT: ICD-10-CM

## 2023-01-06 DIAGNOSIS — M17.11 PRIMARY OSTEOARTHRITIS OF RIGHT KNEE: Primary | ICD-10-CM

## 2023-01-06 DIAGNOSIS — B37.31 CANDIDAL VULVOVAGINITIS: ICD-10-CM

## 2023-01-06 PROCEDURE — 99214 OFFICE O/P EST MOD 30 MIN: CPT | Mod: 25 | Performed by: PHYSICIAN ASSISTANT

## 2023-01-06 PROCEDURE — 20610 DRAIN/INJ JOINT/BURSA W/O US: CPT | Performed by: PHYSICIAN ASSISTANT

## 2023-01-06 RX ORDER — AMOXICILLIN 500 MG/1
2000 CAPSULE ORAL
Qty: 4 CAPSULE | Refills: 1 | Status: SHIPPED | OUTPATIENT
Start: 2023-01-06

## 2023-01-06 RX ORDER — SULFAMETHOXAZOLE/TRIMETHOPRIM 800-160 MG
1 TABLET ORAL 2 TIMES DAILY
Qty: 6 TABLET | Refills: 0 | Status: SHIPPED | OUTPATIENT
Start: 2023-01-06 | End: 2023-01-09

## 2023-01-06 RX ORDER — FLUCONAZOLE 150 MG/1
150 TABLET ORAL ONCE
Qty: 1 TABLET | Refills: 0 | Status: SHIPPED | OUTPATIENT
Start: 2023-01-06 | End: 2023-01-06

## 2023-01-06 RX ORDER — TRIAMCINOLONE ACETONIDE 40 MG/ML
40 INJECTION, SUSPENSION INTRA-ARTICULAR; INTRAMUSCULAR ONCE
Status: COMPLETED | OUTPATIENT
Start: 2023-01-06 | End: 2023-01-06

## 2023-01-06 RX ADMIN — TRIAMCINOLONE ACETONIDE 40 MG: 40 INJECTION, SUSPENSION INTRA-ARTICULAR; INTRAMUSCULAR at 16:00

## 2023-01-06 NOTE — PROGRESS NOTES
Assessment & Plan   Problem List Items Addressed This Visit        Digestive    BMI 50.0-59.9, adult (H)       Circulatory    CHF (congestive heart failure) (H)       Musculoskeletal and Integumentary    OA (osteoarthritis) of knee - Primary    Relevant Medications    triamcinolone (KENALOG-40) injection 40 mg (Completed) (Start on 1/6/2023  4:30 PM)    Other Relevant Orders    DRAIN/INJECT LARGE JOINT/BURSA (Completed)       Urinary    Chronic kidney disease, stage III (moderate) (H)       Behavioral    Major depressive disorder, recurrent episode, moderate (H)   Other Visit Diagnoses     S/P total knee arthroplasty, left        Relevant Medications    amoxicillin (AMOXIL) 500 MG capsule    Candidal vulvovaginitis        Relevant Medications    fluconazole (DIFLUCAN) 150 MG tablet    Acute cystitis without hematuria        Relevant Medications    sulfamethoxazole-trimethoprim (BACTRIM DS) 800-160 MG tablet        PROCEDURE NOTE:  Right  knee was cleaned with betadine then wiped away with alcohol.    40 mg Kenalog and 5 cc of 1 percent lidocaine was injected into the right knee using a lateral approach.   Appropriate wound care dressing applied.    Patient tolerated the procedure well.  Cortisone injection AVS was provided to the patient.       UTI - patient has chronically and states she has symptoms today.  Has been taking AZO so we did not complete a UA today.  Bactrim sent  Diflucan for yeast infection due to antibiotic    Dental prophylaxis - amoxicillin sent.      HCC update  CHF - stable.  Obesity - continue weight loss with ozempic as prescribed  CKD stable  Depression stable                   Return in about 1 week (around 1/13/2023) for reschedule with podiatry.    JACQUIE Dominguez [facility] PRALUIS EDUARD[facility] Tripathi   Christina is a 52 year old, presenting for the following health issues:  Knee Problem      History of Present Illness       Reason for visit:  Legs    She eats  0-1 servings of fruits and vegetables daily.She consumes 2 sweetened beverage(s) daily.She exercises with enough effort to increase her heart rate 9 or less minutes per day.  She exercises with enough effort to increase her heart rate 3 or less days per week.   She is taking medications regularly.                 Review of Systems         Objective    /72   Pulse 90   Temp 99.2  F (37.3  C) (Tympanic)   Resp 16   SpO2 97%   There is no height or weight on file to calculate BMI.  Physical Exam  Constitutional:       General: She is not in acute distress.     Appearance: She is well-developed. She is not diaphoretic.   HENT:      Head: Normocephalic.      Right Ear: External ear normal.      Left Ear: External ear normal.      Nose: Nose normal.   Eyes:      Conjunctiva/sclera: Conjunctivae normal.   Pulmonary:      Effort: Pulmonary effort is normal.   Musculoskeletal:      Cervical back: Normal range of motion.   Skin:     General: Skin is warm.   Neurological:      Mental Status: She is alert and oriented to person, place, and time.   Psychiatric:         Judgment: Judgment normal.

## 2023-01-17 ENCOUNTER — TELEPHONE (OUTPATIENT)
Dept: FAMILY MEDICINE | Facility: CLINIC | Age: 53
End: 2023-01-17
Payer: MEDICARE

## 2023-01-17 DIAGNOSIS — E55.9 VITAMIN D DEFICIENCY: ICD-10-CM

## 2023-01-17 RX ORDER — CHOLECALCIFEROL (VITAMIN D3) 1250 MCG
CAPSULE ORAL
Qty: 60 CAPSULE | Refills: 0 | Status: SHIPPED | OUTPATIENT
Start: 2023-01-17

## 2023-01-17 NOTE — TELEPHONE ENCOUNTER
Called pharmacy and relayed provider's message. Pharmacy stated understanding and will fill the medication to the patient.    Francisca JUNG RN  [facility] Triage Team

## 2023-01-17 NOTE — TELEPHONE ENCOUNTER
"Pharmacy calling stating:    \"Patient is allergic to PCN and was prescribed amoxicillin.\"    Please advise.     Jonelle Arevalo RN on 1/17/2023 at 5:26 PM    "

## 2023-01-17 NOTE — TELEPHONE ENCOUNTER
Patient states she has taken amoxicillin in the past without reaction.   Ok to fill.     Davis Chaudhry PA-C

## 2023-01-20 DIAGNOSIS — G89.29 CHRONIC PAIN OF BOTH SHOULDERS: ICD-10-CM

## 2023-01-20 DIAGNOSIS — G89.29 OTHER CHRONIC PAIN: ICD-10-CM

## 2023-01-20 DIAGNOSIS — M25.511 CHRONIC PAIN OF BOTH SHOULDERS: ICD-10-CM

## 2023-01-20 DIAGNOSIS — M17.0 OSTEOARTHRITIS OF BOTH KNEES, UNSPECIFIED OSTEOARTHRITIS TYPE: ICD-10-CM

## 2023-01-20 DIAGNOSIS — M25.512 CHRONIC PAIN OF BOTH SHOULDERS: ICD-10-CM

## 2023-01-20 RX ORDER — OXYCODONE HYDROCHLORIDE 5 MG/1
10 TABLET ORAL EVERY 6 HOURS PRN
Qty: 240 TABLET | Refills: 0 | Status: SHIPPED | OUTPATIENT
Start: 2023-01-20 | End: 2023-02-17

## 2023-01-20 NOTE — TELEPHONE ENCOUNTER
Patient calling to request refills for oxycodone 5 mg tablets. Patient did not have any additional questions or concerns.     Script and pharmacy pended. Routing to PCP for review.

## 2023-02-16 DIAGNOSIS — Z86.711 HISTORY OF PULMONARY EMBOLISM: ICD-10-CM

## 2023-02-16 DIAGNOSIS — I42.9 CARDIOMYOPATHY, UNSPECIFIED TYPE (H): ICD-10-CM

## 2023-02-16 DIAGNOSIS — M25.50 PAIN IN JOINT: ICD-10-CM

## 2023-02-16 RX ORDER — RIVAROXABAN 10 MG/1
TABLET, FILM COATED ORAL
Qty: 90 TABLET | Refills: 1 | OUTPATIENT
Start: 2023-02-16

## 2023-02-16 RX ORDER — METOPROLOL SUCCINATE 50 MG/1
TABLET, EXTENDED RELEASE ORAL
Qty: 360 TABLET | Refills: 2 | Status: SHIPPED | OUTPATIENT
Start: 2023-02-16 | End: 2023-12-08

## 2023-02-17 ENCOUNTER — TELEPHONE (OUTPATIENT)
Dept: FAMILY MEDICINE | Facility: CLINIC | Age: 53
End: 2023-02-17
Payer: MEDICARE

## 2023-02-17 DIAGNOSIS — G89.29 CHRONIC PAIN OF BOTH SHOULDERS: ICD-10-CM

## 2023-02-17 DIAGNOSIS — M25.511 CHRONIC PAIN OF BOTH SHOULDERS: ICD-10-CM

## 2023-02-17 DIAGNOSIS — M25.512 CHRONIC PAIN OF BOTH SHOULDERS: ICD-10-CM

## 2023-02-17 DIAGNOSIS — G89.29 OTHER CHRONIC PAIN: ICD-10-CM

## 2023-02-17 DIAGNOSIS — M17.0 OSTEOARTHRITIS OF BOTH KNEES, UNSPECIFIED OSTEOARTHRITIS TYPE: ICD-10-CM

## 2023-02-17 RX ORDER — RIVAROXABAN 10 MG/1
TABLET, FILM COATED ORAL
Qty: 90 TABLET | Refills: 0 | Status: SHIPPED | OUTPATIENT
Start: 2023-02-17 | End: 2023-03-06

## 2023-02-17 RX ORDER — OXYCODONE HYDROCHLORIDE 5 MG/1
10 TABLET ORAL EVERY 6 HOURS PRN
Qty: 240 TABLET | Refills: 0 | Status: SHIPPED | OUTPATIENT
Start: 2023-02-17 | End: 2023-03-13

## 2023-02-17 NOTE — TELEPHONE ENCOUNTER
Called the original pharmacy to cancel the prescription. Pharmacy stated understanding and cancelled the prescription.    Francisca JUNG RN  [facility] Triage Team

## 2023-02-17 NOTE — TELEPHONE ENCOUNTER
Received call from pt. Oxycodone sent to the wrong pharmacy. Pt requesting if the medication can be resent to the correct pharmacy prior to the weekend. Pt will be out this weekend and would like ton ensure she is able to pick it up on time.     Would like a [employer] message back once script is sent to correct pharmacy.    Medication pended with preferred pharmacy and routed to provider for review.     Alesia Wells RN

## 2023-03-02 ENCOUNTER — TELEPHONE (OUTPATIENT)
Dept: FAMILY MEDICINE | Facility: CLINIC | Age: 53
End: 2023-03-02
Payer: MEDICARE

## 2023-03-02 NOTE — TELEPHONE ENCOUNTER
Patient Quality Outreach    Patient is due for the following:   Colon Cancer Screening  Breast Cancer Screening - Mammogram  Cervical Cancer Screening - PAP Needed    Next Steps:   Schedule a office visit for Mammogram    Type of outreach:    Sent [place] message.      Questions for provider review:    None     Dona Ford MA

## 2023-03-04 DIAGNOSIS — Z86.711 HISTORY OF PULMONARY EMBOLISM: ICD-10-CM

## 2023-03-06 RX ORDER — RIVAROXABAN 10 MG/1
TABLET, FILM COATED ORAL
Qty: 90 TABLET | Refills: 0 | Status: SHIPPED | OUTPATIENT
Start: 2023-03-06 | End: 2023-06-26

## 2023-03-13 DIAGNOSIS — M25.512 CHRONIC PAIN OF BOTH SHOULDERS: ICD-10-CM

## 2023-03-13 DIAGNOSIS — M17.0 OSTEOARTHRITIS OF BOTH KNEES, UNSPECIFIED OSTEOARTHRITIS TYPE: ICD-10-CM

## 2023-03-13 DIAGNOSIS — G89.29 OTHER CHRONIC PAIN: ICD-10-CM

## 2023-03-13 DIAGNOSIS — G89.29 CHRONIC PAIN OF BOTH SHOULDERS: ICD-10-CM

## 2023-03-13 DIAGNOSIS — M25.511 CHRONIC PAIN OF BOTH SHOULDERS: ICD-10-CM

## 2023-03-13 RX ORDER — OXYCODONE HYDROCHLORIDE 5 MG/1
10 TABLET ORAL EVERY 6 HOURS PRN
Qty: 240 TABLET | Refills: 0 | Status: SHIPPED | OUTPATIENT
Start: 2023-03-13 | End: 2023-04-13

## 2023-03-13 NOTE — TELEPHONE ENCOUNTER
Medication Question or Refill        What medication are you calling about (include dose and sig)?: oxyCODONE (ROXICODONE) 5 MG tablet       Preferred Pharmacy:  [facility][id number] - [facility] [address] [address] [address] [facility] [address]  Phone: [phone] Fax: [phone]        Controlled Substance Agreement on file:   CSA -- Patient Level:    CSA: None found at the patient level.       Who prescribed the medication?: Fitterer    Do you need a refill? Yes    When did you use the medication last? unknown    Patient offered an appointment? Yes    Do you have any questions or concerns?  No      Could we send this information to you in [place] or would you prefer to receive a phone call?:   Patient would prefer a phone call   Okay to leave a detailed message?: Yes at Cell number on file:    Telephone Information:   Mobile [phone]     Lisbet JACOBO    [facility]

## 2023-03-16 NOTE — TELEPHONE ENCOUNTER
Pt called pt requesting update stating pharmacy never got message for ok to refill early. S/w pharmacy and updated that it is ok to refill early. Pt updated that refill has been approved.    Narcisa ROSE RN  [facility] Triage Team

## 2023-03-16 NOTE — TELEPHONE ENCOUNTER
Pt calling stating that pharmacy needs PCP approval to fill rx early. S/w pharmacy who stated that last fill was on on 2/18/23. Pharmacy told pt it was not due to refill until 3/18/18. Need verbal order to refill early. Please advise.    Pt would like callback when order has been approved or with further questions. OK to leave detailed VM    Narcisa ROSE RN  [facility] Triage Team

## 2023-03-16 NOTE — TELEPHONE ENCOUNTER
Left message with pharmacy that early refill of medication is approved.     Had to leave voice mail message since there was a 20 minute wait time on the phone.     Call attempt to  patient , no answer on phone.     Vidhya Jin RN  [facility]

## 2023-03-27 ENCOUNTER — TELEPHONE (OUTPATIENT)
Dept: FAMILY MEDICINE | Facility: CLINIC | Age: 53
End: 2023-03-27
Payer: MEDICARE

## 2023-03-27 NOTE — TELEPHONE ENCOUNTER
Prior Authorization Retail Medication Request    Medication/Dose: Semaglutide, 2 MG/DOSE, 8 MG/3ML SOPN  ICD code (if different than what is on RX):    Previously Tried and Failed:    Rationale:      Insurance Name:  na  Insurance ID:  [id number]      Pharmacy Information (if different than what is on RX)  Name:  same  Phone:  [phone]

## 2023-03-29 NOTE — TELEPHONE ENCOUNTER
No pa needed- this medication is covered by the plan without needing a pa. Per call to the pharmacy, they only have the rx for ozempic 1mg/ dose with direction of injecting 2mg every 7 days. Per pharmacy, they should be able to order in the 2mg dose but will need a new rx. Please send rx for ozempic 2mg dose to the pharmacy.

## 2023-03-31 RX ORDER — SEMAGLUTIDE 2.68 MG/ML
2 INJECTION, SOLUTION SUBCUTANEOUS
Qty: 18 ML | Refills: 0 | Status: SHIPPED | OUTPATIENT
Start: 2023-03-31 | End: 2023-06-01

## 2023-04-03 ENCOUNTER — TELEPHONE (OUTPATIENT)
Dept: FAMILY MEDICINE | Facility: CLINIC | Age: 53
End: 2023-04-03
Payer: MEDICARE

## 2023-04-03 RX ORDER — SEMAGLUTIDE 2.68 MG/ML
2 INJECTION, SOLUTION SUBCUTANEOUS
Qty: 18 ML | Refills: 0 | Status: CANCELLED | OUTPATIENT
Start: 2023-04-03

## 2023-04-04 RX ORDER — SEMAGLUTIDE 2.68 MG/ML
INJECTION, SOLUTION SUBCUTANEOUS
Qty: 9 ML | Refills: 0 | OUTPATIENT
Start: 2023-04-04

## 2023-04-11 DIAGNOSIS — M25.50 PAIN IN JOINT: ICD-10-CM

## 2023-04-12 DIAGNOSIS — M17.0 OSTEOARTHRITIS OF BOTH KNEES, UNSPECIFIED OSTEOARTHRITIS TYPE: ICD-10-CM

## 2023-04-12 DIAGNOSIS — M25.511 CHRONIC PAIN OF BOTH SHOULDERS: ICD-10-CM

## 2023-04-12 DIAGNOSIS — G89.29 CHRONIC PAIN OF BOTH SHOULDERS: ICD-10-CM

## 2023-04-12 DIAGNOSIS — G89.29 OTHER CHRONIC PAIN: ICD-10-CM

## 2023-04-12 DIAGNOSIS — M25.512 CHRONIC PAIN OF BOTH SHOULDERS: ICD-10-CM

## 2023-04-12 NOTE — TELEPHONE ENCOUNTER
Medication Question or Refill        What medication are you calling about (include dose and sig)?: oxyCODONE (ROXICODONE) 5 MG tablet     Preferred Pharmacy    [facility] #[id number] - [place], [place] - [address] [place] [address] & [address]  [address] [place] [address]  Phone: [phone] Fax: [phone]        Controlled Substance Agreement on file:   CSA -- Patient Level:    CSA: None found at the patient level.       Who prescribed the medication?: Fitterer    Do you need a refill? Yes    When did you use the medication last? Unknown    Patient offered an appointment? No, has appt on 04/18/2023    Do you have any questions or concerns?  No      Could we send this information to you [facility] or would you prefer to receive a phone call?:   Patient would prefer a phone call   Okay to leave a detailed message?: Yes at Cell number on file:    Telephone Information:   Mobile [phone]     Lisbet JACOBO    [facility]

## 2023-04-13 RX ORDER — OXYCODONE HYDROCHLORIDE 5 MG/1
10 TABLET ORAL EVERY 6 HOURS PRN
Qty: 240 TABLET | Refills: 0 | Status: SHIPPED | OUTPATIENT
Start: 2023-04-13 | End: 2023-04-20

## 2023-04-13 NOTE — TELEPHONE ENCOUNTER
Pt called to check on this     Is OUT of medication     Christine CONNER, Triage RN  [facility]

## 2023-04-20 ENCOUNTER — VIRTUAL VISIT (OUTPATIENT)
Dept: FAMILY MEDICINE | Facility: CLINIC | Age: 53
End: 2023-04-20
Payer: MEDICARE

## 2023-04-20 DIAGNOSIS — M25.511 CHRONIC PAIN OF BOTH SHOULDERS: ICD-10-CM

## 2023-04-20 DIAGNOSIS — M25.571 PAIN IN JOINT INVOLVING ANKLE AND FOOT, RIGHT: ICD-10-CM

## 2023-04-20 DIAGNOSIS — K08.89 PAIN, DENTAL: Primary | ICD-10-CM

## 2023-04-20 DIAGNOSIS — G89.29 OTHER CHRONIC PAIN: ICD-10-CM

## 2023-04-20 DIAGNOSIS — G89.29 CHRONIC PAIN OF BOTH SHOULDERS: ICD-10-CM

## 2023-04-20 DIAGNOSIS — M17.0 OSTEOARTHRITIS OF BOTH KNEES, UNSPECIFIED OSTEOARTHRITIS TYPE: ICD-10-CM

## 2023-04-20 DIAGNOSIS — M25.512 CHRONIC PAIN OF BOTH SHOULDERS: ICD-10-CM

## 2023-04-20 PROCEDURE — 99214 OFFICE O/P EST MOD 30 MIN: CPT | Mod: 95 | Performed by: PHYSICIAN ASSISTANT

## 2023-04-20 RX ORDER — OXYCODONE HYDROCHLORIDE 5 MG/1
10 TABLET ORAL EVERY 6 HOURS PRN
Qty: 46 TABLET | Refills: 0 | Status: SHIPPED | OUTPATIENT
Start: 2023-04-20 | End: 2023-04-28

## 2023-04-20 RX ORDER — AMOXICILLIN 875 MG
875 TABLET ORAL 2 TIMES DAILY
Qty: 14 TABLET | Refills: 0 | Status: SHIPPED | OUTPATIENT
Start: 2023-04-20 | End: 2023-04-27

## 2023-04-20 NOTE — PROGRESS NOTES
"Christina is a 53 year old who is being evaluated via a billable telephone visit.      What phone number would you like to be contacted at? [phone]  How would you like to obtain your AVS? Frank    Distant Location (provider location):  Off-site    Assessment & Plan   Problem List Items Addressed This Visit        Musculoskeletal and Integumentary    Osteoarthritis of both knees, unspecified osteoarthritis type   Other Visit Diagnoses     Pain, dental    -  Primary    Pain in joint involving ankle and foot, right        Relevant Orders    XR Joint Injection Intermed Right    Other chronic pain        Chronic pain of both shoulders             1. Dental infection - amoxicillin sent.  Patient has dental appointment scheduled.   2. Oxycodone refill for remainder of previous partial Rx fill.   3. Injection ordered for right ankle - patient has previously had this done at [facility].              BMI:   Estimated body mass index is 47.89 kg/m  as calculated from the following:    Height as of 12/6/22: 1.626 m (5' 4\").    Weight as of 9/26/22: 126.6 kg (279 lb).   Weight management plan: Discussed healthy diet and exercise guidelines        Mary Carmen Chaudhry PA-C  [facility]   Christina is a 53 year old, presenting for the following health issues:  Consult             View : No data to display.              HPI     Consult-patient would like antibiotics    PATIENT DECLINED PHQ9    Cab did not show up for transportation to her visit earlier this week.   Patient has PCAs coming in the house to help her, and her son has now moved out of state.   She has another dental infection.           Review of Systems         Objective           Vitals:  No vitals were obtained today due to virtual visit.    Physical Exam   healthy, alert and no distress  PSYCH: Alert and oriented times 3; coherent speech, normal   rate and volume, able to articulate logical thoughts, able   to abstract reason, " no tangential thoughts, no hallucinations   or delusions  Her affect is normal  RESP: No cough, no audible wheezing, able to talk in full sentences  Remainder of exam unable to be completed due to telephone visits                Phone call duration: 12 minutes

## 2023-04-22 ENCOUNTER — HEALTH MAINTENANCE LETTER (OUTPATIENT)
Age: 53
End: 2023-04-22

## 2023-04-27 ENCOUNTER — TELEPHONE (OUTPATIENT)
Dept: FAMILY MEDICINE | Facility: CLINIC | Age: 53
End: 2023-04-27
Payer: MEDICARE

## 2023-04-27 DIAGNOSIS — G89.29 OTHER CHRONIC PAIN: ICD-10-CM

## 2023-04-27 DIAGNOSIS — M25.511 CHRONIC PAIN OF BOTH SHOULDERS: ICD-10-CM

## 2023-04-27 DIAGNOSIS — G89.29 CHRONIC PAIN OF BOTH SHOULDERS: ICD-10-CM

## 2023-04-27 DIAGNOSIS — M17.0 OSTEOARTHRITIS OF BOTH KNEES, UNSPECIFIED OSTEOARTHRITIS TYPE: ICD-10-CM

## 2023-04-27 DIAGNOSIS — M25.512 CHRONIC PAIN OF BOTH SHOULDERS: ICD-10-CM

## 2023-04-27 NOTE — TELEPHONE ENCOUNTER
Pt called states she is not in [place] - [place] sent to [place] pharmacy     Cannot transfer script due to this is a controlled substance    [place]     Do not see any notes regarding that in her chart     oxyCODONE (ROXICODONE) 5 MG tablet 46 tablet 0 4/20/2023  No   Sig - Route: Take 2 tablets (10 mg) by mouth every 6 hours as needed for pain - Oral   Sent to pharmacy as: oxyCODONE HCl 5 MG Oral Tablet (ROXICODONE)   Class: E-Prescribe   Earliest Fill Date: 4/20/2023   Notes to Pharmacy: Patient was only able to  a partial prescription last fill.  Please fill remainder (46) tabs.   Order: [id number]   E-Prescribing Status: Receipt confirmed by pharmacy (4/20/2023  1:22 PM CDT)     Pharmacy    [facility] #[id number] - [place], [place] - [address] AT [facility] [address]     Nancy Garcia RN  [facility]

## 2023-04-28 RX ORDER — OXYCODONE HYDROCHLORIDE 5 MG/1
10 TABLET ORAL EVERY 6 HOURS PRN
Qty: 46 TABLET | Refills: 0 | Status: SHIPPED | OUTPATIENT
Start: 2023-04-28 | End: 2023-05-09

## 2023-04-28 NOTE — TELEPHONE ENCOUNTER
Patient is not in [place]. [place] that [place] is in [place] and wants to go to the [facility] today.    Called [facility] in [place] to cancel 4/20/23 roxicodone. Spoke with Vickie HIRSCH to cancel.    Medication and pharmacy pended.     Estephania Noble RN

## 2023-04-28 NOTE — TELEPHONE ENCOUNTER
Please clarify -- note states [place]? Oxycodone was sent to [place] in [place] on 4/20/2023 -- does patient need it sent to a different pharmacy.

## 2023-05-02 ENCOUNTER — OFFICE VISIT (OUTPATIENT)
Dept: FAMILY MEDICINE | Facility: CLINIC | Age: 53
End: 2023-05-02
Payer: MEDICARE

## 2023-05-02 VITALS
RESPIRATION RATE: 22 BRPM | SYSTOLIC BLOOD PRESSURE: 104 MMHG | OXYGEN SATURATION: 94 % | HEART RATE: 99 BPM | TEMPERATURE: 97 F | DIASTOLIC BLOOD PRESSURE: 74 MMHG

## 2023-05-02 DIAGNOSIS — M17.11 PRIMARY OSTEOARTHRITIS OF RIGHT KNEE: Primary | ICD-10-CM

## 2023-05-02 PROCEDURE — 20610 DRAIN/INJ JOINT/BURSA W/O US: CPT | Mod: RT | Performed by: PHYSICIAN ASSISTANT

## 2023-05-02 RX ORDER — TRIAMCINOLONE ACETONIDE 40 MG/ML
40 INJECTION, SUSPENSION INTRA-ARTICULAR; INTRAMUSCULAR ONCE
Status: COMPLETED | OUTPATIENT
Start: 2023-05-02 | End: 2023-05-02

## 2023-05-02 RX ADMIN — TRIAMCINOLONE ACETONIDE 40 MG: 40 INJECTION, SUSPENSION INTRA-ARTICULAR; INTRAMUSCULAR at 11:39

## 2023-05-02 ASSESSMENT — PAIN SCALES - GENERAL: PAINLEVEL: NO PAIN (0)

## 2023-05-02 ASSESSMENT — PATIENT HEALTH QUESTIONNAIRE - PHQ9
10. IF YOU CHECKED OFF ANY PROBLEMS, HOW DIFFICULT HAVE THESE PROBLEMS MADE IT FOR YOU TO DO YOUR WORK, TAKE CARE OF THINGS AT HOME, OR GET ALONG WITH OTHER PEOPLE: SOMEWHAT DIFFICULT
SUM OF ALL RESPONSES TO PHQ QUESTIONS 1-9: 9
SUM OF ALL RESPONSES TO PHQ QUESTIONS 1-9: 9

## 2023-05-02 NOTE — PROGRESS NOTES
"  Assessment & Plan   Problem List Items Addressed This Visit        Musculoskeletal and Integumentary    OA (osteoarthritis) of knee - Primary    Relevant Medications    triamcinolone (KENALOG-40) injection 40 mg (Completed) (Start on 5/2/2023 12:00 PM)    Other Relevant Orders    DRAIN/INJECT LARGE JOINT/BURSA (Completed)      PROCEDURE NOTE:  Right knee was cleaned with betadine then wiped away with alcohol.    40 mg Kenalog and 5 cc of 1 percent lidocaine was injected into the right knee using a lateral approach.   Appropriate wound care dressing applied.    Patient tolerated the procedure well.             BMI:   Estimated body mass index is 47.89 kg/m  as calculated from the following:    Height as of 12/6/22: 1.626 m (5' 4\").    Weight as of 9/26/22: 126.6 kg (279 lb).   Weight management plan: continue dietary changes and semaglutide        Mary Carmen Chaudhry PA-C  [facility] KATERINA Vasquez is a 53 year old, presenting for the following health issues:  Follow Up (Cortisone shot)        5/2/2023    11:03 AM   Additional Questions   Roomed by Alissa ROSE     History of Present Illness       Reason for visit:  Physical    She eats 0-1 servings of fruits and vegetables daily.She consumes 1 sweetened beverage(s) daily.She exercises with enough effort to increase her heart rate 9 or less minutes per day.  She exercises with enough effort to increase her heart rate 3 or less days per week.   She is taking medications regularly.    Today's PHQ-9         PHQ-9 Total Score: 9    PHQ-9 Q9 Thoughts of better off dead/self-harm past 2 weeks :   Not at all    How difficult have these problems made it for you to do your work, take care of things at home, or get along with other people: Somewhat difficult         Cannot walk without assistance   High risk of falling  Severe bilateral knee pain  Advanced osteoarthritis right knee  Requires assistance for bathing, meal prep, " transfers  Patient has a home health aide daily   Cannot drive              Review of Systems         Objective    /74   Pulse 99   Temp 97  F (36.1  C) (Temporal)   Resp 22   SpO2 94%   There is no height or weight on file to calculate BMI.  Physical Exam  Constitutional:       General: She is not in acute distress.     Appearance: She is well-developed. She is not diaphoretic.   HENT:      Head: Normocephalic.      Right Ear: External ear normal.      Left Ear: External ear normal.      Nose: Nose normal.   Eyes:      Conjunctiva/sclera: Conjunctivae normal.   Pulmonary:      Effort: Pulmonary effort is normal.   Musculoskeletal:      Cervical back: Normal range of motion.   Neurological:      Mental Status: She is alert and oriented to person, place, and time.   Psychiatric:         Judgment: Judgment normal.

## 2023-05-09 DIAGNOSIS — G89.29 CHRONIC PAIN OF BOTH SHOULDERS: ICD-10-CM

## 2023-05-09 DIAGNOSIS — M25.512 CHRONIC PAIN OF BOTH SHOULDERS: ICD-10-CM

## 2023-05-09 DIAGNOSIS — G89.29 OTHER CHRONIC PAIN: ICD-10-CM

## 2023-05-09 DIAGNOSIS — M25.511 CHRONIC PAIN OF BOTH SHOULDERS: ICD-10-CM

## 2023-05-09 DIAGNOSIS — M17.0 OSTEOARTHRITIS OF BOTH KNEES, UNSPECIFIED OSTEOARTHRITIS TYPE: ICD-10-CM

## 2023-05-09 RX ORDER — OXYCODONE HYDROCHLORIDE 5 MG/1
10 TABLET ORAL EVERY 6 HOURS PRN
Qty: 46 TABLET | Refills: 0 | Status: SHIPPED | OUTPATIENT
Start: 2023-05-09 | End: 2023-05-11

## 2023-05-09 NOTE — TELEPHONE ENCOUNTER
Patient called and stated that she needs a refill of her oxycodone. Patient requested that it be sent high priority. Medication and pharmacy is pended.    Francisca JUNG RN  [facility] Triage Team

## 2023-05-10 ENCOUNTER — NURSE TRIAGE (OUTPATIENT)
Dept: NURSING | Facility: CLINIC | Age: 53
End: 2023-05-10
Payer: MEDICARE

## 2023-05-10 ENCOUNTER — TELEPHONE (OUTPATIENT)
Dept: NURSING | Facility: CLINIC | Age: 53
End: 2023-05-10
Payer: MEDICARE

## 2023-05-10 DIAGNOSIS — M17.0 OSTEOARTHRITIS OF BOTH KNEES, UNSPECIFIED OSTEOARTHRITIS TYPE: ICD-10-CM

## 2023-05-10 DIAGNOSIS — G89.29 OTHER CHRONIC PAIN: ICD-10-CM

## 2023-05-10 DIAGNOSIS — G89.29 CHRONIC PAIN OF BOTH SHOULDERS: ICD-10-CM

## 2023-05-10 DIAGNOSIS — M25.512 CHRONIC PAIN OF BOTH SHOULDERS: ICD-10-CM

## 2023-05-10 DIAGNOSIS — M25.511 CHRONIC PAIN OF BOTH SHOULDERS: ICD-10-CM

## 2023-05-10 RX ORDER — OXYCODONE HYDROCHLORIDE 5 MG/1
10 TABLET ORAL EVERY 6 HOURS PRN
Qty: 46 TABLET | Refills: 0 | Status: CANCELLED | OUTPATIENT
Start: 2023-05-10

## 2023-05-10 NOTE — TELEPHONE ENCOUNTER
Nurse Triage SBAR    Is this a 2nd Level Triage? NO    Situation: Pt called wanting her prescription sent to another pharmacy.    Background: Pt is needing a refill of her oxycodone.    Assessment: Christina called earlier today but medication was again sent to the wrong location. She is needing it sent to [facility] #[id number] - [address] [facility]    Recommendation: Please review and fix ASAP as she is currently out.    Arlene Senior RN  [facility] Nurse Advisor   5/10/2023  5:41 PM

## 2023-05-10 NOTE — TELEPHONE ENCOUNTER
Patient calling back to say she wasn't able to send a MyChart message. Patient said it was sent to the wrong pharmacy twice today.  Reassured patient a high priority note was sent to provider regarding her Oxycodone refill request to be sent to correct pharmacy.      Dionna Hernandez RN  [place] [facility]

## 2023-05-10 NOTE — TELEPHONE ENCOUNTER
Pt stating [facility] #[id number] - [place], [place] - [address] AT [address] & [address] refused to refill pt's oxycodone due to order is not from a doctor.   Pharmacist was new to that location.     Pt requesting to send refill to a different pharmacy [phone] [facility] [address] [facility], [place], [place].    Pt stating last dose last night 5/09/2023. Pt is out of medication.    Routing to provider as high priority.

## 2023-05-11 RX ORDER — OXYCODONE HYDROCHLORIDE 5 MG/1
10 TABLET ORAL EVERY 6 HOURS PRN
Qty: 240 TABLET | Refills: 0 | Status: SHIPPED | OUTPATIENT
Start: 2023-05-11 | End: 2023-06-05

## 2023-05-11 RX ORDER — OXYCODONE HYDROCHLORIDE 5 MG/1
10 TABLET ORAL EVERY 6 HOURS PRN
Qty: 46 TABLET | Refills: 0 | Status: SHIPPED | OUTPATIENT
Start: 2023-05-11 | End: 2023-05-11

## 2023-05-11 NOTE — TELEPHONE ENCOUNTER
Left message with secure voicemail to inform patient of RX status.     Vidhya Jin RN  [facility]

## 2023-05-11 NOTE — TELEPHONE ENCOUNTER
Spoke to pharmacy and they understand to cancel the 46 tables of oxycodone and fill the new RX of 240.     Vidhya Jin RN  [facility]

## 2023-05-11 NOTE — TELEPHONE ENCOUNTER
Received call from Christine on [address] [facility] [address] [place] calling to confirm 2 things:    1. Active RX for both this location and Christine [address] [facility] [address]. Writer cancelled RX for Christine on [address] in [facility] per note below.     2. Pharmacy noted that OX RX was only for #46 when pt typically received #240.  Wanted to confirm that correct dosage was sent to pharmacy.      Please update.    Narcisa ROSE RN  [facility] Triage Team

## 2023-05-11 NOTE — TELEPHONE ENCOUNTER
Rx changed to #240 - please call and confirm this is the correct Rx with pharmacist    Davis Chaudhry PA-C

## 2023-05-21 ENCOUNTER — NURSE TRIAGE (OUTPATIENT)
Dept: NURSING | Facility: CLINIC | Age: 53
End: 2023-05-21
Payer: MEDICARE

## 2023-05-21 NOTE — TELEPHONE ENCOUNTER
Pharmacist called for clarification on Oxycodone RX.     On 5/11 patient was e-scribed 240 tablets of Oxycodone.   Pharmacist also has another e-scribed RX for 46 tablets of Oxycodone sent on 5/11.     Writer does not find the e-scribed RX for 46 pills on the medication list.  Patient is not out of Oxycodone.  Wants the 46 tablets filled today.    Writer did not page on call provider because patient is not out of medication. Writer will send message to PCP Mary Carmen An RN, [facility] Triage Nurse Advisor      Reason for Disposition    Caller requesting a CONTROLLED substance prescription refill (e.g., narcotics, ADHD medicines)    Protocols used: MEDICATION REFILL AND RENEWAL CALL-A-

## 2023-05-23 NOTE — TELEPHONE ENCOUNTER
Patient should get 240 pills per month.   Please discontinue 46 pill Rx.      Davis Chaudhry PA-C

## 2023-05-25 NOTE — TELEPHONE ENCOUNTER
Attempted to contact pharmacy to inform, was on hold for a while. Will need to contact again.    Nicole FRENCH CMA

## 2023-06-01 ENCOUNTER — MYC MEDICAL ADVICE (OUTPATIENT)
Dept: FAMILY MEDICINE | Facility: CLINIC | Age: 53
End: 2023-06-01
Payer: MEDICARE

## 2023-06-01 DIAGNOSIS — Z83.3 FAMILY HISTORY OF DIABETES MELLITUS: ICD-10-CM

## 2023-06-01 RX ORDER — SEMAGLUTIDE 2.68 MG/ML
2 INJECTION, SOLUTION SUBCUTANEOUS
Qty: 18 ML | Refills: 0 | Status: SHIPPED | OUTPATIENT
Start: 2023-06-01 | End: 2023-06-05

## 2023-06-01 NOTE — TELEPHONE ENCOUNTER
Rx sent  Patient is due for labs  Please have patient schedule lab only appointment in the next 4 weeks.     Davis Chaudhry PA-C

## 2023-06-05 DIAGNOSIS — M25.511 CHRONIC PAIN OF BOTH SHOULDERS: ICD-10-CM

## 2023-06-05 DIAGNOSIS — M17.0 OSTEOARTHRITIS OF BOTH KNEES, UNSPECIFIED OSTEOARTHRITIS TYPE: ICD-10-CM

## 2023-06-05 DIAGNOSIS — G89.29 CHRONIC PAIN OF BOTH SHOULDERS: ICD-10-CM

## 2023-06-05 DIAGNOSIS — G89.29 OTHER CHRONIC PAIN: ICD-10-CM

## 2023-06-05 DIAGNOSIS — M25.512 CHRONIC PAIN OF BOTH SHOULDERS: ICD-10-CM

## 2023-06-05 RX ORDER — SEMAGLUTIDE 2.68 MG/ML
2 INJECTION, SOLUTION SUBCUTANEOUS
Qty: 18 ML | Refills: 0 | Status: SHIPPED | OUTPATIENT
Start: 2023-06-05 | End: 2023-06-26

## 2023-06-05 RX ORDER — OXYCODONE HYDROCHLORIDE 5 MG/1
10 TABLET ORAL EVERY 6 HOURS PRN
Qty: 240 TABLET | Refills: 0 | Status: SHIPPED | OUTPATIENT
Start: 2023-06-05 | End: 2023-06-07

## 2023-06-05 RX ORDER — SEMAGLUTIDE 1.34 MG/ML
INJECTION, SOLUTION SUBCUTANEOUS
Qty: 18 ML | Refills: 0 | Status: CANCELLED | OUTPATIENT
Start: 2023-06-05

## 2023-06-05 NOTE — TELEPHONE ENCOUNTER
"Pt called the clinic requesting an appt with PCP. Pt stated \"I am supposed to see her before she leaves\".      Scheduled pt for 6/26/23 appt.     Pt asked for a refill on Oxycodone and Ozempic to be sent to [facility] (confirmed pharmacy with pt).     Pt stated she did not order Ozempic through [facility] [employer] [facility] service where the last prescription was sent on 6/1/123.     Pt asked this be sent HP so she can pick it up tomorrow.     Routing to PCP to review and advise   "

## 2023-06-06 NOTE — TELEPHONE ENCOUNTER
MyChart reply sent to the patient. Estephania Noble RN;   Breath sounds clear and equal bilaterally.

## 2023-06-07 ENCOUNTER — TELEPHONE (OUTPATIENT)
Dept: FAMILY MEDICINE | Facility: CLINIC | Age: 53
End: 2023-06-07
Payer: MEDICARE

## 2023-06-07 ENCOUNTER — MYC MEDICAL ADVICE (OUTPATIENT)
Dept: FAMILY MEDICINE | Facility: CLINIC | Age: 53
End: 2023-06-07
Payer: MEDICARE

## 2023-06-07 DIAGNOSIS — M25.511 CHRONIC PAIN OF BOTH SHOULDERS: ICD-10-CM

## 2023-06-07 DIAGNOSIS — G89.29 OTHER CHRONIC PAIN: ICD-10-CM

## 2023-06-07 DIAGNOSIS — M25.512 CHRONIC PAIN OF BOTH SHOULDERS: ICD-10-CM

## 2023-06-07 DIAGNOSIS — G89.29 CHRONIC PAIN OF BOTH SHOULDERS: ICD-10-CM

## 2023-06-07 DIAGNOSIS — M17.0 OSTEOARTHRITIS OF BOTH KNEES, UNSPECIFIED OSTEOARTHRITIS TYPE: ICD-10-CM

## 2023-06-07 NOTE — TELEPHONE ENCOUNTER
Patient calling again and wondering if her refill can be approved.     Vidhya Jin RN  [facility]

## 2023-06-07 NOTE — TELEPHONE ENCOUNTER
3rd attempt. Was on hold for 3+ minutes. Will need to attempt again regarding medication.    Nicole FRENCH CMA

## 2023-06-07 NOTE — TELEPHONE ENCOUNTER
Pharmacist called the patient this am, they gave her 30 tablets so far and PCP has to approve the other 210.       Spoke to pharmacy is [place], confirmed that 30 tablets was picked up 6/7/2023 and another 210 has to be re approved by PCP.       Vidhya Jin RN  [facility]

## 2023-06-07 NOTE — TELEPHONE ENCOUNTER
Pt calling back about refill status for Oxycodone. Routing to provider.     Pt verbalized she was upset that refills have been taking longer than normal.

## 2023-06-08 RX ORDER — OXYCODONE HYDROCHLORIDE 5 MG/1
10 TABLET ORAL EVERY 6 HOURS PRN
Qty: 210 TABLET | Refills: 0 | Status: SHIPPED | OUTPATIENT
Start: 2023-06-08 | End: 2023-06-26

## 2023-06-08 NOTE — TELEPHONE ENCOUNTER
Duplicate request. Refills given in separate encounter.    Narcisa ROSE RN  [facility] Triage Team

## 2023-06-08 NOTE — TELEPHONE ENCOUNTER
Refill sent.     Please contact patient- she will need to establish care with a new PCP in 3 weeks - I will no longer be refilling after 6/28/23    Davis Chaudhry PA-C

## 2023-06-08 NOTE — TELEPHONE ENCOUNTER
Pt called requesting update on refill. Pt given PCP message below. Pt verbalized understanding.     Narcisa ROSE RN  [facility] Triage Team

## 2023-06-26 ENCOUNTER — TELEPHONE (OUTPATIENT)
Dept: FAMILY MEDICINE | Facility: CLINIC | Age: 53
End: 2023-06-26

## 2023-06-26 DIAGNOSIS — M17.0 OSTEOARTHRITIS OF BOTH KNEES, UNSPECIFIED OSTEOARTHRITIS TYPE: Primary | ICD-10-CM

## 2023-06-26 DIAGNOSIS — E78.5 HYPERLIPIDEMIA LDL GOAL <130: ICD-10-CM

## 2023-06-26 DIAGNOSIS — Z96.652 S/P TOTAL KNEE ARTHROPLASTY, LEFT: ICD-10-CM

## 2023-06-26 DIAGNOSIS — G89.29 CHRONIC PAIN OF BOTH SHOULDERS: ICD-10-CM

## 2023-06-26 DIAGNOSIS — I10 ESSENTIAL HYPERTENSION WITH GOAL BLOOD PRESSURE LESS THAN 130/80: ICD-10-CM

## 2023-06-26 DIAGNOSIS — G89.29 OTHER CHRONIC PAIN: ICD-10-CM

## 2023-06-26 DIAGNOSIS — M25.511 CHRONIC PAIN OF BOTH SHOULDERS: ICD-10-CM

## 2023-06-26 DIAGNOSIS — Z86.711 HISTORY OF PULMONARY EMBOLISM: ICD-10-CM

## 2023-06-26 DIAGNOSIS — M25.512 CHRONIC PAIN OF BOTH SHOULDERS: ICD-10-CM

## 2023-06-26 RX ORDER — LOSARTAN POTASSIUM 25 MG/1
25 TABLET ORAL DAILY
Qty: 90 TABLET | Refills: 1 | Status: SHIPPED | OUTPATIENT
Start: 2023-06-26 | End: 2023-09-11

## 2023-06-26 RX ORDER — SEMAGLUTIDE 2.68 MG/ML
2 INJECTION, SOLUTION SUBCUTANEOUS
Qty: 18 ML | Refills: 0 | Status: SHIPPED | OUTPATIENT
Start: 2023-06-26 | End: 2023-10-27

## 2023-06-26 RX ORDER — ROSUVASTATIN CALCIUM 20 MG/1
20 TABLET, COATED ORAL DAILY
Qty: 90 TABLET | Refills: 1 | Status: SHIPPED | OUTPATIENT
Start: 2023-06-26 | End: 2023-12-08

## 2023-06-26 RX ORDER — OXYCODONE HYDROCHLORIDE 5 MG/1
10 TABLET ORAL EVERY 6 HOURS PRN
Qty: 240 TABLET | Refills: 0 | Status: SHIPPED | OUTPATIENT
Start: 2023-06-29 | End: 2024-03-29

## 2023-06-26 NOTE — TELEPHONE ENCOUNTER
Spoke with patient and relayed all information from provider.    Patient acknowledges and understands, no further questions.    Nicole FRENCH CMA

## 2023-06-26 NOTE — TELEPHONE ENCOUNTER
Please inform patient:    1.  I have sent one final Rx for oxycodone.  She should discuss a plan for her pain medication with [facility].  Please have her tell them her PCP is leaving the practice and she needs to establish care for pain management moving forward. She has seen them for pain management in the past.   I have placed a new referral for [facility] if needed.     2. She will likely need to see orthopedics for the knee injections.  I will place a referral to [facility] - soonest she can repeat the knee injection is August 2.     3. I reviewed other medications and sent over enough refills for 6 months.  If she runs out, she may contact our clinic until she is able to establish care with a new PCP.     4. I will place on order for follow up with a new PCP.     I cannot accept and gifts from patients.     Davis Chaudhry PA-C

## 2023-06-26 NOTE — TELEPHONE ENCOUNTER
Spoke with patient and informed of provider's message.    At this time patient is requesting a call from provider to discuss further action on medication refills.  - She is not sure who she should see. She is requesting a provider that does joint injections. She will take a look and then schedule.    Patient is requesting refill requests. Informed patient that after 06/28 she will not be refilling anything. Patient states she was not aware of this. Please see encounter dated 06/08 where RN informed patient.     Patient is wondering where she should send a gift for Davis to.    Nicole FRENCH CMA

## 2023-06-26 NOTE — TELEPHONE ENCOUNTER
Patient is calling and stated she had to cancel her appointment today with PCP because her PCA worker who was going to drive her was unable to take the car because her  took it this morning.  Patient stated with the available medical transportation, they all need 24 hour notice to try and schedule a ride so she would not be able to make her appointment today.      Patient stated she is requesting a message be sent to PCP to be worked into her schedule being this is PCP's last week at the clinic.  Patient is requesting a call as soon as possible so she can schedule transportation to the clinic.    Will forward to PCP for review and request to work into the schedule this week.    Meera Roberson RN

## 2023-06-26 NOTE — TELEPHONE ENCOUNTER
Unfortunately today is my final day seeing patients, she will need to reschedule with a different provider.     Davis Chaudhry PA-C

## 2023-07-12 ENCOUNTER — TELEPHONE (OUTPATIENT)
Dept: FAMILY MEDICINE | Facility: CLINIC | Age: 53
End: 2023-07-12
Payer: MEDICARE

## 2023-07-12 NOTE — TELEPHONE ENCOUNTER
Pt called     1) [facility] did not previously receive her referral for pain management. Faxed referral to [facility] through [facility]     2) requesting new PCP to establish care, scheduled:     Appointments in Next Year    Jul 17, 2023  5:30 PM  (Arrive by 5:10 PM)  Provider Visit with Mary Herrera PA-C  [facility] ([facility] ) [phone]        Christine CONNER, Triage RN  [facility]

## 2023-08-24 ENCOUNTER — TELEPHONE (OUTPATIENT)
Dept: FAMILY MEDICINE | Facility: CLINIC | Age: 53
End: 2023-08-24
Payer: MEDICARE

## 2023-08-24 NOTE — TELEPHONE ENCOUNTER
Forms/Letter Request    Type of form/letter: Incontinence Supply Order Prescription - (  Popalee kilgore prevail,  Tonya sepulveda    Have you been seen for this request: N/A    Do we have the form/letter: Yes:     Who is the form from? [employer]    (if other please explain)    Where did/will the form come from? form was faxed in    When is form/letter needed by: When able     How would you like the form/letter returned: [phone]     Placed in red folder & put on Mary's desk.   Christine Estrada

## 2023-08-25 ENCOUNTER — MEDICAL CORRESPONDENCE (OUTPATIENT)
Dept: HEALTH INFORMATION MANAGEMENT | Facility: CLINIC | Age: 53
End: 2023-08-25
Payer: MEDICARE

## 2023-08-25 NOTE — TELEPHONE ENCOUNTER
Form faxed to [facility] at [phone].   Form faxed to [facility] and filed Team 1
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
## 2023-09-11 DIAGNOSIS — I10 ESSENTIAL HYPERTENSION WITH GOAL BLOOD PRESSURE LESS THAN 130/80: ICD-10-CM

## 2023-09-11 RX ORDER — LOSARTAN POTASSIUM 25 MG/1
25 TABLET ORAL DAILY
Qty: 30 TABLET | Refills: 0 | Status: SHIPPED | OUTPATIENT
Start: 2023-09-11 | End: 2023-10-03

## 2023-09-11 NOTE — TELEPHONE ENCOUNTER
Spoke to pt, she will call back to est care with new provider  She also mentioned [facility] has not reached out.   Follow up when referral is sent.  1st attempt     Alissa LORD  Visit Facilitator

## 2023-09-11 NOTE — TELEPHONE ENCOUNTER
Due for office visit, only 1 month supply submitted.    Please call and inform pt to schedule Office visit for a med check. Fitterer patient, needs to establish care with new PCP

## 2023-09-12 ENCOUNTER — TELEPHONE (OUTPATIENT)
Dept: FAMILY MEDICINE | Facility: CLINIC | Age: 53
End: 2023-09-12
Payer: MEDICARE

## 2023-09-12 NOTE — TELEPHONE ENCOUNTER
[facility] in [place] called back and said that they have called the patient many times, she has not called them back to schedule. Messaging the patient by my chart

## 2023-09-13 NOTE — TELEPHONE ENCOUNTER
"Pain referral placed 06/26.    Location: [facility]  Phone: [phone]    Per note in referral by Kacey Luna: \"Faxed to [phone]\".    Informed patient via MYC regarding who to contact to schedule.    Nicole FRENCH [facility]    "

## 2023-10-03 DIAGNOSIS — I10 ESSENTIAL HYPERTENSION WITH GOAL BLOOD PRESSURE LESS THAN 130/80: ICD-10-CM

## 2023-10-04 RX ORDER — LOSARTAN POTASSIUM 25 MG/1
25 TABLET ORAL DAILY
Qty: 14 TABLET | Refills: 0 | Status: SHIPPED | OUTPATIENT
Start: 2023-10-04 | End: 2024-06-18

## 2023-10-04 NOTE — TELEPHONE ENCOUNTER
Due for office visit, only 2 weeks supply submitted. No further refills will be provided unless she schedules an appointment    Please call and inform pt to schedule Office visit for a med check. Fitterer patient, needs to establish care with new PCP

## 2023-10-10 NOTE — TELEPHONE ENCOUNTER
Pt has read [employer] message-  no appointment has been made.     Kanika Costa RN on 10/10/2023 at 11:53 AM

## 2023-10-23 ENCOUNTER — TELEPHONE (OUTPATIENT)
Dept: FAMILY MEDICINE | Facility: CLINIC | Age: 53
End: 2023-10-23
Payer: MEDICARE

## 2023-10-23 DIAGNOSIS — M25.50 PAIN IN JOINT: ICD-10-CM

## 2023-10-24 DIAGNOSIS — I42.9 CARDIOMYOPATHY, UNSPECIFIED TYPE (H): ICD-10-CM

## 2023-10-24 RX ORDER — FUROSEMIDE 20 MG
TABLET ORAL
Qty: 30 TABLET | Refills: 0 | Status: SHIPPED | OUTPATIENT
Start: 2023-10-24 | End: 2024-05-16

## 2023-10-24 NOTE — TELEPHONE ENCOUNTER
Fax from Pharmacy:  [email or web address], 1 MG/DOSE, 4 MG/3ML SOPN   THIS MEDICATION IS CURRENTLY ON  backorder. Can you send an ALTERNATIVE?    Pt previously established with Davis Chaudhry. Pt had a VV with Mary Herrera to establish care, but was a no-show. Appt has not been rescheduled.     Called pt to assist with scheduling. Telephone visit scheduled for Friday, 10/27/23 at 4pm to establish care with Mary Herrera. Pt notes that she would like to set up an in-person appt following the telephone VV. Please send alternative to pharmacy if appropriate.    Ele Giron,  [facility]

## 2023-10-27 ENCOUNTER — VIRTUAL VISIT (OUTPATIENT)
Dept: FAMILY MEDICINE | Facility: CLINIC | Age: 53
End: 2023-10-27
Payer: MEDICARE

## 2023-10-27 DIAGNOSIS — M25.512 CHRONIC PAIN OF BOTH SHOULDERS: ICD-10-CM

## 2023-10-27 DIAGNOSIS — G89.29 OTHER CHRONIC PAIN: ICD-10-CM

## 2023-10-27 DIAGNOSIS — F33.1 MAJOR DEPRESSIVE DISORDER, RECURRENT EPISODE, MODERATE (H): ICD-10-CM

## 2023-10-27 DIAGNOSIS — M25.511 CHRONIC PAIN OF BOTH SHOULDERS: ICD-10-CM

## 2023-10-27 DIAGNOSIS — G89.29 CHRONIC PAIN OF BOTH SHOULDERS: ICD-10-CM

## 2023-10-27 DIAGNOSIS — M17.0 OSTEOARTHRITIS OF BOTH KNEES, UNSPECIFIED OSTEOARTHRITIS TYPE: ICD-10-CM

## 2023-10-27 DIAGNOSIS — F41.1 GENERALIZED ANXIETY DISORDER: ICD-10-CM

## 2023-10-27 PROCEDURE — 99442 PR PHYSICIAN TELEPHONE EVALUATION 11-20 MIN: CPT | Mod: 95 | Performed by: PHYSICIAN ASSISTANT

## 2023-10-27 RX ORDER — BUPROPION HYDROCHLORIDE 300 MG/1
300 TABLET ORAL EVERY MORNING
Qty: 90 TABLET | Refills: 1 | Status: SHIPPED | OUTPATIENT
Start: 2023-10-27 | End: 2024-05-16

## 2023-10-27 RX ORDER — SEMAGLUTIDE 2.68 MG/ML
2 INJECTION, SOLUTION SUBCUTANEOUS
Qty: 18 ML | Refills: 0 | Status: SHIPPED | OUTPATIENT
Start: 2023-10-27 | End: 2024-03-21

## 2023-10-27 RX ORDER — LORAZEPAM 0.5 MG/1
0.5 TABLET ORAL 2 TIMES DAILY PRN
Qty: 20 TABLET | Refills: 0 | Status: SHIPPED | OUTPATIENT
Start: 2023-10-27

## 2023-10-27 ASSESSMENT — PATIENT HEALTH QUESTIONNAIRE - PHQ9
SUM OF ALL RESPONSES TO PHQ QUESTIONS 1-9: 12
SUM OF ALL RESPONSES TO PHQ QUESTIONS 1-9: 12

## 2023-10-27 NOTE — PROGRESS NOTES
Christina is a 53 year old who is being evaluated via a billable telephone visit.      What phone number would you like to be contacted at? [phone]  How would you like to obtain your AVS? Frank  Distant Location (provider location):  On-site    Assessment & Plan     BMI 50.0-59.9, adult (H)  Has been working well and helpful for her weight loss. Prescription sent to  in [place] per patient preference and out of stock at her usual [facility].   - Semaglutide, 2 MG/DOSE, (OZEMPIC, 2 MG/DOSE,) 8 MG/3ML pen  Dispense: 18 mL; Refill: 0    Major depressive disorder, recurrent episode, moderate (H)  Increase in depression lately, will increase wellbutrin to 300 mg daily.   - buPROPion (WELLBUTRIN XL) 300 MG 24 hr tablet  Dispense: 90 tablet; Refill: 1    Generalized anxiety disorder  Uses lorazepam sparingly, 20 tabs lasts her nearly a year. Will refill to continue to use sparingly as needed.   - LORazepam (ATIVAN) 0.5 MG tablet  Dispense: 20 tablet; Refill: 0    Osteoarthritis of both knees, unspecified osteoarthritis type  Other chronic pain  Chronic pain of both shoulders  Referred to [facility] by previous PCP but patient has not heard from them regarding scheduling appointment     Follow up next 2-3 months for lab work and annual exam     JACQUIE Rodriguez [facility]   Christina is a 53 year old, presenting for the following health issues:  Establish Care        10/27/2023     3:31 PM   Additional Questions   Roomed by Woo   Accompanied by N/A       History of Present Illness       Reason for visit:  Establish Care      Previously on oxycodone for bilateral knee OA, bilateral shoulder pain, was recommended to re-establish with [facility] who patient has worked with before, she has not gotten a call from them yet. Referred to ortho for knee injections as well.     Reviewed PMH:  History gastric bypass many years ago  History of bilateral PE on xarelto  CHF  Hx  of PE - on xarelto lifelong   Hyperlipidemia - well controlled on statin  Hypertension - controlled on losartan and metoprolol   MDD - daughter passed away and continues to grieve, feels more depressed lately   KAROLINA on CPEP       Review of Systems   Constitutional, HEENT, cardiovascular, pulmonary, gi and gu systems are negative, except as otherwise noted.      Objective         Vitals:  No vitals were obtained today due to virtual visit.    Physical Exam   alert and no distress  PSYCH: Alert and oriented times 3; coherent speech, normal   rate and volume, able to articulate logical thoughts, able   to abstract reason, no tangential thoughts, no hallucinations   or delusions  Her affect is normal  RESP: No cough, no audible wheezing, able to talk in full sentences  Remainder of exam unable to be completed due to telephone visits          Start: 4:00 PM   End: 4:15 PM   Phone call duration: 15 minutes

## 2023-11-06 ENCOUNTER — TELEPHONE (OUTPATIENT)
Dept: FAMILY MEDICINE | Facility: CLINIC | Age: 53
End: 2023-11-06
Payer: MEDICARE

## 2023-11-06 NOTE — TELEPHONE ENCOUNTER
Spoke with pt and she stated understanding. Pt has tried calling [facility] pain clinic was on hold for over 2 hours and unable to get a hold of anyone. Pt will keep trying.     Patient reports that she has taking gabapentin before but does not like to take it and dot not want this. Pt is asking if there are any other options for her until she can get into the pain clinic. Pt states pains are getting bad and is using the walker to get around.     Pt plans to schedule AWV soon.     Pt would like a phone call back.     Velma Ball RN

## 2023-11-06 NOTE — TELEPHONE ENCOUNTER
Unfortunately I am not able to prescribe any narcotics at this time. I would recommend she call [facility] to schedule that appointment vs waiting for them to call her - their phone number is [phone]     I would be willing to send in a prescription for gabapentin if she would like? It looks like she has been on this in the past, but it's been a couple years since it was last prescribed.     Please also remind her to schedule an in person appointment with me in the next couple months for annual exam, labs and follow up on depression since we increased her wellbutrin last visit.     Mary Herrera PA-C on 11/6/2023 at 11:12 AM

## 2023-11-06 NOTE — TELEPHONE ENCOUNTER
Is she currently using tylenol and topical voltaren gel? These would be my only other suggestions outside of the gabapentin unfortunately.  Mary Herrera PA-C on 11/6/2023 at 3:40 PM

## 2023-11-06 NOTE — TELEPHONE ENCOUNTER
"Patient calling clinic requesting pain medication. Patient stated she does not wish to go back on Oxycodone because it was \"too strong\" last time. Patient is requesting a short term dose of Norco or something similar until she is able to make appt with [facility]. Patient stated she is still awaiting a call back from [facility] to schedule an appt.     Routing to last visiting provider for recommendations.       "

## 2023-11-06 NOTE — TELEPHONE ENCOUNTER
Patient Contact     Attempt # 1     Was call answered?    No  Left message on recommendations from provider if pt has further questions to call the clinic back at [phone].      On call back: relay message below from Mary Foy.    Velma Ball RN

## 2023-11-30 ENCOUNTER — TELEPHONE (OUTPATIENT)
Dept: FAMILY MEDICINE | Facility: CLINIC | Age: 53
End: 2023-11-30
Payer: MEDICARE

## 2023-11-30 DIAGNOSIS — M25.511 CHRONIC PAIN OF BOTH SHOULDERS: ICD-10-CM

## 2023-11-30 DIAGNOSIS — G89.29 CHRONIC PAIN OF BOTH SHOULDERS: ICD-10-CM

## 2023-11-30 DIAGNOSIS — G89.29 OTHER CHRONIC PAIN: ICD-10-CM

## 2023-11-30 DIAGNOSIS — M25.512 CHRONIC PAIN OF BOTH SHOULDERS: ICD-10-CM

## 2023-11-30 DIAGNOSIS — M17.0 OSTEOARTHRITIS OF BOTH KNEES, UNSPECIFIED OSTEOARTHRITIS TYPE: Primary | ICD-10-CM

## 2023-11-30 NOTE — TELEPHONE ENCOUNTER
Patient Contact     Attempt # 1     Was call answered?      Left non-detailed message to call the clinic back at [phone].      On call back: Is pt okay to have referral sent to [facility] as pt has been there in the past.       Velma Ball RN

## 2023-11-30 NOTE — TELEPHONE ENCOUNTER
It looks like she has seen [facility] in the past - would she be willing to return to seeing them?    Mary Almanza PA-C on 11/30/2023 at 12:17 PM

## 2023-11-30 NOTE — TELEPHONE ENCOUNTER
Patient is asking you call her, she states this personal and will not give me any information. She was very short and rude on the  phone .     I did offer a phone visit but she declined.     I asked her to send you a my chart message.       Sorry, very difficult call.       Vidhya Jin RN  [facility]

## 2023-11-30 NOTE — TELEPHONE ENCOUNTER
I would recommend starting with a phone visit with me in order to discuss her concerns as I will not be able to spend adequate time without an appointment. I have openings next week for virtual visits - I would like to check in with her regarding her mood with change in wellbutrin dose since last visit anyway.    Mayr Almanza PA-C on 11/30/2023 at 9:54 AM

## 2023-11-30 NOTE — TELEPHONE ENCOUNTER
"Spoke with patient and she was appreciative of the call back so quick. Pt is requesting a referral for a different pain clinic. She is not happy with [facility]. Pt reports the staff has been very rude, not professional.    Pt was looking to talk with provider about other \"good\" pain clinics that would be more respectful and professional. Writer did help pt schedule a VV with provider for next week but pt states if a different referral could be placed before that visit that would be okay too as it takes some time to schedule.     Velma Ball RN          "

## 2023-12-06 ENCOUNTER — VIRTUAL VISIT (OUTPATIENT)
Dept: FAMILY MEDICINE | Facility: CLINIC | Age: 53
End: 2023-12-06
Payer: MEDICARE

## 2023-12-06 DIAGNOSIS — G89.29 CHRONIC PAIN OF BOTH SHOULDERS: ICD-10-CM

## 2023-12-06 DIAGNOSIS — M25.512 CHRONIC PAIN OF BOTH SHOULDERS: ICD-10-CM

## 2023-12-06 DIAGNOSIS — M17.0 OSTEOARTHRITIS OF BOTH KNEES, UNSPECIFIED OSTEOARTHRITIS TYPE: Primary | ICD-10-CM

## 2023-12-06 DIAGNOSIS — M25.511 CHRONIC PAIN OF BOTH SHOULDERS: ICD-10-CM

## 2023-12-06 DIAGNOSIS — G89.29 OTHER CHRONIC PAIN: ICD-10-CM

## 2023-12-06 DIAGNOSIS — F33.1 MAJOR DEPRESSIVE DISORDER, RECURRENT EPISODE, MODERATE (H): ICD-10-CM

## 2023-12-06 PROCEDURE — 99442 PR PHYSICIAN TELEPHONE EVALUATION 11-20 MIN: CPT | Mod: 95 | Performed by: PHYSICIAN ASSISTANT
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
## 2023-12-08 DIAGNOSIS — E78.5 HYPERLIPIDEMIA LDL GOAL <130: ICD-10-CM

## 2023-12-08 RX ORDER — ROSUVASTATIN CALCIUM 20 MG/1
20 TABLET, COATED ORAL DAILY
Qty: 90 TABLET | Refills: 1 | Status: SHIPPED | OUTPATIENT
Start: 2023-12-08 | End: 2024-05-16

## 2024-02-06 DIAGNOSIS — Z86.711 HISTORY OF PULMONARY EMBOLISM: ICD-10-CM

## 2024-02-15 ENCOUNTER — TELEPHONE (OUTPATIENT)
Dept: FAMILY MEDICINE | Facility: CLINIC | Age: 54
End: 2024-02-15
Payer: MEDICARE

## 2024-02-15 NOTE — TELEPHONE ENCOUNTER
Pt requesting sooner visit for ongoing concerns with hernia, vaginal concerns and incontinence.  Declined discussing any concerns further. Scheduled with Mary per pt request.  Cara Higgins, SHREYAS  [facility] RN Triage Team

## 2024-02-15 NOTE — LETTER
March 6, 2024      RE  Christina Fletcher  [address] [address]  [facility] [address]        To Whom It May Concern,     I am the primary care provider for the above named patient. This letter is in regards to recent denial for coverage of ozempic. Christina has been on ozempic for weight loss given her BMI of 55. She struggles with severe knee and hip pain due to severe osteoarthritis. She is ultimately in need of a knee replacement but is not a candidate for surgery until she achieves weight loss. She has attempted weight loss with lifestyle changes, but as you can imagine her activity level is greatly limited by her pain. Her weight also contributes to her pain level. It is my medical opinion that she be on ozempic or other alternative GLP-1 medications to help with her weight loss, therefore allowing her to become a candidate for surgery and ultimately improve her quality of life and overall health. Please reconsider the recent denial to cover her ozempic medication. Reach out of my office with any questions or concerns.      Sincerely,    Mary Almanza PA-C           When pt is no longer an acute or imminent risk of harm to self or others, and is able to care for self safely, pt may then be discharged.

## 2024-02-15 NOTE — TELEPHONE ENCOUNTER
Pt calling as needs a prior auth for Ozempic, per her pharmacy, [facility].  Routing for PA pool to start process.

## 2024-02-28 NOTE — TELEPHONE ENCOUNTER
Central Prior Authorization Team  Phone: [phone]    PA Initiation    Medication: OZEMPIC (2 MG/DOSE) 8 MG/3ML SC SOPN  Insurance Company: [employer] - Phone [phone] Fax [phone]  Pharmacy Filling the Rx: [facility] [place], [place] - [address] [address]  Filling Pharmacy Phone: [phone]  Filling Pharmacy Fax:    Start Date: 2/28/2024

## 2024-02-28 NOTE — TELEPHONE ENCOUNTER
PRIOR AUTHORIZATION DENIED    Medication: OZEMPIC (2 MG/DOSE) 8 MG/3ML SC SOPN  Insurance Company: [employer] - Phone [phone] Fax [phone]  Denial Date: 2/28/2024    Denial Reason(s):     Appeal Information: If provider would like to appeal please provide a letter of medical necessity.

## 2024-03-04 NOTE — TELEPHONE ENCOUNTER
Please let patient know that ozempic coverage was denied. It appears her insurance plan does not cover weight loss medications. She missed her last appointment with me - she should reschedule this so we can discuss further.   Mary Almanza PA-C on 3/4/2024 at 12:27 PM

## 2024-03-05 NOTE — TELEPHONE ENCOUNTER
S/w pt 3/5/24 and relayed message about Ozempic prior auth denial.     Insurance  Pt checking to make sure her insurance is identified correctly (primary/secondary) with [employer]. Pt will call back with information on what she finds out from insurance company.     Knee surgery pending  Pt stated she was losing weight and on track for knee surgery. Appt in May 2024 to assess weight loss progress and decision about scheduling surgery.     Appointment  Pt apologized about 'no show' appt 2/21/24. Stated cab never showed up. Pt will have a caregiver provide a ride to any in-person appt in the future.    2 or 3 appts needed?  VV possible - Pt priority is Ozempic Rx and is available for a VV also. Pt hasn't been taking anything and has notice weight gain.     OV - Pt would like OV to discuss various ongoing concerns of incontinence, hernia and other personal concerns.    Annual Px - Pt also had no show in Jan for annual Px    Natalie Gudino on 3/5/2024 at 10:53 AM

## 2024-03-06 NOTE — TELEPHONE ENCOUNTER
Letter of appeal written for ozempic, routed to team 1 to print and fax - see number below.     She will need to be seen in person for her concerns. Would suggest scheduling 1 appointment at a time     Mary Almanza PA-C on 3/6/2024 at 2:59 PM

## 2024-03-07 ENCOUNTER — TELEPHONE (OUTPATIENT)
Dept: FAMILY MEDICINE | Facility: CLINIC | Age: 54
End: 2024-03-07
Payer: MEDICARE

## 2024-03-07 NOTE — TELEPHONE ENCOUNTER
Please clarify which pain clinic? My most recent referral was for [facility] but I don't see an appointment through the [facility] system scheduled. I had sent a referral to [facility] in June? Is that where she's going? Let me know   Mary Almanza PA-C on 3/7/2024 at 12:58 PM     Stage 3: Additional Anesthesia Type: 1% lidocaine with epinephrine

## 2024-03-07 NOTE — TELEPHONE ENCOUNTER
Patient states that she is scheduled for pain clinic 3/14 for steroid injections. . States that she is stating that they have the referral for her knees, but need the referral for her right ankle.     Patient requests that it be sent ASAP.     Patient requested that MyChart be deactivated because she is unable to access it.   Estephania Noble RN

## 2024-03-07 NOTE — TELEPHONE ENCOUNTER
Patient Contact    Attempt # 1    Was call answered?  No.  Left message on voicemail with information to call triage back at [phone].    On call back: see message below from JACQUIE Almaguer RN

## 2024-03-08 NOTE — TELEPHONE ENCOUNTER
Called patient and relayed provider's message. Patient stated that she does not want to go to [facility] because they do not prescribe medication. She also stated that she is currently not going to any pain clinic. Patient has an appointment with provider on 3/13/24 and will discuss it with the provider then. Patient did not want to go back and forth with the nurses and will just talk to the provider at the appointment. Patient ended call.    Francisca JUNG RN  [facility] Triage Team

## 2024-03-11 ENCOUNTER — TELEPHONE (OUTPATIENT)
Dept: FAMILY MEDICINE | Facility: CLINIC | Age: 54
End: 2024-03-11
Payer: MEDICARE

## 2024-03-11 NOTE — TELEPHONE ENCOUNTER
Forms/Letter Request    Type of form/letter: (Medicare Drug Coverage Request form)     Do we have the form/letter: Yes:     Who is the form from? [employer]       Where did/will the form come from? form was faxed in    When is form/letter needed by: When able     How would you like the form/letter returned: [phone]        Placed in red folder & Put on Mary's desk.  Christine Estrada

## 2024-03-13 ENCOUNTER — TELEPHONE (OUTPATIENT)
Dept: FAMILY MEDICINE | Facility: CLINIC | Age: 54
End: 2024-03-13

## 2024-03-13 NOTE — TELEPHONE ENCOUNTER
S/w pt in [facility] after being told she was too late for checking in for 4pm appt.     Provided business cards of [facility] manager and customer service card located in [facility].      Pt stated she would be calling the manager.     Printed out patient medication list and problem list at request of patient (2 copies).     Discussed various providers at [facility] and pt will likely call to schedule with Fe Winchester PA-C or Dr. Aguila.     Natalie Gudino on 3/13/2024 at 4:56 PM

## 2024-03-14 NOTE — TELEPHONE ENCOUNTER
Letter written and form completed and signed by Mary Almanza PA-C    Faxed to [facility] at [phone]    [facility].     Natalie Gudino on 3/14/2024 at 2:05 PM

## 2024-03-18 ENCOUNTER — TELEPHONE (OUTPATIENT)
Dept: FAMILY MEDICINE | Facility: CLINIC | Age: 54
End: 2024-03-18
Payer: MEDICARE

## 2024-03-18 NOTE — TELEPHONE ENCOUNTER
Forms/Letter Request    Type of form/letter: Prior Authorization Denial  Cover sheet    Redetermination Notice - Denial of Medicare Prescription Drug Coverage    Appeal Rights    Request for Reconsideration of Medicare Prescription Drug Denial    Do we have the form/letter: Yes, red folder, Mary Almanza's Inbox.     Who is the form from? Insurance comp  [employer]  Medicare Part D Appeals  [address] [address]  [place]  [address]    Enrollee's ID# [id number]  Plan Name: [facility]  Formulary ID: [id number]  Contract ID:   Plan ID: [id number]    Where did/will the form come from? form was faxed in    When is form/letter needed by: As able    How would you like the form/letter returned:   Standard appeals [phone]  Expedited appeals [id number]     Natalie Gudino on 3/18/2024 at 12:19 PM

## 2024-03-21 ENCOUNTER — TELEPHONE (OUTPATIENT)
Dept: FAMILY MEDICINE | Facility: CLINIC | Age: 54
End: 2024-03-21
Payer: MEDICARE

## 2024-03-21 NOTE — TELEPHONE ENCOUNTER
Hi there,    Please start pa process as pt insurance is not covering this medication. Information listed below is pt's insurance    [employer] part D  Id:[id number]  Grp: [id number]  Pcn: [id number]  Bin: [id number]    Thank you,  Miryam Emery PhT  [facility]

## 2024-03-21 NOTE — TELEPHONE ENCOUNTER
Denial states that ozempic not FDA approved for weight loss  We could try to get wegovy covered instead which is the same generic medication as ozempic     Will sent prescription for wegovy 0.25 mg weekly x4 weeks, since she's been off ozempic will need to start low dose and titrate. Then will plan to increase this dose every 4 weeks but should have follow up in clinic with provider within the next month prior to increasing dose.    Mary Almanza PA-C on 3/21/2024 at 12:21 PM

## 2024-03-25 DIAGNOSIS — I42.9 CARDIOMYOPATHY, UNSPECIFIED TYPE (H): ICD-10-CM

## 2024-03-25 RX ORDER — METOPROLOL SUCCINATE 50 MG/1
TABLET, EXTENDED RELEASE ORAL
Qty: 360 TABLET | Refills: 1 | OUTPATIENT
Start: 2024-03-25

## 2024-03-27 ENCOUNTER — TELEPHONE (OUTPATIENT)
Dept: FAMILY MEDICINE | Facility: CLINIC | Age: 54
End: 2024-03-27
Payer: MEDICARE

## 2024-03-27 NOTE — TELEPHONE ENCOUNTER
Forms/Letter Request    Type of form/letter: ([facility] ICD-10 Diah[place]isis Verification Form)    Do we have the form/letter: Yes:     Who is the form from? [facility]    (if other please explain)    Where did/will the form come from? form was faxed in    When is form/letter needed by: When able    How would you like the form/letter returned: Fax : [phone]      Place in red folder & Put on Mary's desk.  Christine Estrada

## 2024-03-29 ENCOUNTER — OFFICE VISIT (OUTPATIENT)
Dept: FAMILY MEDICINE | Facility: CLINIC | Age: 54
End: 2024-03-29
Payer: MEDICARE

## 2024-03-29 VITALS
OXYGEN SATURATION: 96 % | DIASTOLIC BLOOD PRESSURE: 70 MMHG | RESPIRATION RATE: 20 BRPM | HEART RATE: 85 BPM | TEMPERATURE: 98.3 F | SYSTOLIC BLOOD PRESSURE: 120 MMHG

## 2024-03-29 DIAGNOSIS — M17.0 OSTEOARTHRITIS OF BOTH KNEES, UNSPECIFIED OSTEOARTHRITIS TYPE: ICD-10-CM

## 2024-03-29 DIAGNOSIS — N18.2 CHRONIC KIDNEY DISEASE, STAGE 2 (MILD): ICD-10-CM

## 2024-03-29 DIAGNOSIS — I50.22 CHRONIC SYSTOLIC HEART FAILURE (H): Primary | ICD-10-CM

## 2024-03-29 DIAGNOSIS — G47.33 OSA ON CPAP: ICD-10-CM

## 2024-03-29 DIAGNOSIS — N39.41 URGE INCONTINENCE OF URINE: ICD-10-CM

## 2024-03-29 DIAGNOSIS — K21.9 GASTROESOPHAGEAL REFLUX DISEASE, UNSPECIFIED WHETHER ESOPHAGITIS PRESENT: ICD-10-CM

## 2024-03-29 DIAGNOSIS — F33.1 MAJOR DEPRESSIVE DISORDER, RECURRENT EPISODE, MODERATE (H): ICD-10-CM

## 2024-03-29 DIAGNOSIS — Z12.11 SCREEN FOR COLON CANCER: ICD-10-CM

## 2024-03-29 DIAGNOSIS — E53.8 VITAMIN B12 DEFICIENCY WITHOUT ANEMIA: ICD-10-CM

## 2024-03-29 DIAGNOSIS — E78.5 HYPERLIPIDEMIA WITH TARGET LDL LESS THAN 130: ICD-10-CM

## 2024-03-29 DIAGNOSIS — G89.29 OTHER CHRONIC PAIN: ICD-10-CM

## 2024-03-29 DIAGNOSIS — Z83.3 FAMILY HISTORY OF DIABETES MELLITUS: ICD-10-CM

## 2024-03-29 LAB
ALBUMIN SERPL BCG-MCNC: 4 G/DL (ref 3.5–5.2)
ALP SERPL-CCNC: 80 U/L (ref 40–150)
ALT SERPL W P-5'-P-CCNC: 20 U/L (ref 0–50)
ANION GAP SERPL CALCULATED.3IONS-SCNC: 12 MMOL/L (ref 7–15)
AST SERPL W P-5'-P-CCNC: 26 U/L (ref 0–45)
BILIRUB SERPL-MCNC: 0.4 MG/DL
BUN SERPL-MCNC: 18.6 MG/DL (ref 6–20)
CALCIUM SERPL-MCNC: 9.5 MG/DL (ref 8.6–10)
CHLORIDE SERPL-SCNC: 106 MMOL/L (ref 98–107)
CHOLEST SERPL-MCNC: 125 MG/DL
CREAT SERPL-MCNC: 0.89 MG/DL (ref 0.51–0.95)
DEPRECATED HCO3 PLAS-SCNC: 23 MMOL/L (ref 22–29)
EGFRCR SERPLBLD CKD-EPI 2021: 77 ML/MIN/1.73M2
ERYTHROCYTE [DISTWIDTH] IN BLOOD BY AUTOMATED COUNT: 11.3 % (ref 10–15)
FASTING STATUS PATIENT QL REPORTED: YES
GLUCOSE SERPL-MCNC: 109 MG/DL (ref 70–99)
HBA1C MFR BLD: 5.3 % (ref 0–5.6)
HCT VFR BLD AUTO: 40.2 % (ref 35–47)
HDLC SERPL-MCNC: 53 MG/DL
HGB BLD-MCNC: 13.1 G/DL (ref 11.7–15.7)
LDLC SERPL CALC-MCNC: 57 MG/DL
MCH RBC QN AUTO: 37.6 PG (ref 26.5–33)
MCHC RBC AUTO-ENTMCNC: 32.6 G/DL (ref 31.5–36.5)
MCV RBC AUTO: 116 FL (ref 78–100)
NONHDLC SERPL-MCNC: 72 MG/DL
PLATELET # BLD AUTO: 245 10E3/UL (ref 150–450)
POTASSIUM SERPL-SCNC: 4.6 MMOL/L (ref 3.4–5.3)
PROT SERPL-MCNC: 7.7 G/DL (ref 6.4–8.3)
RBC # BLD AUTO: 3.48 10E6/UL (ref 3.8–5.2)
SODIUM SERPL-SCNC: 141 MMOL/L (ref 135–145)
TRIGL SERPL-MCNC: 74 MG/DL
VIT B12 SERPL-MCNC: 497 PG/ML (ref 232–1245)
WBC # BLD AUTO: 7 10E3/UL (ref 4–11)

## 2024-03-29 PROCEDURE — 80061 LIPID PANEL: CPT | Performed by: PHYSICIAN ASSISTANT

## 2024-03-29 PROCEDURE — 96127 BRIEF EMOTIONAL/BEHAV ASSMT: CPT | Performed by: PHYSICIAN ASSISTANT

## 2024-03-29 PROCEDURE — 99214 OFFICE O/P EST MOD 30 MIN: CPT | Performed by: PHYSICIAN ASSISTANT

## 2024-03-29 PROCEDURE — 82607 VITAMIN B-12: CPT | Performed by: PHYSICIAN ASSISTANT

## 2024-03-29 PROCEDURE — 36415 COLL VENOUS BLD VENIPUNCTURE: CPT | Performed by: PHYSICIAN ASSISTANT

## 2024-03-29 PROCEDURE — 83036 HEMOGLOBIN GLYCOSYLATED A1C: CPT | Performed by: PHYSICIAN ASSISTANT

## 2024-03-29 PROCEDURE — 85027 COMPLETE CBC AUTOMATED: CPT | Performed by: PHYSICIAN ASSISTANT

## 2024-03-29 PROCEDURE — 80053 COMPREHEN METABOLIC PANEL: CPT | Performed by: PHYSICIAN ASSISTANT

## 2024-03-29 RX ORDER — DICLOFENAC SODIUM 30 MG/G
2 GEL TOPICAL 2 TIMES DAILY
Qty: 100 G | Refills: 3 | Status: SHIPPED | OUTPATIENT
Start: 2024-03-29

## 2024-03-29 ASSESSMENT — PAIN SCALES - GENERAL: PAINLEVEL: SEVERE PAIN (7)

## 2024-03-29 ASSESSMENT — PATIENT HEALTH QUESTIONNAIRE - PHQ9
SUM OF ALL RESPONSES TO PHQ QUESTIONS 1-9: 15
10. IF YOU CHECKED OFF ANY PROBLEMS, HOW DIFFICULT HAVE THESE PROBLEMS MADE IT FOR YOU TO DO YOUR WORK, TAKE CARE OF THINGS AT HOME, OR GET ALONG WITH OTHER PEOPLE: EXTREMELY DIFFICULT
SUM OF ALL RESPONSES TO PHQ QUESTIONS 1-9: 15

## 2024-03-29 NOTE — PROGRESS NOTES
Assessment & Plan     Chronic systolic heart failure (H)  Overdue for follow up with cardiology. Referral placed today.  - Adult Cardiology Eval  Referral  - CBC with Platelets  - Comprehensive metabolic panel (BMP + Alb, Alk Phos, ALT, AST, Total. Bili, TP)    Screen for colon cancer  - Colonoscopy Screening  Referral    Hyperlipidemia with target LDL less than 130  - Lipid panel reflex to direct LDL Fasting    Chronic kidney disease, stage 2 (mild)  - Albumin Random Urine Quantitative with Creat Ratio  - Comprehensive metabolic panel (BMP + Alb, Alk Phos, ALT, AST, Total. Bili, TP)    KAROLINA on CPAP  Noted. Stable.     Gastroesophageal reflux disease, unspecified whether esophagitis present  Chronic issues with GERD, noted possible hiatal hernia on echo in 2022, has not had EGD. Order placed.   - Adult GI  Referral - Procedure Only    BMI 50.0-59.9, adult (H)  Working on getting wegovy covered, previously did well on ozempic. Is in need of knee surgery but unable to have done until she achieves weight loss.     Osteoarthritis of both knees, unspecified osteoarthritis type  Other chronic pain  Sees ortho. Referred to [facility] per patient request. Also requesting higher dose of diclofenac gel.   - Diclofenac Sodium 3 % GEL  Dispense: 100 g; Refill: 3  - Pain Management  Referral    Urge incontinence of urine  Chronic issue. Referral placed to urolgoy.   - Adult Urology  Referral    Major depressive disorder, recurrent episode, moderate (H)  Continues to struggle with grief from daughter's death. Would consider therapy again if she could do virtually.  - Adult Mental Health  Referral    Family history of diabetes mellitus  - Hemoglobin A1c    35 minutes spent on the date of the encounter doing chart review, history and exam, documentation and further activities as noted above     Mary Almanza PA-C on 3/29/2024 at 11:27 AM        Subjective    Christina is a 54 year old, presenting for the following health issues:  Hernia and Referrals/continued Care/healthcare        3/29/2024    11:02 AM   Additional Questions   Roomed by Bea ROSE     Via the Health Maintenance questionnaire, the patient has reported the following services have been completed -Mammogram, this information has been sent to the abstraction team.  History of Present Illness       Reason for visit:  More    She eats 2-3 servings of fruits and vegetables daily.She consumes 1 sweetened beverage(s) daily. She exercises with enough effort to increase her heart rate 3 or less days per week.   She is taking medications regularly.     Chronic pain/Severe OA of knees  Sees ortho  Needs referral to pain clinic - doesn't want to go to [facility] or [facility]  Is in need of knee replacement but won't operate until weight is down     Weight/MO  Had been on ozempic for weight loss, no longer covered as patient is not diabetic  Prescription sent for wegovy, prior auth is needed and is in process   She was doing well with ozempic      Incontinence  Has tried oxybutynin   Has been referred to urology in the past but      Questionable hiatal hernia noted on echo in   Chronic GERD for years  Has not had EGD     CHF  Hasn't seen cardiology since   On metoprolol, lasix prn   No shortness of breath or edema.     Review of Systems  Constitutional, HEENT, cardiovascular, pulmonary, gi and gu systems are negative, except as otherwise noted.      Objective    /70   Pulse 85   Temp 98.3  F (36.8  C)   Resp 20   SpO2 96%   There is no height or weight on file to calculate BMI.  Physical Exam   GENERAL: alert and no distress, in wheelchair   RESP: lungs clear to auscultation - no rales, rhonchi or wheezes  CV: regular rate and rhythm, normal S1 S2, no S3 or S4, no murmur, click or rub, no peripheral edema  SKIN: no suspicious lesions or rashes  NEURO: Normal strength and tone, mentation intact and speech  normal  PSYCH: mentation appears normal, affect normal/bright        Signed Electronically by: Mary Almanza PA-C on 3/29/2024 at 11:27 AM

## 2024-03-29 NOTE — TELEPHONE ENCOUNTER
Forms completed and signed by Mary Almanza PA-C.     Faxed to [facility] at [phone],  Michelle Rangel (phone: [phone]).    [facility].     Natalie Gudino on 3/29/2024 at 2:08 PM

## 2024-04-01 NOTE — TELEPHONE ENCOUNTER
PRIOR AUTHORIZATION DENIED    Medication: WEGOVY 0.25 MG/0.5ML SC SOAJ    Insurance Company: [employer] - Phone [phone] Fax [phone]    Denial Date: 4/1/2024    Denial Reason(s): Weight loss medications are excluded    Appeal Information:

## 2024-04-01 NOTE — TELEPHONE ENCOUNTER
PA Initiation    Medication: WEGOVY 0.25 MG/0.5ML SC SOAJ  Insurance Company: [employer] - Phone [phone] Fax [phone]  Pharmacy Filling the Rx: [place], [place] - [address] [address]  Filling Pharmacy Phone: [phone]  Filling Pharmacy Fax: [phone]  Start Date: 4/1/2024

## 2024-04-04 ENCOUNTER — TELEPHONE (OUTPATIENT)
Dept: FAMILY MEDICINE | Facility: CLINIC | Age: 54
End: 2024-04-04
Payer: MEDICARE

## 2024-04-04 DIAGNOSIS — N18.2 CHRONIC KIDNEY DISEASE, STAGE 2 (MILD): ICD-10-CM

## 2024-04-04 NOTE — TELEPHONE ENCOUNTER
PA Initiation    Medication: WEGOVY 0.25 MG/0.5ML SC SOAJ  Insurance Company: [place] Medicaid ([facility]) - Phone [phone] Fax [phone]  Pharmacy Filling the Rx: [place], [place] - [address] [facility]  Filling Pharmacy Phone: [phone]  Filling Pharmacy Fax: [phone]  Start Date: 4/4/2024

## 2024-04-04 NOTE — TELEPHONE ENCOUNTER
----- Message from Mary Almanza PA-C sent at 4/4/2024  7:06 AM CDT -----  Please let pt know:    - electrolytes, kidney and liver function normal   - A1c is normal - no diabetes  - vitamin B12 is in normal range   - cholesterol is normal   - blood counts stable from 2022, no anemia, normal white blood cells and platelets     Mary Almanza PA-C on 4/4/2024 at 7:05 AM

## 2024-04-04 NOTE — TELEPHONE ENCOUNTER
Hi there,    I spoke with pt yesterday regarding her denial. Per pt request, she wants to try a pa with their medicaid plan. Insurance information provided below    BIN: [id number]  ID: [id number]    Thank you,  Miryam Garza  [facility]

## 2024-04-04 NOTE — TELEPHONE ENCOUNTER
Please let patient know that unfortunately her insurance is denying coverage for her weight loss medication as they are excluded from her plan.  Mary Almanza PA-C on 4/4/2024 at 7:05 AM

## 2024-04-04 NOTE — TELEPHONE ENCOUNTER
Spoke to patient and she was told her lab results.           Vidhya Jin RN  [facility]

## 2024-04-04 NOTE — RESULT ENCOUNTER NOTE
Please let pt know:    - electrolytes, kidney and liver function normal   - A1c is normal - no diabetes  - vitamin B12 is in normal range   - cholesterol is normal   - blood counts stable from 2022, no anemia, normal white blood cells and platelets     Mayr Almanza PA-C on 4/4/2024 at 7:05 AM

## 2024-04-05 NOTE — TELEPHONE ENCOUNTER
Spoke to pt, states that pharmacy told her that Zepbound is on formulary, is request a new rx be send in.   Ron GARCIA CMA

## 2024-04-05 NOTE — TELEPHONE ENCOUNTER
PRIOR AUTHORIZATION DENIED    Medication: WEGOVY 0.25 MG/0.5ML SC SOAJ SECONDARY INSURANCE    Insurance Company: [place] Medicaid ([facility]) - Phone [phone] Fax [phone]    Denial Date: 4/4/2024    Denial Reason(s):       Appeal Information:

## 2024-04-05 NOTE — TELEPHONE ENCOUNTER
Please let pt know her secondary insurance is denying wegovy. Needs to have seen nutritionist in the last 3 months - I have placed a referral for her. She can call [phone] to schedule this appointment.   Mary Almanza PA-C on 4/5/2024 at 12:33 PM

## 2024-04-09 ENCOUNTER — TELEPHONE (OUTPATIENT)
Dept: FAMILY MEDICINE | Facility: CLINIC | Age: 54
End: 2024-04-09
Payer: MEDICARE

## 2024-04-09 NOTE — TELEPHONE ENCOUNTER
Prior Authorization Retail Medication Request    Medication/Dose: Zepbound 2.5mg  Diagnosis and ICD code (if different than what is on RX):    New/renewal/insurance change PA/secondary ins. PA:  Previously Tried and Failed:    Rationale:      Insurance   Primary:   Insurance ID:  [id number]    Secondary (if applicable):  Insurance ID:      Pharmacy Information (if different than what is on RX)  Name:  [facility]  Phone:    Fax:

## 2024-04-09 NOTE — LETTER
April 24, 2024      Christina Fletcher  [address] [address]  [facility] [address]           [address] [address]  Phone: [phone]               March 6, 2024        SIVA Fletcher  [address] [address]  [facility] [address]           To Whom It May Concern,      I am the primary care provider for the above named patient. This letter is in regards to recent denial for coverage of Zepbound. Christina was previously on ozempic for weight loss given her BMI of 55. She struggles with severe knee and hip pain due to severe osteoarthritis. She is ultimately in need of a knee replacement but is not a candidate for surgery until she achieves weight loss. She has attempted weight loss with lifestyle changes, but as you can imagine her activity level is greatly limited by her pain. Her weight also contributes to her pain level. Ozempic has been denied despite appeal. It is my medical opinion that she be on Zepbound or other alternative GLP-1 medications to help with her weight loss, therefore allowing her to become a candidate for surgery and ultimately improve her quality of life and overall health. Please reconsider the recent denial to cover her Zepbound medication. Reach out of my office with any questions or concerns.       Sincerely,     Mary Almanza PA-C

## 2024-04-15 ENCOUNTER — TELEPHONE (OUTPATIENT)
Dept: FAMILY MEDICINE | Facility: CLINIC | Age: 54
End: 2024-04-15
Payer: MEDICARE

## 2024-04-15 NOTE — TELEPHONE ENCOUNTER
Prior Authorization Retail Medication Request    Medication/Dose: Diclofenac Sodium 3 % GEL  Diagnosis and ICD code (if different than what is on RX):   New/renewal/insurance change PA/secondary ins. PA:  Previously Tried and Failed:    Rationale:      Insurance   Primary:   Insurance ID:  [id number]    Secondary (if applicable):  Insurance ID:      Pharmacy Information (if different than what is on RX)  Name:  same  Phone:  [phone]  Fax:

## 2024-04-22 ENCOUNTER — TELEPHONE (OUTPATIENT)
Dept: FAMILY MEDICINE | Facility: CLINIC | Age: 54
End: 2024-04-22
Payer: MEDICARE

## 2024-04-22 NOTE — TELEPHONE ENCOUNTER
Patient states that she is having symptoms of UTI and she is in [place] taking care of her step mother. Patient reports foul odor, frequency and burning with urination.  Informed the patient that providers cannot treat out of state.  Patient states that she would like to schedule an appointment with Mary Almanza PA-C for Wednesday when she will be back in [place].  Patient refused going to  today at her current location.    Pharmacy attached in case Mary Almanza PA-C wants to send rx and see patient for follow up on Wednesday.  Estephania Noble RN

## 2024-04-22 NOTE — TELEPHONE ENCOUNTER
Needs eval before prescription can be sent. I would strongly encourage evaluation at urgent care in [place]   Mary Almanza PA-C on 4/22/2024 at 10:44 AM

## 2024-04-22 NOTE — TELEPHONE ENCOUNTER
I would advise she be seen before Thursday by any available provider or at urgent care when she returns to [place]. If she refuses, OK to use a same day with me but she should really have this looked at sooner  Mary Almanza PA-C on 4/22/2024 at 10:58 AM

## 2024-04-22 NOTE — TELEPHONE ENCOUNTER
"Spoke with pt who refused UC. Pt agreed to appt on Thursday with PCP. Pt states \"I told y'all my ankle is messed up, about to call hospital for ER\" \"That's crazy I have to sit in pain with UTI until then\". Writer advised pt to be seen right away for ankle and UTI symptoms, pt stated okay and hung up.     Velma Ball RN    "

## 2024-04-22 NOTE — TELEPHONE ENCOUNTER
Central Prior Authorization Team   Phone: [phone]    PA Initiation    Medication: Zepbound 2.5mg  Insurance Company: [employer] - Phone [phone] Fax [phone]  Pharmacy Filling the Rx: [id number] [place], [place] - [address] AT [facility]  Filling Pharmacy Phone: [phone]  Filling Pharmacy Fax:    Start Date: 4/21/2024

## 2024-04-22 NOTE — TELEPHONE ENCOUNTER
Spoke with pt and relayed providers message. Pt refuses to go to  out of state. Pt requesting appt with provider when she returns. Can pt be scheduled on same day opening on Thursday?    Velma Ball RN

## 2024-04-23 NOTE — TELEPHONE ENCOUNTER
PRIOR AUTHORIZATION DENIED    Medication: Zepbound 2.5mg-PA DENIED     Denial Date: 4/22/2024    Denial Rational:           Appeal Information:

## 2024-04-24 NOTE — TELEPHONE ENCOUNTER
Medication Appeal Initiation    We have initiated an appeal for the requested medication:  Medication: Zepbound 2.5mg-PA DENIED, INITIATE PA APPEAL   Appeal Start Date:  4/24/2024  Insurance Company:  [employer] (FAX) [phone]  Comments:         Initiate PA Appeal via fax [phone] with Letter of Medical Necessity (dated 4/24/2024) attached with Request. Awaiting on a response on Appeal.

## 2024-04-25 NOTE — TELEPHONE ENCOUNTER
MEDICATION APPEAL DENIED    Medication: Zepbound 2.5mg-PA DENIED,  PA APPEAL DENIED     Denial Date: 4/25/2024    Denial Rational:               Second Level Appeal Information:     Second level appeals will be managed by the clinic staff and provider. Please contact the [employer] Prior Authorization Team if additional information about the denial is needed.

## 2024-04-25 NOTE — TELEPHONE ENCOUNTER
PA Initiation    Medication: DICLOFENAC SODIUM 3 % EX GEL  Insurance Company: [employer] - Phone [phone] Fax [phone]  Pharmacy Filling the Rx: [id number] - [place], [place] - [address] AT [facility] [address]  Filling Pharmacy Phone: [phone]  Filling Pharmacy Fax: [phone]  Start Date: 4/25/2024        Note: Due to record-high volumes, our turn-around time is taking longer than usual . We are currently 10 business days behind in the pools.   We are working diligently to submit all requests in a timely manner and in the order they are received. Please only flag TRUE URGENT requests as high priority to the pool at this time.   If you have questions - please send a note/message in the active PA encounter and send back to the [facility] PA pool [[id number]].    If you have more specific questions about our process please reach out to our supervisor Tiffany Soto.   Thank you!   RPPA (Retail Pharmacy Prior Authorization) team

## 2024-04-26 ENCOUNTER — TELEPHONE (OUTPATIENT)
Dept: FAMILY MEDICINE | Facility: CLINIC | Age: 54
End: 2024-04-26
Payer: MEDICARE

## 2024-04-26 NOTE — TELEPHONE ENCOUNTER
Forms/Letter Request     Type of form/letter:    Notice of Denial  Medicare Part D Prescription Drug Coverage    Do we have the form/letter: Yes, red folder, Mary Almanza's inbox.       Who is the form from? [employer]     Where did/will the form come from? form was faxed in     When is form/letter needed by: When able      How would you like the form/letter returned:  [phone]     Natalie Gudino on 4/26/2024 at 9:28 AM

## 2024-04-29 ENCOUNTER — TELEPHONE (OUTPATIENT)
Dept: FAMILY MEDICINE | Facility: CLINIC | Age: 54
End: 2024-04-29
Payer: MEDICARE

## 2024-04-29 NOTE — TELEPHONE ENCOUNTER
I did not close down her MyChart, not even sure how to do that. She missed appointment with me last week - if she is needing to have discussion with me then she will have to schedule phone visit so that I have designated time to discuss as I'm seeing patients today and will not have time. If she is willing to give names of medications she is needing refilled I am happy to send. If the cream she is referring to is the diclofenac, I just received notice that insurance is denying to cover.     Mary Almanza PA-C on 4/29/2024 at 3:13 PM

## 2024-04-29 NOTE — TELEPHONE ENCOUNTER
Pt called the clinic asking why her mychart was closed down and she would like the number for patient relations. Patient relations number given, and pt informed that there is no note in her chart stating why her mychart was closed down.     Pt is reporting difficulty with filling multiple medications including a cream and is requesting a call from her provider now.     Writer asked the pt if she would like the number for MyChart help line or if she would like to start using her mychart. Pt was reluctant to answer questions or discuss why she was upset or what the problem was. Pt did mention that she thinks Mary Almanza closed down her mychart even though the pt had said that she did not want it closed down. Pt then told writer that she does not know what she is talking about so it is better if you do not get involved and disconnected the phone call.     Routing to provider; please review and advise. Pt is wanting a callback from provider today.

## 2024-04-30 NOTE — TELEPHONE ENCOUNTER
Triage Patient Outreach    Attempt # 1    Was call answered?  No.  Left voicemail to return call to Triage at Primary Clinic    Estephania Noble RN

## 2024-04-30 NOTE — TELEPHONE ENCOUNTER
PRIOR AUTHORIZATION DENIED    Medication: DICLOFENAC SODIUM 3 % EX GEL  Insurance Company: [employer] - Phone [phone] Fax [phone]  Denial Date: 4/25/2024  Denial Reason(s):       Appeal Information:       Patient Notified: NO

## 2024-05-07 NOTE — TELEPHONE ENCOUNTER
Triage Patient Outreach    Attempt # 2    Was call answered?  No.  Left voicemail to return call to Triage at Primary Clinic    Francisca Jaimes RN

## 2024-05-08 NOTE — TELEPHONE ENCOUNTER
Unable to reach patient after 3 failed attempts. Routing back to provider for further recommendations.     If wanting to send letter, please route to team.        Patient Contact    Attempt # 3    Was call answered?  No.  Left message on voicemail with information to call me back.

## 2024-05-09 NOTE — TELEPHONE ENCOUNTER
I am happy to see patient in clinic or via virtual visit to discuss and manage her health concerns if she chooses to continue to see me.   Mary Almanza PA-C on 5/9/2024 at 3:53 PM

## 2024-05-09 NOTE — TELEPHONE ENCOUNTER
Spoke to the patient. She is upset and feels Mary the provider is not listening to her . She is concerned about her health.     Patient would not give me specifics to document in the phone call.      She would not allow mw to ask questions and continued to be upset stating she has contacted Employee Relations.       I asked about the medications she would need and she would not answer my questions.          I wanted to offer a phone visit and the patient hung up on me.       Mary, I'm sorry, very difficult to communicate with the patient . I hope you can find resolution with Employee Relations.       Vidhya Jin ,RN  [facility]

## 2024-05-16 ENCOUNTER — MYC MEDICAL ADVICE (OUTPATIENT)
Dept: FAMILY MEDICINE | Facility: CLINIC | Age: 54
End: 2024-05-16
Payer: MEDICARE

## 2024-05-16 DIAGNOSIS — Z86.711 HISTORY OF PULMONARY EMBOLISM: ICD-10-CM

## 2024-05-16 DIAGNOSIS — E78.5 HYPERLIPIDEMIA LDL GOAL <130: ICD-10-CM

## 2024-05-16 DIAGNOSIS — I42.9 CARDIOMYOPATHY, UNSPECIFIED TYPE (H): ICD-10-CM

## 2024-05-16 DIAGNOSIS — F33.1 MAJOR DEPRESSIVE DISORDER, RECURRENT EPISODE, MODERATE (H): ICD-10-CM

## 2024-05-16 RX ORDER — ROSUVASTATIN CALCIUM 20 MG/1
20 TABLET, COATED ORAL DAILY
Qty: 90 TABLET | Refills: 3 | Status: SHIPPED | OUTPATIENT
Start: 2024-05-16

## 2024-05-16 RX ORDER — FUROSEMIDE 20 MG
TABLET ORAL
Qty: 30 TABLET | Refills: 3 | Status: SHIPPED | OUTPATIENT
Start: 2024-05-16

## 2024-05-16 RX ORDER — BUPROPION HYDROCHLORIDE 300 MG/1
300 TABLET ORAL EVERY MORNING
Qty: 90 TABLET | Refills: 3 | Status: SHIPPED | OUTPATIENT
Start: 2024-05-16

## 2024-05-16 RX ORDER — METOPROLOL SUCCINATE 50 MG/1
TABLET, EXTENDED RELEASE ORAL
Qty: 360 TABLET | Refills: 3 | Status: SHIPPED | OUTPATIENT
Start: 2024-05-16

## 2024-05-16 NOTE — TELEPHONE ENCOUNTER
Per clinic manager, patient requesting refills of xarelto, crestor, metoprolol and lasix to [facility] on [address]. Also appears that wellbutrin is coming due. Refills sent as requested.   Mary Almanza PA-C on 5/16/2024 at 11:16 AM

## 2024-06-05 ENCOUNTER — MYC MEDICAL ADVICE (OUTPATIENT)
Dept: FAMILY MEDICINE | Facility: CLINIC | Age: 54
End: 2024-06-05
Payer: MEDICARE

## 2024-06-05 ENCOUNTER — TELEPHONE (OUTPATIENT)
Dept: FAMILY MEDICINE | Facility: CLINIC | Age: 54
End: 2024-06-05
Payer: MEDICARE

## 2024-06-05 DIAGNOSIS — Z86.711 HISTORY OF PULMONARY EMBOLISM: ICD-10-CM

## 2024-06-05 NOTE — TELEPHONE ENCOUNTER
Tried to explain to the patient that her refill was sent to the pharmacy 5/16/2024. For some reason the pharmacy did not dispense her mediation.     She would not allow me to speak and hung up.       Vidhya iJn RN  [facility]

## 2024-06-05 NOTE — CONFIDENTIAL NOTE
Refill prescription sent to the pharmacy.  Please confirm with the pharmacy if they have received.  Unfortunately we do not have any way to tell why pharmacy did not receive that.  Please apologize to the patient for this.  PCP is out of office  she will be back in 3 months.    Donnell Swift MD

## 2024-06-05 NOTE — TELEPHONE ENCOUNTER
Per Cr's:    Requesting new refill of Xarelto.    5/16/24 prescription was never received by [facility].

## 2024-06-05 NOTE — TELEPHONE ENCOUNTER
"Hi Mary,    Patient expresses that she feels ignored--  -Is \"expecting PCP to call her\" to discuss concerns.    -Upset r/t the delay in Xarelto refill (states that she has not had supply for about 3 weeks)    Writer did inform patient that a Xarelto prescription (among other prescriptions) were sent to her preferred [facility] on 5/16/24. Patient still expresses frustration.    -Has not received responses to numerous MyChart messages      "

## 2024-06-06 NOTE — TELEPHONE ENCOUNTER
Spoke to patient yesterday via phone and she  would not allow me to clarify her medications sent to pharmacy.     Will not respond to my chart.       Vidhya Jin RN  [facility]

## 2024-06-18 DIAGNOSIS — E78.5 HYPERLIPIDEMIA LDL GOAL <130: ICD-10-CM

## 2024-06-18 DIAGNOSIS — I10 ESSENTIAL HYPERTENSION WITH GOAL BLOOD PRESSURE LESS THAN 130/80: ICD-10-CM

## 2024-06-18 RX ORDER — LOSARTAN POTASSIUM 25 MG/1
25 TABLET ORAL DAILY
Qty: 30 TABLET | Refills: 0 | Status: SHIPPED | OUTPATIENT
Start: 2024-06-18 | End: 2024-07-01

## 2024-06-18 NOTE — TELEPHONE ENCOUNTER
"Pt calling in with bad connection. Caller unable to hear writer. Writer hung up and called caller back. Pt was upset that writer hung up. Writer explained that caller was not able to hear writer and needed a recall for better connection.     Pt frustrated \"I don't get why all my medications are being canceled\" \" Kyra been on these for years\". Writer was able to identify medications needing refill for rosuvastatin and losartan. Pt states pharmacy does not have refills.  Pt was unpleasant with writer and hung up the phone.      Spoke with pharmacy who states that pt picked up rosuvastatin on 6/13/24 from different pharmacy. Pt would need refill for losartan.     Velma Ball RN        "

## 2024-06-23 ENCOUNTER — HEALTH MAINTENANCE LETTER (OUTPATIENT)
Age: 54
End: 2024-06-23

## 2024-06-24 ENCOUNTER — TELEPHONE (OUTPATIENT)
Dept: NUTRITION | Facility: CLINIC | Age: 54
End: 2024-06-24
Payer: MEDICARE

## 2024-06-24 NOTE — PROGRESS NOTES
Sent video link for scheduled appointment at 5:30 6/24/2024. Called patient x 2 and unable to reach. Left RD name and number along with scheduling department for patient to contact to reschedule appointment. Await call back from patient.    Margareth Rivera, RD, LD  [facility] Outpatient Dietitian  [phone] (office phone)

## 2024-07-01 DIAGNOSIS — I10 ESSENTIAL HYPERTENSION WITH GOAL BLOOD PRESSURE LESS THAN 130/80: ICD-10-CM

## 2024-07-01 RX ORDER — LOSARTAN POTASSIUM 25 MG/1
25 TABLET ORAL DAILY
Qty: 90 TABLET | Refills: 0 | Status: SHIPPED | OUTPATIENT
Start: 2024-07-01

## 2024-08-12 NOTE — ADDENDUM NOTE
Addended by: MAKAYLA HANCOCK on: 4/16/2020 07:29 AM     Modules accepted: Orders     The Following Changes were made to your medications:   1.    Encouraged oral nutritional supplements as needed      Follow up with Dr. Curiel on 8/22/24      If you develop any fevers, chills or new symptoms, please call the 24 hour on call phone number [phone]; and have the on call transplant physician paged.

## 2024-09-01 ENCOUNTER — HEALTH MAINTENANCE LETTER (OUTPATIENT)
Age: 54
End: 2024-09-01

## 2024-09-17 DIAGNOSIS — Z86.711 HISTORY OF PULMONARY EMBOLISM: ICD-10-CM

## 2024-09-18 RX ORDER — RIVAROXABAN 10 MG/1
10 TABLET, FILM COATED ORAL AT BEDTIME
Qty: 90 TABLET | Refills: 0 | Status: SHIPPED | OUTPATIENT
Start: 2024-09-18

## 2024-10-11 ENCOUNTER — PATIENT OUTREACH (OUTPATIENT)
Dept: FAMILY MEDICINE | Facility: CLINIC | Age: 54
End: 2024-10-11
Payer: MEDICARE

## 2024-10-11 NOTE — TELEPHONE ENCOUNTER
Patient Quality Outreach    Patient is due for the following:   Diabetes -  A1C  Colon Cancer Screening  Depression  -  Depression follow-up visit  IVD  -  LDL (Fasting)  Physical Annual Wellness Visit      Topic Date Due    Hepatitis B Vaccine (1 of 3 - 19+ 3-dose series) Never done    Pneumococcal Vaccine (2 of 2 - PPSV23 or PCV20) 07/19/2016    Zoster (Shingles) Vaccine (1 of 2) Never done    Diptheria Tetanus Pertussis (DTAP/TDAP/TD) Vaccine (2 - Td or Tdap) 11/01/2022    Flu Vaccine (1) Never done    COVID-19 Vaccine (1 - 2024-25 season) Never done       Next Steps:   Schedule a Annual Wellness Visit    Type of outreach:    Sent [employer] message.      Questions for provider review:    None           Lily Paris MA

## 2024-11-18 DIAGNOSIS — I10 ESSENTIAL HYPERTENSION WITH GOAL BLOOD PRESSURE LESS THAN 130/80: ICD-10-CM

## 2024-11-18 RX ORDER — LOSARTAN POTASSIUM 25 MG/1
25 TABLET ORAL DAILY
Qty: 90 TABLET | Refills: 0 | Status: SHIPPED | OUTPATIENT
Start: 2024-11-18

## 2024-11-18 NOTE — TELEPHONE ENCOUNTER
Prescription approved per [facility] Refill Protocol.  Cara Higgins RN  [facility]

## 2025-01-09 DIAGNOSIS — Z86.711 HISTORY OF PULMONARY EMBOLISM: ICD-10-CM

## 2025-01-09 RX ORDER — RIVAROXABAN 10 MG/1
10 TABLET, FILM COATED ORAL AT BEDTIME
Qty: 90 TABLET | Refills: 0 | Status: SHIPPED | OUTPATIENT
Start: 2025-01-09

## 2025-01-30 ENCOUNTER — VIRTUAL VISIT (OUTPATIENT)
Dept: FAMILY MEDICINE | Facility: CLINIC | Age: 55
End: 2025-01-30
Payer: MEDICARE

## 2025-01-30 DIAGNOSIS — G89.29 OTHER CHRONIC PAIN: Primary | ICD-10-CM

## 2025-01-30 DIAGNOSIS — M25.571 PAIN IN JOINT, ANKLE AND FOOT, RIGHT: ICD-10-CM

## 2025-01-30 DIAGNOSIS — M25.561 RIGHT KNEE PAIN, UNSPECIFIED CHRONICITY: ICD-10-CM

## 2025-01-30 DIAGNOSIS — F32.A DEPRESSION, UNSPECIFIED DEPRESSION TYPE: ICD-10-CM

## 2025-01-30 DIAGNOSIS — E55.9 VITAMIN D DEFICIENCY: ICD-10-CM

## 2025-01-30 RX ORDER — ERGOCALCIFEROL 1.25 MG/1
50000 CAPSULE, LIQUID FILLED ORAL WEEKLY
Qty: 12 CAPSULE | Refills: 1 | Status: SHIPPED | OUTPATIENT
Start: 2025-01-30

## 2025-01-30 RX ORDER — CYANOCOBALAMIN (VITAMIN B-12) 2500 MCG
2500 TABLET, SUBLINGUAL SUBLINGUAL DAILY
Qty: 90 TABLET | Refills: 3 | Status: CANCELLED | OUTPATIENT
Start: 2025-01-30

## 2025-01-30 RX ORDER — GINSENG 100 MG
1 CAPSULE ORAL DAILY
COMMUNITY

## 2025-01-30 RX ORDER — CHOLECALCIFEROL (VITAMIN D3) 1250 MCG
1250 CAPSULE ORAL WEEKLY
Qty: 60 CAPSULE | Refills: 0 | Status: CANCELLED | OUTPATIENT
Start: 2025-01-30

## 2025-01-30 ASSESSMENT — PATIENT HEALTH QUESTIONNAIRE - PHQ9
10. IF YOU CHECKED OFF ANY PROBLEMS, HOW DIFFICULT HAVE THESE PROBLEMS MADE IT FOR YOU TO DO YOUR WORK, TAKE CARE OF THINGS AT HOME, OR GET ALONG WITH OTHER PEOPLE: EXTREMELY DIFFICULT
SUM OF ALL RESPONSES TO PHQ QUESTIONS 1-9: 20
SUM OF ALL RESPONSES TO PHQ QUESTIONS 1-9: 20

## 2025-01-30 NOTE — PROGRESS NOTES
Christina is a 55 year old who is being evaluated via a billable video visit.    What phone number would you like to be contacted at? [phone]   How would you like to obtain your AVS? Frank      Assessment & Plan     Other chronic pain  Ongoing chronic pain mostly marked in right knee and joint area in the ankle.  In the past she has received some injection in that area.  She has been seeing overall pain clinic requesting a referral which is done to the best of our ability if she has any follow-up questions on that kind of referral she should discuss that with her primary care doctor.  Patient has seen [facility]. She has establish care there for injections .  - Pain Management  Referral; Future    Right knee pain, unspecified chronicity    - Pain Management  Referral; Future    Pain in joint, ankle and foot, right    - Pain Management  Referral; Future    Depression, unspecified depression type  Patient not well-controlled advised to take Wellbutrin which she has not taken in the last few months I checked on the on our system she should have enough refills she has a follow-up appointment with PCP to discuss after taking medication for some time.    Vitamin D deficiency  Requesting vitamin D capsule to be taken once a week which is sent to the pharmacy.  - vitamin D2 (ERGOCALCIFEROL) 49507 units (1250 mcg) capsule; Take 1 capsule (50,000 Units) by mouth once a week.                Subjective   Christina is a 55 year old, presenting for the following health issues:  Referral ([facility])        1/30/2025     1:52 PM   Additional Questions   Roomed by Yas De Santiago Start Time: 2:00    History of Present Illness       Mental Health Follow-up:  Patient presents to follow-up on Depression.Patient's depression since last visit has been:  Worse  The patient is not having other symptoms associated with depression.      Any significant life events: grief or loss and health  "concerns  Patient is feeling anxious or having panic attacks.  Patient has no concerns about alcohol or drug use.    Headaches:   Since the patient's last clinic visit, headaches are: worsened  The patient is getting headaches:  One time per day  She is not able to do normal daily activities when she has a migraine.  The patient is taking the following rescue/relief medications:  Tylenol   Patient states \"I get some relief\" from the rescue/relief medications.   The patient is taking the following medications to prevent migraines:  No medications to prevent migraines  In the past 4 weeks, the patient has gone to an Urgent Care or Emergency Room 0 times times due to headaches.    Reason for visit:  Depression, Headache, Referral Request    She eats 0-1 servings of fruits and vegetables daily.She consumes 0 sweetened beverage(s) daily.She exercises with enough effort to increase her heart rate 9 or less minutes per day.  She exercises with enough effort to increase her heart rate 3 or less days per week.   She is taking medications regularly.     Patient states she has been feeling extra sad and depressed due to the recent passing of her daughter and spouse.   Currently not taking her Wellbutrin she is feeling somewhat depressed and sad.      Review of Systems  Constitutional, HEENT, cardiovascular, pulmonary, gi and gu systems are negative, except as otherwise noted.      Objective           Vitals:  No vitals were obtained today due to virtual visit.    Physical Exam   GENERAL: alert and no distress        Video-Visit Details    Type of service:  Video Visit   Video End Time:2:20 PM  Originating Location (pt. Location): Home    Distant Location (provider location):  On-site  Platform used for Video Visit: Alonzo  Signed Electronically by: Donnell Swift MD    "

## 2025-02-25 DIAGNOSIS — I10 ESSENTIAL HYPERTENSION WITH GOAL BLOOD PRESSURE LESS THAN 130/80: ICD-10-CM

## 2025-02-25 RX ORDER — LOSARTAN POTASSIUM 25 MG/1
25 TABLET ORAL DAILY
Qty: 90 TABLET | Refills: 1 | Status: SHIPPED | OUTPATIENT
Start: 2025-02-25

## 2025-03-11 ENCOUNTER — TELEPHONE (OUTPATIENT)
Dept: FAMILY MEDICINE | Facility: CLINIC | Age: 55
End: 2025-03-11
Payer: MEDICARE

## 2025-03-11 NOTE — TELEPHONE ENCOUNTER
Forms/Letter Request    Type of form/letter: [employer]- Incontinence Supply order prescription     Do we have the form/letter: Yes: Red folder placed in Mary's inbox      How would you like the form/letter returned: Fax : [phone]

## 2025-03-12 ENCOUNTER — TELEPHONE (OUTPATIENT)
Dept: FAMILY MEDICINE | Facility: CLINIC | Age: 55
End: 2025-03-12
Payer: MEDICARE

## 2025-03-12 ENCOUNTER — MEDICAL CORRESPONDENCE (OUTPATIENT)
Dept: HEALTH INFORMATION MANAGEMENT | Facility: CLINIC | Age: 55
End: 2025-03-12
Payer: MEDICARE

## 2025-03-12 DIAGNOSIS — N39.41 URGE INCONTINENCE OF URINE: Primary | ICD-10-CM

## 2025-03-12 NOTE — TELEPHONE ENCOUNTER
General Call    Reason for Call:  Request for urinary incontinence medical supplies.  - bed pads  - pull ups  - wipes    What are your questions or concerns:  No    Date of last appointment with provider:   1/30/25 with Dr. Swift  3/31/25 with Mary Almanza PA-C    Could we send this information to you in [employer] or would you prefer to receive a phone call?:   MARTHA Gudino on 3/12/2025 at 11:28 AM

## 2025-03-25 ENCOUNTER — TELEPHONE (OUTPATIENT)
Dept: FAMILY MEDICINE | Facility: CLINIC | Age: 55
End: 2025-03-25
Payer: MEDICARE

## 2025-03-25 NOTE — TELEPHONE ENCOUNTER
Forms/Letter Request    Type of form/letter: [facility]- ICD-10 Diagnosis Verification Form     Do we have the form/letter: Yes: Red folder placed in Mary's inbox      How would you like the form/letter returned: Fax : [phone]

## 2025-04-22 ENCOUNTER — TELEPHONE (OUTPATIENT)
Dept: FAMILY MEDICINE | Facility: CLINIC | Age: 55
End: 2025-04-22
Payer: MEDICARE

## 2025-04-22 NOTE — TELEPHONE ENCOUNTER
Patient contacts clinic requesting to reschedule her appointment that was scheduled on 4/23 to 4/25.  Per patient request writer rescheduled her appointment.

## 2025-05-26 DIAGNOSIS — I42.9 CARDIOMYOPATHY, UNSPECIFIED TYPE (H): ICD-10-CM

## 2025-05-27 RX ORDER — METOPROLOL SUCCINATE 50 MG/1
TABLET, EXTENDED RELEASE ORAL
Qty: 120 TABLET | Refills: 0 | Status: SHIPPED | OUTPATIENT
Start: 2025-05-27

## 2025-05-28 NOTE — TELEPHONE ENCOUNTER
1st call attempt, LVM, deferring for 1 business days for 2nd call attempt  Joe Fitch CMA on 5/28/2025 at 8:29 AM

## 2025-05-29 DIAGNOSIS — I42.9 CARDIOMYOPATHY, UNSPECIFIED TYPE (H): ICD-10-CM

## 2025-05-29 DIAGNOSIS — E78.5 HYPERLIPIDEMIA LDL GOAL <130: ICD-10-CM

## 2025-05-29 DIAGNOSIS — E55.9 VITAMIN D DEFICIENCY: ICD-10-CM

## 2025-05-29 RX ORDER — FUROSEMIDE 20 MG/1
TABLET ORAL
Qty: 30 TABLET | Refills: 0 | Status: SHIPPED | OUTPATIENT
Start: 2025-05-29

## 2025-05-29 RX ORDER — ERGOCALCIFEROL 1.25 MG/1
50000 CAPSULE, LIQUID FILLED ORAL WEEKLY
Qty: 12 CAPSULE | Refills: 0 | Status: SHIPPED | OUTPATIENT
Start: 2025-05-29

## 2025-05-29 RX ORDER — ROSUVASTATIN CALCIUM 20 MG/1
20 TABLET, COATED ORAL DAILY
Qty: 90 TABLET | Refills: 0 | Status: SHIPPED | OUTPATIENT
Start: 2025-05-29

## 2025-05-29 NOTE — TELEPHONE ENCOUNTER
Called spoke to pt scheduled appt for 06/04/2025 w/pcp     Joe Fitch CMA on 5/29/2025 at 1:15 PM

## 2025-07-01 ENCOUNTER — NURSE TRIAGE (OUTPATIENT)
Dept: FAMILY MEDICINE | Facility: CLINIC | Age: 55
End: 2025-07-01
Payer: MEDICARE

## 2025-07-01 DIAGNOSIS — I42.9 CARDIOMYOPATHY, UNSPECIFIED TYPE (H): ICD-10-CM

## 2025-07-01 RX ORDER — METOPROLOL SUCCINATE 50 MG/1
TABLET, EXTENDED RELEASE ORAL
Qty: 360 TABLET | OUTPATIENT
Start: 2025-07-01

## 2025-07-01 RX ORDER — METOPROLOL SUCCINATE 50 MG/1
TABLET, EXTENDED RELEASE ORAL
Qty: 120 TABLET | Refills: 0 | Status: SHIPPED | OUTPATIENT
Start: 2025-07-01

## 2025-07-01 NOTE — TELEPHONE ENCOUNTER
Called patient and relayed provider's message. She stated understanding.     Medication and pharmacy is pended. Routing to [facility] providers to approve medication per PCP's note.     Francisca JUNG RN  [facility] Triage Team

## 2025-07-01 NOTE — TELEPHONE ENCOUNTER
S-(situation): Patient transferred to [facility] RN triage, due to high blood pressure.    B-(background): BP reading from yesterday, 187/88. Patient has not taken BP today. States that she has not taken her Metoprolol in 3 days.     A-(assessment): Headaches, right chest pain. Patient declines answering any more questions. Declining triage. Only wants her medication filled.     R-(recommendations): Patient became very escalated. Declined to answer any questions that triage nurse was asking. Writer explained that she was transferred to triage due to high BP readings and symptoms. Patient continued to ask where writer was located, and full name. Patient states she just needs her medication refilled. With BP readings and symptoms- ED would be advised.     RN reviewed chart, note from provider on 5/27 indicates she needs follow up visit for additional refills. Patient cancelled 2 appointments since, and has run out of medication.     Routing to nurse pool to contact patient. RN was unable to assist due to escalated patient, refusing triage.   5/27  Notes to Pharmacy: Please ask the patient to schedule a follow up visit prior to additional refills.   Reason for Disposition   Systolic BP >= 160 OR Diastolic >= 100, and any cardiac (e.g., breathing difficulty, chest pain) or neurologic symptoms (e.g., new-onset blurred or double vision)    Additional Information   Negative: Sounds like a life-threatening emergency to the triager   Negative: Symptom is main concern (e.g., headache, chest pain)   Negative: Low blood pressure is main concern    Protocols used: Blood Pressure - High-A-OH

## 2025-07-01 NOTE — TELEPHONE ENCOUNTER
OK to fill 30 day supply but not sure which pharmacy she would like to use. I am not in clinic today and will be unable to log back on until later this evening so please send to covering provider pool to fill 30 day supply of necessary meds once pharmacy confirmed. She has an appointment with me 7/11 - needs to keep appointment as she is long overdue for visit.   Mary Almanza PA-C on 7/1/2025 at 10:20 AM

## 2025-07-12 ENCOUNTER — HEALTH MAINTENANCE LETTER (OUTPATIENT)
Age: 55
End: 2025-07-12

## 2025-07-31 DIAGNOSIS — Z86.711 HISTORY OF PULMONARY EMBOLISM: ICD-10-CM

## 2025-07-31 NOTE — TELEPHONE ENCOUNTER
Called and spoke with pt.   She verbalized understanding and stated she will keep her appt for Monday.    Thank you,  Ami Tsang RN

## 2025-07-31 NOTE — TELEPHONE ENCOUNTER
Pt asks for prescription for Xarelto. Has upcoming appointment.       This was sent to a pharmacy in [facility], she states her son gets it there for her. She has been out for 4 days and is requesting a new prescription to a local pharmacy prior to her appt next week. She states that the [facility] states there are no refills.     Please review and order if appropriate.    Francisca Sow RN

## 2025-08-04 ENCOUNTER — OFFICE VISIT (OUTPATIENT)
Dept: FAMILY MEDICINE | Facility: CLINIC | Age: 55
End: 2025-08-04
Payer: MEDICAID

## 2025-08-04 VITALS
OXYGEN SATURATION: 96 % | DIASTOLIC BLOOD PRESSURE: 70 MMHG | RESPIRATION RATE: 20 BRPM | SYSTOLIC BLOOD PRESSURE: 135 MMHG | HEART RATE: 103 BPM

## 2025-08-04 DIAGNOSIS — Z86.711 HISTORY OF PULMONARY EMBOLISM: ICD-10-CM

## 2025-08-04 DIAGNOSIS — M25.571 PAIN IN JOINT, ANKLE AND FOOT, RIGHT: ICD-10-CM

## 2025-08-04 DIAGNOSIS — I42.9 CARDIOMYOPATHY, UNSPECIFIED TYPE (H): ICD-10-CM

## 2025-08-04 DIAGNOSIS — N18.2 CKD (CHRONIC KIDNEY DISEASE) STAGE 2, GFR 60-89 ML/MIN: ICD-10-CM

## 2025-08-04 DIAGNOSIS — Z83.3 FAMILY HISTORY OF DIABETES MELLITUS: ICD-10-CM

## 2025-08-04 DIAGNOSIS — I10 ESSENTIAL HYPERTENSION WITH GOAL BLOOD PRESSURE LESS THAN 130/80: ICD-10-CM

## 2025-08-04 DIAGNOSIS — K08.9 POOR DENTITION: ICD-10-CM

## 2025-08-04 DIAGNOSIS — G89.29 CHRONIC PAIN OF RIGHT KNEE: ICD-10-CM

## 2025-08-04 DIAGNOSIS — I50.22 CHRONIC SYSTOLIC CONGESTIVE HEART FAILURE (H): ICD-10-CM

## 2025-08-04 DIAGNOSIS — Z96.652 S/P TOTAL KNEE ARTHROPLASTY, LEFT: ICD-10-CM

## 2025-08-04 DIAGNOSIS — F33.1 MAJOR DEPRESSIVE DISORDER, RECURRENT EPISODE, MODERATE (H): ICD-10-CM

## 2025-08-04 DIAGNOSIS — E78.5 HYPERLIPIDEMIA LDL GOAL <130: ICD-10-CM

## 2025-08-04 DIAGNOSIS — M25.561 CHRONIC PAIN OF RIGHT KNEE: ICD-10-CM

## 2025-08-04 LAB
ERYTHROCYTE [DISTWIDTH] IN BLOOD BY AUTOMATED COUNT: 11.3 % (ref 10–15)
EST. AVERAGE GLUCOSE BLD GHB EST-MCNC: 100 MG/DL
HBA1C MFR BLD: 5.1 % (ref 0–5.6)
HCT VFR BLD AUTO: 35.7 % (ref 35–47)
HGB BLD-MCNC: 11.6 G/DL (ref 11.7–15.7)
MCH RBC QN AUTO: 36.8 PG (ref 26.5–33)
MCHC RBC AUTO-ENTMCNC: 32.5 G/DL (ref 31.5–36.5)
MCV RBC AUTO: 113 FL (ref 78–100)
PLATELET # BLD AUTO: 215 10E3/UL (ref 150–450)
RBC # BLD AUTO: 3.15 10E6/UL (ref 3.8–5.2)
WBC # BLD AUTO: 5 10E3/UL (ref 4–11)

## 2025-08-04 PROCEDURE — 80053 COMPREHEN METABOLIC PANEL: CPT | Performed by: PHYSICIAN ASSISTANT

## 2025-08-04 PROCEDURE — 36415 COLL VENOUS BLD VENIPUNCTURE: CPT | Performed by: PHYSICIAN ASSISTANT

## 2025-08-04 PROCEDURE — 99214 OFFICE O/P EST MOD 30 MIN: CPT | Performed by: PHYSICIAN ASSISTANT

## 2025-08-04 PROCEDURE — 3075F SYST BP GE 130 - 139MM HG: CPT | Performed by: PHYSICIAN ASSISTANT

## 2025-08-04 PROCEDURE — 83036 HEMOGLOBIN GLYCOSYLATED A1C: CPT | Performed by: PHYSICIAN ASSISTANT

## 2025-08-04 PROCEDURE — G2211 COMPLEX E/M VISIT ADD ON: HCPCS | Performed by: PHYSICIAN ASSISTANT

## 2025-08-04 PROCEDURE — 85027 COMPLETE CBC AUTOMATED: CPT | Performed by: PHYSICIAN ASSISTANT

## 2025-08-04 PROCEDURE — 3078F DIAST BP <80 MM HG: CPT | Performed by: PHYSICIAN ASSISTANT

## 2025-08-04 PROCEDURE — 96127 BRIEF EMOTIONAL/BEHAV ASSMT: CPT | Performed by: PHYSICIAN ASSISTANT

## 2025-08-04 PROCEDURE — 80061 LIPID PANEL: CPT | Performed by: PHYSICIAN ASSISTANT

## 2025-08-04 PROCEDURE — 1126F AMNT PAIN NOTED NONE PRSNT: CPT | Performed by: PHYSICIAN ASSISTANT

## 2025-08-04 RX ORDER — METOPROLOL SUCCINATE 50 MG/1
TABLET, EXTENDED RELEASE ORAL
Qty: 360 TABLET | Refills: 1 | Status: SHIPPED | OUTPATIENT
Start: 2025-08-04

## 2025-08-04 RX ORDER — FUROSEMIDE 20 MG/1
TABLET ORAL
Qty: 90 TABLET | Refills: 1 | Status: SHIPPED | OUTPATIENT
Start: 2025-08-04

## 2025-08-04 RX ORDER — AMOXICILLIN 500 MG/1
2000 CAPSULE ORAL
Qty: 4 CAPSULE | Refills: 1 | Status: SHIPPED | OUTPATIENT
Start: 2025-08-04

## 2025-08-04 RX ORDER — BUPROPION HYDROCHLORIDE 300 MG/1
300 TABLET ORAL EVERY MORNING
Qty: 90 TABLET | Refills: 3 | Status: CANCELLED | OUTPATIENT
Start: 2025-08-04

## 2025-08-04 RX ORDER — LOSARTAN POTASSIUM 25 MG/1
25 TABLET ORAL DAILY
Qty: 90 TABLET | Refills: 1 | Status: SHIPPED | OUTPATIENT
Start: 2025-08-04

## 2025-08-04 RX ORDER — BUPROPION HYDROCHLORIDE 150 MG/1
150 TABLET, EXTENDED RELEASE ORAL 2 TIMES DAILY
Qty: 180 TABLET | Refills: 1 | Status: SHIPPED | OUTPATIENT
Start: 2025-08-04

## 2025-08-04 RX ORDER — ROSUVASTATIN CALCIUM 20 MG/1
20 TABLET, COATED ORAL DAILY
Qty: 90 TABLET | Refills: 1 | Status: SHIPPED | OUTPATIENT
Start: 2025-08-04

## 2025-08-04 ASSESSMENT — PAIN SCALES - GENERAL: PAINLEVEL_OUTOF10: NO PAIN (0)

## 2025-08-05 ENCOUNTER — TELEPHONE (OUTPATIENT)
Dept: FAMILY MEDICINE | Facility: CLINIC | Age: 55
End: 2025-08-05
Payer: MEDICAID

## 2025-08-05 ENCOUNTER — PATIENT OUTREACH (OUTPATIENT)
Dept: CARE COORDINATION | Facility: CLINIC | Age: 55
End: 2025-08-05
Payer: MEDICAID

## 2025-08-05 DIAGNOSIS — K08.89 PAIN, DENTAL: Primary | ICD-10-CM

## 2025-08-05 LAB
ALBUMIN SERPL BCG-MCNC: 3.8 G/DL (ref 3.5–5.2)
ALP SERPL-CCNC: 78 U/L (ref 40–150)
ALT SERPL W P-5'-P-CCNC: 31 U/L (ref 0–50)
ANION GAP SERPL CALCULATED.3IONS-SCNC: 9 MMOL/L (ref 7–15)
AST SERPL W P-5'-P-CCNC: 31 U/L (ref 0–45)
BILIRUB SERPL-MCNC: 0.3 MG/DL
BUN SERPL-MCNC: 19 MG/DL (ref 6–20)
CALCIUM SERPL-MCNC: 9.1 MG/DL (ref 8.8–10.4)
CHLORIDE SERPL-SCNC: 103 MMOL/L (ref 98–107)
CHOLEST SERPL-MCNC: 119 MG/DL
CREAT SERPL-MCNC: 0.89 MG/DL (ref 0.51–0.95)
EGFRCR SERPLBLD CKD-EPI 2021: 77 ML/MIN/1.73M2
FASTING STATUS PATIENT QL REPORTED: NO
FASTING STATUS PATIENT QL REPORTED: NO
GLUCOSE SERPL-MCNC: 87 MG/DL (ref 70–99)
HCO3 SERPL-SCNC: 23 MMOL/L (ref 22–29)
HDLC SERPL-MCNC: 53 MG/DL
LDLC SERPL CALC-MCNC: 49 MG/DL
NONHDLC SERPL-MCNC: 66 MG/DL
POTASSIUM SERPL-SCNC: 4.7 MMOL/L (ref 3.4–5.3)
PROT SERPL-MCNC: 7.1 G/DL (ref 6.4–8.3)
SODIUM SERPL-SCNC: 135 MMOL/L (ref 135–145)
TRIGL SERPL-MCNC: 87 MG/DL

## 2025-08-05 RX ORDER — AMOXICILLIN 500 MG/1
500 CAPSULE ORAL 3 TIMES DAILY
Qty: 21 CAPSULE | Refills: 0 | Status: SHIPPED | OUTPATIENT
Start: 2025-08-05 | End: 2025-08-12

## 2025-08-07 ENCOUNTER — PATIENT OUTREACH (OUTPATIENT)
Dept: CARE COORDINATION | Facility: CLINIC | Age: 55
End: 2025-08-07
Payer: MEDICAID

## 2025-08-13 ENCOUNTER — TELEPHONE (OUTPATIENT)
Dept: FAMILY MEDICINE | Facility: CLINIC | Age: 55
End: 2025-08-13
Payer: MEDICAID

## 2025-08-21 ENCOUNTER — TELEPHONE (OUTPATIENT)
Dept: FAMILY MEDICINE | Facility: CLINIC | Age: 55
End: 2025-08-21
Payer: MEDICAID

## 2025-09-02 DIAGNOSIS — E55.9 VITAMIN D DEFICIENCY: ICD-10-CM

## 2025-09-02 RX ORDER — ERGOCALCIFEROL 1.25 MG/1
50000 CAPSULE, LIQUID FILLED ORAL WEEKLY
Qty: 12 CAPSULE | Refills: 0 | Status: SHIPPED | OUTPATIENT
Start: 2025-09-02

## 2025-09-03 ENCOUNTER — VIRTUAL VISIT (OUTPATIENT)
Dept: FAMILY MEDICINE | Facility: CLINIC | Age: 55
End: 2025-09-03
Payer: MEDICARE

## 2025-09-03 DIAGNOSIS — Z53.9 DIAGNOSIS NOT YET DEFINED: Primary | ICD-10-CM

## (undated) DEVICE — INTRODUCER CATH VASC 5FRX10CM  MPIS-501-NT-U-SST

## (undated) DEVICE — INTRO SHEATH 7FRX10CM PINNACLE RSS702

## (undated) DEVICE — CATH DIAG 4FR ANG PIG 538453S

## (undated) DEVICE — CATH ANGIO INFINITI JL4 4FRX100CM 538420

## (undated) DEVICE — CATH ANGIO INFINITI JR4 4FRX100CM 538421

## (undated) DEVICE — Device

## (undated) DEVICE — WIRE GUIDE 0.035"X260CM SAFE-T-J EXCHANGE G00517

## (undated) DEVICE — TOTE ANGIO CORP PC15AT SAN32CC83O

## (undated) DEVICE — DEFIB PRO-PADZ LVP LQD GEL ADULT 8900-2105-01

## (undated) DEVICE — SYR ANGIOGRAPHY MULTIUSE KIT ACIST 014612

## (undated) DEVICE — SLEEVE TR BAND RADIAL COMPRESSION DEVICE 24CM TRB24-REG

## (undated) DEVICE — KIT HAND CONTROL ANGIOTOUCH ACIST 65CM AT-P65

## (undated) DEVICE — INTRO GLIDESHEATH SLENDER 6FR 10X45CM 60-1060

## (undated) RX ORDER — HEPARIN SODIUM 1000 [USP'U]/ML
INJECTION, SOLUTION INTRAVENOUS; SUBCUTANEOUS
Status: DISPENSED
Start: 2019-01-28

## (undated) RX ORDER — NITROGLYCERIN 5 MG/ML
VIAL (ML) INTRAVENOUS
Status: DISPENSED
Start: 2019-01-28

## (undated) RX ORDER — FENTANYL CITRATE 50 UG/ML
INJECTION, SOLUTION INTRAMUSCULAR; INTRAVENOUS
Status: DISPENSED
Start: 2019-01-28

## (undated) RX ORDER — VERAPAMIL HYDROCHLORIDE 2.5 MG/ML
INJECTION, SOLUTION INTRAVENOUS
Status: DISPENSED
Start: 2019-01-28